# Patient Record
Sex: FEMALE | Race: WHITE | NOT HISPANIC OR LATINO | Employment: OTHER | URBAN - METROPOLITAN AREA
[De-identification: names, ages, dates, MRNs, and addresses within clinical notes are randomized per-mention and may not be internally consistent; named-entity substitution may affect disease eponyms.]

---

## 2017-10-31 ENCOUNTER — HISTORICAL (OUTPATIENT)
Dept: ADMINISTRATIVE | Facility: HOSPITAL | Age: 77
End: 2017-10-31

## 2017-11-27 ENCOUNTER — HISTORICAL (OUTPATIENT)
Dept: ADMINISTRATIVE | Facility: HOSPITAL | Age: 77
End: 2017-11-27

## 2018-01-24 ENCOUNTER — HISTORICAL (OUTPATIENT)
Dept: RADIOLOGY | Facility: HOSPITAL | Age: 78
End: 2018-01-24

## 2018-02-05 ENCOUNTER — HISTORICAL (OUTPATIENT)
Dept: ADMINISTRATIVE | Facility: HOSPITAL | Age: 78
End: 2018-02-05

## 2018-02-05 LAB
ERYTHROCYTE [DISTWIDTH] IN BLOOD BY AUTOMATED COUNT: 17.2 % (ref 11.5–17)
FERRITIN SERPL-MCNC: 5.8 NG/ML (ref 8–388)
FOLATE SERPL-MCNC: 41.5 NG/ML (ref 3.1–17.5)
HCT VFR BLD AUTO: 31.2 % (ref 37–47)
HGB BLD-MCNC: 9.4 GM/DL (ref 12–16)
IRON SATN MFR SERPL: 5.1 % (ref 20–50)
IRON SERPL-MCNC: 25 MCG/DL (ref 50–175)
MCH RBC QN AUTO: 24 PG (ref 27–31)
MCHC RBC AUTO-ENTMCNC: 30.1 GM/DL (ref 33–36)
MCV RBC AUTO: 79.6 FL (ref 80–94)
PLATELET # BLD AUTO: 85 X10(3)/MCL (ref 130–400)
PMV BLD AUTO: 10.9 FL (ref 9.4–12.4)
RBC # BLD AUTO: 3.92 X10(6)/MCL (ref 4.2–5.4)
TIBC SERPL-MCNC: 488 MCG/DL (ref 250–450)
TRANSFERRIN SERPL-MCNC: 363 MG/DL (ref 200–360)
VIT B12 SERPL-MCNC: 1555 PG/ML (ref 193–986)
WBC # SPEC AUTO: 3.1 X10(3)/MCL (ref 4.5–11.5)

## 2018-02-14 ENCOUNTER — HISTORICAL (OUTPATIENT)
Dept: ADMINISTRATIVE | Facility: HOSPITAL | Age: 78
End: 2018-02-14

## 2018-02-14 LAB
ALBUMIN SERPL-MCNC: 3.4 GM/DL (ref 3.4–5)
ALBUMIN/GLOB SERPL: 1.1 RATIO (ref 1.1–2)
ALP SERPL-CCNC: 82 UNIT/L (ref 38–126)
ALT SERPL-CCNC: 23 UNIT/L (ref 12–78)
AST SERPL-CCNC: 32 UNIT/L (ref 15–37)
BILIRUB SERPL-MCNC: 1.1 MG/DL (ref 0.2–1)
BILIRUBIN DIRECT+TOT PNL SERPL-MCNC: 0.3 MG/DL (ref 0–0.5)
BILIRUBIN DIRECT+TOT PNL SERPL-MCNC: 0.8 MG/DL (ref 0–0.8)
BUN SERPL-MCNC: 12 MG/DL (ref 7–18)
CALCIUM SERPL-MCNC: 8.8 MG/DL (ref 8.5–10.1)
CHLORIDE SERPL-SCNC: 108 MMOL/L (ref 98–107)
CO2 SERPL-SCNC: 28 MMOL/L (ref 21–32)
CREAT SERPL-MCNC: 0.88 MG/DL (ref 0.55–1.02)
ERYTHROCYTE [DISTWIDTH] IN BLOOD BY AUTOMATED COUNT: 20.1 % (ref 11.5–17)
GLOBULIN SER-MCNC: 3 GM/DL (ref 2.4–3.5)
GLUCOSE SERPL-MCNC: 122 MG/DL (ref 74–106)
HCT VFR BLD AUTO: 32.4 % (ref 37–47)
HGB BLD-MCNC: 9.3 GM/DL (ref 12–16)
MAGNESIUM SERPL-MCNC: 2.4 MG/DL (ref 1.8–2.4)
MCH RBC QN AUTO: 24.9 PG (ref 27–31)
MCHC RBC AUTO-ENTMCNC: 28.7 GM/DL (ref 33–36)
MCV RBC AUTO: 86.9 FL (ref 80–94)
PLATELET # BLD AUTO: 82 X10(3)/MCL (ref 130–400)
PMV BLD AUTO: 10.8 FL (ref 9.4–12.4)
POTASSIUM SERPL-SCNC: 4.5 MMOL/L (ref 3.5–5.1)
PROT SERPL-MCNC: 6.4 GM/DL (ref 6.4–8.2)
RBC # BLD AUTO: 3.73 X10(6)/MCL (ref 4.2–5.4)
SODIUM SERPL-SCNC: 140 MMOL/L (ref 136–145)
WBC # SPEC AUTO: 3.2 X10(3)/MCL (ref 4.5–11.5)

## 2018-06-06 ENCOUNTER — HISTORICAL (OUTPATIENT)
Dept: ADMINISTRATIVE | Facility: HOSPITAL | Age: 78
End: 2018-06-06

## 2018-06-06 ENCOUNTER — HISTORICAL (OUTPATIENT)
Dept: LAB | Facility: HOSPITAL | Age: 78
End: 2018-06-06

## 2018-06-06 LAB
ABS NEUT (OLG): 3.79 X10(3)/MCL (ref 2.1–9.2)
ALBUMIN SERPL-MCNC: 3.9 GM/DL (ref 3.4–5)
ALBUMIN/GLOB SERPL: 1.2 RATIO (ref 1.1–2)
ALP SERPL-CCNC: 95 UNIT/L (ref 38–126)
ALT SERPL-CCNC: 42 UNIT/L (ref 12–78)
AMYLASE SERPL-CCNC: 84 UNIT/L (ref 25–115)
APPEARANCE, UA: CLEAR
AST SERPL-CCNC: 47 UNIT/L (ref 15–37)
BACTERIA SPEC CULT: ABNORMAL /HPF
BASOPHILS # BLD AUTO: 0 X10(3)/MCL (ref 0–0.2)
BASOPHILS NFR BLD AUTO: 0 %
BILIRUB SERPL-MCNC: 2 MG/DL (ref 0.2–1)
BILIRUB UR QL STRIP: NEGATIVE
BILIRUBIN DIRECT+TOT PNL SERPL-MCNC: 0.4 MG/DL (ref 0–0.5)
BILIRUBIN DIRECT+TOT PNL SERPL-MCNC: 1.6 MG/DL (ref 0–0.8)
BUN SERPL-MCNC: 22 MG/DL (ref 7–18)
CALCIUM SERPL-MCNC: 9.5 MG/DL (ref 8.5–10.1)
CHLORIDE SERPL-SCNC: 102 MMOL/L (ref 98–107)
CO2 SERPL-SCNC: 32 MMOL/L (ref 21–32)
COLOR UR: ABNORMAL
CREAT SERPL-MCNC: 1.03 MG/DL (ref 0.55–1.02)
EOSINOPHIL # BLD AUTO: 0.1 X10(3)/MCL (ref 0–0.9)
EOSINOPHIL NFR BLD AUTO: 1 %
ERYTHROCYTE [DISTWIDTH] IN BLOOD BY AUTOMATED COUNT: 13.8 % (ref 11.5–17)
GLOBULIN SER-MCNC: 3.3 GM/DL (ref 2.4–3.5)
GLUCOSE (UA): NEGATIVE
GLUCOSE SERPL-MCNC: 89 MG/DL (ref 74–106)
H PYLORI AB SER IA-ACNC: NEGATIVE
HCT VFR BLD AUTO: 48.6 % (ref 37–47)
HGB BLD-MCNC: 15.5 GM/DL (ref 12–16)
HGB UR QL STRIP: NEGATIVE
KETONES UR QL STRIP: ABNORMAL
LEUKOCYTE ESTERASE UR QL STRIP: ABNORMAL
LIPASE SERPL-CCNC: 246 UNIT/L (ref 73–393)
LYMPHOCYTES # BLD AUTO: 1 X10(3)/MCL (ref 0.6–4.6)
LYMPHOCYTES NFR BLD AUTO: 18 %
MCH RBC QN AUTO: 32.1 PG (ref 27–31)
MCHC RBC AUTO-ENTMCNC: 31.9 GM/DL (ref 33–36)
MCV RBC AUTO: 100.6 FL (ref 80–94)
MONOCYTES # BLD AUTO: 0.8 X10(3)/MCL (ref 0.1–1.3)
MONOCYTES NFR BLD AUTO: 14 %
NEUTROPHILS # BLD AUTO: 3.79 X10(3)/MCL (ref 1.4–7.9)
NEUTROPHILS NFR BLD AUTO: 65 %
NITRITE UR QL STRIP: NEGATIVE
PH UR STRIP: 6.5 [PH] (ref 5–9)
PLATELET # BLD AUTO: 83 X10(3)/MCL (ref 130–400)
PMV BLD AUTO: 11.6 FL (ref 9.4–12.4)
POTASSIUM SERPL-SCNC: 4.6 MMOL/L (ref 3.5–5.1)
PROT SERPL-MCNC: 7.2 GM/DL (ref 6.4–8.2)
PROT UR QL STRIP: NEGATIVE
RBC # BLD AUTO: 4.83 X10(6)/MCL (ref 4.2–5.4)
RBC #/AREA URNS HPF: ABNORMAL /[HPF]
SODIUM SERPL-SCNC: 139 MMOL/L (ref 136–145)
SP GR UR STRIP: 1.02 (ref 1–1.03)
SQUAMOUS EPITHELIAL, UA: ABNORMAL
UA WBC MAN: ABNORMAL /HPF
UROBILINOGEN UR STRIP-ACNC: 1
WBC # SPEC AUTO: 5.8 X10(3)/MCL (ref 4.5–11.5)

## 2018-09-25 ENCOUNTER — HISTORICAL (OUTPATIENT)
Dept: ADMINISTRATIVE | Facility: HOSPITAL | Age: 78
End: 2018-09-25

## 2018-10-29 ENCOUNTER — HISTORICAL (OUTPATIENT)
Dept: ADMINISTRATIVE | Facility: HOSPITAL | Age: 78
End: 2018-10-29

## 2018-10-29 LAB
ALBUMIN SERPL-MCNC: 3.5 GM/DL (ref 3.4–5)
ALP SERPL-CCNC: 77 UNIT/L (ref 38–126)
ALT SERPL-CCNC: 32 UNIT/L (ref 12–78)
AST SERPL-CCNC: 39 UNIT/L (ref 15–37)
BILIRUB SERPL-MCNC: 2.3 MG/DL (ref 0.2–1)
BILIRUBIN DIRECT+TOT PNL SERPL-MCNC: 0.5 MG/DL (ref 0–0.2)
BILIRUBIN DIRECT+TOT PNL SERPL-MCNC: 1.8 MG/DL (ref 0–0.8)
BUN SERPL-MCNC: 22 MG/DL (ref 7–18)
CALCIUM SERPL-MCNC: 8.7 MG/DL (ref 8.5–10.1)
CHLORIDE SERPL-SCNC: 106 MMOL/L (ref 98–107)
CO2 SERPL-SCNC: 29 MMOL/L (ref 21–32)
CREAT SERPL-MCNC: 0.94 MG/DL (ref 0.55–1.02)
CREAT/UREA NIT SERPL: 23.4
ERYTHROCYTE [DISTWIDTH] IN BLOOD BY AUTOMATED COUNT: 12.6 % (ref 11.5–17)
GLUCOSE SERPL-MCNC: 108 MG/DL (ref 74–106)
HCT VFR BLD AUTO: 43.8 % (ref 37–47)
HGB BLD-MCNC: 13.8 GM/DL (ref 12–16)
LIVER PROFILE INTERP: ABNORMAL
MCH RBC QN AUTO: 31.6 PG (ref 27–31)
MCHC RBC AUTO-ENTMCNC: 31.5 GM/DL (ref 33–36)
MCV RBC AUTO: 100.2 FL (ref 80–94)
PLATELET # BLD AUTO: 54 X10(3)/MCL (ref 130–400)
PMV BLD AUTO: 11.1 FL (ref 9.4–12.4)
POTASSIUM SERPL-SCNC: 4.3 MMOL/L (ref 3.5–5.1)
PROT SERPL-MCNC: 6.4 GM/DL (ref 6.4–8.2)
RBC # BLD AUTO: 4.37 X10(6)/MCL (ref 4.2–5.4)
SODIUM SERPL-SCNC: 141 MMOL/L (ref 136–145)
WBC # SPEC AUTO: 3.2 X10(3)/MCL (ref 4.5–11.5)

## 2019-03-04 ENCOUNTER — HISTORICAL (OUTPATIENT)
Dept: ADMINISTRATIVE | Facility: HOSPITAL | Age: 79
End: 2019-03-04

## 2019-03-04 LAB
ALBUMIN SERPL-MCNC: 3.5 GM/DL (ref 3.4–5)
ALBUMIN/GLOB SERPL: 1 RATIO (ref 1.1–2)
ALP SERPL-CCNC: 81 UNIT/L (ref 38–126)
ALT SERPL-CCNC: 33 UNIT/L (ref 12–78)
APTT PPP: 27.4 SECOND(S) (ref 24.8–36.9)
AST SERPL-CCNC: 38 UNIT/L (ref 15–37)
BILIRUB SERPL-MCNC: 1.9 MG/DL (ref 0.2–1)
BILIRUBIN DIRECT+TOT PNL SERPL-MCNC: 0.5 MG/DL (ref 0–0.5)
BILIRUBIN DIRECT+TOT PNL SERPL-MCNC: 1.4 MG/DL (ref 0–0.8)
BUN SERPL-MCNC: 15 MG/DL (ref 7–18)
CALCIUM SERPL-MCNC: 8.9 MG/DL (ref 8.5–10.1)
CHLORIDE SERPL-SCNC: 106 MMOL/L (ref 98–107)
CHOLEST SERPL-MCNC: 186 MG/DL (ref 0–200)
CHOLEST/HDLC SERPL: 2.5 {RATIO} (ref 0–4)
CO2 SERPL-SCNC: 31 MMOL/L (ref 21–32)
CREAT SERPL-MCNC: 0.81 MG/DL (ref 0.55–1.02)
ERYTHROCYTE [DISTWIDTH] IN BLOOD BY AUTOMATED COUNT: 13.5 % (ref 11.5–17)
GLOBULIN SER-MCNC: 3.4 GM/DL (ref 2.4–3.5)
GLUCOSE SERPL-MCNC: 105 MG/DL (ref 74–106)
HCT VFR BLD AUTO: 46.1 % (ref 37–47)
HDLC SERPL-MCNC: 75 MG/DL (ref 35–60)
HGB BLD-MCNC: 14.5 GM/DL (ref 12–16)
INR PPP: 1.1 (ref 0–1.3)
LDLC SERPL CALC-MCNC: 95 MG/DL (ref 0–129)
MAGNESIUM SERPL-MCNC: 2 MG/DL (ref 1.8–2.4)
MCH RBC QN AUTO: 31.3 PG (ref 27–31)
MCHC RBC AUTO-ENTMCNC: 31.5 GM/DL (ref 33–36)
MCV RBC AUTO: 99.6 FL (ref 80–94)
PLATELET # BLD AUTO: 61 X10(3)/MCL (ref 130–400)
PMV BLD AUTO: 10.7 FL (ref 9.4–12.4)
POTASSIUM SERPL-SCNC: 3.9 MMOL/L (ref 3.5–5.1)
PROT SERPL-MCNC: 6.9 GM/DL (ref 6.4–8.2)
PROTHROMBIN TIME: 14.6 SECOND(S) (ref 12.2–14.7)
RBC # BLD AUTO: 4.63 X10(6)/MCL (ref 4.2–5.4)
SODIUM SERPL-SCNC: 140 MMOL/L (ref 136–145)
TRIGL SERPL-MCNC: 79 MG/DL (ref 30–150)
TSH SERPL-ACNC: 2.72 MIU/L (ref 0.36–3.74)
VLDLC SERPL CALC-MCNC: 16 MG/DL
WBC # SPEC AUTO: 3.7 X10(3)/MCL (ref 4.5–11.5)

## 2019-03-27 ENCOUNTER — HISTORICAL (OUTPATIENT)
Dept: ADMINISTRATIVE | Facility: HOSPITAL | Age: 79
End: 2019-03-27

## 2019-03-27 LAB
INFLUENZA A ANTIGEN, POC: POSITIVE
INFLUENZA B ANTIGEN, POC: NEGATIVE

## 2019-06-12 ENCOUNTER — HISTORICAL (OUTPATIENT)
Dept: LAB | Facility: HOSPITAL | Age: 79
End: 2019-06-12

## 2019-06-12 LAB
ABS NEUT (OLG): 2.9 X10(3)/MCL (ref 2.1–9.2)
ALBUMIN SERPL-MCNC: 3.7 GM/DL (ref 3.4–5)
ALBUMIN/GLOB SERPL: 1.2 RATIO (ref 1.1–2)
ALP SERPL-CCNC: 78 UNIT/L (ref 38–126)
ALT SERPL-CCNC: 28 UNIT/L (ref 12–78)
AST SERPL-CCNC: 37 UNIT/L (ref 15–37)
BASOPHILS # BLD AUTO: 0 X10(3)/MCL (ref 0–0.2)
BASOPHILS NFR BLD AUTO: 0 %
BILIRUB SERPL-MCNC: 1.9 MG/DL (ref 0.2–1)
BILIRUBIN DIRECT+TOT PNL SERPL-MCNC: 0.4 MG/DL (ref 0–0.5)
BILIRUBIN DIRECT+TOT PNL SERPL-MCNC: 1.5 MG/DL (ref 0–0.8)
BUN SERPL-MCNC: 23 MG/DL (ref 7–18)
CALCIUM SERPL-MCNC: 9.2 MG/DL (ref 8.5–10.1)
CHLORIDE SERPL-SCNC: 107 MMOL/L (ref 98–107)
CO2 SERPL-SCNC: 30 MMOL/L (ref 21–32)
CREAT SERPL-MCNC: 0.97 MG/DL (ref 0.55–1.02)
EOSINOPHIL # BLD AUTO: 0 X10(3)/MCL (ref 0–0.9)
EOSINOPHIL NFR BLD AUTO: 1 %
ERYTHROCYTE [DISTWIDTH] IN BLOOD BY AUTOMATED COUNT: 13.8 % (ref 11.5–17)
FERRITIN SERPL-MCNC: 46.5 NG/ML (ref 8–388)
GLOBULIN SER-MCNC: 3.2 GM/DL (ref 2.4–3.5)
GLUCOSE SERPL-MCNC: 72 MG/DL (ref 74–106)
HCT VFR BLD AUTO: 47.6 % (ref 37–47)
HGB BLD-MCNC: 14.7 GM/DL (ref 12–16)
IRON SATN MFR SERPL: 34 % (ref 20–50)
IRON SERPL-MCNC: 117 MCG/DL (ref 50–175)
LYMPHOCYTES # BLD AUTO: 0.7 X10(3)/MCL (ref 0.6–4.6)
LYMPHOCYTES NFR BLD AUTO: 17 %
MCH RBC QN AUTO: 31.3 PG (ref 27–31)
MCHC RBC AUTO-ENTMCNC: 30.9 GM/DL (ref 33–36)
MCV RBC AUTO: 101.5 FL (ref 80–94)
MONOCYTES # BLD AUTO: 0.5 X10(3)/MCL (ref 0.1–1.3)
MONOCYTES NFR BLD AUTO: 12 %
NEUTROPHILS # BLD AUTO: 2.9 X10(3)/MCL (ref 2.1–9.2)
NEUTROPHILS NFR BLD AUTO: 69 %
PLATELET # BLD AUTO: 67 X10(3)/MCL (ref 130–400)
PMV BLD AUTO: 11.9 FL (ref 9.4–12.4)
POTASSIUM SERPL-SCNC: 4.5 MMOL/L (ref 3.5–5.1)
PROT SERPL-MCNC: 6.9 GM/DL (ref 6.4–8.2)
RBC # BLD AUTO: 4.69 X10(6)/MCL (ref 4.2–5.4)
SODIUM SERPL-SCNC: 142 MMOL/L (ref 136–145)
TIBC SERPL-MCNC: 344 MCG/DL (ref 250–450)
TRANSFERRIN SERPL-MCNC: 265 MG/DL (ref 200–360)
VIT B12 SERPL-MCNC: 1408 PG/ML (ref 193–986)
WBC # SPEC AUTO: 4.2 X10(3)/MCL (ref 4.5–11.5)

## 2019-10-16 ENCOUNTER — HISTORICAL (OUTPATIENT)
Dept: ADMINISTRATIVE | Facility: HOSPITAL | Age: 79
End: 2019-10-16

## 2019-10-16 LAB
ABS NEUT (OLG): 1.61 X10(3)/MCL (ref 2.1–9.2)
ACANTHOCYTES (OLG): 0
ALBUMIN SERPL-MCNC: 3.4 GM/DL (ref 3.4–5)
ALP SERPL-CCNC: 96 UNIT/L (ref 38–126)
ALT SERPL-CCNC: 34 UNIT/L (ref 12–78)
AST SERPL-CCNC: 42 UNIT/L (ref 15–37)
BILIRUB SERPL-MCNC: 1.7 MG/DL (ref 0.2–1)
BILIRUBIN DIRECT+TOT PNL SERPL-MCNC: 0.5 MG/DL (ref 0–0.5)
BILIRUBIN DIRECT+TOT PNL SERPL-MCNC: 1.2 MG/DL (ref 0–0.8)
BUN SERPL-MCNC: 14 MG/DL (ref 7–18)
BURR CELLS BLD QL SMEAR: 0
CALCIUM SERPL-MCNC: 8.7 MG/DL (ref 8.5–10.1)
CHLORIDE SERPL-SCNC: 109 MMOL/L (ref 98–107)
CO2 SERPL-SCNC: 30 MMOL/L (ref 21–32)
CREAT SERPL-MCNC: 0.89 MG/DL (ref 0.55–1.02)
CREAT/UREA NIT SERPL: 15.7
DACRYOCYTES BLD QL SMEAR: 0
ERYTHROCYTE [DISTWIDTH] IN BLOOD BY AUTOMATED COUNT: 13.4 % (ref 11.5–17)
GLUCOSE SERPL-MCNC: 78 MG/DL (ref 74–106)
HCT VFR BLD AUTO: 44 % (ref 37–47)
HGB BLD-MCNC: 13.7 GM/DL (ref 12–16)
LIVER PROFILE INTERP: ABNORMAL
LYMPHOCYTES NFR BLD MANUAL: 19 % (ref 13–40)
MCH RBC QN AUTO: 31.1 PG (ref 27–31)
MCHC RBC AUTO-ENTMCNC: 31.1 GM/DL (ref 33–36)
MCV RBC AUTO: 99.8 FL (ref 80–94)
MONOCYTES NFR BLD MANUAL: 10 % (ref 2–11)
NEUTROPHILS NFR BLD MANUAL: 70 % (ref 47–80)
OVALOCYTES BLD QL SMEAR: 0
PLATELET # BLD AUTO: 54 X10(3)/MCL (ref 130–400)
PLATELET # BLD EST: NORMAL 10*3/UL
PMV BLD AUTO: 11.2 FL (ref 7.4–10.4)
POTASSIUM SERPL-SCNC: 4 MMOL/L (ref 3.5–5.1)
PROT SERPL-MCNC: 6.5 GM/DL (ref 6.4–8.2)
RBC # BLD AUTO: 4.41 X10(6)/MCL (ref 4.2–5.4)
SODIUM SERPL-SCNC: 142 MMOL/L (ref 136–145)
SPHEROCYTES BLD QL SMEAR: 0
WBC # SPEC AUTO: 2.9 X10(3)/MCL (ref 4.5–11.5)

## 2019-12-19 ENCOUNTER — HISTORICAL (OUTPATIENT)
Dept: ADMINISTRATIVE | Facility: HOSPITAL | Age: 79
End: 2019-12-19

## 2019-12-19 LAB
BUN SERPL-MCNC: 16 MG/DL (ref 7–18)
CREAT SERPL-MCNC: 0.84 MG/DL (ref 0.55–1.02)

## 2020-03-05 ENCOUNTER — HISTORICAL (OUTPATIENT)
Dept: ADMINISTRATIVE | Facility: HOSPITAL | Age: 80
End: 2020-03-05

## 2020-03-05 LAB
ABS NEUT (OLG): 2.03 X10(3)/MCL (ref 2.1–9.2)
ALBUMIN SERPL-MCNC: 3 GM/DL (ref 3.4–5)
ALBUMIN/GLOB SERPL: 0.9 {RATIO}
ALP SERPL-CCNC: 97 UNIT/L (ref 38–126)
ALT SERPL-CCNC: 39 UNIT/L (ref 12–78)
AST SERPL-CCNC: 49 UNIT/L (ref 15–37)
BASOPHILS NFR BLD MANUAL: 1 % (ref 0–2)
BILIRUB SERPL-MCNC: 2.1 MG/DL (ref 0.2–1)
BILIRUBIN DIRECT+TOT PNL SERPL-MCNC: 0.5 MG/DL (ref 0–0.2)
BILIRUBIN DIRECT+TOT PNL SERPL-MCNC: 1.6 MG/DL (ref 0–0.8)
BUN SERPL-MCNC: 19 MG/DL (ref 7–18)
CALCIUM SERPL-MCNC: 8.7 MG/DL (ref 8.5–10.1)
CHLORIDE SERPL-SCNC: 107 MMOL/L (ref 98–107)
CO2 SERPL-SCNC: 28 MMOL/L (ref 21–32)
CREAT SERPL-MCNC: 0.98 MG/DL (ref 0.55–1.02)
EOSINOPHIL NFR BLD MANUAL: 4 % (ref 0–8)
ERYTHROCYTE [DISTWIDTH] IN BLOOD BY AUTOMATED COUNT: 14.5 % (ref 11.5–17)
GLOBULIN SER-MCNC: 3.4 GM/DL (ref 2.4–3.5)
GLUCOSE SERPL-MCNC: 179 MG/DL (ref 74–106)
HCT VFR BLD AUTO: 46.3 % (ref 37–47)
HGB BLD-MCNC: 14 GM/DL (ref 12–16)
LYMPHOCYTES NFR BLD MANUAL: 17 % (ref 13–40)
MCH RBC QN AUTO: 30.3 PG (ref 27–31)
MCHC RBC AUTO-ENTMCNC: 30.2 GM/DL (ref 33–36)
MCV RBC AUTO: 100.2 FL (ref 80–94)
MONOCYTES NFR BLD MANUAL: 3 % (ref 2–11)
NEUTROPHILS NFR BLD MANUAL: 75 % (ref 47–80)
PLATELET # BLD AUTO: 60 X10(3)/MCL (ref 130–400)
PLATELET # BLD EST: NORMAL 10*3/UL
PMV BLD AUTO: 12.1 FL (ref 7.4–10.4)
POTASSIUM SERPL-SCNC: 3.8 MMOL/L (ref 3.5–5.1)
PROT SERPL-MCNC: 6.4 GM/DL (ref 6.4–8.2)
RBC # BLD AUTO: 4.62 X10(6)/MCL (ref 4.2–5.4)
SODIUM SERPL-SCNC: 140 MMOL/L (ref 136–145)
WBC # SPEC AUTO: 2.9 X10(3)/MCL (ref 4.5–11.5)

## 2020-03-06 ENCOUNTER — HISTORICAL (OUTPATIENT)
Dept: ADMINISTRATIVE | Facility: HOSPITAL | Age: 80
End: 2020-03-06

## 2020-03-06 LAB
CHOLEST SERPL-MCNC: 190 MG/DL (ref 0–200)
CHOLEST/HDLC SERPL: 2.2 {RATIO} (ref 0–4)
HDLC SERPL-MCNC: 87 MG/DL (ref 35–60)
LDLC SERPL CALC-MCNC: 89 MG/DL (ref 0–129)
TRIGL SERPL-MCNC: 69 MG/DL (ref 30–150)
VLDLC SERPL CALC-MCNC: 14 MG/DL

## 2020-06-27 ENCOUNTER — HISTORICAL (OUTPATIENT)
Dept: URGENT CARE | Facility: CLINIC | Age: 80
End: 2020-06-27

## 2020-07-22 ENCOUNTER — HISTORICAL (OUTPATIENT)
Dept: ADMINISTRATIVE | Facility: HOSPITAL | Age: 80
End: 2020-07-22

## 2020-07-22 LAB
ABS NEUT (OLG): 3.53 X10(3)/MCL (ref 2.1–9.2)
ALBUMIN SERPL-MCNC: 3.1 GM/DL (ref 3.4–5)
ALBUMIN/GLOB SERPL: 0.9 RATIO (ref 1.1–2)
ALP SERPL-CCNC: 112 UNIT/L (ref 40–150)
ALT SERPL-CCNC: 29 UNIT/L (ref 0–55)
AST SERPL-CCNC: 48 UNIT/L (ref 5–34)
BASOPHILS # BLD AUTO: 0 X10(3)/MCL (ref 0–0.2)
BASOPHILS NFR BLD AUTO: 0 %
BILIRUB SERPL-MCNC: 1.3 MG/DL
BILIRUBIN DIRECT+TOT PNL SERPL-MCNC: 0.5 MG/DL (ref 0–0.5)
BILIRUBIN DIRECT+TOT PNL SERPL-MCNC: 0.8 MG/DL (ref 0–0.8)
BUN SERPL-MCNC: 17.2 MG/DL (ref 9.8–20.1)
CALCIUM SERPL-MCNC: 9.2 MG/DL (ref 8.4–10.2)
CHLORIDE SERPL-SCNC: 99 MMOL/L (ref 98–107)
CHOLEST SERPL-MCNC: 172 MG/DL
CHOLEST/HDLC SERPL: 3 {RATIO} (ref 0–5)
CO2 SERPL-SCNC: 28 MMOL/L (ref 23–31)
CREAT SERPL-MCNC: 0.83 MG/DL (ref 0.55–1.02)
EOSINOPHIL # BLD AUTO: 0 X10(3)/MCL (ref 0–0.9)
EOSINOPHIL NFR BLD AUTO: 0 %
ERYTHROCYTE [DISTWIDTH] IN BLOOD BY AUTOMATED COUNT: 13.5 % (ref 11.5–17)
EST. AVERAGE GLUCOSE BLD GHB EST-MCNC: 114 MG/DL
GLOBULIN SER-MCNC: 3.4 GM/DL (ref 2.4–3.5)
GLUCOSE SERPL-MCNC: 174 MG/DL (ref 82–115)
HBA1C MFR BLD: 5.6 %
HCT VFR BLD AUTO: 42.6 % (ref 37–47)
HDLC SERPL-MCNC: 54 MG/DL (ref 35–60)
HGB BLD-MCNC: 13.5 GM/DL (ref 12–16)
IMM GRANULOCYTES # BLD AUTO: 0 10*3/UL
IMM GRANULOCYTES NFR BLD AUTO: 2 %
LDLC SERPL CALC-MCNC: 105 MG/DL (ref 50–140)
LYMPHOCYTES # BLD AUTO: 0.6 X10(3)/MCL (ref 0.6–4.6)
LYMPHOCYTES NFR BLD AUTO: 12 %
MCH RBC QN AUTO: 31 PG (ref 27–31)
MCHC RBC AUTO-ENTMCNC: 31.7 GM/DL (ref 33–36)
MCV RBC AUTO: 97.7 FL (ref 80–94)
MONOCYTES # BLD AUTO: 0.5 X10(3)/MCL (ref 0.1–1.3)
MONOCYTES NFR BLD AUTO: 11 %
NEUTROPHILS # BLD AUTO: 3.53 X10(3)/MCL (ref 2.1–9.2)
NEUTROPHILS NFR BLD AUTO: 74 %
PLATELET # BLD AUTO: 107 X10(3)/MCL (ref 130–400)
PMV BLD AUTO: 10.6 FL (ref 9.4–12.4)
POTASSIUM SERPL-SCNC: 4.6 MMOL/L (ref 3.5–5.1)
PROT SERPL-MCNC: 6.5 GM/DL (ref 5.8–7.6)
RBC # BLD AUTO: 4.36 X10(6)/MCL (ref 4.2–5.4)
SODIUM SERPL-SCNC: 137 MMOL/L (ref 136–145)
TRIGL SERPL-MCNC: 67 MG/DL (ref 37–140)
VLDLC SERPL CALC-MCNC: 13 MG/DL
WBC # SPEC AUTO: 4.8 X10(3)/MCL (ref 4.5–11.5)

## 2021-01-18 ENCOUNTER — HISTORICAL (OUTPATIENT)
Dept: ADMINISTRATIVE | Facility: HOSPITAL | Age: 81
End: 2021-01-18

## 2021-02-04 ENCOUNTER — HISTORICAL (OUTPATIENT)
Dept: ADMINISTRATIVE | Facility: HOSPITAL | Age: 81
End: 2021-02-04

## 2021-02-04 LAB
ABS NEUT (OLG): 2.56 X10(3)/MCL (ref 2.1–9.2)
AFP-TM SERPL-MCNC: 4.8 NG/ML
ALBUMIN SERPL-MCNC: 3.7 GM/DL (ref 3.4–4.8)
ALBUMIN/GLOB SERPL: 1.2 RATIO (ref 1.1–2)
ALP SERPL-CCNC: 87 UNIT/L (ref 40–150)
ALT SERPL-CCNC: 22 UNIT/L (ref 0–55)
AST SERPL-CCNC: 34 UNIT/L (ref 5–34)
BASOPHILS # BLD AUTO: 0 X10(3)/MCL (ref 0–0.2)
BASOPHILS NFR BLD AUTO: 0 %
BILIRUB SERPL-MCNC: 1.4 MG/DL
BILIRUBIN DIRECT+TOT PNL SERPL-MCNC: 0.6 MG/DL (ref 0–0.5)
BILIRUBIN DIRECT+TOT PNL SERPL-MCNC: 0.8 MG/DL (ref 0–0.8)
BUN SERPL-MCNC: 16.4 MG/DL (ref 9.8–20.1)
CALCIUM SERPL-MCNC: 9.6 MG/DL (ref 8.4–10.2)
CHLORIDE SERPL-SCNC: 104 MMOL/L (ref 98–107)
CO2 SERPL-SCNC: 32 MMOL/L (ref 23–31)
CREAT SERPL-MCNC: 0.89 MG/DL (ref 0.55–1.02)
EOSINOPHIL # BLD AUTO: 0 X10(3)/MCL (ref 0–0.9)
EOSINOPHIL NFR BLD AUTO: 1 %
ERYTHROCYTE [DISTWIDTH] IN BLOOD BY AUTOMATED COUNT: 13.9 % (ref 11.5–17)
GLOBULIN SER-MCNC: 3 GM/DL (ref 2.4–3.5)
GLUCOSE SERPL-MCNC: 122 MG/DL (ref 82–115)
HCT VFR BLD AUTO: 42.2 % (ref 37–47)
HGB BLD-MCNC: 13.2 GM/DL (ref 12–16)
LYMPHOCYTES # BLD AUTO: 0.7 X10(3)/MCL (ref 0.6–4.6)
LYMPHOCYTES NFR BLD AUTO: 18 %
MCH RBC QN AUTO: 31.3 PG (ref 27–31)
MCHC RBC AUTO-ENTMCNC: 31.3 GM/DL (ref 33–36)
MCV RBC AUTO: 100 FL (ref 80–94)
MONOCYTES # BLD AUTO: 0.5 X10(3)/MCL (ref 0.1–1.3)
MONOCYTES NFR BLD AUTO: 14 %
NEUTROPHILS # BLD AUTO: 2.56 X10(3)/MCL (ref 2.1–9.2)
NEUTROPHILS NFR BLD AUTO: 66 %
PLATELET # BLD AUTO: 71 X10(3)/MCL (ref 130–400)
PMV BLD AUTO: 11.5 FL (ref 9.4–12.4)
POTASSIUM SERPL-SCNC: 4.5 MMOL/L (ref 3.5–5.1)
PROT SERPL-MCNC: 6.7 GM/DL (ref 5.8–7.6)
RBC # BLD AUTO: 4.22 X10(6)/MCL (ref 4.2–5.4)
SODIUM SERPL-SCNC: 141 MMOL/L (ref 136–145)
WBC # SPEC AUTO: 3.9 X10(3)/MCL (ref 4.5–11.5)

## 2021-02-11 ENCOUNTER — HISTORICAL (OUTPATIENT)
Dept: RADIOLOGY | Facility: HOSPITAL | Age: 81
End: 2021-02-11

## 2021-02-22 ENCOUNTER — HISTORICAL (OUTPATIENT)
Dept: ADMINISTRATIVE | Facility: HOSPITAL | Age: 81
End: 2021-02-22

## 2021-06-17 ENCOUNTER — HISTORICAL (OUTPATIENT)
Dept: ADMINISTRATIVE | Facility: HOSPITAL | Age: 81
End: 2021-06-17

## 2021-08-24 ENCOUNTER — HISTORICAL (OUTPATIENT)
Dept: ADMINISTRATIVE | Facility: HOSPITAL | Age: 81
End: 2021-08-24

## 2021-09-20 ENCOUNTER — HISTORICAL (OUTPATIENT)
Dept: ADMINISTRATIVE | Facility: HOSPITAL | Age: 81
End: 2021-09-20

## 2021-09-20 LAB
ABS NEUT (OLG): 2.27 X10(3)/MCL (ref 2.1–9.2)
ALBUMIN SERPL-MCNC: 3.2 GM/DL (ref 3.4–4.8)
ALBUMIN/GLOB SERPL: 1.1 RATIO (ref 1.1–2)
ALP SERPL-CCNC: 87 UNIT/L (ref 40–150)
ALT SERPL-CCNC: 25 UNIT/L (ref 0–55)
AST SERPL-CCNC: 37 UNIT/L (ref 5–34)
BASOPHILS # BLD AUTO: 0 X10(3)/MCL (ref 0–0.2)
BASOPHILS NFR BLD AUTO: 1 %
BILIRUB SERPL-MCNC: 2.2 MG/DL
BILIRUBIN DIRECT+TOT PNL SERPL-MCNC: 0.7 MG/DL (ref 0–0.5)
BILIRUBIN DIRECT+TOT PNL SERPL-MCNC: 1.5 MG/DL (ref 0–0.8)
BUN SERPL-MCNC: 31.8 MG/DL (ref 9.8–20.1)
CALCIUM SERPL-MCNC: 9.4 MG/DL (ref 8.4–10.2)
CHLORIDE SERPL-SCNC: 110 MMOL/L (ref 98–107)
CHOLEST SERPL-MCNC: 181 MG/DL
CHOLEST/HDLC SERPL: 4 {RATIO} (ref 0–5)
CO2 SERPL-SCNC: 26 MMOL/L (ref 23–31)
CREAT SERPL-MCNC: 1.1 MG/DL (ref 0.55–1.02)
EOSINOPHIL # BLD AUTO: 0.1 X10(3)/MCL (ref 0–0.9)
EOSINOPHIL NFR BLD AUTO: 2 %
ERYTHROCYTE [DISTWIDTH] IN BLOOD BY AUTOMATED COUNT: 14.6 % (ref 11.5–17)
GLOBULIN SER-MCNC: 2.9 GM/DL (ref 2.4–3.5)
GLUCOSE SERPL-MCNC: 115 MG/DL (ref 82–115)
HCT VFR BLD AUTO: 42.6 % (ref 37–47)
HDLC SERPL-MCNC: 49 MG/DL (ref 35–60)
HGB BLD-MCNC: 13.2 GM/DL (ref 12–16)
LDLC SERPL CALC-MCNC: 114 MG/DL (ref 50–140)
LYMPHOCYTES # BLD AUTO: 0.7 X10(3)/MCL (ref 0.6–4.6)
LYMPHOCYTES NFR BLD AUTO: 19 %
MAGNESIUM SERPL-MCNC: 2.3 MG/DL (ref 1.6–2.6)
MCH RBC QN AUTO: 31.1 PG (ref 27–31)
MCHC RBC AUTO-ENTMCNC: 31 GM/DL (ref 33–36)
MCV RBC AUTO: 100.2 FL (ref 80–94)
MONOCYTES # BLD AUTO: 0.5 X10(3)/MCL (ref 0.1–1.3)
MONOCYTES NFR BLD AUTO: 15 %
NEUTROPHILS # BLD AUTO: 2.27 X10(3)/MCL (ref 2.1–9.2)
NEUTROPHILS NFR BLD AUTO: 62 %
PLATELET # BLD AUTO: 71 X10(3)/MCL (ref 130–400)
PMV BLD AUTO: 11.1 FL (ref 9.4–12.4)
POTASSIUM SERPL-SCNC: 4.4 MMOL/L (ref 3.5–5.1)
PROT SERPL-MCNC: 6.1 GM/DL (ref 5.8–7.6)
RBC # BLD AUTO: 4.25 X10(6)/MCL (ref 4.2–5.4)
SODIUM SERPL-SCNC: 143 MMOL/L (ref 136–145)
TRIGL SERPL-MCNC: 89 MG/DL (ref 37–140)
TSH SERPL-ACNC: 1.96 UIU/ML (ref 0.35–4.94)
VLDLC SERPL CALC-MCNC: 18 MG/DL
WBC # SPEC AUTO: 3.6 X10(3)/MCL (ref 4.5–11.5)

## 2022-01-11 ENCOUNTER — HISTORICAL (OUTPATIENT)
Dept: ADMINISTRATIVE | Facility: HOSPITAL | Age: 82
End: 2022-01-11

## 2022-01-11 LAB
ALLERGEN DOG DANDER IGE (OLG): <0.1 KU/L
INFLUENZA A ANTIGEN, POC: NEGATIVE
INFLUENZA B ANTIGEN, POC: NEGATIVE
SARS-COV-2 RNA RESP QL NAA+PROBE: NEGATIVE

## 2022-04-07 ENCOUNTER — HISTORICAL (OUTPATIENT)
Dept: ADMINISTRATIVE | Facility: HOSPITAL | Age: 82
End: 2022-04-07

## 2022-04-07 LAB
ABS NEUT (OLG): 2.1 (ref 2.1–9.2)
ALBUMIN SERPL-MCNC: 3.6 G/DL (ref 3.4–4.8)
ALBUMIN/GLOB SERPL: 1.1 {RATIO} (ref 1.1–2)
ALP SERPL-CCNC: 130 U/L (ref 40–150)
ALT SERPL-CCNC: 25 U/L (ref 0–55)
AST SERPL-CCNC: 49 U/L (ref 5–34)
BASOPHILS NFR BLD MANUAL: 1 % (ref 0–2)
BILIRUB SERPL-MCNC: 1.9 MG/DL
BILIRUBIN DIRECT+TOT PNL SERPL-MCNC: 0.7 (ref 0–0.5)
BILIRUBIN DIRECT+TOT PNL SERPL-MCNC: 1.2 (ref 0–0.8)
BUN SERPL-MCNC: 15.8 MG/DL (ref 9.8–20.1)
CALCIUM SERPL-MCNC: 9.5 MG/DL (ref 8.7–10.5)
CHLORIDE SERPL-SCNC: 105 MMOL/L (ref 98–107)
CHOLEST SERPL-MCNC: 205 MG/DL
CHOLEST/HDLC SERPL: 3 {RATIO} (ref 0–5)
CO2 SERPL-SCNC: 28 MMOL/L (ref 23–31)
CREAT SERPL-MCNC: 0.79 MG/DL (ref 0.55–1.02)
DEPRECATED CALCIDIOL+CALCIFEROL SERPL-MC: 35.4 NG/ML (ref 30–80)
EOSINOPHIL NFR BLD MANUAL: 1 % (ref 0–8)
ERYTHROCYTE [DISTWIDTH] IN BLOOD BY AUTOMATED COUNT: 14.1 % (ref 11.5–17)
GLOBULIN SER-MCNC: 3.2 G/DL (ref 2.4–3.5)
GLUCOSE SERPL-MCNC: 77 MG/DL (ref 82–115)
HCT VFR BLD AUTO: 44.5 % (ref 37–47)
HDLC SERPL-MCNC: 66 MG/DL (ref 35–60)
HEMOLYSIS INTERF INDEX SERPL-ACNC: 5
HGB BLD-MCNC: 13.8 G/DL (ref 12–16)
ICTERIC INTERF INDEX SERPL-ACNC: 2
LDLC SERPL CALC-MCNC: 126 MG/DL (ref 50–140)
LIPEMIC INTERF INDEX SERPL-ACNC: <0
LYMPHOCYTES NFR BLD MANUAL: 15 % (ref 13–40)
MANUAL DIFF? (OHS): YES
MCH RBC QN AUTO: 30.1 PG (ref 27–31)
MCHC RBC AUTO-ENTMCNC: 31 G/DL (ref 33–36)
MCV RBC AUTO: 96.9 FL (ref 80–94)
MONOCYTES NFR BLD MANUAL: 10 % (ref 2–11)
NEUTROPHILS NFR BLD MANUAL: 72 % (ref 47–80)
PLATELET # BLD AUTO: 69 10*3/UL (ref 130–400)
PLATELET # BLD EST: NORMAL 10*3/UL
PMV BLD AUTO: 11.4 FL (ref 9.4–12.4)
POS ERR2 (OHS): NORMAL
POTASSIUM SERPL-SCNC: 4.1 MMOL/L (ref 3.5–5.1)
PROT SERPL-MCNC: 6.8 G/DL (ref 5.8–7.6)
RBC # BLD AUTO: 4.59 10*6/UL (ref 4.2–5.4)
RBC MORPH BLD: NORMAL
SODIUM SERPL-SCNC: 141 MMOL/L (ref 136–145)
TRIGL SERPL-MCNC: 64 MG/DL (ref 37–140)
TSH SERPL-ACNC: 1.66 M[IU]/L (ref 0.35–4.94)
VERIFY DIFF SUSPECT FLAG (OHS): NORMAL
VIT B12 SERPL-MCNC: 1110 PG/ML (ref 213–816)
VLDLC SERPL CALC-MCNC: 13 MG/DL
WBC # SPEC AUTO: 3.6 10*3/UL (ref 4.5–11.5)

## 2022-04-30 NOTE — OP NOTE
Patient:   Joy Donahue            MRN: [id number]            FIN: [id number]               Age:   80 years     Sex:  Female     :  1940   Associated Diagnoses:   None   Author:   Maya Toney MD      NAME: Joy Donahue  SURGEON:  Maya Toney MD    YOB: 1940  DATE OF SURGERY: 2021    PREOPERATIVE DIAGNOSIS:  Cataract, left eye.    POSTOPERATIVE DIAGNOSIS:  Cataract, left eye.    PROCEDURE:  Cataract extraction with intraocular lens placement, left eye.    HISTORY:  The patient is an 80 year-old female, who presented to the clinic with a 2-3+ nuclear sclerotic cataract causing difficulty in patient's activities of daily living. After thorough discussion of risks, benefits, alternatives and complications, the patient expressed understanding and wished to proceed with cataract extraction.  Patient has decided to proceed with FLACS and multifocal IOL in the operative eye.    PROCEDURE IN DETAIL:  On the day of surgery patient was brought to the preoperative holding area, where patient was given five drops each of phenylephrine, tropicamide and Cyclopentolate into the operative eye.    Patient was then brought to the [facility] laser suite, where patient was given Marcaine drops into the operative eye, and toric markings were made.  Laser was used to create capsulotomy, fragment lens..  Patient was then brought to the operating room.  Patient was prepped and draped in normal sterile fashion.  A lid speculum was inserted.  Using operating microscope, 1.0-mm keratome was used to create a side port wound through which 1% epishugarcaine was injected, followed by Viscoat.  A 2.4 mm keratome was used to create triplanar phacoemulsification wound, through which continuous tear capsulorrhexis was performed using cystotome and Utrata forceps. Hydrodissection and delineation were performed until lens was rotating freely in capsular bag.  Phacoemulsification was carried out in divide  and conquer fashion to remove nuclear and epinuclear fragments.  I/A was used to remove cortex carefully.  Healon was injected into the capsular bag, which was found to be free of tears or defect.  A ZLBOO +20.5 diopter lens was inserted and carefully rotated into place.  Viscoelastic was removed from the eye.  Wounds were sealed using hydration.   Lens was in excellent position at end of case.  Gati/Brom/Pred drops were placed into the patient's eye, which was patched and shielded.  The patient tolerated the procedure well and will followup tomorrow in clinic.

## 2022-04-30 NOTE — OP NOTE
Patient:   Joy Donahue            MRN: [id number]            FIN: [id number]               Age:   80 years     Sex:  Female     :  1940   Associated Diagnoses:   None   Author:   Maya Toney MD      NAME: Joy Donahue  SURGEON:  Maya Toney MD    YOB: 1940  DATE OF SURGERY: 1940    PREOPERATIVE DIAGNOSIS:  Cataract, right eye.    POSTOPERATIVE DIAGNOSIS:  Cataract, right eye.    PROCEDURE:  Cataract extraction with intraocular lens placement, right eye.    HISTORY:  The patient is an 80 year-old female, who presented to the clinic with a 2-3+ nuclear sclerotic cataract causing difficulty in patient's activities of daily living. After thorough discussion of risks, benefits, alternatives and complications, the patient expressed understanding and wished to proceed with cataract extraction.  Patient has decided to proceed with FLACS, LRI  and multifocal IOL in the operative eye.    PROCEDURE IN DETAIL:  On the day of surgery patient was brought to the preoperative holding area, where patient was given five drops each of phenylephrine, tropicamide and Cyclopentolate into the operative eye.    Patient was then brought to the [facility] laser suite, where patient was given Marcaine drops into the operative eye, and toric markings were made.  Laser was used to create capsulotomy, LRI, and fragment lens..  Patient was then brought to the operating room.  Patient was prepped and draped in normal sterile fashion.  A lid speculum was inserted.  Using operating microscope, 1.0-mm keratome was used to create a side port wound through which 1% epishugarcaine was injected, followed by Viscoat.  A 2.4 mm keratome was used to create triplanar phacoemulsification wound, through which continuous tear capsulorrhexis was performed using cystotome and Utrata forceps. Hydrodissection and delineation were performed until lens was rotating freely in capsular bag.  Phacoemulsification was  carried out in divide and conquer fashion to remove nuclear and epinuclear fragments.  I/A was used to remove cortex carefully.  Healon was injected into the capsular bag, which was found to be free of tears or defect.  A ZLBOO +20.0 diopter lens was inserted and carefully rotated into place.  Viscoelastic was removed from the eye.  Wounds were sealed using hydration.   Lens was in excellent position at end of case.  Gati/Brom/Pred drops were placed into the patient's eye, which was patched and shielded.  The patient tolerated the procedure well and will followup tomorrow in clinic.

## 2022-08-24 ENCOUNTER — OFFICE VISIT (OUTPATIENT)
Dept: PRIMARY CARE CLINIC | Facility: CLINIC | Age: 82
End: 2022-08-24
Payer: MEDICARE

## 2022-08-24 VITALS
SYSTOLIC BLOOD PRESSURE: 119 MMHG | HEART RATE: 74 BPM | OXYGEN SATURATION: 100 % | DIASTOLIC BLOOD PRESSURE: 71 MMHG | WEIGHT: 168.13 LBS

## 2022-08-24 DIAGNOSIS — D69.6 THROMBOCYTOPENIA, UNSPECIFIED: Primary | ICD-10-CM

## 2022-08-24 DIAGNOSIS — K55.059 ACUTE (REVERSIBLE) ISCHEMIA OF INTESTINE, PART AND EXTENT UNSPECIFIED: ICD-10-CM

## 2022-08-24 DIAGNOSIS — K74.60 HEPATIC CIRRHOSIS, UNSPECIFIED HEPATIC CIRRHOSIS TYPE, UNSPECIFIED WHETHER ASCITES PRESENT: ICD-10-CM

## 2022-08-24 PROCEDURE — 99214 OFFICE O/P EST MOD 30 MIN: CPT | Mod: ,,, | Performed by: FAMILY MEDICINE

## 2022-08-24 PROCEDURE — 80053 COMPREHEN METABOLIC PANEL: CPT | Performed by: FAMILY MEDICINE

## 2022-08-24 PROCEDURE — 99214 PR OFFICE/OUTPT VISIT, EST, LEVL IV, 30-39 MIN: ICD-10-PCS | Mod: ,,, | Performed by: FAMILY MEDICINE

## 2022-08-24 PROCEDURE — 85025 COMPLETE CBC W/AUTO DIFF WBC: CPT | Performed by: FAMILY MEDICINE

## 2022-08-24 PROCEDURE — 84075 ASSAY ALKALINE PHOSPHATASE: CPT | Performed by: FAMILY MEDICINE

## 2022-08-24 RX ORDER — SUCRALFATE 1 G/1
TABLET ORAL
COMMUNITY
Start: 2022-06-08

## 2022-08-24 RX ORDER — RIVAROXABAN 10 MG/1
TABLET, FILM COATED ORAL
COMMUNITY
Start: 2022-06-08

## 2022-08-24 RX ORDER — MELOXICAM 15 MG/1
TABLET ORAL
COMMUNITY
Start: 2022-02-26

## 2022-08-24 RX ORDER — DICYCLOMINE HYDROCHLORIDE 10 MG/1
10 CAPSULE ORAL
COMMUNITY
Start: 2022-05-13 | End: 2023-05-13

## 2022-08-24 RX ORDER — NADOLOL 20 MG/1
20 TABLET ORAL
COMMUNITY

## 2022-08-24 RX ORDER — SPIRONOLACTONE 50 MG/1
50 TABLET, FILM COATED ORAL
COMMUNITY
End: 2023-05-24

## 2022-08-24 RX ORDER — ALBUTEROL SULFATE 90 UG/1
AEROSOL, METERED RESPIRATORY (INHALATION)
COMMUNITY
Start: 2022-01-13

## 2022-08-24 RX ORDER — LACTULOSE 10 G/15ML
20 SOLUTION ORAL; RECTAL DAILY PRN
COMMUNITY

## 2022-08-24 NOTE — ASSESSMENT & PLAN NOTE
Assess CBC today, and platelet count is less than 50 would recommend discontinuation Xarelto however due to history of recurrent thrombotic events recommend follow-up with Hematology for further evaluation and treatment options which would not cause significant thrombocytopenia and may consider platelet transfusion as well as other possible treatment option.  ER precautions given for signs concerning for acute bleeding or signs of acute thrombus.

## 2022-08-24 NOTE — PROGRESS NOTES
Chief Complaint  Chief Complaint   Patient presents with    Follow-up     Hospital follow up       History of Present Illness  Patient presents to clinic today for routine visit.  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  She was started on Xarelto at hospital and follow-up with the gastroenterologist and was also referred to the hematologist who recommended continuation of anticoagulant therapy and was referred to a hepatologist to determine the length of anticoagulant therapy with a known history of liver cirrhosis in the setting of hepatitis-C see with treatment with nondetectable HCV viral load.  Specialist reported that she seemed to be well compensated from a cirrhosis standpoint however labs revealed low sodium and low platelet count and an EGD was performed which showed no gastritis it was recommended that she stop her PPI and sucralfate. A thoracic hemangioma was also noted on imaging which was assessed by hematology. Though the prior work-ups show that she was complaining of abdominal pain she states today that she had not experienced abdominal pain at any point and throughout her hospital visits and now her pain was always located at the mid-back without radiation. She states when the pain is at it's peak it is associated with a cold sensation but denies any numbness or tingling or bowel or bladder incontinence. She was told that the T12 hemangioma could be the source of her pain symptoms but did not choose to pursue any surgical intervention due to concerns of unwanted outcomes from the surgery.     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 Valentino   Hepatologist   GI  Hematology    Review of Systems  Review of Systems   Constitutional: Positive for fatigue. Negative for chills and fever.   HENT: Negative for congestion and sore throat.    Eyes: Negative for redness and visual disturbance.   Respiratory: Negative for cough and shortness of breath.    Cardiovascular: Negative for chest pain and palpitations.   Gastrointestinal: Positive for abdominal pain. Negative for blood in stool, constipation, diarrhea, nausea and vomiting.   Musculoskeletal: Positive for arthralgias and back pain. Negative for myalgias.   Skin: Negative for pallor and rash.   Neurological: Negative for dizziness and headaches.   Psychiatric/Behavioral: Negative for agitation and dysphoric mood.       Physical Exam  Physical Exam  Constitutional:       Appearance: Normal appearance.   HENT:      Head: Normocephalic and atraumatic.   Eyes:      General: No scleral icterus.     Conjunctiva/sclera: Conjunctivae normal.   Cardiovascular:      Rate and Rhythm: Normal rate and regular rhythm.   Pulmonary:      Effort: Pulmonary effort is normal.      Breath sounds: Normal breath sounds.   Skin:     General: Skin is warm and dry.   Neurological:      General: No focal deficit present.      Mental Status: She is alert and oriented to person, place, and time.   Psychiatric:         Mood  and Affect: Mood normal.         Behavior: Behavior normal.         Problem List/Past Medical History  Past Medical History:   Diagnosis Date    Pacemaker     Paroxysmal atrial fibrillation     Pericardial cyst     Thrombus        Procedure/Surgical History  Past Surgical History:   Procedure Laterality Date    APPENDECTOMY      CARDIAC PACEMAKER PLACEMENT      cataract surgery      HYSTERECTOMY      LAPAROSCOPIC CHOLECYSTECTOMY      REPEAT ABDOMINAL PARACENTESIS      THROMBECTOMY         Medications  Current Outpatient Medications on File Prior to Visit   Medication Sig Dispense Refill    pantoprazole (PROTONIX) 40 MG tablet TAKE 1 TABLET BY MOUTH TWICE A DAY (Patient not taking: Reported on 8/24/2022) 180 tablet 1     No current facility-administered medications on file prior to visit.       Allegies  Review of patient's allergies indicates:   Allergen Reactions    Ciprofloxacin Swelling    Iodine Shortness Of Breath    Latex Itching    Fluoride preparations Dermatitis     Other reaction(s): Ulcer of mouth        Social History  Social History     Socioeconomic History    Marital status:    Tobacco Use    Smoking status: Never Smoker    Smokeless tobacco: Never Used   Substance and Sexual Activity    Alcohol use: Never    Drug use: Never    Sexual activity: Not Currently       Family History  History reviewed. No pertinent family history.      Immunizations    There is no immunization history on file for this patient.    Health Maintenance  Health Maintenance   Topic Date Due    TETANUS VACCINE  Never done    DEXA Scan  07/05/2019    Lipid Panel  04/07/2027        Assessment / Plan  Problem List Items Addressed This Visit        Hematology    Thrombocytopenia, unspecified - Primary    Current Assessment & Plan     Assess CBC today, and platelet count is less than 50 would recommend discontinuation Xarelto however due to history of recurrent thrombotic events recommend follow-up with  Hematology for further evaluation and treatment options which would not cause significant thrombocytopenia and may consider platelet transfusion as well as other possible treatment option.  ER precautions given for signs concerning for acute bleeding or signs of acute thrombus.              Other    Acute (reversible) ischemia of intestine, part and extent unspecified      Other Visit Diagnoses     Hepatic cirrhosis, unspecified hepatic cirrhosis type, unspecified whether ascites present              RTC 6 weeks for wellness    Lauri Medina

## 2022-09-22 ENCOUNTER — HISTORICAL (OUTPATIENT)
Dept: ADMINISTRATIVE | Facility: HOSPITAL | Age: 82
End: 2022-09-22
Payer: MEDICARE

## 2022-10-10 ENCOUNTER — OFFICE VISIT (OUTPATIENT)
Dept: PRIMARY CARE CLINIC | Facility: CLINIC | Age: 82
End: 2022-10-10
Payer: MEDICARE

## 2022-10-10 VITALS
SYSTOLIC BLOOD PRESSURE: 134 MMHG | HEART RATE: 77 BPM | DIASTOLIC BLOOD PRESSURE: 58 MMHG | OXYGEN SATURATION: 99 % | WEIGHT: 166.31 LBS

## 2022-10-10 DIAGNOSIS — D69.6 THROMBOCYTOPENIA: ICD-10-CM

## 2022-10-10 DIAGNOSIS — D69.6 THROMBOCYTOPENIA, UNSPECIFIED: ICD-10-CM

## 2022-10-10 DIAGNOSIS — R41.89 COGNITIVE DECLINE: Primary | ICD-10-CM

## 2022-10-10 DIAGNOSIS — Z00.00 WELLNESS EXAMINATION: ICD-10-CM

## 2022-10-10 PROCEDURE — G0439 PPPS, SUBSEQ VISIT: HCPCS | Mod: ,,, | Performed by: FAMILY MEDICINE

## 2022-10-10 PROCEDURE — G0439 PR MEDICARE ANNUAL WELLNESS SUBSEQUENT VISIT: ICD-10-PCS | Mod: ,,, | Performed by: FAMILY MEDICINE

## 2022-10-10 RX ORDER — DONEPEZIL HYDROCHLORIDE 5 MG/1
5 TABLET, FILM COATED ORAL NIGHTLY
Qty: 90 TABLET | Refills: 3 | Status: SHIPPED | OUTPATIENT
Start: 2022-10-10 | End: 2023-12-05 | Stop reason: SDUPTHER

## 2022-10-10 NOTE — PROGRESS NOTES
Chief Complaint  Chief Complaint   Patient presents with    Follow-up     With labs & discuss MRI       History of Present Illness  Patient presents to clinic today for routine wellness visit with lab review.  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  She was started on Xarelto at time at the hospital and follow-up with the gastroenterologist and was also referred to the hematologist who recommended continuation of anticoagulant therapy and was referred to a hepatologist to determine the length of anticoagulant therapy with a known history of liver cirrhosis in the setting of hepatitis-C with treatment with nondetectable HCV viral load.  Lab work performed at her last clinic visit revealed progressively decreasing platelet count with most recent level 6 weeks ago at 58 however considering her significant thrombosis history in the fact that her platelets were remaining greater than 50,000 the decision was made at that time to continue Xarelto until follow-up with her specialist and monitor for signs concerning for acute blood loss.  Labs at that time revealed chemistry panel with no overly concerning findings with AST minimally elevated at 46 with ALT within normal range and no concerning electrolyte disturbances.  WBC was decreased at 2.9 with ANC at 1.8 without notable anemia.  Today in clinic her blood pressure is within normal limits.  More recent labs performed through her hepatologist include CBC with platelet count now increasing at 71 with WBC still at 2.9 and still no anemia similar findings on CMP with AST at 50 and ALT within normal range and no notable electrolyte abnormalities.  Patient does have a complaint over the past 6 months to year noticing issues with cognitive decline.  She states that 1st this was related to slow cognition and  remembering people's names but finds that she at this time often finds it difficult to remember the names of people that she is very familiar with and sometimes will even have difficulty with taking wrong turns when driving in areas that she is very familiar with.    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 Valentino   Hepatologist   GI  Hematology    Review of Systems  Review of Systems   Constitutional:  Positive for fatigue. Negative for chills and fever.   HENT:  Negative for congestion and sore throat.    Eyes:  Negative for redness and visual disturbance.   Respiratory:  Negative for cough and shortness of breath.    Cardiovascular:  Negative for chest pain and palpitations.   Gastrointestinal:  Positive for abdominal pain. Negative for blood in stool, constipation, diarrhea, nausea and vomiting.   Musculoskeletal:  Positive for arthralgias and back pain. Negative for myalgias.   Skin:  Negative for pallor and rash.   Neurological:  Negative for dizziness and headaches.   Psychiatric/Behavioral:  Negative for agitation and dysphoric mood.      Physical Exam  Physical Exam  Constitutional:       Appearance: Normal appearance.   HENT:      Head: Normocephalic and atraumatic.   Eyes:       General: No scleral icterus.     Conjunctiva/sclera: Conjunctivae normal.   Cardiovascular:      Rate and Rhythm: Normal rate and regular rhythm.   Pulmonary:      Effort: Pulmonary effort is normal.      Breath sounds: Normal breath sounds.   Skin:     General: Skin is warm and dry.   Neurological:      General: No focal deficit present.      Mental Status: She is alert and oriented to person, place, and time.   Psychiatric:         Mood and Affect: Mood normal.         Behavior: Behavior normal.       Problem List/Past Medical History  Past Medical History:   Diagnosis Date    Pacemaker     Paroxysmal atrial fibrillation     Pericardial cyst     Thrombus        Procedure/Surgical History  Past Surgical History:   Procedure Laterality Date    APPENDECTOMY      CARDIAC PACEMAKER PLACEMENT      cataract surgery      HYSTERECTOMY      LAPAROSCOPIC CHOLECYSTECTOMY      REPEAT ABDOMINAL PARACENTESIS      THROMBECTOMY         Medications  Current Outpatient Medications on File Prior to Visit   Medication Sig Dispense Refill    albuterol (PROVENTIL/VENTOLIN HFA) 90 mcg/actuation inhaler Inhale into the lungs.      lactulose (CHRONULAC) 10 gram/15 mL solution Take 20 g by mouth daily as needed.      nadoloL (CORGARD) 20 MG tablet Take 20 mg by mouth.      XARELTO 10 mg Tab       [DISCONTINUED] rifAXIMin (XIFAXAN) 550 mg Tab Take 550 mg by mouth.      dicyclomine (BENTYL) 10 MG capsule Take 10 mg by mouth.      meloxicam (MOBIC) 15 MG tablet       pantoprazole (PROTONIX) 40 MG tablet TAKE 1 TABLET BY MOUTH TWICE A DAY (Patient not taking: No sig reported) 180 tablet 1    spironolactone (ALDACTONE) 50 MG tablet Take 50 mg by mouth.      sucralfate (CARAFATE) 1 gram tablet        No current facility-administered medications on file prior to visit.       Allegies  Review of patient's allergies indicates:   Allergen Reactions    Ciprofloxacin Swelling    Iodine Shortness Of Breath    Latex Itching    Fluoride preparations  Dermatitis     Other reaction(s): Ulcer of mouth        Social History  Social History     Socioeconomic History    Marital status:    Tobacco Use    Smoking status: Never    Smokeless tobacco: Never   Substance and Sexual Activity    Alcohol use: Never    Drug use: Never    Sexual activity: Not Currently       Family History  History reviewed. No pertinent family history.      Immunizations    There is no immunization history on file for this patient.    Health Maintenance  Health Maintenance   Topic Date Due    TETANUS VACCINE  Never done    DEXA Scan  07/05/2019    Lipid Panel  04/07/2027        Assessment / Plan  Problem List Items Addressed This Visit          Neuro    Cognitive decline - Primary    Current Assessment & Plan     Based upon patient report concern for possibility of normal cognitive decline of aging verses early onset dementia, will refer to Neurology at this time recommend over-the-counter Prevagen which the patient has recently initiated with new prescription of Aricept 5 mg nightly and monitor for response with maintenance and prevention of further cognitive decline            Hematology    Thrombocytopenia, unspecified    Current Assessment & Plan     Improving me a levels though still thrombocytopenic with continued Xarelto dosing, follow-up with cardiac specialist for consideration of continuation of anticoagulant with history of recurrent DVTs with consideration of discontinuing splint if platelet count drops below 50 or signs of kamala bleeding are noted            Other    Wellness examination    Current Assessment & Plan     Discussed routine annual health maintenance and screening in addition to acute issues noted below.          Other Visit Diagnoses       Thrombocytopenia              RTC 4 months for routine follow-up    Lauri Medina

## 2022-10-10 NOTE — ASSESSMENT & PLAN NOTE
Based upon patient report concern for possibility of normal cognitive decline of aging verses early onset dementia, will refer to Neurology at this time recommend over-the-counter Prevagen which the patient has recently initiated with new prescription of Aricept 5 mg nightly and monitor for response with maintenance and prevention of further cognitive decline

## 2022-10-10 NOTE — ASSESSMENT & PLAN NOTE
Improving me a levels though still thrombocytopenic with continued Xarelto dosing, follow-up with cardiac specialist for consideration of continuation of anticoagulant with history of recurrent DVTs with consideration of discontinuing splint if platelet count drops below 50 or signs of kamala bleeding are noted

## 2022-10-18 LAB
CHOLEST SERPL-MSCNC: 208 MG/DL (ref 0–200)
HDLC SERPL-MCNC: 71 MG/DL (ref 35–70)
LDLC SERPL CALC-MCNC: 122 MG/DL (ref 0–160)
TRIGL SERPL-MCNC: 85 MG/DL (ref 40–160)

## 2023-01-09 PROBLEM — Z00.00 WELLNESS EXAMINATION: Status: RESOLVED | Noted: 2022-10-10 | Resolved: 2023-01-09

## 2023-02-09 PROCEDURE — 87088 URINE BACTERIA CULTURE: CPT | Performed by: FAMILY MEDICINE

## 2023-02-09 PROCEDURE — 85025 COMPLETE CBC W/AUTO DIFF WBC: CPT | Performed by: FAMILY MEDICINE

## 2023-02-09 PROCEDURE — 80053 COMPREHEN METABOLIC PANEL: CPT | Performed by: FAMILY MEDICINE

## 2023-02-09 PROCEDURE — 81001 URINALYSIS AUTO W/SCOPE: CPT | Performed by: FAMILY MEDICINE

## 2023-02-14 ENCOUNTER — OFFICE VISIT (OUTPATIENT)
Dept: PRIMARY CARE CLINIC | Facility: CLINIC | Age: 83
End: 2023-02-14
Payer: MEDICARE

## 2023-02-14 VITALS
SYSTOLIC BLOOD PRESSURE: 102 MMHG | HEART RATE: 66 BPM | WEIGHT: 166.38 LBS | OXYGEN SATURATION: 95 % | DIASTOLIC BLOOD PRESSURE: 65 MMHG

## 2023-02-14 DIAGNOSIS — K55.059 ACUTE (REVERSIBLE) ISCHEMIA OF INTESTINE, PART AND EXTENT UNSPECIFIED: ICD-10-CM

## 2023-02-14 DIAGNOSIS — I85.10 SECONDARY ESOPHAGEAL VARICES WITHOUT BLEEDING: ICD-10-CM

## 2023-02-14 DIAGNOSIS — I20.9 ANGINA PECTORIS, UNSPECIFIED: ICD-10-CM

## 2023-02-14 DIAGNOSIS — N17.9 AKI (ACUTE KIDNEY INJURY): ICD-10-CM

## 2023-02-14 DIAGNOSIS — R41.89 COGNITIVE DECLINE: ICD-10-CM

## 2023-02-14 DIAGNOSIS — D69.6 THROMBOCYTOPENIA, UNSPECIFIED: Primary | ICD-10-CM

## 2023-02-14 DIAGNOSIS — I48.91 ATRIAL FIBRILLATION, UNSPECIFIED TYPE: ICD-10-CM

## 2023-02-14 DIAGNOSIS — I11.0 HYPERTENSIVE HEART DISEASE WITH HEART FAILURE: ICD-10-CM

## 2023-02-14 DIAGNOSIS — K74.60 CIRRHOSIS OF LIVER WITHOUT ASCITES, UNSPECIFIED HEPATIC CIRRHOSIS TYPE: ICD-10-CM

## 2023-02-14 PROCEDURE — 99214 OFFICE O/P EST MOD 30 MIN: CPT | Mod: ,,, | Performed by: FAMILY MEDICINE

## 2023-02-14 PROCEDURE — 99214 PR OFFICE/OUTPT VISIT, EST, LEVL IV, 30-39 MIN: ICD-10-PCS | Mod: ,,, | Performed by: FAMILY MEDICINE

## 2023-02-14 NOTE — PROGRESS NOTES
Chief Complaint  Chief Complaint   Patient presents with    Follow-up     With labs       History of Present Illness  Patient presents to clinic today for routine wellness visit with lab review.  At the last visit she reported concern for mild cognitive decline and was referred to Neurology and given a prescription for Aricept 5 mg nightly which she is been continuing to take and reports no further progression of cognitive symptoms.  Blood work performed prior to this visit includes CBC with WBC at 4.0 which is increased from previous check of 2.9 though still slightly leukopenic with platelet count at 73 which is increased from previous check of 58 though still thrombocytopenic.  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 Since her last visit the Xarelto was discontinued which may be the cause of the increasing platelet count.  The decision was made at the time due to her risk possible recurrent VTE to continue the medication unless platelet count drops below 50 or if signs of kamala bleeding were noted which reportedly none have occurred.  Include CMP with creatinine slightly elevated at 1.08 with EGFR at 51 with fasting glucose at 122 with no other overly concerning findings.  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 Valentino   Hepatologist   GI  Hematology    Review of Systems  Review of Systems   Constitutional:  Positive for fatigue. Negative for chills and fever.   HENT:  Negative for congestion and sore throat.    Eyes:  Negative for redness and visual disturbance.   Respiratory:  Negative for cough and shortness of breath.    Cardiovascular:  Negative for chest pain and palpitations.   Gastrointestinal:  Positive for abdominal pain. Negative for blood in stool, constipation, diarrhea, nausea and vomiting.   Musculoskeletal:  Positive for arthralgias and back pain. Negative for myalgias.   Skin:  Negative for pallor and rash.   Neurological:  Negative for dizziness and headaches.   Psychiatric/Behavioral:  Negative for agitation and dysphoric mood.      Physical Exam  Physical Exam  Constitutional:       Appearance: Normal appearance.   HENT:      Head: Normocephalic and atraumatic.   Eyes:      General: No scleral icterus.     Conjunctiva/sclera: Conjunctivae normal.   Cardiovascular:      Rate and Rhythm: Normal rate and regular rhythm.   Pulmonary:      Effort: Pulmonary effort is normal.      Breath sounds: Normal breath sounds.   Skin:     General: Skin is warm and dry.   Neurological:      General: No focal deficit present.      Mental Status: She is alert and oriented to person, place, and time.   Psychiatric:         Mood and Affect: Mood normal.         Behavior: Behavior normal.       Problem List/Past Medical History  Past Medical History:   Diagnosis Date    Pacemaker     Paroxysmal atrial fibrillation     Pericardial cyst     Thrombus        Procedure/Surgical History  Past Surgical History:   Procedure Laterality Date    APPENDECTOMY      CARDIAC PACEMAKER PLACEMENT       cataract surgery      HYSTERECTOMY      LAPAROSCOPIC CHOLECYSTECTOMY      REPEAT ABDOMINAL PARACENTESIS      spot removed Left     left ankle spot removed    THROMBECTOMY         Medications  Current Outpatient Medications on File Prior to Visit   Medication Sig Dispense Refill    donepeziL (ARICEPT) 5 MG tablet Take 1 tablet (5 mg total) by mouth every evening. 90 tablet 3    nadoloL (CORGARD) 20 MG tablet Take 20 mg by mouth.      pantoprazole (PROTONIX) 40 MG tablet TAKE 1 TABLET BY MOUTH TWICE A  tablet 1    rifAXIMin (XIFAXAN) 550 mg Tab Take 1 tablet (550 mg total) by mouth 2 (two) times daily. 180 tablet 3    spironolactone (ALDACTONE) 50 MG tablet Take 50 mg by mouth.      albuterol (PROVENTIL/VENTOLIN HFA) 90 mcg/actuation inhaler Inhale into the lungs.      dicyclomine (BENTYL) 10 MG capsule Take 10 mg by mouth.      lactulose (CHRONULAC) 10 gram/15 mL solution Take 20 g by mouth daily as needed.      meloxicam (MOBIC) 15 MG tablet       sucralfate (CARAFATE) 1 gram tablet       XARELTO 10 mg Tab        No current facility-administered medications on file prior to visit.       Allegies  Review of patient's allergies indicates:   Allergen Reactions    Ciprofloxacin Swelling    Iodine Shortness Of Breath    Latex Itching    Fluoride preparations Dermatitis     Other reaction(s): Ulcer of mouth        Social History  Social History     Socioeconomic History    Marital status:    Tobacco Use    Smoking status: Never    Smokeless tobacco: Never   Substance and Sexual Activity    Alcohol use: Never    Drug use: Never    Sexual activity: Not Currently       Family History  History reviewed. No pertinent family history.      Immunizations    There is no immunization history on file for this patient.    Health Maintenance  Health Maintenance   Topic Date Due    TETANUS VACCINE  Never done    DEXA Scan  07/05/2019    Lipid Panel  10/18/2027        Assessment / Plan  Problem List Items Addressed  This Visit          Neuro    Cognitive decline    Overview     Continue Aricept at this time with follow-up to Neurology as previously referred and monitor for signs or symptoms concerning for acute delirium or progression of chronic symptoms.            Cardiac/Vascular    Hypertensive heart disease with heart failure    Angina pectoris, unspecified    Atrial fibrillation, unspecified type       Renal/    PAT (acute kidney injury)    Overview     Mild PAT  Reassess renal function with chem panel and UA at f/u. Renal sparing tactics discussed:  -Follow low sodium diet (2 grams a day)   -Control high blood pressure, goal BP < 130/80,    -Maintain healthy weight.   -Stay well hydrated with 64-128oz of free water daily   -Do not take NSAIDs (Ibuprofen, Naproxen, Aleve, Advil, Toradol, Mobic), may take only Tylenol as needed for pain/headaches.            Hematology    Thrombocytopenia, unspecified - Primary    Overview     Continue to monitor CBC at routine intervals            GI    Secondary esophageal varices without bleeding    Cirrhosis of liver without ascites    Overview     Continue to follow up with hepatologist            Other    Acute (reversible) ischemia of intestine, part and extent unspecified   RTC 6 months for routine follow-up for wellness    Lauri Medina

## 2023-04-10 ENCOUNTER — OFFICE VISIT (OUTPATIENT)
Dept: PRIMARY CARE CLINIC | Facility: CLINIC | Age: 83
End: 2023-04-10
Payer: MEDICARE

## 2023-04-10 VITALS
BODY MASS INDEX: 26.36 KG/M2 | HEIGHT: 66 IN | TEMPERATURE: 98 F | DIASTOLIC BLOOD PRESSURE: 72 MMHG | SYSTOLIC BLOOD PRESSURE: 110 MMHG | OXYGEN SATURATION: 98 % | RESPIRATION RATE: 18 BRPM | WEIGHT: 164 LBS | HEART RATE: 87 BPM

## 2023-04-10 DIAGNOSIS — G89.29 CHRONIC PAIN OF LEFT ANKLE: Primary | ICD-10-CM

## 2023-04-10 DIAGNOSIS — C44.701: ICD-10-CM

## 2023-04-10 DIAGNOSIS — R39.11 URINARY HESITANCY: ICD-10-CM

## 2023-04-10 DIAGNOSIS — M25.572 CHRONIC PAIN OF LEFT ANKLE: Primary | ICD-10-CM

## 2023-04-10 PROCEDURE — 99214 OFFICE O/P EST MOD 30 MIN: CPT | Mod: ,,, | Performed by: FAMILY MEDICINE

## 2023-04-10 PROCEDURE — 99214 PR OFFICE/OUTPT VISIT, EST, LEVL IV, 30-39 MIN: ICD-10-PCS | Mod: ,,, | Performed by: FAMILY MEDICINE

## 2023-04-10 NOTE — PROGRESS NOTES
Chief Complaint  Chief Complaint   Patient presents with    Back Pain       History of Present Illness  Patient presents to clinic today earlier than previously scheduled follow-up with complaint of aching back pain with some urinary hesitancy.  She also reports that her left lateral ankle had a skin growth develop and followed up to dermatology who performed a biopsy and was told that it was malignant skin cancer she has another follow-up for another surgical procedure on the area of the skin in 7 days however has been complaining for several years of pain to the left ankle and had imaging performed over 2 years ago but wants to know if imaging would be recommended for re-evaluation at this time considering her new cancer diagnosis.  Her blood pressure is within normal limits.      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 Valentino   Hepatologist   GI  Hematology    Review of Systems  Review of Systems   Constitutional:  Positive for fatigue. Negative for chills and fever.   HENT:  Negative for congestion and sore throat.    Eyes:  Negative for redness and visual disturbance.   Respiratory:  Negative for cough and shortness of breath.    Cardiovascular:  Negative for chest pain and palpitations.    Gastrointestinal:  Negative for abdominal pain, blood in stool, constipation, diarrhea, nausea and vomiting.   Musculoskeletal:  Positive for arthralgias and back pain. Negative for myalgias.   Skin:  Positive for wound. Negative for pallor and rash.   Neurological:  Negative for dizziness and headaches.   Psychiatric/Behavioral:  Negative for agitation and dysphoric mood.      Physical Exam  Physical Exam  Constitutional:       Appearance: Normal appearance.   HENT:      Head: Normocephalic and atraumatic.   Eyes:      General: No scleral icterus.     Conjunctiva/sclera: Conjunctivae normal.   Cardiovascular:      Rate and Rhythm: Normal rate and regular rhythm.   Pulmonary:      Effort: Pulmonary effort is normal.      Breath sounds: Normal breath sounds.   Skin:     General: Skin is warm and dry.      Comments: 1 cm diameter eschar of left lateral ankle from prior biopsy site with no erythema and no telangiectasias noted   Neurological:      General: No focal deficit present.      Mental Status: She is alert and oriented to person, place, and time.   Psychiatric:         Mood and Affect: Mood normal.         Behavior: Behavior normal.       Problem List/Past Medical History  Past Medical History:   Diagnosis Date    Pacemaker     Paroxysmal atrial fibrillation     Pericardial cyst     Thrombus        Procedure/Surgical History  Past Surgical History:   Procedure Laterality Date    APPENDECTOMY      CARDIAC PACEMAKER PLACEMENT      cataract surgery      HYSTERECTOMY      LAPAROSCOPIC CHOLECYSTECTOMY      REPEAT ABDOMINAL PARACENTESIS      spot removed Left     left ankle spot removed    THROMBECTOMY         Medications  Current Outpatient Medications on File Prior to Visit   Medication Sig Dispense Refill    albuterol (PROVENTIL/VENTOLIN HFA) 90 mcg/actuation inhaler Inhale into the lungs.      dicyclomine (BENTYL) 10 MG capsule Take 10 mg by mouth.      donepeziL (ARICEPT) 5 MG tablet Take 1 tablet (5 mg total)  by mouth every evening. 90 tablet 3    lactulose (CHRONULAC) 10 gram/15 mL solution Take 20 g by mouth daily as needed.      meloxicam (MOBIC) 15 MG tablet       nadoloL (CORGARD) 20 MG tablet Take 20 mg by mouth.      pantoprazole (PROTONIX) 40 MG tablet TAKE 1 TABLET BY MOUTH TWICE A  tablet 1    rifAXIMin (XIFAXAN) 550 mg Tab Take 1 tablet (550 mg total) by mouth 2 (two) times daily. 180 tablet 3    spironolactone (ALDACTONE) 50 MG tablet Take 50 mg by mouth.      sucralfate (CARAFATE) 1 gram tablet       XARELTO 10 mg Tab        No current facility-administered medications on file prior to visit.       Allegies  Review of patient's allergies indicates:   Allergen Reactions    Ciprofloxacin Swelling    Iodine Shortness Of Breath    Latex Itching    Fluoride      Mouth sores    Fluoride preparations Dermatitis     Other reaction(s): Ulcer of mouth        Social History  Social History     Socioeconomic History    Marital status:    Tobacco Use    Smoking status: Never    Smokeless tobacco: Never   Substance and Sexual Activity    Alcohol use: Never    Drug use: Never    Sexual activity: Not Currently       Family History  History reviewed. No pertinent family history.      Immunizations  Immunization History   Administered Date(s) Administered    COVID-19, MRNA, LN-S, PF (Pfizer) (Purple Cap) 03/06/2021, 03/27/2021    Influenza - High Dose - PF (65 years and older) 12/01/2016, 10/28/2018    Influenza - Trivalent - PF (ADULT) 10/06/2021    Pneumococcal Polysaccharide - 23 Valent 10/06/2021    Tdap 09/09/2016, 12/03/2016       Health Maintenance  Health Maintenance   Topic Date Due    DEXA Scan  07/05/2019    TETANUS VACCINE  12/03/2026    Lipid Panel  10/18/2027        Assessment / Plan  Problem List Items Addressed This Visit          Renal/    Urinary hesitancy    Overview     Urine dip in clinic reveals no concerning findings, perform urinalysis and follow-up results              Oncology     Primary malignant neoplasm of skin of ankle    Overview     Follow-up with dermatology for routine monitoring and management              Orthopedic    Chronic pain of left ankle - Primary    Overview     Perform x-ray and follow-up results          RTC previously scheduled follow-up  Lauri Medina

## 2023-04-12 ENCOUNTER — HOSPITAL ENCOUNTER (OUTPATIENT)
Dept: RADIOLOGY | Facility: HOSPITAL | Age: 83
Discharge: HOME | End: 2023-04-12
Attending: FAMILY MEDICINE
Payer: MEDICARE

## 2023-04-12 DIAGNOSIS — C44.701: ICD-10-CM

## 2023-04-12 DIAGNOSIS — G89.29 CHRONIC PAIN OF LEFT ANKLE: ICD-10-CM

## 2023-04-12 DIAGNOSIS — M25.572 CHRONIC PAIN OF LEFT ANKLE: ICD-10-CM

## 2023-04-12 PROCEDURE — 73610 X-RAY EXAM OF ANKLE: CPT | Mod: TC,LT

## 2023-05-22 PROBLEM — N17.9 AKI (ACUTE KIDNEY INJURY): Status: RESOLVED | Noted: 2023-02-14 | Resolved: 2023-05-22

## 2023-05-24 ENCOUNTER — OFFICE VISIT (OUTPATIENT)
Dept: PRIMARY CARE CLINIC | Facility: CLINIC | Age: 83
End: 2023-05-24
Payer: MEDICARE

## 2023-05-24 VITALS
DIASTOLIC BLOOD PRESSURE: 64 MMHG | WEIGHT: 170.69 LBS | SYSTOLIC BLOOD PRESSURE: 99 MMHG | OXYGEN SATURATION: 97 % | BODY MASS INDEX: 27.55 KG/M2 | HEART RATE: 76 BPM

## 2023-05-24 DIAGNOSIS — C44.701: Primary | ICD-10-CM

## 2023-05-24 PROCEDURE — 99214 PR OFFICE/OUTPT VISIT, EST, LEVL IV, 30-39 MIN: ICD-10-PCS | Mod: ,,, | Performed by: FAMILY MEDICINE

## 2023-05-24 PROCEDURE — 99214 OFFICE O/P EST MOD 30 MIN: CPT | Mod: ,,, | Performed by: FAMILY MEDICINE

## 2023-05-24 RX ORDER — FUROSEMIDE 40 MG/1
40 TABLET ORAL
COMMUNITY
End: 2023-05-24

## 2023-05-24 RX ORDER — BENZONATATE 200 MG/1
200 CAPSULE ORAL 3 TIMES DAILY PRN
Qty: 30 CAPSULE | Refills: 0 | Status: SHIPPED | OUTPATIENT
Start: 2023-05-24 | End: 2023-06-03

## 2023-05-24 RX ORDER — CEFDINIR 300 MG/1
300 CAPSULE ORAL 2 TIMES DAILY
COMMUNITY
End: 2023-08-11 | Stop reason: ALTCHOICE

## 2023-05-24 NOTE — PROGRESS NOTES
Chief Complaint  Chief Complaint   Patient presents with    Sinus Problem     Sinus congestion, ears popping & stopped up    Foot Swelling     Left foot swelling, has notes from a cruise ship doctor       History of Present Illness  Patient presents to clinic today earlier than previously scheduled follow-up. She went on a cruise following a recent surgery on her foot from dermatology who performed a biopsy and was told that it was malignant skin cancer she has another follow-up for another surgical procedure on the area of the skin and bone. The crusie was 7 days, returned 2 days ago and the left foot developed significant swelling and infection during the cruise and required rocephin and is now on oral cefdinir, swelling is improving but remains persistent to some extent.     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 no longer following up with last lab work performed 05/2019 with nondetectable HCV viral load, rifaximin 550 mg twice daily for prevention of hepatic encephalopathy   History of AFib-status post pacemaker placement, history of platelet deficiency-currently off of Xarelto platelet count drops less than 50 with average   Cognitive decline-Aricept 5 mg nightly    Specialist:  Cardiology-Dr. Valentino   Hepatologist   GI  Hematology    Review of Systems  Review of Systems   Constitutional:  Positive for fatigue. Negative for chills and fever.   HENT:  Negative for congestion and sore throat.    Eyes:  Negative for redness and visual disturbance.   Respiratory:  Negative for cough and shortness of breath.    Cardiovascular:  Negative for chest pain and palpitations.    Gastrointestinal:  Negative for abdominal pain, blood in stool, constipation, diarrhea, nausea and vomiting.   Musculoskeletal:  Positive for arthralgias and back pain. Negative for myalgias.   Skin:  Positive for wound. Negative for pallor and rash.   Neurological:  Negative for dizziness and headaches.   Psychiatric/Behavioral:  Negative for agitation and dysphoric mood.      Physical Exam  Physical Exam  Constitutional:       Appearance: Normal appearance.   HENT:      Head: Normocephalic and atraumatic.   Eyes:      General: No scleral icterus.     Conjunctiva/sclera: Conjunctivae normal.   Cardiovascular:      Rate and Rhythm: Normal rate and regular rhythm.   Pulmonary:      Effort: Pulmonary effort is normal.      Breath sounds: Normal breath sounds.   Skin:     General: Skin is warm and dry.      Comments: 1 cm diameter eschar of left lateral ankle from prior biopsy site with no erythema and no telangiectasias noted, mild pedal edema is noted with 2+ bilateral dorsalis pedis and posterior tibialis pulses   Neurological:      General: No focal deficit present.      Mental Status: She is alert and oriented to person, place, and time.   Psychiatric:         Mood and Affect: Mood normal.         Behavior: Behavior normal.       Problem List/Past Medical History  Past Medical History:   Diagnosis Date    Pacemaker     Paroxysmal atrial fibrillation     Pericardial cyst     Thrombus        Procedure/Surgical History  Past Surgical History:   Procedure Laterality Date    APPENDECTOMY      CARDIAC PACEMAKER PLACEMENT      cataract surgery      FOOT SURGERY Left     HYSTERECTOMY      LAPAROSCOPIC CHOLECYSTECTOMY      REPEAT ABDOMINAL PARACENTESIS      spot removed Left     left ankle spot removed    THROMBECTOMY         Medications  Current Outpatient Medications on File Prior to Visit   Medication Sig Dispense Refill    albuterol (PROVENTIL/VENTOLIN HFA) 90 mcg/actuation inhaler Inhale into the lungs.       cefdinir (OMNICEF) 300 MG capsule Take 300 mg by mouth 2 (two) times daily.      donepeziL (ARICEPT) 5 MG tablet Take 1 tablet (5 mg total) by mouth every evening. 90 tablet 3    furosemide (LASIX) 40 MG tablet Take 40 mg by mouth as needed.      lactulose (CHRONULAC) 10 gram/15 mL solution Take 20 g by mouth daily as needed.      meloxicam (MOBIC) 15 MG tablet       rifAXIMin (XIFAXAN) 550 mg Tab Take 1 tablet (550 mg total) by mouth 2 (two) times daily. 180 tablet 3    spironolactone (ALDACTONE) 50 MG tablet Take 50 mg by mouth.      sucralfate (CARAFATE) 1 gram tablet       XARELTO 10 mg Tab       nadoloL (CORGARD) 20 MG tablet Take 20 mg by mouth.      [DISCONTINUED] pantoprazole (PROTONIX) 40 MG tablet TAKE 1 TABLET BY MOUTH TWICE A DAY (Patient not taking: Reported on 5/24/2023) 180 tablet 1     No current facility-administered medications on file prior to visit.       Allegies  Review of patient's allergies indicates:   Allergen Reactions    Ciprofloxacin Swelling    Iodine Shortness Of Breath    Latex Itching    Fluoride      Mouth sores    Fluoride preparations Dermatitis     Other reaction(s): Ulcer of mouth        Social History  Social History     Socioeconomic History    Marital status:    Tobacco Use    Smoking status: Never    Smokeless tobacco: Never   Substance and Sexual Activity    Alcohol use: Never    Drug use: Never    Sexual activity: Not Currently       Family History  History reviewed. No pertinent family history.      Immunizations  Immunization History   Administered Date(s) Administered    COVID-19, MRNA, LN-S, PF (Pfizer) (Purple Cap) 03/06/2021, 03/27/2021    Influenza - High Dose - PF (65 years and older) 12/01/2016, 10/28/2018    Influenza - Trivalent - PF (ADULT) 10/06/2021    Pneumococcal Polysaccharide - 23 Valent 10/06/2021    Tdap 09/09/2016, 12/03/2016       Health Maintenance  Health Maintenance   Topic Date Due    DEXA Scan  07/05/2019    TETANUS VACCINE  12/03/2026     Lipid Panel  10/18/2027        Assessment / Plan  Problem List Items Addressed This Visit          Oncology    Primary malignant neoplasm of skin of ankle - Primary    Overview     Due to recent infection and treatment would recommend Follow-up with dermatology for routine monitoring and management          RTC previously scheduled follow-up  Lauri Medina

## 2023-05-25 ENCOUNTER — TELEPHONE (OUTPATIENT)
Dept: PRIMARY CARE CLINIC | Facility: CLINIC | Age: 83
End: 2023-05-25
Payer: MEDICARE

## 2023-05-25 NOTE — TELEPHONE ENCOUNTER
Pt is requesting an XR and MRI of the left foot. She also stated that with the pain she is have some nausea and vomiting. Please advise.

## 2023-05-25 NOTE — TELEPHONE ENCOUNTER
----- Message from Meghana Martinez sent at 5/25/2023 12:02 PM CDT -----  .Type:  Needs Medical Advice    Who Called: pt  Symptoms (please be specific): nausea ,foot pain   How long has patient had these symptoms:    Pharmacy name and phone #:    Would the patient rather a call back or a response via [facility]?   Best Call Back Number: [phone]  Additional Information: pt asking for pcp to order xray of foot and mri foot also with the pain is having she nausea and throwing up

## 2023-05-26 DIAGNOSIS — R11.0 NAUSEA: Primary | ICD-10-CM

## 2023-05-26 DIAGNOSIS — M86.9: Primary | ICD-10-CM

## 2023-05-26 RX ORDER — ONDANSETRON 4 MG/1
4 TABLET, FILM COATED ORAL EVERY 8 HOURS PRN
Qty: 15 TABLET | Refills: 0 | Status: SHIPPED | OUTPATIENT
Start: 2023-05-26

## 2023-05-26 NOTE — TELEPHONE ENCOUNTER
Would not recommend MRI but will order CT scan and x-ray.  X-ray may need to be performed 1st for insurance reasons but will order both at this time.  I will call in some medicine for nausea

## 2023-06-01 ENCOUNTER — HOSPITAL ENCOUNTER (OUTPATIENT)
Dept: RADIOLOGY | Facility: HOSPITAL | Age: 83
Discharge: HOME | End: 2023-06-01
Attending: FAMILY MEDICINE
Payer: MEDICARE

## 2023-06-01 DIAGNOSIS — M86.9: ICD-10-CM

## 2023-06-01 PROCEDURE — 73700 CT LOWER EXTREMITY W/O DYE: CPT | Mod: TC,LT

## 2023-06-05 ENCOUNTER — TELEPHONE (OUTPATIENT)
Dept: PRIMARY CARE CLINIC | Facility: CLINIC | Age: 83
End: 2023-06-05
Payer: MEDICARE

## 2023-06-05 DIAGNOSIS — C44.701: Primary | ICD-10-CM

## 2023-06-05 RX ORDER — MUPIROCIN 20 MG/G
OINTMENT TOPICAL
COMMUNITY
Start: 2023-03-28 | End: 2023-06-05 | Stop reason: SDUPTHER

## 2023-06-05 RX ORDER — MUPIROCIN 20 MG/G
OINTMENT TOPICAL 2 TIMES DAILY
Qty: 30 G | Refills: 2 | Status: SHIPPED | OUTPATIENT
Start: 2023-06-05

## 2023-06-05 NOTE — TELEPHONE ENCOUNTER
----- Message from [facility] sent at 6/5/2023 10:13 AM CDT -----  .Type:  Test Results    Who Called:  PT    Name of Test (Lab/Mammo/Etc):  MRI    Date of Test: 6-1-23    Ordering Provider:  Aravind    Where the test was performed: [facility]    Would the patient rather a call back? Yes    Best Call Back Number:  [phone]  Additional Information:   calling for results

## 2023-06-30 ENCOUNTER — OFFICE VISIT (OUTPATIENT)
Dept: URGENT CARE | Facility: CLINIC | Age: 83
End: 2023-06-30
Payer: MEDICARE

## 2023-06-30 VITALS
RESPIRATION RATE: 18 BRPM | BODY MASS INDEX: 26.36 KG/M2 | OXYGEN SATURATION: 98 % | HEIGHT: 66 IN | HEART RATE: 85 BPM | WEIGHT: 164 LBS | TEMPERATURE: 99 F | DIASTOLIC BLOOD PRESSURE: 76 MMHG | SYSTOLIC BLOOD PRESSURE: 114 MMHG

## 2023-06-30 DIAGNOSIS — M54.32 LEFT SIDED SCIATICA: Primary | ICD-10-CM

## 2023-06-30 PROCEDURE — 99213 OFFICE O/P EST LOW 20 MIN: CPT | Mod: ,,, | Performed by: FAMILY MEDICINE

## 2023-06-30 PROCEDURE — 99213 PR OFFICE/OUTPT VISIT, EST, LEVL III, 20-29 MIN: ICD-10-PCS | Mod: ,,, | Performed by: FAMILY MEDICINE

## 2023-06-30 RX ORDER — CYCLOBENZAPRINE HCL 5 MG
5 TABLET ORAL NIGHTLY
Qty: 10 TABLET | Refills: 0 | Status: SHIPPED | OUTPATIENT
Start: 2023-06-30 | End: 2023-07-10

## 2023-06-30 NOTE — PATIENT INSTRUCTIONS
Muscle relaxer at bedtime.  Warm compress. Careful stretching.  If it does not improve can call us.  May need further workup with left hip X ray if continues.

## 2023-06-30 NOTE — PROGRESS NOTES
"Subjective:      Patient ID: Nery Donahue is a 82 y.o. female.    Vitals:  height is 5' 6" (1.676 m) and weight is 74.4 kg (164 lb). Her temperature is 98.5 °F (36.9 °C). Her blood pressure is 114/76 and her pulse is 85. Her respiration is 18 and oxygen saturation is 98%.     Chief Complaint: Pain (Possible sciatica -- left hip pain that radiates down left leg, numbness in leg at times x 1 week. )    7 days of L hip pain down the L leg with occasional numbness.  No new trauma. No dysuria, no fever. No bowel or bladder incontinence.       Constitution: Negative for sweating, fatigue and fever.   Cardiovascular:  Negative for chest pain.   Musculoskeletal:  Positive for pain. Negative for trauma and joint swelling.   Skin:  Negative for rash.   Neurological:  Negative for dizziness.    Objective:     Physical Exam   Constitutional: She is oriented to person, place, and time. She appears well-developed. She is cooperative. No distress.   HENT:   Head: Normocephalic and atraumatic.   Nose: Nose normal.   Mouth/Throat: Oropharynx is clear and moist and mucous membranes are normal.   Eyes: Conjunctivae and lids are normal.   Neck: Trachea normal and phonation normal. Neck supple.   Cardiovascular: Normal rate, regular rhythm, normal heart sounds and normal pulses.   Pulmonary/Chest: Effort normal and breath sounds normal.   Abdominal: Normal appearance and bowel sounds are normal. She exhibits no mass. Soft.   Musculoskeletal:         General: No deformity.      Comments: Pos SLR L leg, slight TTP of L lower buttock. Neg log roll.  No anterior hip TTP.    Neurological: no focal deficit. She is alert and oriented to person, place, and time. She has normal strength and normal reflexes. No sensory deficit.   Skin: Skin is warm, dry, intact and not diaphoretic.   Psychiatric: Her speech is normal and behavior is normal. Judgment and thought content normal.   Nursing note and vitals reviewed.    Assessment:     1. Left " sided sciatica        Plan:       Left sided sciatica  -     cyclobenzaprine (FLEXERIL) 5 MG tablet; Take 1 tablet (5 mg total) by mouth nightly. for 10 days  Dispense: 10 tablet; Refill: 0

## 2023-07-23 ENCOUNTER — OFFICE VISIT (OUTPATIENT)
Dept: URGENT CARE | Facility: CLINIC | Age: 83
End: 2023-07-23
Payer: MEDICARE

## 2023-07-23 VITALS
HEIGHT: 66 IN | OXYGEN SATURATION: 97 % | RESPIRATION RATE: 16 BRPM | SYSTOLIC BLOOD PRESSURE: 108 MMHG | HEART RATE: 87 BPM | TEMPERATURE: 99 F | DIASTOLIC BLOOD PRESSURE: 66 MMHG | WEIGHT: 164 LBS | BODY MASS INDEX: 26.36 KG/M2

## 2023-07-23 DIAGNOSIS — R52 BODY ACHES: ICD-10-CM

## 2023-07-23 DIAGNOSIS — N30.00 ACUTE CYSTITIS WITHOUT HEMATURIA: Primary | ICD-10-CM

## 2023-07-23 LAB
BILIRUB UR QL STRIP: POSITIVE
CTP QC/QA: YES
CTP QC/QA: YES
GLUCOSE UR QL STRIP: NEGATIVE
KETONES UR QL STRIP: NEGATIVE
LEUKOCYTE ESTERASE UR QL STRIP: POSITIVE
PH, POC UA: 5
POC BLOOD, URINE: NEGATIVE
POC MOLECULAR INFLUENZA A AGN: NEGATIVE
POC MOLECULAR INFLUENZA B AGN: NEGATIVE
POC NITRATES, URINE: POSITIVE
PROT UR QL STRIP: POSITIVE
SARS-COV-2 RDRP RESP QL NAA+PROBE: NEGATIVE
SP GR UR STRIP: 1.01 (ref 1–1.03)
UROBILINOGEN UR STRIP-ACNC: 2 (ref 0.1–1.1)

## 2023-07-23 PROCEDURE — 87635: ICD-10-PCS | Mod: QW,,,

## 2023-07-23 PROCEDURE — 87077 CULTURE AEROBIC IDENTIFY: CPT

## 2023-07-23 PROCEDURE — 87635 SARS-COV-2 COVID-19 AMP PRB: CPT | Mod: QW,,,

## 2023-07-23 PROCEDURE — 96372 PR INJECTION,THERAP/PROPH/DIAG2ST, IM OR SUBCUT: ICD-10-PCS | Mod: ,,,

## 2023-07-23 PROCEDURE — 87502 POCT INFLUENZA A/B MOLECULAR: ICD-10-PCS | Mod: QW,,,

## 2023-07-23 PROCEDURE — 99214 PR OFFICE/OUTPT VISIT, EST, LEVL IV, 30-39 MIN: ICD-10-PCS | Mod: 25,,,

## 2023-07-23 PROCEDURE — 87502 INFLUENZA DNA AMP PROBE: CPT | Mod: QW,,,

## 2023-07-23 PROCEDURE — 96372 THER/PROPH/DIAG INJ SC/IM: CPT | Mod: ,,,

## 2023-07-23 PROCEDURE — 87088 URINE BACTERIA CULTURE: CPT

## 2023-07-23 PROCEDURE — 99214 OFFICE O/P EST MOD 30 MIN: CPT | Mod: 25,,,

## 2023-07-23 PROCEDURE — 81003 URINALYSIS AUTO W/O SCOPE: CPT | Mod: QW,,,

## 2023-07-23 PROCEDURE — 81003 POCT URINALYSIS, DIPSTICK, MANUAL, W/O SCOPE: ICD-10-PCS | Mod: QW,,,

## 2023-07-23 RX ORDER — CEFTRIAXONE 1 G/1
1 INJECTION, POWDER, FOR SOLUTION INTRAMUSCULAR; INTRAVENOUS
Status: COMPLETED | OUTPATIENT
Start: 2023-07-23 | End: 2023-07-23

## 2023-07-23 RX ORDER — SULFAMETHOXAZOLE AND TRIMETHOPRIM 800; 160 MG/1; MG/1
1 TABLET ORAL 2 TIMES DAILY
Qty: 14 TABLET | Refills: 0 | Status: SHIPPED | OUTPATIENT
Start: 2023-07-23 | End: 2023-07-25

## 2023-07-23 RX ADMIN — CEFTRIAXONE 1 G: 1 INJECTION, POWDER, FOR SOLUTION INTRAMUSCULAR; INTRAVENOUS at 11:07

## 2023-07-23 NOTE — PATIENT INSTRUCTIONS
Negative flu and COVID  Positive for bladder infections      Will call with urine culture results in a few days.  Complete full course of antibiotics.      Drink plenty of water daily. Avoid soda.    Follow up with your primary care doctor as needed.    Present to the nearest Emergency Department with any significant change or worsening of symptoms including but not limited to blood in urine, nausea/vomiting, abdominal pain, fever, body aches, chills, or flank pain.

## 2023-07-23 NOTE — PROGRESS NOTES
"Subjective:      Patient ID: Nery Donahue is a 82 y.o. female.    Vitals:  height is 5' 6" (1.676 m) and weight is 74.4 kg (164 lb). Her temperature is 99.2 °F (37.3 °C). Her blood pressure is 108/66 and her pulse is 87. Her respiration is 16 and oxygen saturation is 97%.     Chief Complaint: Fever (Temp 99.9 today. Last night chills, sweats, body aches along with frequent urination. Took aspirin this morning.)    Patient is a 82-year-old female with past medical history of hypertensive heart disease, atrial fibrillation, pacemaker, who presents to urgent care with complaints of low-grade fever, T-max 99.9° at home last night with chills, sweats, body aches.  Patient also reports urinary frequency and urgency.  Patient denies abdominal pain, flank pain, neck stiffness, rash, GI symptoms.    Fever       Constitution: Positive for fever.   Genitourinary:  Positive for frequency and urgency.    Objective:     Physical Exam   Constitutional: She is oriented to person, place, and time. She appears well-developed.   HENT:   Head: Normocephalic and atraumatic.   Ears:   Right Ear: External ear normal.   Left Ear: External ear normal.   Nose: Nose normal. No nasal deformity. No epistaxis.   Mouth/Throat: Oropharynx is clear and moist and mucous membranes are normal.   Eyes: Lids are normal.   Neck: Trachea normal and phonation normal. Neck supple.   Cardiovascular: Regular rhythm, normal heart sounds and normal pulses.   Pulmonary/Chest: Effort normal and breath sounds normal. No respiratory distress. She has no wheezes.   Abdominal: Normal appearance and bowel sounds are normal. She exhibits no distension. Soft. There is no guarding, no left CVA tenderness and no right CVA tenderness.      Comments: Suprapubic discomfort, mild tenderness   Neurological: no focal deficit. She is alert, oriented to person, place, and time and at baseline. She displays no weakness. No sensory deficit. Coordination and gait normal.   Skin: " Skin is warm, dry and intact.   Psychiatric: Her speech is normal and behavior is normal.   Nursing note and vitals reviewed.    Assessment:     1. Acute cystitis without hematuria    2. Body aches        Plan:       Acute cystitis without hematuria  -     POCT Urinalysis, Dipstick, Manual, w/o Scope  -     Urine culture  -     sulfamethoxazole-trimethoprim 800-160mg (BACTRIM DS) 800-160 mg Tab; Take 1 tablet by mouth 2 (two) times daily. for 7 days  Dispense: 14 tablet; Refill: 0  -     cefTRIAXone injection 1 g    Body aches  -     POCT COVID-19 Rapid Screening  -     POCT Influenza A/B Molecular        UA positive for nitrites, 500 leukocytes  Based on chills, systemic symptoms will send in 7 days of Bactrim.  Based on complicated UTI and fever, patient would like injection of Rocephin in clinic.        Will call with urine culture results in a few days.  Complete full course of antibiotics.      Drink plenty of water daily. Avoid soda.    Follow up with your primary care doctor as needed.    Present to the nearest Emergency Department with any significant change or worsening of symptoms including but not limited to blood in urine, nausea/vomiting, abdominal pain, fever, body aches, chills, or flank pain.

## 2023-07-25 DIAGNOSIS — N30.00 ACUTE CYSTITIS WITHOUT HEMATURIA: Primary | ICD-10-CM

## 2023-07-25 LAB — BACTERIA UR CULT: ABNORMAL

## 2023-07-25 RX ORDER — NITROFURANTOIN 25; 75 MG/1; MG/1
100 CAPSULE ORAL 2 TIMES DAILY
Qty: 14 CAPSULE | Refills: 0 | Status: SHIPPED | OUTPATIENT
Start: 2023-07-25 | End: 2023-08-01

## 2023-08-09 NOTE — PROGRESS NOTES
Date: 08/14/2023  Patient ID: [id number]   Chief Complaint: Urinary Tract Infection and Fatigue    HPI:   Nery Donahue is a 83 y.o. female here today for Urinary Tract Infection and Fatigue  Patient recently went to the urgent care for UTI symptoms (frequency, urgency).  Urinalysis showed gamma Streptococcus.  Patient was started on Augmentin and Pyridium. Since then patient developed burning pain in back and upper chest, nausea, chills, fever of 101 2 days ago and is still having urinary frequency/urgency.  Never had dysuria  Patient also c/o fatigue and occasional lightheadedness when walking around for too long.   Patient Active Problem List   Diagnosis    Thrombocytopenia, unspecified    Acute (reversible) ischemia of intestine, part and extent unspecified    Cognitive decline    Wellness examination    Secondary esophageal varices without bleeding    Hypertensive heart disease with heart failure    Angina pectoris, unspecified    Atrial fibrillation, unspecified type    Cirrhosis of liver without ascites    Urinary hesitancy    Chronic pain of left ankle    Primary malignant neoplasm of skin of ankle    Acute cystitis without hematuria    Fatigue    Pyelonephritis     Outpatient Medications Marked as Taking for the 8/14/23 encounter (Office Visit) with Monae Nazario MD   Medication Sig Dispense Refill    albuterol (PROVENTIL/VENTOLIN HFA) 90 mcg/actuation inhaler Inhale into the lungs.      amoxicillin-clavulanate 875-125mg (AUGMENTIN) 875-125 mg per tablet Take 1 tablet by mouth every 12 (twelve) hours. for 7 days 14 tablet 0    donepeziL (ARICEPT) 5 MG tablet Take 1 tablet (5 mg total) by mouth every evening. 90 tablet 3    L.acid/B.animalis,bifidum/FOS (PROBIOTIC COMPLEX ORAL) Take by mouth. SYMBIOTIC      lactulose (CHRONULAC) 10 gram/15 mL solution Take 20 g by mouth daily as needed.      meloxicam (MOBIC) 15 MG tablet       mupirocin (BACTROBAN) 2 % ointment Apply topically 2 (two) times daily. 30 g  "2    nadoloL (CORGARD) 20 MG tablet Take 20 mg by mouth.      phenazopyridine (PYRIDIUM) 200 MG tablet Take 1 tablet (200 mg total) by mouth 3 (three) times daily as needed for Pain. 6 tablet 0    rifAXIMin (XIFAXAN) 550 mg Tab Take 1 tablet (550 mg total) by mouth 2 (two) times daily. 180 tablet 3    sucralfate (CARAFATE) 1 gram tablet       XARELTO 10 mg Tab        Past Medical History:   Diagnosis Date    Hepatitis C     treated/cured previously    Pacemaker     Paroxysmal atrial fibrillation     Pericardial cyst     Skin cancer     Thrombus      Past Surgical History:   Procedure Laterality Date    APPENDECTOMY      CARDIAC PACEMAKER PLACEMENT      cataract surgery      FOOT SURGERY Left     HYSTERECTOMY      LAPAROSCOPIC CHOLECYSTECTOMY      parathyroid resection      REPEAT ABDOMINAL PARACENTESIS      SKIN CANCER EXCISION      spot removed Left     left ankle spot removed    THROMBECTOMY       Review of patient's allergies indicates:   Allergen Reactions    Ciprofloxacin Swelling    Iodine Shortness Of Breath    Latex Itching    Fluoride      Mouth sores    Fluoride preparations Dermatitis     Other reaction(s): Ulcer of mouth     Family History   Problem Relation Age of Onset    No Known Problems Mother     No Known Problems Father     No Known Problems Sister     No Known Problems Brother       Social History     Socioeconomic History    Marital status:    Tobacco Use    Smoking status: Never    Smokeless tobacco: Never   Substance and Sexual Activity    Alcohol use: Not Currently    Drug use: Never    Sexual activity: Not Currently     Patient Care Team:  Monae Nazario MD as PCP - General (Family Medicine)  Lauri Medina MD (Inactive)   Subjective:   ROS  See HPI for details  All Other ROS: Negative except as stated in HPI.   Objective:   BP (!) 153/78   Pulse 70   Temp 97.6 °F (36.4 °C)   Ht 5' 6" (1.676 m)   Wt 75.8 kg (167 lb 3.2 oz)   SpO2 95%   BMI 26.99 kg/m²   Physical " Exam  Cardiovascular:      Rate and Rhythm: Normal rate and regular rhythm.      Heart sounds: Normal heart sounds.      Comments: Occasional PVCs. Has pacemaker  Pulmonary:      Effort: Pulmonary effort is normal.      Breath sounds: Normal breath sounds.   Abdominal:      General: Bowel sounds are normal. There is no distension.      Palpations: Abdomen is soft.      Tenderness: There is abdominal tenderness (upper quadrants). There is left CVA tenderness and guarding. There is no right CVA tenderness or rebound.   Musculoskeletal:      Comments: Anterior chest surgical scar       Assessment:       ICD-10-CM ICD-9-CM   1. Acute cystitis without hematuria  N30.00 595.0   2. Pyelonephritis  N12 590.80   3. Fatigue, unspecified type  R53.83 780.79      Plan:   1. Acute cystitis without hematuria  Overview:  Stop Augmentin since it is not working  Start Bactrim for now until culture results.  Patient has allergy to Cipro  Can use OTC AZO for relief of urinary spasms.  Report any continuing/worsening symptoms after antibiotic completion such as nausea/vomiting, low back pain, blood in urine, burning with urination, or fever/body aches/chills, etc.  Drink plenty of water daily. Avoid soda and sugary drinks  Increase hydration  Wipe from front to back  Cotton underwear  Avoid douching  Empty bladder before and after intercourse  Urinate frequently. Do not hold urine for extended periods of time  ED precautions            Orders:  -     sulfamethoxazole-trimethoprim 800-160mg (BACTRIM DS) 800-160 mg Tab; Take 1 tablet by mouth 2 (two) times daily. for 7 days  Dispense: 14 tablet; Refill: 0  -     Urinalysis  -     Cancel: Urine culture  -     Urine culture  -     CT Abdomen Without Contrast; Future; Expected date: 08/14/2023    2. Pyelonephritis  Overview:  Suspected due to exam  Obtain CT to r/o abscess/other etiology. Pt cannot tolerate dye 2/2 pacemaker and allergy  F/u with results  Start abx as described  above      Orders:  -     CT Abdomen Without Contrast; Future; Expected date: 08/14/2023    3. Fatigue, unspecified type  Overview:  Likely related to IVVD increase oral hydration   R/O other etiology: TSH/fT4, CBC, vit D level, cortisol level  R/O psych: anxiety, depression  Consider sleep study for possible MAX/OHS  Reduce stressors  Encourage sleep and nutrition  Avoid tobacco, caffeine, alcohol  Graded exercise therapy: 30min moderate activity 5d/wk  CBT      Orders:  -     CBC Auto Differential; Future; Expected date: 08/14/2023  -     Comprehensive Metabolic Panel; Future; Expected date: 08/14/2023  -     Cortisol; Future; Expected date: 08/14/2023  -     Vitamin D; Future; Expected date: 08/14/2023  -     TSH; Future; Expected date: 08/14/2023  -     T4, Free; Future; Expected date: 08/14/2023         Medication List with Changes/Refills   New Medications    SULFAMETHOXAZOLE-TRIMETHOPRIM 800-160MG (BACTRIM DS) 800-160 MG TAB    Take 1 tablet by mouth 2 (two) times daily. for 7 days       Start Date: 8/14/2023 End Date: 8/21/2023   Current Medications    ALBUTEROL (PROVENTIL/VENTOLIN HFA) 90 MCG/ACTUATION INHALER    Inhale into the lungs.       Start Date: 1/13/2022 End Date: --    AMOXICILLIN-CLAVULANATE 875-125MG (AUGMENTIN) 875-125 MG PER TABLET    Take 1 tablet by mouth every 12 (twelve) hours. for 7 days       Start Date: 8/11/2023 End Date: 8/18/2023    DONEPEZIL (ARICEPT) 5 MG TABLET    Take 1 tablet (5 mg total) by mouth every evening.       Start Date: 10/10/2022End Date: 10/10/2023    L.ACID/B.ANIMALIS,BIFIDUM/FOS (PROBIOTIC COMPLEX ORAL)    Take by mouth. SYMBIOTIC       Start Date: --        End Date: --    LACTULOSE (CHRONULAC) 10 GRAM/15 ML SOLUTION    Take 20 g by mouth daily as needed.       Start Date: --        End Date: --    MELOXICAM (MOBIC) 15 MG TABLET           Start Date: 2/26/2022 End Date: --    MUPIROCIN (BACTROBAN) 2 % OINTMENT    Apply topically 2 (two) times daily.       Start  Date: 6/5/2023  End Date: --    NADOLOL (CORGARD) 20 MG TABLET    Take 20 mg by mouth.       Start Date: --        End Date: --    ONDANSETRON (ZOFRAN) 4 MG TABLET    Take 1 tablet (4 mg total) by mouth every 8 (eight) hours as needed for Nausea.       Start Date: 5/26/2023 End Date: --    PHENAZOPYRIDINE (PYRIDIUM) 200 MG TABLET    Take 1 tablet (200 mg total) by mouth 3 (three) times daily as needed for Pain.       Start Date: 8/11/2023 End Date: 8/14/2023    RIFAXIMIN (XIFAXAN) 550 MG TAB    Take 1 tablet (550 mg total) by mouth 2 (two) times daily.       Start Date: 10/10/2022End Date: --    SUCRALFATE (CARAFATE) 1 GRAM TABLET           Start Date: 6/8/2022  End Date: --    XARELTO 10 MG TAB           Start Date: 6/8/2022  End Date: --          Follow up in about 1 week (around 8/21/2023) for UTI, fatigue. In addition to their scheduled follow up, the patient has also been instructed to follow up on as needed basis.

## 2023-08-11 ENCOUNTER — OFFICE VISIT (OUTPATIENT)
Dept: URGENT CARE | Facility: CLINIC | Age: 83
End: 2023-08-11
Payer: MEDICARE

## 2023-08-11 VITALS
HEART RATE: 86 BPM | SYSTOLIC BLOOD PRESSURE: 133 MMHG | OXYGEN SATURATION: 96 % | BODY MASS INDEX: 26.52 KG/M2 | DIASTOLIC BLOOD PRESSURE: 95 MMHG | TEMPERATURE: 99 F | RESPIRATION RATE: 18 BRPM | HEIGHT: 66 IN | WEIGHT: 165 LBS

## 2023-08-11 DIAGNOSIS — N30.00 ACUTE CYSTITIS WITHOUT HEMATURIA: Primary | ICD-10-CM

## 2023-08-11 LAB
BILIRUB UR QL STRIP: NEGATIVE
GLUCOSE UR QL STRIP: NEGATIVE
KETONES UR QL STRIP: NEGATIVE
LEUKOCYTE ESTERASE UR QL STRIP: POSITIVE
PH, POC UA: 5
POC BLOOD, URINE: NEGATIVE
POC NITRATES, URINE: NEGATIVE
PROT UR QL STRIP: NEGATIVE
SP GR UR STRIP: 1.01 (ref 1–1.03)
UROBILINOGEN UR STRIP-ACNC: POSITIVE (ref 0.1–1.1)

## 2023-08-11 PROCEDURE — 99213 OFFICE O/P EST LOW 20 MIN: CPT | Mod: ,,, | Performed by: FAMILY MEDICINE

## 2023-08-11 PROCEDURE — 87077 CULTURE AEROBIC IDENTIFY: CPT | Performed by: FAMILY MEDICINE

## 2023-08-11 PROCEDURE — 99213 PR OFFICE/OUTPT VISIT, EST, LEVL III, 20-29 MIN: ICD-10-PCS | Mod: ,,, | Performed by: FAMILY MEDICINE

## 2023-08-11 PROCEDURE — 81003 POCT URINALYSIS, DIPSTICK, MANUAL, W/O SCOPE: ICD-10-PCS | Mod: QW,,, | Performed by: FAMILY MEDICINE

## 2023-08-11 PROCEDURE — 87088 URINE BACTERIA CULTURE: CPT | Performed by: FAMILY MEDICINE

## 2023-08-11 PROCEDURE — 81003 URINALYSIS AUTO W/O SCOPE: CPT | Mod: QW,,, | Performed by: FAMILY MEDICINE

## 2023-08-11 RX ORDER — PHENAZOPYRIDINE HYDROCHLORIDE 200 MG/1
200 TABLET, FILM COATED ORAL 3 TIMES DAILY PRN
Qty: 6 TABLET | Refills: 0 | Status: SHIPPED | OUTPATIENT
Start: 2023-08-11 | End: 2023-08-14

## 2023-08-11 RX ORDER — AMOXICILLIN AND CLAVULANATE POTASSIUM 875; 125 MG/1; MG/1
1 TABLET, FILM COATED ORAL EVERY 12 HOURS
Qty: 14 TABLET | Refills: 0 | Status: SHIPPED | OUTPATIENT
Start: 2023-08-11 | End: 2023-08-18

## 2023-08-11 NOTE — PROGRESS NOTES
Patient ID: [id number]     Chief Complaint: Urinary Frequency (Frequent urination, chills x last night. Was here approx two weeks ago for a UTI -- symptoms did resolve, but returned. )    83-year-old female still very independent presents today with about 3 days of urinary frequency and suprapubic tenderness.  She was here 2 weeks ago diagnosed with a UTI, given Bactrim, urine culture came back resistant to Bactrim so she was given Macrobid for 5 days.  She took the Macrobid and she reports that almost all of the symptoms went away but she believes there may have been some lingering symptoms remaining.  She went on a cruise recently and by the time she came back she was having out right urgency, frequency and return of the suprapubic tenderness.  She also reports having some chills but no flank pain or fever.    History of Present Illness:     Nery Donahue is a 83 y.o. female with a history of prior UTIs who presents today for symptoms of urinary frequency, urgency and dysuria. She denies experiencing any fevers, chills, nausea, vomiting, back pain/ flank pain, or significant fatigue or malaise beyond baseline. Her oral intake has been normal.     Past Medical History:     ----------------------------  Pacemaker  Paroxysmal atrial fibrillation  Pericardial cyst  Skin cancer  Thrombus     Past Surgical History:   Procedure Laterality Date    APPENDECTOMY      CARDIAC PACEMAKER PLACEMENT      cataract surgery      FOOT SURGERY Left     HYSTERECTOMY      LAPAROSCOPIC CHOLECYSTECTOMY      REPEAT ABDOMINAL PARACENTESIS      SKIN CANCER EXCISION      spot removed Left     left ankle spot removed    THROMBECTOMY         Review of patient's allergies indicates:   Allergen Reactions    Ciprofloxacin Swelling    Iodine Shortness Of Breath    Latex Itching    Fluoride      Mouth sores    Fluoride preparations Dermatitis     Other reaction(s): Ulcer of mouth       Outpatient Medications Marked as Taking for the 8/11/23  "encounter (Office Visit) with Joey Farrell MD   Medication Sig Dispense Refill    albuterol (PROVENTIL/VENTOLIN HFA) 90 mcg/actuation inhaler Inhale into the lungs.      donepeziL (ARICEPT) 5 MG tablet Take 1 tablet (5 mg total) by mouth every evening. 90 tablet 3    L.acid/B.animalis,bifidum/FOS (PROBIOTIC COMPLEX ORAL) Take by mouth. SYMBIOTIC      lactulose (CHRONULAC) 10 gram/15 mL solution Take 20 g by mouth daily as needed.      meloxicam (MOBIC) 15 MG tablet       mupirocin (BACTROBAN) 2 % ointment Apply topically 2 (two) times daily. 30 g 2    nadoloL (CORGARD) 20 MG tablet Take 20 mg by mouth.      rifAXIMin (XIFAXAN) 550 mg Tab Take 1 tablet (550 mg total) by mouth 2 (two) times daily. 180 tablet 3    sucralfate (CARAFATE) 1 gram tablet       XARELTO 10 mg Tab          Social History     Socioeconomic History    Marital status:    Tobacco Use    Smoking status: Never    Smokeless tobacco: Never   Substance and Sexual Activity    Alcohol use: Not Currently    Drug use: Never    Sexual activity: Not Currently        Family History   Problem Relation Age of Onset    No Known Problems Mother     No Known Problems Father     No Known Problems Sister     No Known Problems Brother         Subjective:     Review of Systems   Constitutional:  Negative for chills, fever and malaise/fatigue.   Gastrointestinal:  Negative for abdominal pain, diarrhea, nausea and vomiting.   Genitourinary:  Positive for frequency and urgency. Negative for dysuria.   Musculoskeletal:  Negative for back pain.       Objective:     BP (!) 133/95   Pulse 86   Temp 98.6 °F (37 °C)   Resp 18   Ht 5' 6" (1.676 m)   Wt 74.8 kg (165 lb)   SpO2 96%   BMI 26.63 kg/m²     Physical Exam  Constitutional:       General: She is not in acute distress.     Appearance: Normal appearance. She is normal weight. She is not ill-appearing or toxic-appearing.   Cardiovascular:      Rate and Rhythm: Normal rate.   Pulmonary:      Effort: " Pulmonary effort is normal.   Abdominal:      Tenderness: There is no abdominal tenderness. There is no right CVA tenderness or left CVA tenderness.   Skin:     Coloration: Skin is not pale.      Findings: No rash.   Neurological:      Mental Status: She is alert.         Assessment & Plan:       ICD-10-CM ICD-9-CM   1. Acute cystitis without hematuria  N30.00 595.0        1. Acute cystitis without hematuria  -     POCT Urinalysis, Dipstick, Manual, w/o Scope  -     amoxicillin-clavulanate 875-125mg (AUGMENTIN) 875-125 mg per tablet; Take 1 tablet by mouth every 12 (twelve) hours. for 7 days  Dispense: 14 tablet; Refill: 0  -     Urine culture  -     phenazopyridine (PYRIDIUM) 200 MG tablet; Take 1 tablet (200 mg total) by mouth 3 (three) times daily as needed for Pain.  Dispense: 6 tablet; Refill: 0         We will assume same bug as 2 weeks ago, and using that is sensitivity bug is especially sensitive to Augmentin so will send in that for 7 days.  We will send for culture and call her with the results.

## 2023-08-11 NOTE — PATIENT INSTRUCTIONS
Please take Augmentin twice daily for 7 days    We will call you with the results of your urine culture in 2 days    Please go to the emergency room if you develop nausea, vomiting, fever, or severe abdominal pain

## 2023-08-14 ENCOUNTER — OFFICE VISIT (OUTPATIENT)
Dept: PRIMARY CARE CLINIC | Facility: CLINIC | Age: 83
End: 2023-08-14
Payer: MEDICARE

## 2023-08-14 VITALS
BODY MASS INDEX: 26.87 KG/M2 | WEIGHT: 167.19 LBS | HEART RATE: 70 BPM | OXYGEN SATURATION: 95 % | DIASTOLIC BLOOD PRESSURE: 78 MMHG | TEMPERATURE: 98 F | HEIGHT: 66 IN | SYSTOLIC BLOOD PRESSURE: 153 MMHG

## 2023-08-14 DIAGNOSIS — N30.00 ACUTE CYSTITIS WITHOUT HEMATURIA: Primary | ICD-10-CM

## 2023-08-14 DIAGNOSIS — N12 PYELONEPHRITIS: ICD-10-CM

## 2023-08-14 DIAGNOSIS — R53.83 FATIGUE, UNSPECIFIED TYPE: ICD-10-CM

## 2023-08-14 LAB
APPEARANCE UR: CLEAR
BACTERIA #/AREA URNS AUTO: NORMAL /HPF
BILIRUB UR QL STRIP.AUTO: ABNORMAL
COLOR UR: ABNORMAL
GLUCOSE UR QL STRIP.AUTO: NEGATIVE
KETONES UR QL STRIP.AUTO: NEGATIVE
LEUKOCYTE ESTERASE UR QL STRIP.AUTO: ABNORMAL
NITRITE UR QL STRIP.AUTO: POSITIVE
PH UR STRIP.AUTO: 6.5 [PH]
PROT UR QL STRIP.AUTO: NEGATIVE
RBC #/AREA URNS AUTO: <5 /HPF
RBC UR QL AUTO: NEGATIVE
SP GR UR STRIP.AUTO: 1.02 (ref 1–1.03)
SQUAMOUS #/AREA URNS AUTO: <5 /HPF
UROBILINOGEN UR STRIP-ACNC: 1
WBC #/AREA URNS AUTO: <5 /HPF

## 2023-08-14 PROCEDURE — 99214 PR OFFICE/OUTPT VISIT, EST, LEVL IV, 30-39 MIN: ICD-10-PCS | Mod: ,,, | Performed by: STUDENT IN AN ORGANIZED HEALTH CARE EDUCATION/TRAINING PROGRAM

## 2023-08-14 PROCEDURE — 81001 URINALYSIS AUTO W/SCOPE: CPT | Performed by: STUDENT IN AN ORGANIZED HEALTH CARE EDUCATION/TRAINING PROGRAM

## 2023-08-14 PROCEDURE — 99214 OFFICE O/P EST MOD 30 MIN: CPT | Mod: ,,, | Performed by: STUDENT IN AN ORGANIZED HEALTH CARE EDUCATION/TRAINING PROGRAM

## 2023-08-14 RX ORDER — SULFAMETHOXAZOLE AND TRIMETHOPRIM 800; 160 MG/1; MG/1
1 TABLET ORAL 2 TIMES DAILY
Qty: 14 TABLET | Refills: 0 | Status: SHIPPED | OUTPATIENT
Start: 2023-08-14 | End: 2023-08-21

## 2023-08-15 ENCOUNTER — HOSPITAL ENCOUNTER (EMERGENCY)
Facility: HOSPITAL | Age: 83
Discharge: HOME | End: 2023-08-15
Attending: EMERGENCY MEDICINE
Payer: MEDICARE

## 2023-08-15 ENCOUNTER — TELEPHONE (OUTPATIENT)
Dept: PRIMARY CARE CLINIC | Facility: CLINIC | Age: 83
End: 2023-08-15
Payer: MEDICARE

## 2023-08-15 VITALS
RESPIRATION RATE: 18 BRPM | SYSTOLIC BLOOD PRESSURE: 152 MMHG | HEIGHT: 66 IN | OXYGEN SATURATION: 99 % | HEART RATE: 60 BPM | DIASTOLIC BLOOD PRESSURE: 83 MMHG | TEMPERATURE: 98 F | BODY MASS INDEX: 26.52 KG/M2 | WEIGHT: 165 LBS

## 2023-08-15 DIAGNOSIS — N39.0 URINARY TRACT INFECTION WITHOUT HEMATURIA, SITE UNSPECIFIED: Primary | ICD-10-CM

## 2023-08-15 DIAGNOSIS — R10.13 EPIGASTRIC ABDOMINAL PAIN: ICD-10-CM

## 2023-08-15 LAB
ALBUMIN SERPL-MCNC: 3.2 G/DL (ref 3.4–4.8)
ALBUMIN/GLOB SERPL: 1.1 RATIO (ref 1.1–2)
ALP SERPL-CCNC: 117 UNIT/L (ref 40–150)
ALT SERPL-CCNC: 21 UNIT/L (ref 0–55)
APPEARANCE UR: CLEAR
AST SERPL-CCNC: 42 UNIT/L (ref 5–34)
BACTERIA #/AREA URNS AUTO: NORMAL /HPF
BACTERIA UR CULT: ABNORMAL
BACTERIA UR CULT: ABNORMAL
BASOPHILS # BLD AUTO: 0.02 X10(3)/MCL
BASOPHILS NFR BLD AUTO: 0.4 %
BILIRUB SERPL-MCNC: 2.3 MG/DL
BILIRUB UR QL STRIP.AUTO: NEGATIVE
BUN SERPL-MCNC: 10 MG/DL (ref 9.8–20.1)
CALCIUM SERPL-MCNC: 8.8 MG/DL (ref 8.4–10.2)
CHLORIDE SERPL-SCNC: 107 MMOL/L (ref 98–107)
CO2 SERPL-SCNC: 21 MMOL/L (ref 23–31)
COLOR UR: YELLOW
CREAT SERPL-MCNC: 0.88 MG/DL (ref 0.55–1.02)
EOSINOPHIL # BLD AUTO: 0.02 X10(3)/MCL (ref 0–0.9)
EOSINOPHIL NFR BLD AUTO: 0.4 %
ERYTHROCYTE [DISTWIDTH] IN BLOOD BY AUTOMATED COUNT: 15.9 % (ref 11.5–17)
GFR SERPLBLD CREATININE-BSD FMLA CKD-EPI: >60 MLS/MIN/1.73/M2
GLOBULIN SER-MCNC: 2.9 GM/DL (ref 2.4–3.5)
GLUCOSE SERPL-MCNC: 89 MG/DL (ref 82–115)
GLUCOSE UR QL STRIP.AUTO: NEGATIVE
HCT VFR BLD AUTO: 38.9 % (ref 37–47)
HGB BLD-MCNC: 12.6 G/DL (ref 12–16)
IMM GRANULOCYTES # BLD AUTO: 0.04 X10(3)/MCL (ref 0–0.04)
IMM GRANULOCYTES NFR BLD AUTO: 0.8 %
KETONES UR QL STRIP.AUTO: NEGATIVE
LEUKOCYTE ESTERASE UR QL STRIP.AUTO: ABNORMAL
LIPASE SERPL-CCNC: 26 U/L
LYMPHOCYTES # BLD AUTO: 0.46 X10(3)/MCL (ref 0.6–4.6)
LYMPHOCYTES NFR BLD AUTO: 9 %
MCH RBC QN AUTO: 29.2 PG (ref 27–31)
MCHC RBC AUTO-ENTMCNC: 32.4 G/DL (ref 33–36)
MCV RBC AUTO: 90.3 FL (ref 80–94)
MONOCYTES # BLD AUTO: 0.85 X10(3)/MCL (ref 0.1–1.3)
MONOCYTES NFR BLD AUTO: 16.6 %
NEUTROPHILS # BLD AUTO: 3.72 X10(3)/MCL (ref 2.1–9.2)
NEUTROPHILS NFR BLD AUTO: 72.8 %
NITRITE UR QL STRIP.AUTO: NEGATIVE
NRBC BLD AUTO-RTO: 0 %
PH UR STRIP.AUTO: 7 [PH]
PLATELET # BLD AUTO: 62 X10(3)/MCL (ref 130–400)
PMV BLD AUTO: 10.8 FL (ref 7.4–10.4)
POTASSIUM SERPL-SCNC: 3.8 MMOL/L (ref 3.5–5.1)
PROT SERPL-MCNC: 6.1 GM/DL (ref 5.8–7.6)
PROT UR QL STRIP.AUTO: NEGATIVE
RBC # BLD AUTO: 4.31 X10(6)/MCL (ref 4.2–5.4)
RBC #/AREA URNS AUTO: <5 /HPF
RBC UR QL AUTO: NEGATIVE
SODIUM SERPL-SCNC: 136 MMOL/L (ref 136–145)
SP GR UR STRIP.AUTO: 1.01 (ref 1–1.03)
SQUAMOUS #/AREA URNS AUTO: <5 /HPF
TROPONIN I SERPL-MCNC: <0.01 NG/ML (ref 0–0.04)
UROBILINOGEN UR STRIP-ACNC: 1
WBC # SPEC AUTO: 5.11 X10(3)/MCL (ref 4.5–11.5)
WBC #/AREA URNS AUTO: <5 /HPF

## 2023-08-15 PROCEDURE — 25000003 PHARM REV CODE 250: Performed by: PHYSICIAN ASSISTANT

## 2023-08-15 PROCEDURE — 81001 URINALYSIS AUTO W/SCOPE: CPT | Performed by: PHYSICIAN ASSISTANT

## 2023-08-15 PROCEDURE — 84484 ASSAY OF TROPONIN QUANT: CPT | Performed by: PHYSICIAN ASSISTANT

## 2023-08-15 PROCEDURE — 93010 EKG 12-LEAD: ICD-10-PCS | Mod: ,,, | Performed by: INTERNAL MEDICINE

## 2023-08-15 PROCEDURE — 83690 ASSAY OF LIPASE: CPT | Performed by: PHYSICIAN ASSISTANT

## 2023-08-15 PROCEDURE — 63600175 PHARM REV CODE 636 W HCPCS: Performed by: PHYSICIAN ASSISTANT

## 2023-08-15 PROCEDURE — 80053 COMPREHEN METABOLIC PANEL: CPT | Performed by: PHYSICIAN ASSISTANT

## 2023-08-15 PROCEDURE — 93005 ELECTROCARDIOGRAM TRACING: CPT

## 2023-08-15 PROCEDURE — 85025 COMPLETE CBC W/AUTO DIFF WBC: CPT | Performed by: PHYSICIAN ASSISTANT

## 2023-08-15 PROCEDURE — 93010 ELECTROCARDIOGRAM REPORT: CPT | Mod: ,,, | Performed by: INTERNAL MEDICINE

## 2023-08-15 PROCEDURE — 99285 EMERGENCY DEPT VISIT HI MDM: CPT | Mod: 25

## 2023-08-15 RX ORDER — HYOSCYAMINE SULFATE 0.12 MG/1
0.12 TABLET SUBLINGUAL 3 TIMES DAILY
Qty: 15 TABLET | Refills: 0 | Status: SHIPPED | OUTPATIENT
Start: 2023-08-15 | End: 2023-08-20

## 2023-08-15 RX ORDER — ONDANSETRON 4 MG/1
4 TABLET, ORALLY DISINTEGRATING ORAL
Status: COMPLETED | OUTPATIENT
Start: 2023-08-15 | End: 2023-08-15

## 2023-08-15 RX ORDER — MAG HYDROX/ALUMINUM HYD/SIMETH 200-200-20
30 SUSPENSION, ORAL (FINAL DOSE FORM) ORAL
Qty: 200 ML | Refills: 0 | Status: SHIPPED | OUTPATIENT
Start: 2023-08-15 | End: 2023-08-27

## 2023-08-15 RX ADMIN — ONDANSETRON 4 MG: 4 TABLET, ORALLY DISINTEGRATING ORAL at 11:08

## 2023-08-15 RX ADMIN — DEXTROSE MONOHYDRATE 1 G: 5 INJECTION INTRAVENOUS at 02:08

## 2023-08-15 NOTE — ED PROVIDER NOTES
Encounter Date: 8/15/2023       History     Chief Complaint   Patient presents with    Abdominal Pain     Presents to ED with c/o epigastric abdominal pain and nausea x2 days ago. Recently dx with UTI and Rx antibiotics, patient states the pain originally began in the lower abdomen, however, has traveled to the epigastric region. Denies urinary symptoms.      83 year old white female presents with complaints of epigastric abdominal pain and nausea for two days. Patient reports she was recently diagnosed with a UTI at the urgent care and started on prescriptions antibiotics. She reports that she has had these medications changed twice. From appearance of the chart this appears to be bactrim(7/23) to macrobid(7/25) to augmentin(8/11), back to bactrim (yesterday,8/14) by her pcp. She reports her pain has worsened substantially and has had intermittent fevers to tmax 102 approximately three days ago. She denies any dysuria or hematuria. She denies any active vomiting or diarrhea. She reports she had a colonoscopy approximately 5 years ago. History of appendectomy and cholecystectomy.She denies any vaginal or rectal bleeding, chest pain or shortness of breath. Patient does have a history of hepatitis C And cirrhosis. She was treated for the Hep C approximately 3 years ago.     The history is provided by the patient. No  was used.   Abdominal Pain  The current episode started two days ago. The onset of the illness was gradual. The problem has not changed since onset.The abdominal pain is located in the left flank and right flank. The severity of the abdominal pain is 7/10. The abdominal pain is relieved by nothing. The abdominal pain is exacerbated by fatty foods. The other symptoms of the illness include fever and nausea. The other symptoms of the illness do not include melena, vomiting or diarrhea.   The illness is associated with recent antibiotic use. The patient states that she believes she is  currently not pregnant. The patient has not had a change in bowel habit. Risk factors for an acute abdominal problem include being elderly.     Review of patient's allergies indicates:   Allergen Reactions    Ciprofloxacin Swelling    Iodine Shortness Of Breath    Latex Itching    Fluoride      Mouth sores    Fluoride preparations Dermatitis     Other reaction(s): Ulcer of mouth     Past Medical History:   Diagnosis Date    Hepatitis C     treated/cured previously    Pacemaker     Paroxysmal atrial fibrillation     Pericardial cyst     Skin cancer     Thrombus      Past Surgical History:   Procedure Laterality Date    APPENDECTOMY      CARDIAC PACEMAKER PLACEMENT      cataract surgery      FOOT SURGERY Left     HYSTERECTOMY      LAPAROSCOPIC CHOLECYSTECTOMY      parathyroid resection      REPEAT ABDOMINAL PARACENTESIS      SKIN CANCER EXCISION      spot removed Left     left ankle spot removed    THROMBECTOMY       Family History   Problem Relation Age of Onset    No Known Problems Mother     No Known Problems Father     No Known Problems Sister     No Known Problems Brother      Social History     Tobacco Use    Smoking status: Never    Smokeless tobacco: Never   Substance Use Topics    Alcohol use: Not Currently    Drug use: Never     Review of Systems   Constitutional:  Positive for fever.   Gastrointestinal:  Positive for abdominal pain and nausea. Negative for diarrhea, melena and vomiting.       Physical Exam     Initial Vitals [08/15/23 1049]   BP Pulse Resp Temp SpO2   (!) 146/70 71 19 98.3 °F (36.8 °C) 98 %      MAP       --         Physical Exam    ED Course   Procedures  Labs Reviewed   COMPREHENSIVE METABOLIC PANEL - Abnormal; Notable for the following components:       Result Value    Carbon Dioxide 21 (*)     Albumin Level 3.2 (*)     Bilirubin Total 2.3 (*)     Aspartate Aminotransferase 42 (*)     All other components within normal limits   URINALYSIS, REFLEX TO URINE CULTURE - Abnormal; Notable for  the following components:    Leukocyte Esterase, UA 1+ (*)     All other components within normal limits   CBC WITH DIFFERENTIAL - Abnormal; Notable for the following components:    MCHC 32.4 (*)     Platelet 62 (*)     MPV 10.8 (*)     Lymph # 0.46 (*)     All other components within normal limits   LIPASE - Normal   TROPONIN I - Normal   URINALYSIS, MICROSCOPIC - Normal   CBC W/ AUTO DIFFERENTIAL    Narrative:     The following orders were created for panel order CBC auto differential.  Procedure                               Abnormality         Status                     ---------                               -----------         ------                     CBC with Differential[[id number]]        Abnormal            Final result                 Please view results for these tests on the individual orders.     EKG Readings: (Independently Interpreted)   Initial Reading: No STEMI. Rhythm: Paced Rhythm. Heart Rate: 72. Ectopy: No Ectopy. Conduction: Normal.       Imaging Results              CT Abdomen Pelvis  Without Contrast (Final result)  Result time 08/15/23 13:55:54   Procedure changed from CT Abdomen Pelvis With Contrast     Final result by Marika Mendez MD (08/15/23 13:55:54)                          Final result by Marika Mendez MD (08/15/23 13:55:51)                   Impression:      1. Nonobstructing nephrolithiasis  2. Cirrhotic morphology of the liver  3. Changes of portal hypertension including splenomegaly, small volume free intraperitoneal fluid and mesenteric edema.  Component of portal enteropathy is not excluded.  4. Further evaluation limited without contrast.      Electronically signed by: Marika Mendez  Date:    08/15/2023  Time:    13:55               Narrative:    EXAMINATION:  CT ABDOMEN PELVIS WITHOUT CONTRAST    CLINICAL HISTORY:  UTI, recurrent/complicated (Female);    TECHNIQUE:  Helically acquired axial images, sagittal and coronal reformations were obtained from the  lung bases to the pubic symphysis without the IV administration of contrast.    Automated tube current modulation, weight-based exposure dosing, and/or iterative reconstruction technique utilized to reach lowest reasonably achievable exposure rate.    DLP: 316 mGy*cm    COMPARISON:  CT abdomen pelvis 12/01/2019    FINDINGS:  HEART: Distal aspect of a pacer lead is seen at the right ventricular apex.    LUNG BASES: Well aerated.    LIVER: Cirrhotic morphology of the liver.  Unchanged hypodensity at the left lobe of the liver.  Further characterization limited.  Note that this noncontrast CT does not serve as routine hepatocellular carcinoma screening.    BILIARY: Gallbladder is surgically absent.    PANCREAS: No inflammatory change.    SPLEEN: Spleen 13 cm.    ADRENALS: No mass.    KIDNEYS/URETERS: Incidental bilateral fluid attenuation renal cortical cysts. No follow-up imaging is recommended as incidental lesions are likely benign.  No hydronephrosis.  Nonobstructing caliceal calculi measuring 3-5 mm on the right and 1-2 mm on the left.  No perinephric stranding.    GI TRACT/MESENTERY: Evaluation of the bowel is limited without contrast.  Small sliding hiatal hernia. Colonic diverticulosis without acute inflammatory change.    PERITONEUM: There is mesenteric edema.  Trace free intraperitoneal fluid.No free air.    LYMPH NODES: No enlarged lymph nodes by size criteria.    VASCULATURE: Aortoiliac atherosclerosis.    BLADDER: Normal appearance given degree of distention.    REPRODUCTIVE ORGANS: Uterus is surgically absent.    ABDOMINAL WALL: Unremarkable.    BONES: Degenerative change at the lumbosacral junction.                                       Medications   cefTRIAXone (ROCEPHIN) 1 g in dextrose 5 % in water (D5W) 100 mL IVPB (MB+) (1 g Intravenous New Bag 8/15/23 1412)   ondansetron disintegrating tablet 4 mg (4 mg Oral Given 8/15/23 1122)     Medical Decision Making:   Initial Assessment:     83 year old  white female presents with complaints of epigastric abdominal pain and nausea for two days. Patient reports she was recently diagnosed with a UTI at the urgent care and started on prescriptions antibiotics. She reports that she has had these medications changed twice. From appearance of the chart this appears to be bactrim(7/23) to macrobid(7/25) to augmentin(8/11), back to bactrim (yesterday,8/14) by her pcp. She reports her pain has worsened substantially and has had intermittent fevers to tmax 102. She denies any dysuria or hematuria. She denies any active vomiting or diarrhea. She reports she had a colonoscopy approximately 5 years ago. History of appendectomy and cholecystectomy.She denies any vaginal or rectal bleeding. Patient does have a history of hepatitis C And cirrhosis. She was treated for the Hep C approximately 3 years ago.     Differential Diagnosis:   UTI, Pyelonephritis  Clinical Tests:   Lab Tests: Reviewed  The following lab test(s) were unremarkable: CBC, CMP and Urinalysis       <> Summary of Lab: Urinalysis improved even from yesterday. Renal indices are wnl.  Radiological Study: Reviewed  ED Management:  Patients labs and ct scan are within normal limits. Patient has been on 4 different abx in less than 4 week, she may have a level of gastritis with the pain she is describing. Will place her on maalox and levsin and have her finish bactrim to completion as her uti seems to be improving. Patient has not had fever in greater than 48 hours and she is likely on the posterior end of this infection. Assured patient that if anything changed or worsened to please return to the ED.                           Clinical Impression:   Final diagnoses:  [R10.13] Epigastric abdominal pain  [N39.0] Urinary tract infection without hematuria, site unspecified (Primary)        ED Disposition Condition    Discharge Stable          ED Prescriptions       Medication Sig Dispense Start Date End Date Auth. Provider     aluminum-magnesium hydroxide-simethicone (MAALOX) 200-200-20 mg/5 mL Susp Take 30 mLs by mouth 4 (four) times daily before meals and nightly. for 12 days 200 mL 8/15/2023 8/27/2023 Ifeoma Curry PA-C    hyoscyamine (LEVSIN/SL) 0.125 mg Subl Place 1 tablet (0.125 mg total) under the tongue 3 (three) times daily. for 5 days 15 tablet 8/15/2023 8/20/2023 Ifeoma Curry PA-C          Follow-up Information       Follow up With Specialties Details Why Contact Info    Monae Nazario MD Family Medicine Schedule an appointment as soon as possible for a visit in 3 days  [address] [facility] [place] [address] [address] [phone]               Ifeoma Curry PA-C  08/15/23 1525

## 2023-08-15 NOTE — TELEPHONE ENCOUNTER
----- Message from Jada Momin sent at 8/15/2023  8:41 AM CDT -----  Regarding: Referral  .Type:  Patient Returning Call    Who Called:Nery  Who Left Message for Patient:Nery  Does the patient know what this is regarding?:Pain  Would the patient rather a call back or a response via [facility]?   Best Call Back Number:[phone]  Additional Information: Patient states that she is in a lot of pain. Pain from her chest to her rectum and she has not been able to sleep. Patient wanted to know if a test was in the system for her. I did advise her that a mri was in the referral section. Patient want to know where to go and if blood work can be ordered as well.

## 2023-08-15 NOTE — DISCHARGE INSTRUCTIONS
Use the maalox as directed. Finish the antibiotics as discussed with food. If symptoms change or worsen at all please return.

## 2023-08-15 NOTE — FIRST PROVIDER EVALUATION
"Medical screening examination initiated.  I have conducted a focused provider triage encounter, findings are as follows:    Chief Complaint   Patient presents with    Abdominal Pain     Presents to ED with c/o epigastric abdominal pain and nausea x2 days ago. Recently dx with UTI and Rx antibiotics, patient states the pain originally began in the lower abdomen, however, has traveled to the epigastric region. Denies urinary symptoms.      Brief history of present illness: 83 y.o. female presents to the ED with epigastric abdominal pain onset 2 days ago. Recently diagnosed with UTI and started on abx. Denies chest pain, SOB, dysuria    Vitals:    08/15/23 1049   BP: (!) 146/70   Pulse: 71   Resp: 19   Temp: 98.3 °F (36.8 °C)   TempSrc: Oral   SpO2: 98%   Weight: 74.8 kg (165 lb)   Height: 5' 6" (1.676 m)       Pertinent physical exam:  Awake, alert, ambulatory, non-labored respirations    Brief workup plan:  labs, UA, EKG    Preliminary workup initiated; this workup will be continued and followed by the physician or advanced practice provider that is assigned to the patient when roomed.  "

## 2023-08-16 PROBLEM — R18.8 CIRRHOSIS OF LIVER WITH ASCITES: Status: ACTIVE | Noted: 2023-02-14

## 2023-08-17 ENCOUNTER — LAB VISIT (OUTPATIENT)
Dept: LAB | Facility: HOSPITAL | Age: 83
End: 2023-08-17
Attending: STUDENT IN AN ORGANIZED HEALTH CARE EDUCATION/TRAINING PROGRAM
Payer: MEDICARE

## 2023-08-17 DIAGNOSIS — R53.83 FATIGUE, UNSPECIFIED TYPE: ICD-10-CM

## 2023-08-17 LAB
ALBUMIN SERPL-MCNC: 3.2 G/DL (ref 3.4–4.8)
ALBUMIN/GLOB SERPL: 1.1 RATIO (ref 1.1–2)
ALP SERPL-CCNC: 111 UNIT/L (ref 40–150)
ALT SERPL-CCNC: 19 UNIT/L (ref 0–55)
AST SERPL-CCNC: 33 UNIT/L (ref 5–34)
BASOPHILS # BLD AUTO: 0.01 X10(3)/MCL
BASOPHILS NFR BLD AUTO: 0.3 %
BILIRUB SERPL-MCNC: 1.4 MG/DL
BUN SERPL-MCNC: 21.8 MG/DL (ref 9.8–20.1)
CALCIUM SERPL-MCNC: 8.9 MG/DL (ref 8.4–10.2)
CHLORIDE SERPL-SCNC: 105 MMOL/L (ref 98–107)
CO2 SERPL-SCNC: 25 MMOL/L (ref 23–31)
CORTIS SERPL-SCNC: 15.3 UG/DL
CREAT SERPL-MCNC: 1.04 MG/DL (ref 0.55–1.02)
DEPRECATED CALCIDIOL+CALCIFEROL SERPL-MC: 34.8 NG/ML (ref 30–80)
EOSINOPHIL # BLD AUTO: 0.04 X10(3)/MCL (ref 0–0.9)
EOSINOPHIL NFR BLD AUTO: 1 %
ERYTHROCYTE [DISTWIDTH] IN BLOOD BY AUTOMATED COUNT: 16.1 % (ref 11.5–17)
GFR SERPLBLD CREATININE-BSD FMLA CKD-EPI: 53 MLS/MIN/1.73/M2
GLOBULIN SER-MCNC: 2.8 GM/DL (ref 2.4–3.5)
GLUCOSE SERPL-MCNC: 105 MG/DL (ref 82–115)
HCT VFR BLD AUTO: 34.2 % (ref 37–47)
HGB BLD-MCNC: 10.8 G/DL (ref 12–16)
IMM GRANULOCYTES # BLD AUTO: 0.04 X10(3)/MCL (ref 0–0.04)
IMM GRANULOCYTES NFR BLD AUTO: 1 %
LYMPHOCYTES # BLD AUTO: 0.56 X10(3)/MCL (ref 0.6–4.6)
LYMPHOCYTES NFR BLD AUTO: 14.4 %
MCH RBC QN AUTO: 29.3 PG (ref 27–31)
MCHC RBC AUTO-ENTMCNC: 31.6 G/DL (ref 33–36)
MCV RBC AUTO: 92.7 FL (ref 80–94)
MONOCYTES # BLD AUTO: 0.61 X10(3)/MCL (ref 0.1–1.3)
MONOCYTES NFR BLD AUTO: 15.7 %
NEUTROPHILS # BLD AUTO: 2.62 X10(3)/MCL (ref 2.1–9.2)
NEUTROPHILS NFR BLD AUTO: 67.6 %
NRBC BLD AUTO-RTO: 0 %
PLATELET # BLD AUTO: 77 X10(3)/MCL (ref 130–400)
PMV BLD AUTO: 11.4 FL (ref 7.4–10.4)
POTASSIUM SERPL-SCNC: 5.1 MMOL/L (ref 3.5–5.1)
PROT SERPL-MCNC: 6 GM/DL (ref 5.8–7.6)
RBC # BLD AUTO: 3.69 X10(6)/MCL (ref 4.2–5.4)
SODIUM SERPL-SCNC: 137 MMOL/L (ref 136–145)
T4 FREE SERPL-MCNC: 0.91 NG/DL (ref 0.7–1.48)
TSH SERPL-ACNC: 2.58 UIU/ML (ref 0.35–4.94)
WBC # SPEC AUTO: 3.88 X10(3)/MCL (ref 4.5–11.5)

## 2023-08-17 PROCEDURE — 84439 ASSAY OF FREE THYROXINE: CPT

## 2023-08-17 PROCEDURE — 82533 TOTAL CORTISOL: CPT

## 2023-08-17 PROCEDURE — 85025 COMPLETE CBC W/AUTO DIFF WBC: CPT

## 2023-08-17 PROCEDURE — 82306 VITAMIN D 25 HYDROXY: CPT

## 2023-08-17 PROCEDURE — 80053 COMPREHEN METABOLIC PANEL: CPT

## 2023-08-17 PROCEDURE — 36415 COLL VENOUS BLD VENIPUNCTURE: CPT

## 2023-08-17 PROCEDURE — 84443 ASSAY THYROID STIM HORMONE: CPT

## 2023-08-19 LAB — BACTERIA UR CULT: NO GROWTH

## 2023-09-21 ENCOUNTER — TELEPHONE (OUTPATIENT)
Dept: PRIMARY CARE CLINIC | Facility: CLINIC | Age: 83
End: 2023-09-21
Payer: MEDICARE

## 2023-09-21 DIAGNOSIS — K58.9 IRRITABLE BOWEL SYNDROME, UNSPECIFIED TYPE: Primary | ICD-10-CM

## 2023-09-21 NOTE — TELEPHONE ENCOUNTER
----- Message from Do Andrews sent at 9/21/2023  1:22 PM CDT -----  .Type:  RX Refill Request    Who Called: pt  Refill or New Rx:refill  RX Name and Strength:rifAXIMin (XIFAXAN) 550 mg Tab  How is the patient currently taking it?. 2XDay  Is this a 30 day or 90 day RX:780  Preferred Pharmacy with phone number:[facility][id number] - [place] [facility] [address] [facility] [place]  Local or Mail Order:local   Ordering Provider:  Would the patient rather a call back or a response via [facility]?   Best Call Back Number:-[phone]  Additional Information: refill request

## 2023-11-13 PROBLEM — Z00.00 WELLNESS EXAMINATION: Status: RESOLVED | Noted: 2022-10-10 | Resolved: 2023-11-13

## 2023-11-13 PROBLEM — N12 PYELONEPHRITIS: Status: RESOLVED | Noted: 2023-08-14 | Resolved: 2023-11-13

## 2023-12-05 DIAGNOSIS — K58.9 IRRITABLE BOWEL SYNDROME, UNSPECIFIED TYPE: ICD-10-CM

## 2023-12-05 RX ORDER — DONEPEZIL HYDROCHLORIDE 5 MG/1
5 TABLET, FILM COATED ORAL NIGHTLY
Qty: 90 TABLET | Refills: 3 | Status: SHIPPED | OUTPATIENT
Start: 2023-12-05 | End: 2024-12-04

## 2024-01-17 ENCOUNTER — OFFICE VISIT (OUTPATIENT)
Dept: URGENT CARE | Facility: CLINIC | Age: 84
End: 2024-01-17
Payer: MEDICARE

## 2024-01-17 VITALS
BODY MASS INDEX: 26.52 KG/M2 | OXYGEN SATURATION: 95 % | DIASTOLIC BLOOD PRESSURE: 66 MMHG | WEIGHT: 165 LBS | HEIGHT: 66 IN | RESPIRATION RATE: 16 BRPM | HEART RATE: 71 BPM | SYSTOLIC BLOOD PRESSURE: 122 MMHG | TEMPERATURE: 99 F

## 2024-01-17 DIAGNOSIS — J06.9 URI WITH COUGH AND CONGESTION: ICD-10-CM

## 2024-01-17 DIAGNOSIS — Z11.59 ENCOUNTER FOR SCREENING FOR OTHER VIRAL DISEASES: Primary | ICD-10-CM

## 2024-01-17 LAB
CTP QC/QA: YES
SARS-COV-2 AG RESP QL IA.RAPID: NEGATIVE

## 2024-01-17 PROCEDURE — 87811 SARS-COV-2 COVID19 W/OPTIC: CPT | Mod: QW,S$GLB,, | Performed by: FAMILY MEDICINE

## 2024-01-17 PROCEDURE — 99213 OFFICE O/P EST LOW 20 MIN: CPT | Mod: S$GLB,,, | Performed by: FAMILY MEDICINE

## 2024-01-17 RX ORDER — LEVOCETIRIZINE DIHYDROCHLORIDE 5 MG/1
5 TABLET, FILM COATED ORAL NIGHTLY
Qty: 10 TABLET | Refills: 0 | Status: SHIPPED | OUTPATIENT
Start: 2024-01-17 | End: 2024-01-27

## 2024-01-17 RX ORDER — IPRATROPIUM BROMIDE 21 UG/1
2 SPRAY, METERED NASAL 2 TIMES DAILY
Qty: 30 ML | Refills: 0 | Status: SHIPPED | OUTPATIENT
Start: 2024-01-17 | End: 2024-01-24

## 2024-01-17 RX ORDER — PROMETHAZINE HYDROCHLORIDE AND DEXTROMETHORPHAN HYDROBROMIDE 6.25; 15 MG/5ML; MG/5ML
5 SYRUP ORAL NIGHTLY PRN
Qty: 118 ML | Refills: 0 | Status: SHIPPED | OUTPATIENT
Start: 2024-01-17

## 2024-01-17 NOTE — PATIENT INSTRUCTIONS
Below are suggestions for symptomatic relief of your upper respiratory symptoms:              -Salt water gargles to soothe throat pain.              -Chloroseptic spray and Cepacol lozenges also help to numb throat pain.              -Warm herbal teas with honey/lemon/mary can help soothe sore throat and hoarseness              -Nasal saline spray reduces inflammation and dryness.              -Warm face compresses to help with facial sinus pain/pressure.              -Humidifiers and steam can help with nasal dryness and congestion              -Vicks vapor rub at night for chest congestion.              -Flonase OTC or Nasacort OTC for nasal congestion and post-nasal drip. Ok to use twice daily for the first week, then reduce to once daily after symptoms have begun to improve.              -Afrin is a nasal spray that can give immediate relief of nasal congestion but you cannot use this medication for more than 3 days              -Simple foods like chicken noodle soup.              - Mucinex for congestion or Mucinex DM for cough during the day time. Delsym helps with coughing at night. Mucinex-D if you have sinus pressure/sinus pain or chest congestion. (caution if history of high blood pressure or palpitations). You must increase your water intake when using expectorants (Mucinex).             -Zyrtec/Claritin/Allegra/Xyzal should help with allergies.  -If you DO NOT have Hypertension or any history of palpitations, it is ok to take over the counter Sudafed or Mucinex D or Allegra-D or Claritin-D or Zyrtec-D.  -If you do take one of the above, it is ok to combine that with plain over the counter Mucinex or Allegra or Claritin or Zyrtec. If, for example, you are taking Zyrtec -D, you can combine that with Mucinex, but not Mucinex-D.  If you are taking Mucinex-D, you can combine that with plain Allegra or Claritin or Zyrtec.   -If you DO have Hypertension or palpitations, it is safe to take Coricidin HBP for  relief of sinus symptoms.

## 2024-01-17 NOTE — PROGRESS NOTES
"Subjective:      Patient ID: Nery Donahue is a 83 y.o. female.    Vitals:  height is 5' 6" (1.676 m) and weight is 74.8 kg (165 lb). Her tympanic temperature is 98.6 °F (37 °C). Her blood pressure is 122/66 and her pulse is 71. Her respiration is 16 and oxygen saturation is 95%.     Chief Complaint: Cough    Patient reports a productive, thick cough started  a couple of days ago.Patient reports she took Zyrtec and used OTC nose spray for her symptoms.    Cough  This is a new problem. The current episode started in the past 7 days. The problem has been unchanged. The problem occurs every few minutes. The cough is Productive of sputum. Associated symptoms include headaches, nasal congestion, postnasal drip and rhinorrhea. Associated symptoms comments: Scratchy throat. Nothing aggravates the symptoms. Treatments tried: OTC nose spray and zyrtec. The treatment provided mild relief. Her past medical history is significant for bronchitis. There is no history of asthma, COPD or emphysema.       HENT:  Positive for postnasal drip.    Respiratory:  Positive for cough.    Neurological:  Positive for headaches.      Objective:     Vitals:    01/17/24 1249   BP: 122/66   BP Location: Left arm   Patient Position: Sitting   BP Method: Medium (Automatic)   Pulse: 71   Resp: 16   Temp: 98.6 °F (37 °C)   TempSrc: Tympanic   SpO2: 95%   Weight: 74.8 kg (165 lb)   Height: 5' 6" (1.676 m)      Physical Exam   Constitutional: She is oriented to person, place, and time. She appears well-developed. She is cooperative.  Non-toxic appearance. She does not appear ill. No distress.   HENT:   Head: Normocephalic and atraumatic.   Ears:   Right Ear: Hearing, tympanic membrane, external ear and ear canal normal.   Left Ear: Hearing, tympanic membrane, external ear and ear canal normal.   Nose: Congestion present. No mucosal edema, rhinorrhea or nasal deformity. No epistaxis. Right sinus exhibits no maxillary sinus tenderness and no frontal " sinus tenderness. Left sinus exhibits no maxillary sinus tenderness and no frontal sinus tenderness.   Mouth/Throat: Uvula is midline, oropharynx is clear and moist and mucous membranes are normal. No trismus in the jaw. Normal dentition. No uvula swelling. No oropharyngeal exudate, posterior oropharyngeal edema or posterior oropharyngeal erythema.   Eyes: Conjunctivae and lids are normal. No scleral icterus.   Neck: Trachea normal and phonation normal. Neck supple. No edema present. No erythema present. No neck rigidity present.   Cardiovascular: Normal rate, regular rhythm, normal heart sounds and normal pulses.   Pulmonary/Chest: Effort normal and breath sounds normal. No respiratory distress. She has no decreased breath sounds. She has no rhonchi.   Abdominal: Normal appearance.   Musculoskeletal: Normal range of motion.         General: Normal range of motion.   Neurological: She is alert and oriented to person, place, and time. She exhibits normal muscle tone. Coordination normal.   Skin: Skin is warm, intact and not diaphoretic.   Psychiatric: Her speech is normal and behavior is normal. Judgment and thought content normal.   Nursing note and vitals reviewed.    Results for orders placed or performed in visit on 01/17/24   SARS Coronavirus 2 Antigen, POCT Manual Read   Result Value Ref Range    SARS Coronavirus 2 Antigen Negative Negative     Acceptable Yes         Assessment:     1. Encounter for screening for other viral diseases    2. URI with cough and congestion        Plan:       Encounter for screening for other viral diseases  -     SARS Coronavirus 2 Antigen, POCT Manual Read    2. URI with cough and congestion  -     promethazine-dextromethorphan (PROMETHAZINE-DM) 6.25-15 mg/5 mL Syrp; Take 5 mLs by mouth nightly as needed (cough).  Dispense: 118 mL; Refill: 0  -     levocetirizine (XYZAL) 5 MG tablet; Take 1 tablet (5 mg total) by mouth every evening. May substitute for Zyrtec 10 mg  daily if Xyzal is not affordable. for 10 days  Dispense: 10 tablet; Refill: 0  -     ipratropium (ATROVENT) 21 mcg (0.03 %) nasal spray; 2 sprays by Each Nostril route 2 (two) times daily. for 7 days  Dispense: 30 mL; Refill: 0      Patient Instructions   Below are suggestions for symptomatic relief of your upper respiratory symptoms:              -Salt water gargles to soothe throat pain.              -Chloroseptic spray and Cepacol lozenges also help to numb throat pain.              -Warm herbal teas with honey/lemon/mary can help soothe sore throat and hoarseness              -Nasal saline spray reduces inflammation and dryness.              -Warm face compresses to help with facial sinus pain/pressure.              -Humidifiers and steam can help with nasal dryness and congestion              -Vicks vapor rub at night for chest congestion.              -Flonase OTC or Nasacort OTC for nasal congestion and post-nasal drip. Ok to use twice daily for the first week, then reduce to once daily after symptoms have begun to improve.              -Afrin is a nasal spray that can give immediate relief of nasal congestion but you cannot use this medication for more than 3 days              -Simple foods like chicken noodle soup.              - Mucinex for congestion or Mucinex DM for cough during the day time. Delsym helps with coughing at night. Mucinex-D if you have sinus pressure/sinus pain or chest congestion. (caution if history of high blood pressure or palpitations). You must increase your water intake when using expectorants (Mucinex).             -Zyrtec/Claritin/Allegra/Xyzal should help with allergies.  -If you DO NOT have Hypertension or any history of palpitations, it is ok to take over the counter Sudafed or Mucinex D or Allegra-D or Claritin-D or Zyrtec-D.  -If you do take one of the above, it is ok to combine that with plain over the counter Mucinex or Allegra or Claritin or Zyrtec. If, for example, you  are taking Zyrtec -D, you can combine that with Mucinex, but not Mucinex-D.  If you are taking Mucinex-D, you can combine that with plain Allegra or Claritin or Zyrtec.   -If you DO have Hypertension or palpitations, it is safe to take Coricidin HBP for relief of sinus symptoms.

## 2024-04-02 ENCOUNTER — LAB VISIT (OUTPATIENT)
Dept: LAB | Facility: HOSPITAL | Age: 84
End: 2024-04-02
Attending: STUDENT IN AN ORGANIZED HEALTH CARE EDUCATION/TRAINING PROGRAM
Payer: MEDICARE

## 2024-04-02 DIAGNOSIS — I10 ESSENTIAL HYPERTENSION, MALIGNANT: Primary | ICD-10-CM

## 2024-04-02 DIAGNOSIS — I81 PORTAL VEIN THROMBOSIS: ICD-10-CM

## 2024-04-02 DIAGNOSIS — K52.9 INFLAMMATORY BOWEL DISEASE: ICD-10-CM

## 2024-04-02 DIAGNOSIS — D18.03 HEMANGIOMA OF INTRA-ABDOMINAL STRUCTURES: ICD-10-CM

## 2024-04-02 DIAGNOSIS — K74.60 HEPATIC CIRRHOSIS, UNSPECIFIED HEPATIC CIRRHOSIS TYPE, UNSPECIFIED WHETHER ASCITES PRESENT: ICD-10-CM

## 2024-04-02 DIAGNOSIS — I82.90 THROMBOSIS: ICD-10-CM

## 2024-04-02 DIAGNOSIS — R19.7 DIARRHEA, UNSPECIFIED TYPE: ICD-10-CM

## 2024-04-02 DIAGNOSIS — I86.4 GASTRIC VARICES: ICD-10-CM

## 2024-04-02 DIAGNOSIS — K76.6 PORTAL HYPERTENSION: ICD-10-CM

## 2024-04-02 DIAGNOSIS — K72.90 HEPATIC NECROSIS: ICD-10-CM

## 2024-04-02 DIAGNOSIS — I85.00 ESOPHAGEAL VARICES WITHOUT BLEEDING: ICD-10-CM

## 2024-04-02 LAB
AFP-TM SERPL-MCNC: 3.2 NG/ML
ALBUMIN SERPL-MCNC: 3.3 G/DL (ref 3.4–4.8)
ALBUMIN/GLOB SERPL: 1.1 RATIO (ref 1.1–2)
ALP SERPL-CCNC: 98 UNIT/L (ref 40–150)
ALT SERPL-CCNC: 19 UNIT/L (ref 0–55)
AST SERPL-CCNC: 35 UNIT/L (ref 5–34)
BASOPHILS # BLD AUTO: 0.01 X10(3)/MCL
BASOPHILS NFR BLD AUTO: 0.3 %
BILIRUB SERPL-MCNC: 2.2 MG/DL
BUN SERPL-MCNC: 16.5 MG/DL (ref 9.8–20.1)
CALCIUM SERPL-MCNC: 9.5 MG/DL (ref 8.4–10.2)
CHLORIDE SERPL-SCNC: 109 MMOL/L (ref 98–107)
CO2 SERPL-SCNC: 27 MMOL/L (ref 23–31)
CREAT SERPL-MCNC: 0.91 MG/DL (ref 0.55–1.02)
EOSINOPHIL # BLD AUTO: 0.05 X10(3)/MCL (ref 0–0.9)
EOSINOPHIL NFR BLD AUTO: 1.7 %
ERYTHROCYTE [DISTWIDTH] IN BLOOD BY AUTOMATED COUNT: 15.8 % (ref 11.5–17)
GFR SERPLBLD CREATININE-BSD FMLA CKD-EPI: >60 MLS/MIN/1.73/M2
GLOBULIN SER-MCNC: 2.9 GM/DL (ref 2.4–3.5)
GLUCOSE SERPL-MCNC: 107 MG/DL (ref 82–115)
HCT VFR BLD AUTO: 41.1 % (ref 37–47)
HGB BLD-MCNC: 12.7 G/DL (ref 12–16)
IMM GRANULOCYTES # BLD AUTO: 0.01 X10(3)/MCL (ref 0–0.04)
IMM GRANULOCYTES NFR BLD AUTO: 0.3 %
INR PPP: 1.3
LYMPHOCYTES # BLD AUTO: 0.59 X10(3)/MCL (ref 0.6–4.6)
LYMPHOCYTES NFR BLD AUTO: 20.1 %
MCH RBC QN AUTO: 29.3 PG (ref 27–31)
MCHC RBC AUTO-ENTMCNC: 30.9 G/DL (ref 33–36)
MCV RBC AUTO: 94.9 FL (ref 80–94)
MONOCYTES # BLD AUTO: 0.63 X10(3)/MCL (ref 0.1–1.3)
MONOCYTES NFR BLD AUTO: 21.4 %
NEUTROPHILS # BLD AUTO: 1.65 X10(3)/MCL (ref 2.1–9.2)
NEUTROPHILS NFR BLD AUTO: 56.2 %
NRBC BLD AUTO-RTO: 0 %
PLATELET # BLD AUTO: 60 X10(3)/MCL (ref 130–400)
PLATELETS.RETICULATED NFR BLD AUTO: 8.6 % (ref 0.9–11.2)
PMV BLD AUTO: 11.5 FL (ref 7.4–10.4)
POTASSIUM SERPL-SCNC: 4.2 MMOL/L (ref 3.5–5.1)
PROT SERPL-MCNC: 6.2 GM/DL (ref 5.8–7.6)
PROTHROMBIN TIME: 15.9 SECONDS (ref 12.5–14.5)
RBC # BLD AUTO: 4.33 X10(6)/MCL (ref 4.2–5.4)
SODIUM SERPL-SCNC: 141 MMOL/L (ref 136–145)
TSH SERPL-ACNC: 1.21 UIU/ML (ref 0.35–4.94)
WBC # SPEC AUTO: 2.94 X10(3)/MCL (ref 4.5–11.5)

## 2024-04-02 PROCEDURE — 84443 ASSAY THYROID STIM HORMONE: CPT

## 2024-04-02 PROCEDURE — 82105 ALPHA-FETOPROTEIN SERUM: CPT

## 2024-04-02 PROCEDURE — 80053 COMPREHEN METABOLIC PANEL: CPT

## 2024-04-02 PROCEDURE — 36415 COLL VENOUS BLD VENIPUNCTURE: CPT

## 2024-04-02 PROCEDURE — 85610 PROTHROMBIN TIME: CPT

## 2024-04-02 PROCEDURE — 85025 COMPLETE CBC W/AUTO DIFF WBC: CPT

## 2024-04-24 DIAGNOSIS — D72.819 LEUKOPENIA: ICD-10-CM

## 2024-04-24 DIAGNOSIS — D69.6 THROMBOCYTOPENIA: Primary | ICD-10-CM

## 2024-05-06 NOTE — PROGRESS NOTES
Subjective:       Patient ID: Joy Donahue is a 83 y.o. female.    Chief Complaint: New Patient    Diagnosis: Thrombocytopenia, Leukopenia    Current Treatment: None    Treatment History: N/A    HPI:   82 yo F presents in May '24 for evaluation of Thrombocytopenia and Leukopenia. On review of her labs she has had moderate thrombocytopenia with platelet count ranging from 60-100K since at least 2019. Additionally, she has had intermittent mild leukopenia since 2019 as well. She presents to hematology for further evaluation. 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 She states she underwent treatment in [place] for HCV several decades ago and reportedly cleared her infection. She follows with hepatology in [place] and sees them every couple of years? She does not get biannual abd US for HCC screening. She denies any abnormal bleeding, bruising, or infections. She denies any frequent hospitalizations, fevers, chills, NS, unintentional weight loss, increased fatigue, or changes in appetite. She feels her usual self with no complaints today.     I discussed her CBC findings and how they differ from expected values. We discussed the possible etiology for her findings including both benign and malignant causes. I explained the role for initial evaluation with blood work and possibly secondary evaluation with a bone marrow biopsy. All questions were answered at the time of the visit. She is agreeable to proceed with the plan.      Past Medical History:   Diagnosis Date    Cirrhosis of liver with ascites 2/14/2023    Continue to follow up with hepatologist    Hepatitis C     treated/cured previously    Pacemaker     Paroxysmal atrial fibrillation     Pericardial cyst     Skin cancer     Thrombus       Past Surgical History:   Procedure Laterality Date    APPENDECTOMY      CARDIAC PACEMAKER PLACEMENT      cataract surgery      FOOT SURGERY Left     HYSTERECTOMY       LAPAROSCOPIC CHOLECYSTECTOMY      parathyroid resection      REPEAT ABDOMINAL PARACENTESIS      SKIN CANCER EXCISION      spot removed Left     left ankle spot removed    THROMBECTOMY       Social History     Socioeconomic History    Marital status:    Tobacco Use    Smoking status: Never    Smokeless tobacco: Never   Substance and Sexual Activity    Alcohol use: Not Currently    Drug use: Never    Sexual activity: Not Currently     Social Determinants of Health      Received from [facility], [facility]    Food Insecurity    Received from [facility] [place], [facility]    Housing Stability      Family History   Problem Relation Name Age of Onset    No Known Problems Mother      No Known Problems Father      No Known Problems Sister      No Known Problems Brother        Review of patient's allergies indicates:   Allergen Reactions    Ciprofloxacin Swelling    Iodine Shortness Of Breath    Latex Itching    Fluoride      Mouth sores    Fluoride preparations Dermatitis     Other reaction(s): Ulcer of mouth      Review of Systems   Constitutional:  Negative for activity change, fever and unexpected weight change.   HENT:  Negative for sore throat.    Eyes:  Negative for visual disturbance.   Respiratory:  Negative for cough and shortness of breath.    Cardiovascular:  Negative for chest pain.   Gastrointestinal:  Negative for abdominal pain, diarrhea, nausea and vomiting.   Endocrine: Negative for polyuria.   Genitourinary:  Negative for dysuria and hot flashes.   Integumentary:  Negative for rash.   Neurological:  Negative for weakness and headaches.   Hematological:  Negative for adenopathy.   Psychiatric/Behavioral:  Negative for confusion.          Objective:      Vitals:    05/08/24 1303   BP: 115/68   Pulse: 75   Resp: 18   Temp: 97.6 °F (36.4 °C)       Physical Exam  Constitutional:       General: She is not in acute distress.     Appearance:  Normal appearance. She is not ill-appearing.   HENT:      Head: Normocephalic and atraumatic.      Nose: Nose normal.      Mouth/Throat:      Mouth: Mucous membranes are moist.      Pharynx: Oropharynx is clear.   Eyes:      Extraocular Movements: Extraocular movements intact.      Conjunctiva/sclera: Conjunctivae normal.      Pupils: Pupils are equal, round, and reactive to light.   Cardiovascular:      Rate and Rhythm: Normal rate and regular rhythm.      Pulses: Normal pulses.      Heart sounds: Normal heart sounds. No murmur heard.  Pulmonary:      Effort: Pulmonary effort is normal. No respiratory distress.      Breath sounds: Normal breath sounds.   Abdominal:      General: There is no distension.      Palpations: Abdomen is soft.      Tenderness: There is no abdominal tenderness.   Musculoskeletal:         General: Normal range of motion.      Cervical back: Normal range of motion and neck supple.      Right lower leg: No edema.      Left lower leg: No edema.   Lymphadenopathy:      Cervical: No cervical adenopathy.   Skin:     General: Skin is warm and dry.   Neurological:      General: No focal deficit present.      Mental Status: She is alert and oriented to person, place, and time.         LABS AND IMAGING REVIEWED IN EPIC        Component      Latest Ref Rng 8/15/2023 8/17/2023 4/2/2024   Eos #      0 - 0.9 x10(3)/mcL 0.02  0.04  0.05    Lymph #      0.6 - 4.6 x10(3)/mcL 0.46 (L)  0.56 (L)  0.59 (L)    Basophil %      % 0.4  0.3  0.3    Baso #      <=0.2 x10(3)/mcL 0.02  0.01  0.01    Mono #      0.1 - 1.3 x10(3)/mcL 0.85  0.61  0.63    Neut #      2.1 - 9.2 x10(3)/mcL 3.72  2.62  1.65 (L)    LYMPH %      % 9.0  14.4  20.1    Mono %      % 16.6  15.7  21.4    Neut %      % 72.8  67.6  56.2    Eos %      % 0.4  1.0  1.7    Hematocrit      37.0 - 47.0 % 38.9  34.2 (L)  41.1    MCHC      33.0 - 36.0 g/dL 32.4 (L)  31.6 (L)  30.9 (L)    MCV      80.0 - 94.0 fL 90.3  92.7  94.9 (H)    Hemoglobin      12.0 -  16.0 g/dL 12.6  10.8 (L)  12.7    MPV      7.4 - 10.4 fL 10.8 (H)  11.4 (H)  11.5 (H)    MCH      27.0 - 31.0 pg 29.2  29.3  29.3    RBC      4.20 - 5.40 x10(6)/mcL 4.31  3.69 (L)  4.33    Platelet Count      130 - 400 x10(3)/mcL 62 (L)  77 (L)  60 (L)    RDW      11.5 - 17.0 % 15.9  16.1  15.8    WBC      4.50 - 11.50 x10(3)/mcL 5.11  3.88 (L)  2.94 (L)    Immature Granulocytes      % 0.8  1.0  0.3    Immature Grans (Abs)      0 - 0.04 x10(3)/mcL 0.04  0.04  0.01    nRBC      % 0.0  0.0  0.0    Immature Platelet Fraction      0.9 - 11.2 %   8.6       Legend:  (L) Low  (H) High    Assessment:   Chronic thrombocytopenia - Moderate. Due to cirrhosis from HCV.     Leukopenia - Likely benign, but will investigate into possible etiology.     HCV Cirrhosis - Will need Q6m abdominal US and AFP checks for HCC surveillance        Plan:   - Abdominal Ultrasound ordered today  - Follow up with Dr Harris for records and follow up appointment  - Labs today   - RTC 4 weeks for MD visit, labs same day    I spent a total of 55 minutes on the day of the visit.This includes face to face time and non-face to face time preparing to see the patient (eg, review of tests), obtaining and/or reviewing separately obtained history, documenting clinical information in the electronic or other health record, independently interpreting results and communicating results to the patient/family/caregiver, or care coordinator.    Elizabeth Kennedy LeJeune, MD  Hematology/Oncology   [facility]        Professional Services   I, Soledad Guzman LPN, acted solely as a scribe for and in the presence of Dr. Elizabeth Kennedy LeJeune, who performed these services.

## 2024-05-08 ENCOUNTER — OFFICE VISIT (OUTPATIENT)
Dept: HEMATOLOGY/ONCOLOGY | Facility: CLINIC | Age: 84
End: 2024-05-08
Payer: MEDICARE

## 2024-05-08 ENCOUNTER — PATIENT OUTREACH (OUTPATIENT)
Dept: ADMINISTRATIVE | Facility: HOSPITAL | Age: 84
End: 2024-05-08
Payer: MEDICARE

## 2024-05-08 VITALS
HEART RATE: 75 BPM | HEIGHT: 66 IN | SYSTOLIC BLOOD PRESSURE: 115 MMHG | RESPIRATION RATE: 18 BRPM | DIASTOLIC BLOOD PRESSURE: 68 MMHG | WEIGHT: 163.13 LBS | BODY MASS INDEX: 26.22 KG/M2 | TEMPERATURE: 98 F | OXYGEN SATURATION: 98 %

## 2024-05-08 DIAGNOSIS — D72.819 LEUKOPENIA: ICD-10-CM

## 2024-05-08 DIAGNOSIS — K70.31 ALCOHOLIC CIRRHOSIS OF LIVER WITH ASCITES: Primary | ICD-10-CM

## 2024-05-08 DIAGNOSIS — D69.6 THROMBOCYTOPENIA, UNSPECIFIED: ICD-10-CM

## 2024-05-08 DIAGNOSIS — E44.1 MILD PROTEIN-CALORIE MALNUTRITION: ICD-10-CM

## 2024-05-08 DIAGNOSIS — D69.6 THROMBOCYTOPENIA: ICD-10-CM

## 2024-05-08 LAB
ALBUMIN SERPL-MCNC: 3.6 G/DL (ref 3.4–4.8)
ALBUMIN/GLOB SERPL: 1.1 RATIO (ref 1.1–2)
ALP SERPL-CCNC: 90 UNIT/L (ref 40–150)
ALT SERPL-CCNC: 17 UNIT/L (ref 0–55)
AST SERPL-CCNC: 33 UNIT/L (ref 5–34)
BASOPHILS # BLD AUTO: 0.02 X10(3)/MCL
BASOPHILS NFR BLD AUTO: 0.4 %
BILIRUB SERPL-MCNC: 1.7 MG/DL
BUN SERPL-MCNC: 25.1 MG/DL (ref 9.8–20.1)
CALCIUM SERPL-MCNC: 9.9 MG/DL (ref 8.4–10.2)
CHLORIDE SERPL-SCNC: 104 MMOL/L (ref 98–107)
CO2 SERPL-SCNC: 28 MMOL/L (ref 23–31)
CREAT SERPL-MCNC: 0.91 MG/DL (ref 0.55–1.02)
EOSINOPHIL # BLD AUTO: 0.07 X10(3)/MCL (ref 0–0.9)
EOSINOPHIL NFR BLD AUTO: 1.5 %
ERYTHROCYTE [DISTWIDTH] IN BLOOD BY AUTOMATED COUNT: 15.7 % (ref 11.5–17)
FERRITIN SERPL-MCNC: 22.38 NG/ML (ref 4.63–204)
FOLATE SERPL-MCNC: 12.9 NG/ML (ref 7–31.4)
GFR SERPLBLD CREATININE-BSD FMLA CKD-EPI: >60 ML/MIN/1.73/M2
GLOBULIN SER-MCNC: 3.3 GM/DL (ref 2.4–3.5)
GLUCOSE SERPL-MCNC: 57 MG/DL (ref 82–115)
HCT VFR BLD AUTO: 44.5 % (ref 37–47)
HGB BLD-MCNC: 13.9 G/DL (ref 12–16)
IGA SERPL-MCNC: 235 MG/DL (ref 69–517)
IGG SERPL-MCNC: 1386 MG/DL (ref 522–1631)
IGM SERPL-MCNC: 138 MG/DL (ref 33–293)
IMM GRANULOCYTES # BLD AUTO: 0.03 X10(3)/MCL (ref 0–0.04)
IMM GRANULOCYTES NFR BLD AUTO: 0.6 %
IRON SATN MFR SERPL: 18 % (ref 20–50)
IRON SERPL-MCNC: 65 UG/DL (ref 50–170)
LYMPHOCYTES # BLD AUTO: 0.79 X10(3)/MCL (ref 0.6–4.6)
LYMPHOCYTES NFR BLD AUTO: 16.9 %
MCH RBC QN AUTO: 29.8 PG (ref 27–31)
MCHC RBC AUTO-ENTMCNC: 31.2 G/DL (ref 33–36)
MCV RBC AUTO: 95.5 FL (ref 80–94)
MONOCYTES # BLD AUTO: 0.77 X10(3)/MCL (ref 0.1–1.3)
MONOCYTES NFR BLD AUTO: 16.5 %
NEUTROPHILS # BLD AUTO: 2.99 X10(3)/MCL (ref 2.1–9.2)
NEUTROPHILS NFR BLD AUTO: 64.1 %
PLATELET # BLD AUTO: 69 X10(3)/MCL (ref 130–400)
PMV BLD AUTO: 11.4 FL (ref 7.4–10.4)
POTASSIUM SERPL-SCNC: 4.4 MMOL/L (ref 3.5–5.1)
PROT SERPL-MCNC: 6.9 GM/DL (ref 5.8–7.6)
RBC # BLD AUTO: 4.66 X10(6)/MCL (ref 4.2–5.4)
RET# (OHS): 0.07 X10E6/UL (ref 0.02–0.08)
RETICULOCYTE COUNT AUTOMATED (OLG): 1.43 % (ref 1.1–2.1)
SODIUM SERPL-SCNC: 138 MMOL/L (ref 136–145)
TIBC SERPL-MCNC: 288 UG/DL (ref 70–310)
TIBC SERPL-MCNC: 353 UG/DL (ref 250–450)
TRANSFERRIN SERPL-MCNC: 335 MG/DL
TSH SERPL-ACNC: 1.82 UIU/ML (ref 0.35–4.94)
VIT B12 SERPL-MCNC: 1562 PG/ML (ref 213–816)
WBC # SPEC AUTO: 4.67 X10(3)/MCL (ref 4.5–11.5)

## 2024-05-08 PROCEDURE — 80053 COMPREHEN METABOLIC PANEL: CPT | Performed by: STUDENT IN AN ORGANIZED HEALTH CARE EDUCATION/TRAINING PROGRAM

## 2024-05-08 PROCEDURE — 84443 ASSAY THYROID STIM HORMONE: CPT | Performed by: STUDENT IN AN ORGANIZED HEALTH CARE EDUCATION/TRAINING PROGRAM

## 2024-05-08 PROCEDURE — 83521 IG LIGHT CHAINS FREE EACH: CPT | Mod: 59 | Performed by: STUDENT IN AN ORGANIZED HEALTH CARE EDUCATION/TRAINING PROGRAM

## 2024-05-08 PROCEDURE — 3078F DIAST BP <80 MM HG: CPT | Mod: CPTII,S$GLB,, | Performed by: STUDENT IN AN ORGANIZED HEALTH CARE EDUCATION/TRAINING PROGRAM

## 2024-05-08 PROCEDURE — 99204 OFFICE O/P NEW MOD 45 MIN: CPT | Mod: S$GLB,,, | Performed by: STUDENT IN AN ORGANIZED HEALTH CARE EDUCATION/TRAINING PROGRAM

## 2024-05-08 PROCEDURE — 84165 PROTEIN E-PHORESIS SERUM: CPT | Performed by: STUDENT IN AN ORGANIZED HEALTH CARE EDUCATION/TRAINING PROGRAM

## 2024-05-08 PROCEDURE — 99999 PR PBB SHADOW E&M-EST. PATIENT-LVL V: CPT | Mod: PBBFAC,,, | Performed by: STUDENT IN AN ORGANIZED HEALTH CARE EDUCATION/TRAINING PROGRAM

## 2024-05-08 PROCEDURE — 3074F SYST BP LT 130 MM HG: CPT | Mod: CPTII,S$GLB,, | Performed by: STUDENT IN AN ORGANIZED HEALTH CARE EDUCATION/TRAINING PROGRAM

## 2024-05-08 PROCEDURE — 82728 ASSAY OF FERRITIN: CPT | Performed by: STUDENT IN AN ORGANIZED HEALTH CARE EDUCATION/TRAINING PROGRAM

## 2024-05-08 PROCEDURE — 82525 ASSAY OF COPPER: CPT | Performed by: STUDENT IN AN ORGANIZED HEALTH CARE EDUCATION/TRAINING PROGRAM

## 2024-05-08 PROCEDURE — 36415 COLL VENOUS BLD VENIPUNCTURE: CPT | Performed by: STUDENT IN AN ORGANIZED HEALTH CARE EDUCATION/TRAINING PROGRAM

## 2024-05-08 PROCEDURE — 82784 ASSAY IGA/IGD/IGG/IGM EACH: CPT | Performed by: STUDENT IN AN ORGANIZED HEALTH CARE EDUCATION/TRAINING PROGRAM

## 2024-05-08 PROCEDURE — 1159F MED LIST DOCD IN RCRD: CPT | Mod: CPTII,S$GLB,, | Performed by: STUDENT IN AN ORGANIZED HEALTH CARE EDUCATION/TRAINING PROGRAM

## 2024-05-08 PROCEDURE — 1160F RVW MEDS BY RX/DR IN RCRD: CPT | Mod: CPTII,S$GLB,, | Performed by: STUDENT IN AN ORGANIZED HEALTH CARE EDUCATION/TRAINING PROGRAM

## 2024-05-08 PROCEDURE — 82607 VITAMIN B-12: CPT | Performed by: STUDENT IN AN ORGANIZED HEALTH CARE EDUCATION/TRAINING PROGRAM

## 2024-05-08 PROCEDURE — 82746 ASSAY OF FOLIC ACID SERUM: CPT | Performed by: STUDENT IN AN ORGANIZED HEALTH CARE EDUCATION/TRAINING PROGRAM

## 2024-05-08 PROCEDURE — 1126F AMNT PAIN NOTED NONE PRSNT: CPT | Mod: CPTII,S$GLB,, | Performed by: STUDENT IN AN ORGANIZED HEALTH CARE EDUCATION/TRAINING PROGRAM

## 2024-05-08 PROCEDURE — 85025 COMPLETE CBC W/AUTO DIFF WBC: CPT | Performed by: STUDENT IN AN ORGANIZED HEALTH CARE EDUCATION/TRAINING PROGRAM

## 2024-05-08 PROCEDURE — 85045 AUTOMATED RETICULOCYTE COUNT: CPT | Performed by: STUDENT IN AN ORGANIZED HEALTH CARE EDUCATION/TRAINING PROGRAM

## 2024-05-08 PROCEDURE — 83540 ASSAY OF IRON: CPT | Performed by: STUDENT IN AN ORGANIZED HEALTH CARE EDUCATION/TRAINING PROGRAM

## 2024-05-08 RX ORDER — NADOLOL 40 MG/1
40 TABLET ORAL
COMMUNITY

## 2024-05-08 NOTE — PROGRESS NOTES
Value base Outreach  Follow up 5/9/24 to discuss overdue Health maintenance Topics, Patient on appt. Today  Dexa scan, OPCM eligible

## 2024-05-09 ENCOUNTER — PATIENT OUTREACH (OUTPATIENT)
Dept: ADMINISTRATIVE | Facility: HOSPITAL | Age: 84
End: 2024-05-09
Payer: MEDICARE

## 2024-05-09 DIAGNOSIS — R41.89 COGNITIVE DECLINE: Primary | ICD-10-CM

## 2024-05-09 PROBLEM — D72.819 LEUKOPENIA: Status: ACTIVE | Noted: 2024-05-09

## 2024-05-09 LAB
ALBUMIN % SPEP (OHS): 48.51
ALBUMIN SERPL-MCNC: 3.4 G/DL (ref 3.4–4.8)
ALBUMIN/GLOB SERPL: 0.9 RATIO (ref 1.1–2)
ALPHA 1 GLOB (OHS): 0.24 GM/DL
ALPHA 1 GLOB% (OHS): 3.46
ALPHA 2 GLOB % (OHS): 11.73
ALPHA 2 GLOB (OHS): 0.82 GM/DL
BETA GLOB (OHS): 1.05 GM/DL
BETA GLOB% (OHS): 15
COPPER SERPL-MCNC: 130 MCG/DL (ref 77–206)
GAMMA GLOBULIN % (OHS): 21.3
GAMMA GLOBULIN (OHS): 1.49 GM/DL
GLOBULIN SER-MCNC: 3.6 GM/DL (ref 2.4–3.5)
KAPPA LC FREE SER-MCNC: 2.86 MG/DL (ref 0.33–1.94)
KAPPA LC FREE/LAMBDA FREE SER: 1.42 {RATIO} (ref 0.26–1.65)
LAMBDA LC FREE SERPL-MCNC: 2.02 MG/DL (ref 0.57–2.63)
M SPIKE % (OHS): ABNORMAL
M SPIKE (OHS): ABNORMAL
PATH REV: NORMAL
PROT SERPL-MCNC: 7 GM/DL (ref 5.8–7.6)

## 2024-05-09 NOTE — PROGRESS NOTES
Health Maintenance Topic(s) Outreach Outcomes & Actions Taken:  Spoke with patient regarding the following. Currently due for Annual visit, pending appt. I will continue to follow.     Osteoporosis Screening - Outreach Outcomes & Actions Taken  : Patient Declined Scheduling Dexa or Will Call Back to Schedule and Declines does not want xrays, HM updated.     Additional Notes:           Care Management, Digital Medicine, and/or Education Referrals      Eligible for OPCM

## 2024-05-10 ENCOUNTER — OUTPATIENT CASE MANAGEMENT (OUTPATIENT)
Dept: ADMINISTRATIVE | Facility: OTHER | Age: 84
End: 2024-05-10
Payer: MEDICARE

## 2024-05-10 NOTE — LETTER
Joy Donahue  [address]   [address]  [place] [address]      Dear Joy Donahue,     I work with [facility] Outpatient Care Management Department. We received a referral to call you to discuss your medical history. These services are free of charge and are offered to [facility] patients who have recently been discharged from any of our facilities or who have medical conditions that may require the skill of a nurse to assist with management.     I am a Registered Nurse who specializes in connecting patients with available medical and financial resources as well as addressing any educational needs that may be indicated.    I attempted to reach you by telephone, but I was unsuccessful. Please call our department so that we can go over some questions with you, regarding your health.    The Outpatient Care Management Department can be reached at [phone], from 8:00AM to 4:30 PM, on Monday thru Friday.     Additionally, [facility] also has a program where a nurse is available 24/7 to answer questions or provide medical advice, their number is [phone].      Thanks,        Tamara See RN  Outpatient Care Management  Phone #: [phone]

## 2024-05-10 NOTE — PROGRESS NOTES
Outpatient Care Management  Patient Does Not Consent    Patient: Joy Donahue  MRN:  [id number]  Date of Service:  5/10/2024  Completed by:  Tamara See RN    Chief Complaint   Patient presents with    OPCM Chart Review    Case Closure       Patient Summary           Consent Received:  Decline      Patient declined OPCM at this time. Letter with info sent via portal, and mail also per request. Patient would like my name and number if needed in the future - encouraged to call if needed. Voiced understanding.  DANIA See RN

## 2024-05-29 ENCOUNTER — OFFICE VISIT (OUTPATIENT)
Dept: URGENT CARE | Facility: CLINIC | Age: 84
End: 2024-05-29
Payer: MEDICARE

## 2024-05-29 VITALS
HEIGHT: 66 IN | TEMPERATURE: 100 F | OXYGEN SATURATION: 98 % | BODY MASS INDEX: 26.2 KG/M2 | WEIGHT: 163 LBS | SYSTOLIC BLOOD PRESSURE: 142 MMHG | HEART RATE: 73 BPM | RESPIRATION RATE: 17 BRPM | DIASTOLIC BLOOD PRESSURE: 82 MMHG

## 2024-05-29 DIAGNOSIS — R05.9 COUGH, UNSPECIFIED TYPE: ICD-10-CM

## 2024-05-29 DIAGNOSIS — J18.9 PNEUMONIA OF LEFT LOWER LOBE DUE TO INFECTIOUS ORGANISM: Primary | ICD-10-CM

## 2024-05-29 DIAGNOSIS — J01.00 ACUTE NON-RECURRENT MAXILLARY SINUSITIS: ICD-10-CM

## 2024-05-29 DIAGNOSIS — R09.89 CHEST RALES: ICD-10-CM

## 2024-05-29 LAB
CTP QC/QA: YES
CTP QC/QA: YES
POC MOLECULAR INFLUENZA A AGN: NEGATIVE
POC MOLECULAR INFLUENZA B AGN: NEGATIVE
SARS-COV-2 AG RESP QL IA.RAPID: NEGATIVE

## 2024-05-29 PROCEDURE — 87502 INFLUENZA DNA AMP PROBE: CPT | Mod: QW,S$GLB,, | Performed by: PHYSICIAN ASSISTANT

## 2024-05-29 PROCEDURE — 87811 SARS-COV-2 COVID19 W/OPTIC: CPT | Mod: QW,S$GLB,, | Performed by: PHYSICIAN ASSISTANT

## 2024-05-29 PROCEDURE — 99214 OFFICE O/P EST MOD 30 MIN: CPT | Mod: S$GLB,,, | Performed by: PHYSICIAN ASSISTANT

## 2024-05-29 RX ORDER — AMOXICILLIN AND CLAVULANATE POTASSIUM 875; 125 MG/1; MG/1
1 TABLET, FILM COATED ORAL 2 TIMES DAILY
Qty: 14 TABLET | Refills: 0 | Status: SHIPPED | OUTPATIENT
Start: 2024-05-29 | End: 2024-06-05

## 2024-05-29 RX ORDER — AZITHROMYCIN 250 MG/1
TABLET, FILM COATED ORAL
Qty: 6 TABLET | Refills: 0 | Status: SHIPPED | OUTPATIENT
Start: 2024-05-29

## 2024-05-29 RX ORDER — BENZONATATE 200 MG/1
200 CAPSULE ORAL 3 TIMES DAILY PRN
Qty: 30 CAPSULE | Refills: 0 | Status: SHIPPED | OUTPATIENT
Start: 2024-05-29 | End: 2024-06-08

## 2024-05-29 NOTE — PATIENT INSTRUCTIONS
- Rest.    - Drink plenty of fluids.    - Tylenol (acetaminophen) or Ibuprofen as directed as needed for fever/pain. Avoid tylenol if you have a history of liver disease. Do not take ibuprofen if you have a history of GI bleeding, kidney disease, gastric surgery, or if you take blood thinners.     - You can take over-the-counter claritin, zyrtec, allegra, or xyzal as directed. These are antihistamines that can help with runny nose, nasal congestion, sneezing, and helps to dry up post-nasal drip, which usually causes sore throat and cough.    - You can use Flonase (fluticasone) nasal spray as directed for sinus congestion and postnasal drip. This is a steroid nasal spray that works locally over time to decrease the inflammation in your nose/sinuses and help with allergic symptoms. This is not an quick- relief spray like afrin, but it works well if used daily.  Discontinue if you develop nose bleed  - use OTC nasal saline prior to Flonase.  - you can use OTC nasal saline such as Ocean Spray Nasal Saline 1-3 puffs each nostril every 2-3 hours then blow out onto tissue. This is to irrigate the nasal passage way to clear the sinus openings. Use until sinus problem resolved.    - you can take plain OTC Mucinex (guaifenesin) 1200 mg twice a day to help loosen mucous.     -warm salt water gargles can help with sore throat    - warm tea with honey can help with cough. Honey is a natural cough suppressant.    - Take the tessalon (benzonatate) as needed as prescribed for cough     -Please take Augmentin (amoxicillin-clavulanate) with food as this medication may cause upset stomach  - You have been given an antibiotics (augmentin + azithromycin) to treat your condition today (suspected pneumonia/ respiratory infection).    - Please complete the antibiotic as directed on the bottle.   - If you are female and on oral birth control pills, use additional methods to prevent pregnancy while on antibiotics and for one cycle after.   -  you can take otc probiotic to limit upset stomach      - Follow up with your PCP or specialty clinic as directed in the next 1-2 weeks if not improved or as needed.  You can call [phone] to schedule an appointment with the appropriate provider.      - Go to the ER if you develop new or worsening symptoms.     - You must understand that you have received an Urgent Care treatment only and that you may be released before all of your medical problems are known or treated.   - You, the patient, will arrange for follow up care as instructed.   - If your condition worsens or fails to improve we recommend that you receive another evaluation at the ER immediately or contact your PCP to discuss your concerns or return here.

## 2024-05-29 NOTE — PROGRESS NOTES
"Subjective:      Patient ID: Joy Donahue is a 83 y.o. female.    Vitals:  height is 5' 5.98" (1.676 m) and weight is 73.9 kg (163 lb). Her tympanic temperature is 100.3 °F (37.9 °C). Her blood pressure is 142/82 (abnormal) and her pulse is 73. Her respiration is 17 and oxygen saturation is 98%.     Chief Complaint: Cough    This is a 83 y.o female who presents today with chief complaint of productive cough. Symptoms began about a week ago and have been persistent and worsening. She states that cough is productive of green and yellow dark sputum, no hemoptysis. She also has nasal congestion and mucous production from sinuses.   Patient denies SOB, chest pain, body aches, chill, headaches and abdominal pain.   Home tx: otc cough medicine and reports no improvement.   Patient hx of bronchitis.       Cough  This is a new problem. The current episode started 1 to 4 weeks ago. The cough is Productive of sputum. Associated symptoms include a fever (subjective). Pertinent negatives include no chest pain, chills, ear congestion, ear pain, eye redness, headaches, heartburn, hemoptysis, myalgias, nasal congestion, postnasal drip, rash, rhinorrhea, sore throat, shortness of breath, sweats, weight loss or wheezing. She has tried OTC cough suppressant for the symptoms. Her past medical history is significant for bronchitis. There is no history of asthma, bronchiectasis, COPD, emphysema or environmental allergies.     Patient Active Problem List   Diagnosis    Thrombocytopenia, unspecified    Acute (reversible) ischemia of intestine, part and extent unspecified    Cognitive decline    Secondary esophageal varices without bleeding    Hypertensive heart disease with heart failure    Angina pectoris, unspecified    Atrial fibrillation, unspecified type    Cirrhosis of liver with ascites    Urinary hesitancy    Chronic pain of left ankle    Primary malignant neoplasm of skin of ankle    Acute cystitis without hematuria    " Fatigue    Leukopenia     Past Medical History:   Diagnosis Date    Cirrhosis of liver with ascites 2/14/2023    Continue to follow up with hepatologist    Hepatitis C     treated/cured previously    Pacemaker     Paroxysmal atrial fibrillation     Pericardial cyst     Skin cancer     Thrombus          Constitution: Positive for fever (subjective). Negative for chills and fatigue.   HENT:  Positive for congestion and sinus pressure. Negative for ear pain, postnasal drip and sore throat.    Neck: Negative for neck pain and neck stiffness.   Cardiovascular:  Negative for chest pain, leg swelling and palpitations.   Eyes:  Negative for eye itching, eye pain and eye redness.   Respiratory:  Positive for cough and sputum production. Negative for bloody sputum, shortness of breath and wheezing.    Gastrointestinal:  Negative for abdominal pain, nausea, vomiting, diarrhea and heartburn.   Genitourinary:  Negative for dysuria, frequency, urgency, flank pain and hematuria.   Musculoskeletal:  Negative for pain, joint swelling and muscle ache.   Skin:  Negative for color change and rash.   Allergic/Immunologic: Negative for environmental allergies.   Neurological:  Negative for dizziness, light-headedness, headaches, disorientation, altered mental status, numbness and tingling.   Psychiatric/Behavioral:  Negative for altered mental status and disorientation.       Objective:     Physical Exam   Constitutional: She is oriented to person, place, and time. She appears well-developed. She is cooperative.  Non-toxic appearance. She does not appear ill. No distress.   HENT:   Head: Normocephalic and atraumatic.   Ears:   Right Ear: Hearing, tympanic membrane, external ear and ear canal normal.   Left Ear: Hearing, tympanic membrane, external ear and ear canal normal.   Nose: Mucosal edema present. No rhinorrhea or nasal deformity. No epistaxis. Right sinus exhibits maxillary sinus tenderness. Right sinus exhibits no frontal sinus  tenderness. Left sinus exhibits maxillary sinus tenderness. Left sinus exhibits no frontal sinus tenderness.   Mouth/Throat: Uvula is midline, oropharynx is clear and moist and mucous membranes are normal. She does not have dentures. No trismus in the jaw. Normal dentition. No uvula swelling or dental caries. No oropharyngeal exudate, posterior oropharyngeal edema, posterior oropharyngeal erythema, tonsillar abscesses or cobblestoning. No tonsillar exudate.   Eyes: Conjunctivae and lids are normal. No scleral icterus.   Neck: Trachea normal and phonation normal. Neck supple. No edema present. No erythema present. No neck rigidity present.   Cardiovascular: Normal rate, regular rhythm, normal heart sounds and normal pulses.   Pulmonary/Chest: Effort normal. No accessory muscle usage or stridor. No tachypnea and no bradypnea. No respiratory distress. She has no decreased breath sounds. She has no wheezes. She has no rhonchi. She has rales in the left lower field.   Abdominal: Normal appearance.   Musculoskeletal: Normal range of motion.         General: No deformity. Normal range of motion.      Right lower leg: No edema.      Left lower leg: No edema.   Lymphadenopathy:     She has no cervical adenopathy.   Neurological: She is alert and oriented to person, place, and time. She exhibits normal muscle tone. Coordination normal.   Skin: Skin is warm, dry, intact, not diaphoretic and not pale.   Psychiatric: Her speech is normal and behavior is normal. Judgment and thought content normal.   Nursing note and vitals reviewed.      Results for orders placed or performed in visit on 05/29/24   SARS Coronavirus 2 Antigen, POCT Manual Read   Result Value Ref Range    SARS Coronavirus 2 Antigen Negative Negative     Acceptable Yes    POCT Influenza A/B MOLECULAR   Result Value Ref Range    POC Molecular Influenza A Ag Negative Negative    POC Molecular Influenza B Ag Negative Negative      Acceptable Yes        Assessment:     1. Pneumonia of left lower lobe due to infectious organism    2. Cough, unspecified type    3. Chest rales    4. Acute non-recurrent maxillary sinusitis        Plan:       Pneumonia of left lower lobe due to infectious organism    Cough, unspecified type  -     SARS Coronavirus 2 Antigen, POCT Manual Read  -     POCT Influenza A/B MOLECULAR  -     benzonatate (TESSALON) 200 MG capsule; Take 1 capsule (200 mg total) by mouth 3 (three) times daily as needed for Cough.  Dispense: 30 capsule; Refill: 0    Chest rales  -     amoxicillin-clavulanate 875-125mg (AUGMENTIN) 875-125 mg per tablet; Take 1 tablet by mouth 2 (two) times daily. for 7 days  Dispense: 14 tablet; Refill: 0  -     azithromycin (Z-VINCE) 250 MG tablet; Take 2 tablets by mouth on day 1; Take 1 tablet by mouth on days 2-5  Dispense: 6 tablet; Refill: 0    Acute non-recurrent maxillary sinusitis      - Discussed ddx, home care, tx options, and given follow up precautions.  I have reviewed the patient's chart to view previous visits, labs, and imaging to assess PMH and look for any trends or previous treatments.  Previous labs reviewed, recent CMP reviewed to assess hepatic function and kidney function prior to Rx.  Patient with symptoms of respiratory infection for the past week subjective fever at home and elevated temperature 100.3° F in clinic.  COVID and flu both negative.  On physical exam, faint inspiratory rales left lower lung field, recommended chest x-ray to evaluate potential pneumonia, we do not have x-ray available in clinic at this time and patient deferred, will initiate empiric antibiotic treatment for suspected left lower lobe pneumonia.  Close follow-up with PCP, given strict follow-up precautions, seek medical attention for new or worsening symptoms.  Patient expressed understanding, agrees with plan of care.

## 2024-06-05 ENCOUNTER — HOSPITAL ENCOUNTER (OUTPATIENT)
Dept: RADIOLOGY | Facility: HOSPITAL | Age: 84
Discharge: HOME | End: 2024-06-05
Attending: STUDENT IN AN ORGANIZED HEALTH CARE EDUCATION/TRAINING PROGRAM
Payer: MEDICARE

## 2024-06-05 DIAGNOSIS — D69.6 THROMBOCYTOPENIA: ICD-10-CM

## 2024-06-05 PROCEDURE — 76700 US EXAM ABDOM COMPLETE: CPT | Mod: TC

## 2024-06-06 NOTE — PROGRESS NOTES
Subjective:       Patient ID: Joy Donahue is a 83 y.o. female.    Chief Complaint: Follow up    Diagnosis: Thrombocytopenia, Leukopenia    Current Treatment: None    Treatment History: N/A    HPI:   84 yo F presented in May '24 for evaluation of Thrombocytopenia and Leukopenia. On review of her labs she has had moderate thrombocytopenia with platelet count ranging from 60-100K since at least 2019. Additionally, she has had intermittent mild leukopenia since 2019 as well. She presents to hematology for further evaluation. 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 She states she underwent treatment in [place] for HCV several decades ago and reportedly cleared her infection. She follows with hepatology in [place] and sees them every couple of years? She does not get biannual abd US for HCC screening. She denies any abnormal bleeding, bruising, or infections. She denies any frequent hospitalizations, fevers, chills, NS, unintentional weight loss, increased fatigue, or changes in appetite. She feels her usual self with no complaints today.     Hematology workup was done. Her thrombocytopenia is due to her cirrhosis and stable. Her leukopenia is suspected due to age and medication. No malignant findings on workup.     Interval History:  Patient presents to clinic today for MD follow up appointment and labs to discuss results and plan of treatment.  She states she is doing well today.  Recently diagnosed with pneumonia, continues to improve daily.  Discussed labs and scan in detail with patient.  Complains today of frequent urination with a past history of nephrolithiasis, will follow up with PCP.  Denies any abnormal bleeding, bruising, fevers, chills or NS.  Her appetite and energy level are both stable.  Otherwise, she has no further complaints or concerns.      Past Medical History:   Diagnosis Date    Cirrhosis of liver with ascites 2/14/2023    Continue to  follow up with hepatologist    Hepatitis C     treated/cured previously    Pacemaker     Paroxysmal atrial fibrillation     Pericardial cyst     Skin cancer     Thrombus       Past Surgical History:   Procedure Laterality Date    APPENDECTOMY      CARDIAC PACEMAKER PLACEMENT      cataract surgery      FOOT SURGERY Left     HYSTERECTOMY      LAPAROSCOPIC CHOLECYSTECTOMY      parathyroid resection      REPEAT ABDOMINAL PARACENTESIS      SKIN CANCER EXCISION      spot removed Left     left ankle spot removed    THROMBECTOMY       Social History     Socioeconomic History    Marital status:    Tobacco Use    Smoking status: Never    Smokeless tobacco: Never   Substance and Sexual Activity    Alcohol use: Not Currently    Drug use: Never    Sexual activity: Not Currently     Social Determinants of Health      [place]    Food Insecurity    Received from [place]    Housing Stability      Family History   Problem Relation Name Age of Onset    No Known Problems Mother      No Known Problems Father      No Known Problems Sister      No Known Problems Brother        Review of patient's allergies indicates:   Allergen Reactions    Ciprofloxacin Swelling    Iodine Shortness Of Breath    Latex Itching    Fluoride      Mouth sores    Fluoride preparations Dermatitis     Other reaction(s): Ulcer of mouth      Review of Systems   Constitutional:  Negative for activity change, fever and unexpected weight change.   HENT:  Negative for sore throat.    Eyes:  Negative for visual disturbance.   Respiratory:  Negative for cough and shortness of breath.    Cardiovascular:  Negative for chest pain.   Gastrointestinal:  Negative for abdominal pain, diarrhea, nausea and vomiting.   Endocrine: Negative for polyuria.   Genitourinary:  Negative for dysuria and hot flashes.   Integumentary:  Negative for rash.   Neurological:   Negative for weakness and headaches.   Hematological:  Negative for adenopathy.   Psychiatric/Behavioral:  Negative for confusion.          Objective:      Vitals:    06/10/24 1044   BP: 127/70   Pulse: 100   Resp: 18   Temp: 97.4 °F (36.3 °C)         Physical Exam  Constitutional:       General: She is not in acute distress.     Appearance: Normal appearance. She is not ill-appearing.   HENT:      Head: Normocephalic and atraumatic.      Nose: Nose normal.      Mouth/Throat:      Mouth: Mucous membranes are moist.      Pharynx: Oropharynx is clear.   Eyes:      Extraocular Movements: Extraocular movements intact.      Conjunctiva/sclera: Conjunctivae normal.      Pupils: Pupils are equal, round, and reactive to light.   Cardiovascular:      Rate and Rhythm: Normal rate and regular rhythm.      Pulses: Normal pulses.      Heart sounds: Normal heart sounds. No murmur heard.  Pulmonary:      Effort: Pulmonary effort is normal. No respiratory distress.      Breath sounds: Normal breath sounds.   Abdominal:      General: There is no distension.      Palpations: Abdomen is soft.      Tenderness: There is no abdominal tenderness.   Musculoskeletal:         General: Normal range of motion.      Cervical back: Normal range of motion and neck supple.      Right lower leg: No edema.      Left lower leg: No edema.   Lymphadenopathy:      Cervical: No cervical adenopathy.   Skin:     General: Skin is warm and dry.   Neurological:      General: No focal deficit present.      Mental Status: She is alert and oriented to person, place, and time.         LABS AND IMAGING REVIEWED IN EPIC        Component      Latest Ref Rng 8/15/2023 8/17/2023 4/2/2024   Eos #      0 - 0.9 x10(3)/mcL 0.02  0.04  0.05    Lymph #      0.6 - 4.6 x10(3)/mcL 0.46 (L)  0.56 (L)  0.59 (L)    Basophil %      % 0.4  0.3  0.3    Baso #      <=0.2 x10(3)/mcL 0.02  0.01  0.01    Mono #      0.1 - 1.3 x10(3)/mcL 0.85  0.61  0.63    Neut #      2.1 - 9.2  x10(3)/mcL 3.72  2.62  1.65 (L)    LYMPH %      % 9.0  14.4  20.1    Mono %      % 16.6  15.7  21.4    Neut %      % 72.8  67.6  56.2    Eos %      % 0.4  1.0  1.7    Hematocrit      37.0 - 47.0 % 38.9  34.2 (L)  41.1    MCHC      33.0 - 36.0 g/dL 32.4 (L)  31.6 (L)  30.9 (L)    MCV      80.0 - 94.0 fL 90.3  92.7  94.9 (H)    Hemoglobin      12.0 - 16.0 g/dL 12.6  10.8 (L)  12.7    MPV      7.4 - 10.4 fL 10.8 (H)  11.4 (H)  11.5 (H)    MCH      27.0 - 31.0 pg 29.2  29.3  29.3    RBC      4.20 - 5.40 x10(6)/mcL 4.31  3.69 (L)  4.33    Platelet Count      130 - 400 x10(3)/mcL 62 (L)  77 (L)  60 (L)    RDW      11.5 - 17.0 % 15.9  16.1  15.8    WBC      4.50 - 11.50 x10(3)/mcL 5.11  3.88 (L)  2.94 (L)    Immature Granulocytes      % 0.8  1.0  0.3    Immature Grans (Abs)      0 - 0.04 x10(3)/mcL 0.04  0.04  0.01    nRBC      % 0.0  0.0  0.0    Immature Platelet Fraction      0.9 - 11.2 %   8.6         IMAGIN24 Abdominal US:  Impression:  Coarse echotexture with the lobular contour might be features of cirrhosis.  Status post cholecystectomy.  Bilateral renal cyst.  Spleen at 13.1 cm with multiple hyperechoic lesions of questionable etiology other imaging modalities might prove helpful for further evaluation.  Small amount of ascites.  Varices identified near the valerie hepatis      Assessment:   Chronic thrombocytopenia - Moderate. Due to cirrhosis from HCV.     Leukopenia - Benign due to age and medication causing bone marrow suppression.     HCV Cirrhosis - Will need Q6m abdominal US and AFP checks for HCC surveillance        Plan:   - Plan for Abdominal US q6 months for HCC screening - due Dec '24  - CT Abdomen/Pelvis with IV contrast for follow up screening due to findings on ultrasound; ordered today  - RTC 4 months for NP visit, labs same day    Elizabeth Kennedy LeJeune, MD  Hematology/Oncology   [facility]        Professional Services   Soledad TSE LPN, acted solely as a scribe for  and in the presence of Dr. Elizabeth Kennedy LeJeune, who performed these services.

## 2024-06-10 ENCOUNTER — OFFICE VISIT (OUTPATIENT)
Dept: HEMATOLOGY/ONCOLOGY | Facility: CLINIC | Age: 84
End: 2024-06-10
Payer: MEDICARE

## 2024-06-10 ENCOUNTER — LAB VISIT (OUTPATIENT)
Dept: LAB | Facility: HOSPITAL | Age: 84
End: 2024-06-10
Attending: STUDENT IN AN ORGANIZED HEALTH CARE EDUCATION/TRAINING PROGRAM
Payer: MEDICARE

## 2024-06-10 ENCOUNTER — TELEPHONE (OUTPATIENT)
Dept: HEMATOLOGY/ONCOLOGY | Facility: CLINIC | Age: 84
End: 2024-06-10

## 2024-06-10 VITALS
HEART RATE: 100 BPM | TEMPERATURE: 97 F | DIASTOLIC BLOOD PRESSURE: 70 MMHG | OXYGEN SATURATION: 98 % | WEIGHT: 165.81 LBS | HEIGHT: 66 IN | BODY MASS INDEX: 26.65 KG/M2 | RESPIRATION RATE: 18 BRPM | SYSTOLIC BLOOD PRESSURE: 127 MMHG

## 2024-06-10 DIAGNOSIS — D69.6 THROMBOCYTOPENIA: ICD-10-CM

## 2024-06-10 DIAGNOSIS — Z91.041 CONTRAST MEDIA ALLERGY: Primary | ICD-10-CM

## 2024-06-10 DIAGNOSIS — D72.819 LEUKOPENIA: ICD-10-CM

## 2024-06-10 DIAGNOSIS — R93.89 ABNORMAL FINDING ON IMAGING: ICD-10-CM

## 2024-06-10 LAB
ALBUMIN SERPL-MCNC: 2.9 G/DL (ref 3.4–4.8)
ALBUMIN/GLOB SERPL: 0.9 RATIO (ref 1.1–2)
ALP SERPL-CCNC: 112 UNIT/L (ref 40–150)
ALT SERPL-CCNC: 20 UNIT/L (ref 0–55)
ANION GAP SERPL CALC-SCNC: 4 MEQ/L
AST SERPL-CCNC: 39 UNIT/L (ref 5–34)
BASOPHILS # BLD AUTO: 0.03 X10(3)/MCL
BASOPHILS NFR BLD AUTO: 0.9 %
BILIRUB SERPL-MCNC: 1 MG/DL
BUN SERPL-MCNC: 14.6 MG/DL (ref 9.8–20.1)
CALCIUM SERPL-MCNC: 9.3 MG/DL (ref 8.4–10.2)
CHLORIDE SERPL-SCNC: 108 MMOL/L (ref 98–107)
CO2 SERPL-SCNC: 29 MMOL/L (ref 23–31)
CREAT SERPL-MCNC: 0.84 MG/DL (ref 0.55–1.02)
CREAT/UREA NIT SERPL: 17
EOSINOPHIL # BLD AUTO: 0.05 X10(3)/MCL (ref 0–0.9)
EOSINOPHIL NFR BLD AUTO: 1.4 %
ERYTHROCYTE [DISTWIDTH] IN BLOOD BY AUTOMATED COUNT: 15.3 % (ref 11.5–17)
GFR SERPLBLD CREATININE-BSD FMLA CKD-EPI: >60 ML/MIN/1.73/M2
GLOBULIN SER-MCNC: 3.1 GM/DL (ref 2.4–3.5)
GLUCOSE SERPL-MCNC: 104 MG/DL (ref 82–115)
HCT VFR BLD AUTO: 37.5 % (ref 37–47)
HGB BLD-MCNC: 12.2 G/DL (ref 12–16)
IMM GRANULOCYTES # BLD AUTO: 0.04 X10(3)/MCL (ref 0–0.04)
IMM GRANULOCYTES NFR BLD AUTO: 1.1 %
LYMPHOCYTES # BLD AUTO: 0.7 X10(3)/MCL (ref 0.6–4.6)
LYMPHOCYTES NFR BLD AUTO: 20 %
MCH RBC QN AUTO: 31.2 PG (ref 27–31)
MCHC RBC AUTO-ENTMCNC: 32.5 G/DL (ref 33–36)
MCV RBC AUTO: 95.9 FL (ref 80–94)
MONOCYTES # BLD AUTO: 0.48 X10(3)/MCL (ref 0.1–1.3)
MONOCYTES NFR BLD AUTO: 13.7 %
NEUTROPHILS # BLD AUTO: 2.2 X10(3)/MCL (ref 2.1–9.2)
NEUTROPHILS NFR BLD AUTO: 62.9 %
PLATELET # BLD AUTO: 80 X10(3)/MCL (ref 130–400)
PMV BLD AUTO: 10.2 FL (ref 7.4–10.4)
POTASSIUM SERPL-SCNC: 4.2 MMOL/L (ref 3.5–5.1)
PROT SERPL-MCNC: 6 GM/DL (ref 5.8–7.6)
RBC # BLD AUTO: 3.91 X10(6)/MCL (ref 4.2–5.4)
SODIUM SERPL-SCNC: 141 MMOL/L (ref 136–145)
WBC # SPEC AUTO: 3.5 X10(3)/MCL (ref 4.5–11.5)

## 2024-06-10 PROCEDURE — 99213 OFFICE O/P EST LOW 20 MIN: CPT | Mod: S$GLB,,, | Performed by: STUDENT IN AN ORGANIZED HEALTH CARE EDUCATION/TRAINING PROGRAM

## 2024-06-10 PROCEDURE — 1126F AMNT PAIN NOTED NONE PRSNT: CPT | Mod: CPTII,S$GLB,, | Performed by: STUDENT IN AN ORGANIZED HEALTH CARE EDUCATION/TRAINING PROGRAM

## 2024-06-10 PROCEDURE — 1160F RVW MEDS BY RX/DR IN RCRD: CPT | Mod: CPTII,S$GLB,, | Performed by: STUDENT IN AN ORGANIZED HEALTH CARE EDUCATION/TRAINING PROGRAM

## 2024-06-10 PROCEDURE — 1159F MED LIST DOCD IN RCRD: CPT | Mod: CPTII,S$GLB,, | Performed by: STUDENT IN AN ORGANIZED HEALTH CARE EDUCATION/TRAINING PROGRAM

## 2024-06-10 PROCEDURE — 3074F SYST BP LT 130 MM HG: CPT | Mod: CPTII,S$GLB,, | Performed by: STUDENT IN AN ORGANIZED HEALTH CARE EDUCATION/TRAINING PROGRAM

## 2024-06-10 PROCEDURE — 85025 COMPLETE CBC W/AUTO DIFF WBC: CPT

## 2024-06-10 PROCEDURE — 80053 COMPREHEN METABOLIC PANEL: CPT

## 2024-06-10 PROCEDURE — 36415 COLL VENOUS BLD VENIPUNCTURE: CPT

## 2024-06-10 PROCEDURE — 3078F DIAST BP <80 MM HG: CPT | Mod: CPTII,S$GLB,, | Performed by: STUDENT IN AN ORGANIZED HEALTH CARE EDUCATION/TRAINING PROGRAM

## 2024-06-10 PROCEDURE — 99999 PR PBB SHADOW E&M-EST. PATIENT-LVL V: CPT | Mod: PBBFAC,,, | Performed by: STUDENT IN AN ORGANIZED HEALTH CARE EDUCATION/TRAINING PROGRAM

## 2024-06-10 RX ORDER — PREDNISONE 50 MG/1
TABLET ORAL
Qty: 3 TABLET | Refills: 0 | Status: SHIPPED | OUTPATIENT
Start: 2024-06-10

## 2024-06-10 RX ORDER — DIPHENHYDRAMINE HCL 50 MG
CAPSULE ORAL
Qty: 1 CAPSULE | Refills: 0 | Status: SHIPPED | OUTPATIENT
Start: 2024-06-10

## 2024-06-20 ENCOUNTER — HOSPITAL ENCOUNTER (OUTPATIENT)
Dept: RADIOLOGY | Facility: HOSPITAL | Age: 84
Discharge: HOME | End: 2024-06-20
Attending: STUDENT IN AN ORGANIZED HEALTH CARE EDUCATION/TRAINING PROGRAM
Payer: MEDICARE

## 2024-06-20 DIAGNOSIS — R93.89 ABNORMAL FINDING ON IMAGING: ICD-10-CM

## 2024-06-20 PROCEDURE — 25500020 PHARM REV CODE 255: Performed by: STUDENT IN AN ORGANIZED HEALTH CARE EDUCATION/TRAINING PROGRAM

## 2024-06-20 PROCEDURE — 74177 CT ABD & PELVIS W/CONTRAST: CPT | Mod: TC

## 2024-06-20 RX ADMIN — IOHEXOL 95 ML: 350 INJECTION, SOLUTION INTRAVENOUS at 08:06

## 2024-07-05 ENCOUNTER — TELEPHONE (OUTPATIENT)
Dept: HEMATOLOGY/ONCOLOGY | Facility: CLINIC | Age: 84
End: 2024-07-05
Payer: MEDICARE

## 2024-07-05 ENCOUNTER — HOSPITAL ENCOUNTER (EMERGENCY)
Facility: HOSPITAL | Age: 84
Discharge: HOME | End: 2024-07-05
Attending: EMERGENCY MEDICINE
Payer: MEDICARE

## 2024-07-05 VITALS
HEIGHT: 65 IN | TEMPERATURE: 98 F | RESPIRATION RATE: 17 BRPM | BODY MASS INDEX: 26.99 KG/M2 | SYSTOLIC BLOOD PRESSURE: 122 MMHG | OXYGEN SATURATION: 98 % | WEIGHT: 162 LBS | HEART RATE: 80 BPM | DIASTOLIC BLOOD PRESSURE: 72 MMHG

## 2024-07-05 DIAGNOSIS — K52.9 COLITIS: Primary | ICD-10-CM

## 2024-07-05 LAB
ALBUMIN SERPL-MCNC: 3.3 G/DL (ref 3.4–4.8)
ALBUMIN/GLOB SERPL: 1.1 RATIO (ref 1.1–2)
ALP SERPL-CCNC: 100 UNIT/L (ref 40–150)
ALT SERPL-CCNC: 23 UNIT/L (ref 0–55)
ANION GAP SERPL CALC-SCNC: 7 MEQ/L
APTT PPP: 26 SECONDS (ref 23.2–33.7)
AST SERPL-CCNC: 40 UNIT/L (ref 5–34)
BACTERIA #/AREA URNS AUTO: ABNORMAL /HPF
BASOPHILS # BLD AUTO: 0.02 X10(3)/MCL
BASOPHILS NFR BLD AUTO: 0.6 %
BILIRUB SERPL-MCNC: 1.7 MG/DL
BILIRUB UR QL STRIP.AUTO: NEGATIVE
BUN SERPL-MCNC: 19.1 MG/DL (ref 9.8–20.1)
CALCIUM SERPL-MCNC: 9.8 MG/DL (ref 8.4–10.2)
CHLORIDE SERPL-SCNC: 108 MMOL/L (ref 98–107)
CLARITY UR: CLEAR
CO2 SERPL-SCNC: 25 MMOL/L (ref 23–31)
COLOR UR AUTO: ABNORMAL
CREAT SERPL-MCNC: 0.79 MG/DL (ref 0.55–1.02)
CREAT/UREA NIT SERPL: 24
EOSINOPHIL # BLD AUTO: 0.04 X10(3)/MCL (ref 0–0.9)
EOSINOPHIL NFR BLD AUTO: 1.1 %
ERYTHROCYTE [DISTWIDTH] IN BLOOD BY AUTOMATED COUNT: 15 % (ref 11.5–17)
GFR SERPLBLD CREATININE-BSD FMLA CKD-EPI: >60 ML/MIN/1.73/M2
GLOBULIN SER-MCNC: 3 GM/DL (ref 2.4–3.5)
GLUCOSE SERPL-MCNC: 133 MG/DL (ref 82–115)
GLUCOSE UR QL STRIP: NORMAL
HCT VFR BLD AUTO: 38.4 % (ref 37–47)
HGB BLD-MCNC: 12 G/DL (ref 12–16)
HGB UR QL STRIP: NEGATIVE
IMM GRANULOCYTES # BLD AUTO: 0.02 X10(3)/MCL (ref 0–0.04)
IMM GRANULOCYTES NFR BLD AUTO: 0.6 %
INR PPP: 1.1
KETONES UR QL STRIP: NEGATIVE
LEUKOCYTE ESTERASE UR QL STRIP: 250
LIPASE SERPL-CCNC: 46 U/L
LYMPHOCYTES # BLD AUTO: 0.71 X10(3)/MCL (ref 0.6–4.6)
LYMPHOCYTES NFR BLD AUTO: 19.7 %
MAGNESIUM SERPL-MCNC: 2 MG/DL (ref 1.6–2.6)
MCH RBC QN AUTO: 29.2 PG (ref 27–31)
MCHC RBC AUTO-ENTMCNC: 31.3 G/DL (ref 33–36)
MCV RBC AUTO: 93.4 FL (ref 80–94)
MONOCYTES # BLD AUTO: 0.55 X10(3)/MCL (ref 0.1–1.3)
MONOCYTES NFR BLD AUTO: 15.2 %
NEUTROPHILS # BLD AUTO: 2.27 X10(3)/MCL (ref 2.1–9.2)
NEUTROPHILS NFR BLD AUTO: 62.8 %
NITRITE UR QL STRIP: NEGATIVE
NRBC BLD AUTO-RTO: 0 %
PH UR STRIP: 6 [PH]
PLATELET # BLD AUTO: 51 X10(3)/MCL (ref 130–400)
PLATELETS.RETICULATED NFR BLD AUTO: 6 % (ref 0.9–11.2)
PMV BLD AUTO: 11.7 FL (ref 7.4–10.4)
POTASSIUM SERPL-SCNC: 4.1 MMOL/L (ref 3.5–5.1)
PROT SERPL-MCNC: 6.3 GM/DL (ref 5.8–7.6)
PROT UR QL STRIP: NEGATIVE
PROTHROMBIN TIME: 14.3 SECONDS (ref 12.5–14.5)
RBC # BLD AUTO: 4.11 X10(6)/MCL (ref 4.2–5.4)
RBC #/AREA URNS AUTO: ABNORMAL /HPF
SODIUM SERPL-SCNC: 140 MMOL/L (ref 136–145)
SP GR UR STRIP.AUTO: 1.02 (ref 1–1.03)
SQUAMOUS #/AREA URNS LPF: ABNORMAL /HPF
UROBILINOGEN UR STRIP-ACNC: NORMAL
WBC # BLD AUTO: 3.61 X10(3)/MCL (ref 4.5–11.5)
WBC #/AREA URNS AUTO: ABNORMAL /HPF

## 2024-07-05 PROCEDURE — 96372 THER/PROPH/DIAG INJ SC/IM: CPT | Performed by: PHYSICIAN ASSISTANT

## 2024-07-05 PROCEDURE — 81015 MICROSCOPIC EXAM OF URINE: CPT | Performed by: NURSE PRACTITIONER

## 2024-07-05 PROCEDURE — 85025 COMPLETE CBC W/AUTO DIFF WBC: CPT | Performed by: NURSE PRACTITIONER

## 2024-07-05 PROCEDURE — 63600175 PHARM REV CODE 636 W HCPCS: Performed by: PHYSICIAN ASSISTANT

## 2024-07-05 PROCEDURE — 83690 ASSAY OF LIPASE: CPT | Performed by: NURSE PRACTITIONER

## 2024-07-05 PROCEDURE — 85610 PROTHROMBIN TIME: CPT | Performed by: NURSE PRACTITIONER

## 2024-07-05 PROCEDURE — 83735 ASSAY OF MAGNESIUM: CPT | Performed by: NURSE PRACTITIONER

## 2024-07-05 PROCEDURE — 81001 URINALYSIS AUTO W/SCOPE: CPT | Performed by: NURSE PRACTITIONER

## 2024-07-05 PROCEDURE — 80053 COMPREHEN METABOLIC PANEL: CPT | Performed by: NURSE PRACTITIONER

## 2024-07-05 PROCEDURE — 85730 THROMBOPLASTIN TIME PARTIAL: CPT | Performed by: NURSE PRACTITIONER

## 2024-07-05 PROCEDURE — 99285 EMERGENCY DEPT VISIT HI MDM: CPT | Mod: 25

## 2024-07-05 PROCEDURE — 25000003 PHARM REV CODE 250: Performed by: PHYSICIAN ASSISTANT

## 2024-07-05 RX ORDER — AMOXICILLIN AND CLAVULANATE POTASSIUM 875; 125 MG/1; MG/1
1 TABLET, FILM COATED ORAL 2 TIMES DAILY
Qty: 14 TABLET | Refills: 0 | Status: SHIPPED | OUTPATIENT
Start: 2024-07-05 | End: 2024-07-12

## 2024-07-05 RX ORDER — DICYCLOMINE HYDROCHLORIDE 20 MG/1
20 TABLET ORAL 4 TIMES DAILY
Qty: 28 TABLET | Refills: 0 | Status: SHIPPED | OUTPATIENT
Start: 2024-07-05 | End: 2024-07-12

## 2024-07-05 RX ORDER — KETOROLAC TROMETHAMINE 30 MG/ML
30 INJECTION, SOLUTION INTRAMUSCULAR; INTRAVENOUS
Status: COMPLETED | OUTPATIENT
Start: 2024-07-05 | End: 2024-07-05

## 2024-07-05 RX ORDER — DICYCLOMINE HYDROCHLORIDE 10 MG/1
20 CAPSULE ORAL
Status: COMPLETED | OUTPATIENT
Start: 2024-07-05 | End: 2024-07-05

## 2024-07-05 RX ADMIN — KETOROLAC TROMETHAMINE 30 MG: 30 INJECTION, SOLUTION INTRAMUSCULAR at 02:07

## 2024-07-05 RX ADMIN — DICYCLOMINE HYDROCHLORIDE 20 MG: 10 CAPSULE ORAL at 03:07

## 2024-07-05 NOTE — ED PROVIDER NOTES
Encounter Date: 7/5/2024       History     Chief Complaint   Patient presents with    Flank Pain     Pt to ed via pov with reports R flank, lateral abd pain that worsened this morning. Denies nausea, vomiting, or fever. Hx: liver cirrhosis     83-year-old female presents to ED for evaluation of right flank pain worsening this morning.  Reports pain worse with movement and touch.  Denies any nausea or vomiting.  Denies any changes in urination.  Denies any dysuria.  Denies any diarrhea or fever.    The history is provided by the patient. No  was used.     Review of patient's allergies indicates:   Allergen Reactions    Ciprofloxacin Swelling    Iodine Shortness Of Breath    Latex Itching    Fluoride      Mouth sores    Fluoride preparations Dermatitis     Other reaction(s): Ulcer of mouth     Past Medical History:   Diagnosis Date    Cirrhosis of liver with ascites 2/14/2023    Continue to follow up with hepatologist    Hepatitis C     treated/cured previously    Pacemaker     Paroxysmal atrial fibrillation     Pericardial cyst     Skin cancer     Thrombus      Past Surgical History:   Procedure Laterality Date    APPENDECTOMY      CARDIAC PACEMAKER PLACEMENT      cataract surgery      FOOT SURGERY Left     HYSTERECTOMY      LAPAROSCOPIC CHOLECYSTECTOMY      parathyroid resection      REPEAT ABDOMINAL PARACENTESIS      SKIN CANCER EXCISION      spot removed Left     left ankle spot removed    THROMBECTOMY       Family History   Problem Relation Name Age of Onset    No Known Problems Mother      No Known Problems Father      No Known Problems Sister      No Known Problems Brother       Social History     Tobacco Use    Smoking status: Never    Smokeless tobacco: Never   Substance Use Topics    Alcohol use: Not Currently    Drug use: Never     Review of Systems   Constitutional:  Negative for chills, fatigue and fever.   Respiratory:  Negative for shortness of breath.    Cardiovascular:  Negative  for chest pain.   Gastrointestinal:  Positive for abdominal pain. Negative for diarrhea, nausea and vomiting.   Genitourinary:  Positive for flank pain. Negative for dysuria, frequency, pelvic pain and urgency.   Musculoskeletal:  Negative for myalgias.   All other systems reviewed and are negative.      Physical Exam     Initial Vitals [07/05/24 1102]   BP Pulse Resp Temp SpO2   (!) 122/59 76 18 97.8 °F (36.6 °C) 96 %      MAP       --         Physical Exam    Nursing note and vitals reviewed.  Constitutional: She appears well-developed and well-nourished.   HENT:   Head: Normocephalic and atraumatic.   Right Ear: Tympanic membrane and external ear normal.   Left Ear: Tympanic membrane and external ear normal.   Mouth/Throat: Uvula is midline, oropharynx is clear and moist and mucous membranes are normal. No trismus in the jaw. No uvula swelling. No oropharyngeal exudate, posterior oropharyngeal edema or posterior oropharyngeal erythema.   Eyes: Conjunctivae are normal. Pupils are equal, round, and reactive to light.   Neck: Neck supple.   Normal range of motion.  Cardiovascular:  Normal rate, regular rhythm and normal heart sounds.           Pulmonary/Chest: Breath sounds normal. She has no wheezes. She has no rhonchi. She has no rales.   Abdominal: Abdomen is soft. Bowel sounds are normal. There is abdominal tenderness (RUQ). There is no rebound and no guarding.   Musculoskeletal:         General: Normal range of motion.      Cervical back: Normal range of motion and neck supple.     Neurological: She is alert and oriented to person, place, and time.   Skin: Skin is warm and dry.   Psychiatric: She has a normal mood and affect.         ED Course   Procedures  Labs Reviewed   CBC WITH DIFFERENTIAL - Abnormal; Notable for the following components:       Result Value    WBC 3.61 (*)     RBC 4.11 (*)     MCHC 31.3 (*)     Platelet 51 (*)     MPV 11.7 (*)     All other components within normal limits   COMPREHENSIVE  METABOLIC PANEL - Abnormal; Notable for the following components:    Chloride 108 (*)     Glucose 133 (*)     Albumin 3.3 (*)     Bilirubin Total 1.7 (*)     AST 40 (*)     All other components within normal limits   URINALYSIS, REFLEX TO URINE CULTURE - Abnormal; Notable for the following components:    Leukocyte Esterase,  (*)     All other components within normal limits   MAGNESIUM - Normal   LIPASE - Normal   PROTIME-INR - Normal   APTT - Normal          Imaging Results              CT Abdomen Pelvis  Without Contrast (Final result)  Result time 07/05/24 14:44:15      Final result by Marika Mendez MD (07/05/24 14:44:15)                   Impression:      1. Nonobstructing nephrolithiasis  2. Small liver with cirrhotic morphology  3. Mild mesenteric edema and pericolonic edema is nonspecific.  This could be related to changes of portal hypertension or nonspecific enterocolitis.      Electronically signed by: Marika Mendez  Date:    07/05/2024  Time:    14:44               Narrative:    EXAMINATION:  CT ABDOMEN PELVIS WITHOUT CONTRAST    CLINICAL HISTORY:  Abdominal pain, acute, nonlocalized;Flank pain, kidney stone suspected;    TECHNIQUE:  Helically acquired axial images, sagittal and coronal reformations were obtained from the lung bases to the pubic symphysis without the IV administration of contrast.    Automated tube current modulation, weight-based exposure dosing, and/or iterative reconstruction technique utilized to reach lowest reasonably achievable exposure rate.    DLP: 297 mGy*cm    COMPARISON:  CT abdomen pelvis 06/20/2024    FINDINGS:  HEART: Incidental pericardial cyst at the right cardiophrenic angle.    LUNG BASES: Unchanged    LIVER: Small liver with nodular surface contour.    BILIARY: Gallbladder is surgically absent.    PANCREAS: No inflammatory change.    SPLEEN: Mild splenomegaly.    ADRENALS: No mass.    KIDNEYS/URETERS: There are bilateral incidental renal cortical  cysts.  Punctate bilateral renal calculi measuring up to 4-5 mm on the right and 2-3 mm on the left.  No hydronephrosis.    GI TRACT/MESENTERY: Evaluation of the bowel is limited without contrast. Mild mesenteric edema.  Mild edema adjacent to the right hemicolon.  Colonic diverticulosis without acute inflammatory change.    PERITONEUM: Trace free intraperitoneal fluid.No free air.    LYMPH NODES: No enlarged lymph nodes by size criteria.    VASCULATURE: Aortoiliac atherosclerosis.    BLADDER: Normal appearance given degree of distention.    REPRODUCTIVE ORGANS: Normal as visualized.    ABDOMINAL WALL: Unremarkable.    BONES: Degenerative change at the lower lumbar spine.  There is grade 1 anterolisthesis L4 on L5.  Severe disc space narrowing at L5-S1.                                       Medications   ketorolac injection 30 mg (30 mg Intramuscular Given 7/5/24 6080)     Medical Decision Making  83-year-old female presents to ED for evaluation of right flank pain worsening this morning.  Reports pain worse with movement and touch.  Denies any nausea or vomiting.  Denies any changes in urination.  Denies any dysuria.  Denies any diarrhea or fever.    Differential Diagnosis includes, but is not limited to:  AAA, aortic dissection, mesenteric ischemia, perforated viscous, MI/ACS, SBO/volvulus, incarcerated/strangulated hernia, intussusception, ileus, appendicitis, cholecystitis, cholangitis, diverticulitis, esophagitis, hepatitis, nephrolithiasis, pancreatitis, gastroenteritis, colitis, IBD/IBS, biliary colic, GERD, PUD, constipation, UTI/pyelonephritis,  disorder.        Amount and/or Complexity of Data Reviewed  Labs: ordered. Decision-making details documented in ED Course.  Radiology: ordered.  Discussion of management or test interpretation with external provider(s): Patient is afebrile in no acute distress.  Presents to ED for evaluation of right-sided abdominal pain radiating to her flank.  Was heme worse as  movement touch.  Was obtained showing no leukocytosis.  H&H stable.  Mag normal.  Lipase negative.  UA positive for leukocyte esterase.  CT abdomen and pelvis obtained also showing a possible colitis this while placed on Augmentin to cover for both her urine and colitis.  We will give short course of pain medication.  Discussed return to ED precautions.  Patient verbalizes understanding and agrees with plan care.    Risk  OTC drugs.  Prescription drug management.               ED Course as of 07/05/24 1540   Fri Jul 05, 2024   1537 Lipase: 46 [SL]   1537 Magnesium : 2.00 [SL]   1537 WBC(!): 3.61 [SL]   1537 Hemoglobin: 12.0 [SL]   1537 Hematocrit: 38.4 [SL]   1538 Leukocyte Esterase, UA(!): 250 [SL]      ED Course User Index  [SL] Lachelle Rayo PA                           Clinical Impression:  Final diagnoses:  [K52.9] Colitis (Primary)          ED Disposition Condition    Discharge Stable          ED Prescriptions       Medication Sig Dispense Start Date End Date Auth. Provider    amoxicillin-clavulanate 875-125mg (AUGMENTIN) 875-125 mg per tablet Take 1 tablet by mouth 2 (two) times daily. for 7 days 14 tablet 7/5/2024 7/12/2024 Lachelle Rayo PA    dicyclomine (BENTYL) 20 mg tablet Take 1 tablet (20 mg total) by mouth 4 (four) times daily. for 7 days 28 tablet 7/5/2024 7/12/2024 Lachelle Rayo PA          Follow-up Information       Follow up With Specialties Details Why Contact Info    PCP  In 1 week As needed, If you do not have a PCP you may call [phone] to help get set up If you do not have a PCP you may call [phone] to help get set up.             Lachelle Rayo PA  07/05/24 1540

## 2024-07-05 NOTE — DISCHARGE INSTRUCTIONS
Eat a bland diet over the next 2-3 days.  Drink plenty of fluids.  He is Bentyl for abdominal pain.  Take full course of antibiotics.

## 2024-07-05 NOTE — FIRST PROVIDER EVALUATION
Medical screening examination initiated.  I have conducted a focused provider triage encounter, findings are as follows:    Brief history of present illness:  84 y/o female presents with right side flank pain for couple days but worsened this AM. No n/v. No fever. States that she has cirrhosis so unsure if this is culprit. No urinary complaints.     There were no vitals filed for this visit.    Pertinent physical exam:  alert, nonlabored, ambulatory     Brief workup plan:  labs, urine    Preliminary workup initiated; this workup will be continued and followed by the physician or advanced practice provider that is assigned to the patient when roomed.   [FreeTextEntry1] : Hung is a 13 year old healthy girl who presents for an initial consultation for three soft tissue masses. Her mother states that Hung's soft tissue masses were noticed at 2.5-3 years of age and she has three of them, one in her axilla, one on her left posterior flank, and another at her left groin. She states that the soft tissue mass on her left groin causes her discomfort when she stretches, exercises during track practice, runs quickly, and perform push-ups. She is asymptomatic with respect to the other two masses Her mother believes the masses have not increased in size. However, she notes that Hung has grown and that the masses may have grown with her. Hung denies taking any current medications and denies pertinent surgical history.

## 2024-07-11 ENCOUNTER — TELEPHONE (OUTPATIENT)
Dept: HEMATOLOGY/ONCOLOGY | Facility: CLINIC | Age: 84
End: 2024-07-11
Payer: MEDICARE

## 2024-07-16 ENCOUNTER — TELEPHONE (OUTPATIENT)
Dept: INTERNAL MEDICINE | Facility: CLINIC | Age: 84
End: 2024-07-16

## 2024-07-16 NOTE — TELEPHONE ENCOUNTER
----- Message from Shukri Ocasio sent at 7/16/2024  9:53 AM CDT -----  Type:  Same Day Appointment Request    Caller is requesting a same day appointment.  Caller declined first available appointment listed below.    Name of Caller:pt  When is the first available appointment?none  Symptoms:right side severe pain  Best Call Back Number: [phone]  Additional Information: pt states her daughter sees provider and she would like to see provider as well if possible today or tomorrow. Thank you

## 2024-07-16 NOTE — TELEPHONE ENCOUNTER
"Spoke with patient. States she was seen at  recently and taking antibiotic medications and current pain on right side is 8/10. States she would like a local Primary Care Provider since she is "spending so much time here" in [facility] and would like to see Dr. Najera, if possible since her daughter, Shima Donahue (MRN [id number]) is established with Dr. Najera. Informed patient I would ask about an opening in schedule. Patient states she will continue treatment ordered by team at last Emergency Department visit.     Advised patient that should her pain increase/become worse/more painful between now and whenever an appointment can be made, or should she develop any new/alarming symptoms, to immediately go to the closest Emergency Department to her current location for treatment. Patient expressed understanding and gratitude for phone call.  Call ended.      Encounter routed to Dr. Najera.     "    ----- Message from Shukri AnaBios sent at 7/16/2024  9:53 AM CDT -----  Type:  Same Day Appointment Request     Caller is requesting a same day appointment.  Caller declined first available appointment listed below.    Name of Caller:pt  When is the first available appointment?none  Symptoms:right side severe pain  Best Call Back Number:[phone]  Additional Information: pt states her daughter sees provider and she would like to see provider as well if possible today or tomorrow. Thank you      "  "

## 2024-07-18 NOTE — PROGRESS NOTES
[facility] Note    Chief Complaint      Chief Complaint   Patient presents with    [facility]       History of Present Illness      Joy Donahue is a 83 y.o. female who presents today for   Chief Complaint   Patient presents with    [facility]         Patient is new to me.  She presents to clinic today to [facility] primary care and for her routine medical exam.  Patient reports history of hepatitis-C through blood transfusion.  She completed treatment for hepatitis-C 2 years ago.  Platelets remain low. ( Look in media section).  She is seeing Dr. Foster for hepatology follow up.  Bilirubin is elevated the patient says she has decreased over.  She also reports getting infusion of platelets last month.  She is active daily.  She travels to her different work areas.  She ends concession stands in different airports.  There are no other complaints at this time.  She reports feeling well today.               Family History:  family history includes Clotting disorder in her sister; Drug abuse in her daughter; Heart disease in her daughter, father, and mother; Heart failure in her brother; Hypertension in her father; No Known Problems in her maternal grandfather, maternal grandmother, paternal grandfather, paternal grandmother, and son; Pacemaker/defibrilator in her brother and father.   Family history was reviewed with patient.     Medications:  Outpatient Encounter Medications as of 8/2/2024   Medication Sig Note Dispense Refill    lactulose (CHRONULAC) 10 gram/15 mL solution Take 30 mLs (20 g total) by mouth daily as needed.  1892 mL 11    rifAXIMin (XIFAXAN) 550 mg Tab Take 1 tablet (550 mg total) by mouth 2 (two) times daily.  180 tablet 3    spironolactone (ALDACTONE) 25 MG tablet Take 25 mg by mouth once daily.       sucralfate (CARAFATE) 1 gram tablet        [DISCONTINUED] XARELTO 10 mg Tab        [DISCONTINUED] albuterol (PROVENTIL/VENTOLIN HFA) 90 mcg/actuation inhaler Inhale into  the lungs. (Patient not taking: Reported on 5/8/2024) 6/30/2023: PRN      [DISCONTINUED] aluminum-magnesium hydroxide-simethicone (MAALOX) 200-200-20 mg/5 mL Susp Take 30 mLs by mouth 4 (four) times daily before meals and nightly. for 12 days (Patient not taking: Reported on 8/2/2024)  200 mL 0    [DISCONTINUED] azithromycin (Z-VINCE) 250 MG tablet Take 2 tablets by mouth on day 1; Take 1 tablet by mouth on days 2-5 (Patient not taking: Reported on 6/10/2024)  6 tablet 0    [DISCONTINUED] diphenhydrAMINE (BENADRYL) 50 MG capsule Take 50mg by mouth 1 hour before contrast administration. (Patient not taking: Reported on 8/1/2024)  1 capsule 0    [DISCONTINUED] donepeziL (ARICEPT) 5 MG tablet Take 1 tablet (5 mg total) by mouth every evening. (Patient not taking: Reported on 5/8/2024)  90 tablet 3    [DISCONTINUED] hyoscyamine (LEVSIN/SL) 0.125 mg Subl Place 1 tablet (0.125 mg total) under the tongue 3 (three) times daily. for 5 days (Patient not taking: Reported on 8/2/2024)  15 tablet 0    [DISCONTINUED] L.acid/B.animalis,bifidum/FOS (PROBIOTIC COMPLEX ORAL) Take by mouth. SYMBIOTIC (Patient not taking: Reported on 6/10/2024)       [DISCONTINUED] lactulose (CHRONULAC) 10 gram/15 mL solution Take 20 g by mouth daily as needed. (Patient not taking: Reported on 6/10/2024)       [DISCONTINUED] levocetirizine (XYZAL) 5 MG tablet Take 1 tablet (5 mg total) by mouth every evening. May substitute for Zyrtec 10 mg daily if Xyzal is not affordable. for 10 days (Patient not taking: Reported on 8/2/2024)  10 tablet 0    [DISCONTINUED] meloxicam (MOBIC) 15 MG tablet  (Patient not taking: Reported on 5/8/2024)       [DISCONTINUED] mupirocin (BACTROBAN) 2 % ointment Apply topically 2 (two) times daily. (Patient not taking: Reported on 5/8/2024)  30 g 2    [DISCONTINUED] nadoloL (CORGARD) 20 MG tablet Take 20 mg by mouth. (Patient not taking: Reported on 5/8/2024)       [DISCONTINUED] nadoloL (CORGARD) 40 MG tablet 40 mg. (Patient not  "taking: Reported on 8/2/2024)       [DISCONTINUED] ondansetron (ZOFRAN) 4 MG tablet Take 1 tablet (4 mg total) by mouth every 8 (eight) hours as needed for Nausea. (Patient not taking: Reported on 5/8/2024)  15 tablet 0    [DISCONTINUED] predniSONE (DELTASONE) 50 MG Tab Take 50mg by mouth at 13 hours, 7 hours, and 1 hour before contrast administration. (Patient not taking: Reported on 8/1/2024)  3 tablet 0    [DISCONTINUED] promethazine-dextromethorphan (PROMETHAZINE-DM) 6.25-15 mg/5 mL Syrp Take 5 mLs by mouth nightly as needed (cough). (Patient not taking: Reported on 5/8/2024)  118 mL 0     No facility-administered encounter medications on file as of 8/2/2024.       Allergies:  Review of patient's allergies indicates:   Allergen Reactions    Ciprofloxacin Swelling    Iodine Shortness Of Breath    Latex Itching    Fluoride      Mouth sores    Fluoride preparations Dermatitis     Other reaction(s): Ulcer of mouth       Health Maintenance:  Health Maintenance   Topic Date Due    Shingles Vaccine (1 of 2) Never done    DEXA Scan  05/11/2026 (Originally 7/5/2019)    TETANUS VACCINE  12/03/2026    Lipid Panel  07/30/2029     Health Maintenance Topics with due status: Not Due       Topic Last Completion Date    TETANUS VACCINE 12/03/2016    Influenza Vaccine 10/06/2021    Lipid Panel 07/30/2024       Physical Exam      Vital Signs  Pulse: 91  SpO2: 99 %  BP: 124/70  BP Location: Right arm  Patient Position: Sitting  Pain Score: 0-No pain  Height and Weight  Height: 5' 5" (165.1 cm)  Weight: 75.3 kg (166 lb 0.1 oz)  BSA (Calculated - sq m): 1.86 sq meters  BMI (Calculated): 27.6  Weight in (lb) to have BMI = 25: 149.9]    Physical Exam       Assessment/Plan     Joy Donahue is a 83 y.o.female with:    Routine medical exam  -     T4, Free; Future; Expected date: 08/02/2024  -     TSH; Future; Expected date: 08/02/2024    Thrombocytopenia, unspecified    Fatigue, unspecified type  -     T4, Free; Future; Expected " date: 08/02/2024  -     TSH; Future; Expected date: 08/02/2024    Pre-diabetes    Pacemaker    Urinary tract infection without hematuria, site unspecified  -     Urinalysis, Reflex to Urine Culture Urine, Clean Catch    Acute hepatitis C virus infection without hepatic coma        As above, continue current medications and maintain follow up with specialists.  Return to clinic as needed.    Greater than 50% of visit was spent face to face with patient.  All questions were answered to patient's satisfaction.          Miya Silverio, NP-C  [facility]

## 2024-07-23 NOTE — PROGRESS NOTES
Date: 08/01/2024  Patient ID: [id number]   Chief Complaint: Medicare AWV    HPI:   Joy Donahue is a 83 y.o. female here today for a Medicare Wellness.     Opioid Screening: Patient medication list reviewed, patient is not taking prescription opioids. Patient is not using additional opioids than prescribed. Patient is at low risk of substance abuse based on this opioid use history.     Otherwise, patient reports to be doing well.  Since last visit patient went to the ED for abdominal pain where she was treated for UTI with Levsin and Maalox.  However, UA only showed 1+ leukocytes.  Cultures were not obtained.  Prior urine culture showed Enterococcus faecalis.  CT abdomen was also obtained and showed R nonobstructing nephrolithiasis, cirrhotic liver, portal hypertension with splenomegaly and small volume free intraperitoneal fluid and mesenteric edema, aortoiliac atherosclerotic cysts, small sliding hiatal hernia, colonic diverticulosis without acute inflammatory changes, DJD lumbosacral junction.  She was discharged with Augmentin and Bentyl.  She does f/u with hepatologist for cirrhosis.     Labs showed decreased albumin, Hb/Hct, NL cortisol/vitD/TFT. No growth on urine cx  DEXA scan    Diet: eats low cholesterol diet  Activity level: stays active; drives alot  Cognition: answering questions appropriately and following conversation without issues. No sign of cognitive decline during this exam  MMG: no longer   Pap: no longer  DEXA: postponed  CRC: no longer      Patient Active Problem List   Diagnosis    Thrombocytopenia, unspecified    Cognitive decline    Wellness examination    Secondary esophageal varices without bleeding    Hypertensive heart disease with heart failure    Angina pectoris, unspecified    Atrial fibrillation, unspecified type    Cirrhosis of liver with ascites    Urinary hesitancy    Chronic pain of left ankle    Primary malignant neoplasm of skin of ankle    Fatigue    Leukopenia      Outpatient Medications Marked as Taking for the 8/1/24 encounter (Office Visit) with Monae Nazario MD   Medication Sig Dispense Refill    nadoloL (CORGARD) 40 MG tablet 40 mg.      rifAXIMin (XIFAXAN) 550 mg Tab Take 1 tablet (550 mg total) by mouth 2 (two) times daily. 180 tablet 3    spironolactone (ALDACTONE) 25 MG tablet Take 25 mg by mouth once daily.       Past Medical History:   Diagnosis Date    Acute (reversible) ischemia of intestine, part and extent unspecified 08/24/2022    Acute cystitis without hematuria 08/14/2023    Cirrhosis of liver with ascites 02/14/2023    Continue to follow up with hepatologist    COVID     x 2-residual dysgeusia    Hepatitis C     treated/cured previously    Pacemaker     Paroxysmal atrial fibrillation     Pericardial cyst     Skin cancer     Thrombus      Past Surgical History:   Procedure Laterality Date    APPENDECTOMY      CARDIAC PACEMAKER PLACEMENT      cataract surgery      FOOT SURGERY Left     HYSTERECTOMY      LAPAROSCOPIC CHOLECYSTECTOMY      parathyroid resection      REPEAT ABDOMINAL PARACENTESIS      SKIN CANCER EXCISION      spot removed Left     left ankle spot removed    THROMBECTOMY       Review of patient's allergies indicates:   Allergen Reactions    Ciprofloxacin Swelling    Iodine Shortness Of Breath    Latex Itching    Fluoride      Mouth sores    Fluoride preparations Dermatitis     Other reaction(s): Ulcer of mouth     Family History   Problem Relation Name Age of Onset    No Known Problems Mother      No Known Problems Father      No Known Problems Sister      No Known Problems Brother        Social History     Socioeconomic History    Marital status:    Tobacco Use    Smoking status: Never    Smokeless tobacco: Never   Substance and Sexual Activity    Alcohol use: Not Currently    Drug use: Never    Sexual activity: Not Currently     Social Determinants of Health     Financial Resource Strain: Low Risk  (8/1/2024)    Overall Financial  Resource Strain (CARDIA)     Difficulty of Paying Living Expenses: Not hard at all   Food Insecurity: No Food Insecurity (8/1/2024)    Hunger Vital Sign     Worried About Running Out of Food in the Last Year: Never true     Ran Out of Food in the Last Year: Never true   Transportation Needs: No Transportation Needs (8/1/2024)    TRANSPORTATION NEEDS     Transportation : No   Physical Activity: Sufficiently Active (8/1/2024)    Exercise Vital Sign     Days of Exercise per Week: 7 days     Minutes of Exercise per Session: 60 min   Stress: No Stress Concern Present (8/1/2024)    [place] [employer] [employer] - Occupational Stress Questionnaire     Feeling of Stress : Not at all   Housing Stability: Low Risk  (8/1/2024)    Housing Stability Vital Sign     Unable to Pay for Housing in the Last Year: No     Homeless in the Last Year: No     Patient Care Team:  Monae Nazario MD as PCP - General (Family Medicine)  Leida Beckman LPN as Care Coordinator  Valentino, Vernon A., MD as Consulting Physician (Cardiology)   Subjective:   Review of Systems   Constitutional:  Negative for fever and weight loss.   HENT:  Negative for congestion, hearing loss and sore throat.    Eyes:  Negative for blurred vision and double vision.   Respiratory:  Negative for cough and shortness of breath.    Cardiovascular:  Negative for chest pain and palpitations.   Gastrointestinal:  Negative for abdominal pain and diarrhea.   Genitourinary:  Negative for dysuria, frequency, hematuria and urgency.   Musculoskeletal:  Negative for falls.   Skin:  Negative for rash.   Psychiatric/Behavioral:  Negative for depression and memory loss. The patient is not nervous/anxious and does not have insomnia.      See HPI for details  All Other ROS: Negative except as stated in HPI  Patient Reported Health Risk Assessment  What is your age?: 80 or older  Are you male or female?: Female  During the past four weeks, how much have you been bothered  by emotional problems such as feeling anxious, depressed, irritable, sad, or downhearted and blue?: Not at all  During the past five weeks, has your physical and/or emotional health limited your social activities with family, friends, neighbors, or groups?: Not at all  During the past four weeks, how much bodily pain have you generally had?: Mild pain  During the past four weeks, was someone available to help if you needed and wanted help?: Yes, as much as I wanted  During the past four weeks, what was the hardest physical activity you could do for at least two minutes?: Moderate  Can you get to places out of walking distance without help?  (For example, can you travel alone on buses or taxis, or drive your own car?): Yes  Can you go shopping for groceries or clothes without someone's help?: Yes  Can you prepare your own meals?: Yes  Can you do your own housework without help?: Yes  Because of any health problems, do you need the help of another person with your personal care needs such as eating, bathing, dressing, or getting around the house?: No  Can you handle your own money without help?: Yes  During the past four weeks, how would you rate your health in general?: Good  How have things been going for you during the past four weeks?: Pretty well  Are you having difficulties driving your car?: No  Do you always fasten your seat belt when you are in a car?: Yes, usually  How often in the past four weeks have you been bothered by falling or dizzy when standing up?: Never  How often in the past four weeks have you been bothered by sexual problems?: Never  How often in the past four weeks have you been bothered by trouble eating well?: Never  How often in the past four weeks have you been bothered by teeth or denture problems?: Never  How often in the past four weeks have you been bothered with problems using the telephone?: Never  How often in the past four weeks have you been bothered by tiredness or fatigue?:  "Never  Have you fallen two or more times in the past year?: No  Are you afraid of falling?: No  Are you a smoker?: No  During the past four weeks, how many drinks of wine, beer, or other alcoholic beverages did you have?: No alcohol at all  Do you exercise for about 20 minutes three or more days a week?: Yes, most of the time  Have you been given any information to help you with hazards in your house that might hurt you?: Yes  Have you been given any information to help you with keeping track of your medications?: Yes  How often do you have trouble taking medicines the way you've been told to take them?: I always take them as prescribed  How confident are you that you can control and manage most of your health problems?: Very confident  What is your race? (Check all that apply.):   Objective:   /62   Pulse 100   Ht 5' 5" (1.651 m)   Wt 75.3 kg (165 lb 14.4 oz)   SpO2 97%   BMI 27.61 kg/m²   Physical Exam  Vitals reviewed.   Constitutional:       Appearance: Normal appearance.   HENT:      Head: Normocephalic and atraumatic.      Right Ear: Tympanic membrane, ear canal and external ear normal.      Left Ear: Tympanic membrane, ear canal and external ear normal.      Nose: Nose normal. No congestion or rhinorrhea.      Mouth/Throat:      Mouth: Mucous membranes are moist.      Pharynx: Oropharynx is clear.   Eyes:      General: No scleral icterus.     Extraocular Movements: Extraocular movements intact.      Conjunctiva/sclera: Conjunctivae normal.   Cardiovascular:      Rate and Rhythm: Normal rate. Rhythm irregular.      Pulses: Normal pulses.      Heart sounds: Normal heart sounds.   Pulmonary:      Effort: Pulmonary effort is normal.      Breath sounds: Normal breath sounds.   Abdominal:      General: Abdomen is flat. Bowel sounds are normal.      Palpations: Abdomen is soft.      Tenderness: There is no abdominal tenderness.   Musculoskeletal:         General: No swelling or deformity. Normal " range of motion.      Cervical back: Normal range of motion and neck supple.   Skin:     General: Skin is warm and dry.   Neurological:      Mental Status: She is alert and oriented to person, place, and time.   Psychiatric:         Mood and Affect: Mood normal.         Behavior: Behavior normal.         Thought Content: Thought content normal.         Judgment: Judgment normal.            No data to display                  8/1/2024    10:15 AM 8/14/2023     1:30 PM 5/24/2023     9:30 AM 4/10/2023    10:00 AM 2/14/2023     1:00 PM 10/10/2022     2:30 PM 8/24/2022     2:30 PM   Fall Risk Assessment - Outpatient   Mobility Status Ambulatory Ambulatory Ambulatory Ambulatory Ambulatory Ambulatory Ambulatory   Number of falls 0 0 0 0 0 0 0   Identified as fall risk False False False False False False False           Depression Screening  Over the past two weeks, has the patient felt down, depressed, or hopeless?: No  Over the past two weeks, has the patient felt little interest or pleasure in doing things?: No  Functional Ability/Safety Screening  Was the patient's timed Up & Go test unsteady or longer than 30 seconds?: No  Does the patient need help with phone, transportation, shopping, preparing meals, housework, laundry, meds, or managing money?: No  Does the patient's home have rugs in the hallway, lack grab bars in the bathroom, lack handrails on the stairs or have poor lighting?: No  Have you noticed any hearing difficulties?: No  Cognitive Function (Assessed through direct observation with due consideration of information obtained by way of patient reports and/or concerns raised by family, friends, caretakers, or others)    Does the patient repeat questions/statements in the same day?: No  Does the patient have trouble remembering the date, year, and time?: No  Does the patient have difficulty managing finances?: No  Does the patient have a decreased sense of direction?: No  Assessment:       ICD-10-CM ICD-9-CM    1. Wellness examination  Z00.00 V70.0   2. Cirrhosis of liver with ascites, unspecified hepatic cirrhosis type  K74.60 571.5    R18.8    3. Atrial fibrillation, unspecified type  I48.91 427.31   4. Hypertensive heart disease with heart failure  I11.0 402.91     428.9   5. Leukopenia, unspecified type  D72.819 288.50   6. Thrombocytopenia, unspecified  D69.6 287.5      Plan:   1. Wellness examination  Assessment & Plan:  Provided Joy Donahue with a 5-10 year written screening schedule and personal prevention plan. Recommendations were developed using the USPSTF age appropriate recommendations. Education, counseling, and referrals were provided as needed. After Visit Summary printed and given to patient which includes a list of additional screenings\tests needed.        2. Cirrhosis of liver with ascites, unspecified hepatic cirrhosis type  Overview:  H/o of hepC from transfusion  Continue to follow up with hepatologist    Orders:  -     lactulose (CHRONULAC) 10 gram/15 mL solution; Take 30 mLs (20 g total) by mouth daily as needed.  Dispense: 1892 mL; Refill: 11  -     Comprehensive Metabolic Panel; Future; Expected date: 08/01/2025  -     Lipid Panel; Future; Expected date: 08/01/2025    3. Atrial fibrillation, unspecified type  Assessment & Plan:  Not on blood thinner  Pt to f/u with cardiology     Orders:  -     CBC Auto Differential; Future; Expected date: 08/01/2025  -     Lipid Panel; Future; Expected date: 08/01/2025    4. Hypertensive heart disease with heart failure  Assessment & Plan:  Stable  Continue management per cardiology       Orders:  -     CBC Auto Differential; Future; Expected date: 08/01/2025  -     Comprehensive Metabolic Panel; Future; Expected date: 08/01/2025  -     Lipid Panel; Future; Expected date: 08/01/2025    5. Leukopenia, unspecified type  Assessment & Plan:  Stable  Continue to monitor      6. Thrombocytopenia, unspecified  Overview:  Continue to monitor CBC at routine  intervals           Medicare Annual Wellness and Personalized Prevention Plan:   Fall Risk + Home Safety + Hearing Impairment + Depression Screen + Opioid and Substance Abuse Screening + Cognitive Impairment Screen + Health Risk Assessment all reviewed.     Health Maintenance Topics with due status: Not Due       Topic Last Completion Date    TETANUS VACCINE 12/03/2016    Influenza Vaccine 10/06/2021    Lipid Panel 07/30/2024      The patient's Health Maintenance was reviewed and the following appears to be due at this time:   Health Maintenance Due   Topic Date Due    Shingles Vaccine (1 of 2) Never done    RSV Vaccine (Age 60+ and Pregnant patients) (1 - 1-dose 60+ series) Never done    COVID-19 Vaccine (3 - Pfizer risk series) 04/24/2021    Pneumococcal Vaccines (Age 65+) (2 of 2 - PCV) 10/06/2022       Advance Care Planning     Date: 07/23/2024    Living Will  During this visit, I engaged the patient  in the voluntary advance care planning process.  The patient and I reviewed the role for advance directives and their purpose in directing future healthcare if the patient's unable to speak for him/herself.  At this point in time, the patient does have full decision-making capacity.  We discussed different extreme health states that she could experience, and reviewed what kind of medical care she would want in those situations. Has one at home           Follow up in about 1 year (around 8/1/2025) for Medicare Wellness. In addition to their scheduled follow up, the patient has also been instructed to follow up on as needed basis.

## 2024-07-23 NOTE — ASSESSMENT & PLAN NOTE
Provided Joy Donahue with a 5-10 year written screening schedule and personal prevention plan. Recommendations were developed using the USPSTF age appropriate recommendations. Education, counseling, and referrals were provided as needed. After Visit Summary printed and given to patient which includes a list of additional screenings\tests needed.

## 2024-07-25 DIAGNOSIS — Z00.00 WELLNESS EXAMINATION: Primary | ICD-10-CM

## 2024-07-25 DIAGNOSIS — I11.0 HYPERTENSIVE HEART DISEASE WITH HEART FAILURE: ICD-10-CM

## 2024-07-30 ENCOUNTER — LAB VISIT (OUTPATIENT)
Dept: LAB | Facility: HOSPITAL | Age: 84
End: 2024-07-30
Attending: STUDENT IN AN ORGANIZED HEALTH CARE EDUCATION/TRAINING PROGRAM
Payer: MEDICARE

## 2024-07-30 DIAGNOSIS — Z13.220 SCREENING FOR LIPOID DISORDERS: ICD-10-CM

## 2024-07-30 DIAGNOSIS — K74.60 HEPATIC CIRRHOSIS, UNSPECIFIED HEPATIC CIRRHOSIS TYPE, UNSPECIFIED WHETHER ASCITES PRESENT: Primary | ICD-10-CM

## 2024-07-30 DIAGNOSIS — D69.6 THROMBOCYTOPENIA, UNSPECIFIED: ICD-10-CM

## 2024-07-30 LAB
ALBUMIN SERPL-MCNC: 3.5 G/DL (ref 3.4–4.8)
ALBUMIN/GLOB SERPL: 1.1 RATIO (ref 1.1–2)
ALP SERPL-CCNC: 105 UNIT/L (ref 40–150)
ALT SERPL-CCNC: 22 UNIT/L (ref 0–55)
ANION GAP SERPL CALC-SCNC: 3 MEQ/L
AST SERPL-CCNC: 37 UNIT/L (ref 5–34)
BASOPHILS # BLD AUTO: 0.02 X10(3)/MCL
BASOPHILS NFR BLD AUTO: 0.5 %
BILIRUB SERPL-MCNC: 2.1 MG/DL
BUN SERPL-MCNC: 14.5 MG/DL (ref 9.8–20.1)
CALCIUM SERPL-MCNC: 9.7 MG/DL (ref 8.4–10.2)
CHLORIDE SERPL-SCNC: 108 MMOL/L (ref 98–107)
CHOLEST SERPL-MCNC: 227 MG/DL
CHOLEST/HDLC SERPL: 3 {RATIO} (ref 0–5)
CO2 SERPL-SCNC: 29 MMOL/L (ref 23–31)
CREAT SERPL-MCNC: 0.92 MG/DL (ref 0.55–1.02)
CREAT/UREA NIT SERPL: 16
EOSINOPHIL # BLD AUTO: 0.05 X10(3)/MCL (ref 0–0.9)
EOSINOPHIL NFR BLD AUTO: 1.2 %
ERYTHROCYTE [DISTWIDTH] IN BLOOD BY AUTOMATED COUNT: 14.6 % (ref 11.5–17)
GFR SERPLBLD CREATININE-BSD FMLA CKD-EPI: >60 ML/MIN/1.73/M2
GLOBULIN SER-MCNC: 3.1 GM/DL (ref 2.4–3.5)
GLUCOSE SERPL-MCNC: 95 MG/DL (ref 82–115)
HCT VFR BLD AUTO: 41 % (ref 37–47)
HDLC SERPL-MCNC: 76 MG/DL (ref 35–60)
HGB BLD-MCNC: 12.5 G/DL (ref 12–16)
IMM GRANULOCYTES # BLD AUTO: 0.02 X10(3)/MCL (ref 0–0.04)
IMM GRANULOCYTES NFR BLD AUTO: 0.5 %
INR PPP: 1.2
LDLC SERPL CALC-MCNC: 139 MG/DL (ref 50–140)
LYMPHOCYTES # BLD AUTO: 0.91 X10(3)/MCL (ref 0.6–4.6)
LYMPHOCYTES NFR BLD AUTO: 21.2 %
MCH RBC QN AUTO: 28.9 PG (ref 27–31)
MCHC RBC AUTO-ENTMCNC: 30.5 G/DL (ref 33–36)
MCV RBC AUTO: 94.7 FL (ref 80–94)
MONOCYTES # BLD AUTO: 0.62 X10(3)/MCL (ref 0.1–1.3)
MONOCYTES NFR BLD AUTO: 14.5 %
NEUTROPHILS # BLD AUTO: 2.67 X10(3)/MCL (ref 2.1–9.2)
NEUTROPHILS NFR BLD AUTO: 62.1 %
NRBC BLD AUTO-RTO: 0 %
PLATELET # BLD AUTO: 60 X10(3)/MCL (ref 130–400)
PLATELETS.RETICULATED NFR BLD AUTO: 6.4 % (ref 0.9–11.2)
PMV BLD AUTO: 10.6 FL (ref 7.4–10.4)
POTASSIUM SERPL-SCNC: 4.2 MMOL/L (ref 3.5–5.1)
PROT SERPL-MCNC: 6.6 GM/DL (ref 5.8–7.6)
PROTHROMBIN TIME: 14.9 SECONDS (ref 12.5–14.5)
RBC # BLD AUTO: 4.33 X10(6)/MCL (ref 4.2–5.4)
SODIUM SERPL-SCNC: 140 MMOL/L (ref 136–145)
TRIGL SERPL-MCNC: 61 MG/DL (ref 37–140)
VLDLC SERPL CALC-MCNC: 12 MG/DL
WBC # BLD AUTO: 4.29 X10(3)/MCL (ref 4.5–11.5)

## 2024-07-30 PROCEDURE — 80053 COMPREHEN METABOLIC PANEL: CPT

## 2024-07-30 PROCEDURE — 36415 COLL VENOUS BLD VENIPUNCTURE: CPT

## 2024-07-30 PROCEDURE — 85025 COMPLETE CBC W/AUTO DIFF WBC: CPT

## 2024-07-30 PROCEDURE — 85610 PROTHROMBIN TIME: CPT

## 2024-07-30 PROCEDURE — 80061 LIPID PANEL: CPT | Mod: GA

## 2024-07-31 DIAGNOSIS — I48.91 ATRIAL FIBRILLATION, UNSPECIFIED TYPE: ICD-10-CM

## 2024-07-31 DIAGNOSIS — Z95.0 CARDIAC PACEMAKER IN SITU: Primary | ICD-10-CM

## 2024-08-01 ENCOUNTER — OFFICE VISIT (OUTPATIENT)
Dept: PRIMARY CARE CLINIC | Facility: CLINIC | Age: 84
End: 2024-08-01
Payer: MEDICARE

## 2024-08-01 VITALS
DIASTOLIC BLOOD PRESSURE: 62 MMHG | HEART RATE: 100 BPM | BODY MASS INDEX: 27.64 KG/M2 | OXYGEN SATURATION: 97 % | SYSTOLIC BLOOD PRESSURE: 124 MMHG | HEIGHT: 65 IN | WEIGHT: 165.88 LBS

## 2024-08-01 DIAGNOSIS — R18.8 CIRRHOSIS OF LIVER WITH ASCITES, UNSPECIFIED HEPATIC CIRRHOSIS TYPE: ICD-10-CM

## 2024-08-01 DIAGNOSIS — D72.819 LEUKOPENIA, UNSPECIFIED TYPE: ICD-10-CM

## 2024-08-01 DIAGNOSIS — Z00.00 WELLNESS EXAMINATION: Primary | ICD-10-CM

## 2024-08-01 DIAGNOSIS — I48.91 ATRIAL FIBRILLATION, UNSPECIFIED TYPE: ICD-10-CM

## 2024-08-01 DIAGNOSIS — K74.60 CIRRHOSIS OF LIVER WITH ASCITES, UNSPECIFIED HEPATIC CIRRHOSIS TYPE: ICD-10-CM

## 2024-08-01 DIAGNOSIS — I11.0 HYPERTENSIVE HEART DISEASE WITH HEART FAILURE: ICD-10-CM

## 2024-08-01 DIAGNOSIS — D69.6 THROMBOCYTOPENIA, UNSPECIFIED: ICD-10-CM

## 2024-08-01 PROBLEM — K55.059 ACUTE (REVERSIBLE) ISCHEMIA OF INTESTINE, PART AND EXTENT UNSPECIFIED: Status: RESOLVED | Noted: 2022-08-24 | Resolved: 2024-08-01

## 2024-08-01 PROBLEM — N30.00 ACUTE CYSTITIS WITHOUT HEMATURIA: Status: RESOLVED | Noted: 2023-08-14 | Resolved: 2024-08-01

## 2024-08-01 RX ORDER — LACTULOSE 10 G/15ML
20 SOLUTION ORAL; RECTAL DAILY PRN
Qty: 1892 ML | Refills: 11 | Status: SHIPPED | OUTPATIENT
Start: 2024-08-01

## 2024-08-01 RX ORDER — SPIRONOLACTONE 25 MG/1
25 TABLET ORAL DAILY
COMMUNITY

## 2024-08-02 ENCOUNTER — OFFICE VISIT (OUTPATIENT)
Dept: PRIMARY CARE CLINIC | Facility: CLINIC | Age: 84
End: 2024-08-02
Payer: MEDICARE

## 2024-08-02 ENCOUNTER — LAB VISIT (OUTPATIENT)
Dept: LAB | Facility: HOSPITAL | Age: 84
End: 2024-08-02
Attending: NURSE PRACTITIONER
Payer: MEDICARE

## 2024-08-02 VITALS
DIASTOLIC BLOOD PRESSURE: 70 MMHG | HEART RATE: 91 BPM | SYSTOLIC BLOOD PRESSURE: 124 MMHG | WEIGHT: 166 LBS | OXYGEN SATURATION: 99 % | HEIGHT: 65 IN | BODY MASS INDEX: 27.66 KG/M2

## 2024-08-02 DIAGNOSIS — Z95.0 PACEMAKER: ICD-10-CM

## 2024-08-02 DIAGNOSIS — D69.6 THROMBOCYTOPENIA, UNSPECIFIED: ICD-10-CM

## 2024-08-02 DIAGNOSIS — R73.03 PRE-DIABETES: ICD-10-CM

## 2024-08-02 DIAGNOSIS — R53.83 FATIGUE, UNSPECIFIED TYPE: ICD-10-CM

## 2024-08-02 DIAGNOSIS — B17.10 ACUTE HEPATITIS C VIRUS INFECTION WITHOUT HEPATIC COMA: ICD-10-CM

## 2024-08-02 DIAGNOSIS — Z00.00 ROUTINE MEDICAL EXAM: Primary | ICD-10-CM

## 2024-08-02 DIAGNOSIS — N39.0 URINARY TRACT INFECTION WITHOUT HEMATURIA, SITE UNSPECIFIED: ICD-10-CM

## 2024-08-02 DIAGNOSIS — Z00.00 ROUTINE MEDICAL EXAM: ICD-10-CM

## 2024-08-02 LAB
BACTERIA #/AREA URNS AUTO: NORMAL /HPF
BILIRUB UR QL STRIP: NEGATIVE
CLARITY UR REFRACT.AUTO: CLEAR
COLOR UR AUTO: YELLOW
GLUCOSE UR QL STRIP: NEGATIVE
HGB UR QL STRIP: NEGATIVE
KETONES UR QL STRIP: NEGATIVE
LEUKOCYTE ESTERASE UR QL STRIP: ABNORMAL
MICROSCOPIC COMMENT: NORMAL
NITRITE UR QL STRIP: NEGATIVE
PH UR STRIP: 7 [PH] (ref 5–8)
PROT UR QL STRIP: NEGATIVE
RBC #/AREA URNS AUTO: 1 /HPF (ref 0–4)
SP GR UR STRIP: 1.01 (ref 1–1.03)
SQUAMOUS #/AREA URNS AUTO: 5 /HPF
T4 FREE SERPL-MCNC: 0.86 NG/DL (ref 0.71–1.51)
TSH SERPL DL<=0.005 MIU/L-ACNC: 1.65 UIU/ML (ref 0.4–4)
URN SPEC COLLECT METH UR: ABNORMAL
WBC #/AREA URNS AUTO: 4 /HPF (ref 0–5)

## 2024-08-02 PROCEDURE — 84443 ASSAY THYROID STIM HORMONE: CPT | Performed by: NURSE PRACTITIONER

## 2024-08-02 PROCEDURE — 81001 URINALYSIS AUTO W/SCOPE: CPT | Performed by: NURSE PRACTITIONER

## 2024-08-02 PROCEDURE — 36415 COLL VENOUS BLD VENIPUNCTURE: CPT | Mod: PN | Performed by: NURSE PRACTITIONER

## 2024-08-02 PROCEDURE — 99999 PR PBB SHADOW E&M-EST. PATIENT-LVL IV: CPT | Mod: PBBFAC,,, | Performed by: NURSE PRACTITIONER

## 2024-08-02 PROCEDURE — 84439 ASSAY OF FREE THYROXINE: CPT | Performed by: NURSE PRACTITIONER

## 2024-08-05 ENCOUNTER — PATIENT MESSAGE (OUTPATIENT)
Dept: PRIMARY CARE CLINIC | Facility: CLINIC | Age: 84
End: 2024-08-05
Payer: MEDICARE

## 2024-08-06 ENCOUNTER — CLINICAL SUPPORT (OUTPATIENT)
Dept: CARDIOLOGY | Facility: HOSPITAL | Age: 84
End: 2024-08-06
Attending: INTERNAL MEDICINE
Payer: MEDICARE

## 2024-08-06 ENCOUNTER — HOSPITAL ENCOUNTER (OUTPATIENT)
Dept: CARDIOLOGY | Facility: CLINIC | Age: 84
Discharge: HOME | End: 2024-08-06
Payer: MEDICARE

## 2024-08-06 ENCOUNTER — OFFICE VISIT (OUTPATIENT)
Dept: ELECTROPHYSIOLOGY | Facility: CLINIC | Age: 84
End: 2024-08-06
Payer: MEDICARE

## 2024-08-06 VITALS — BODY MASS INDEX: 27.73 KG/M2 | HEART RATE: 83 BPM | WEIGHT: 166.44 LBS | HEIGHT: 65 IN

## 2024-08-06 DIAGNOSIS — Z95.0 CARDIAC PACEMAKER IN SITU: ICD-10-CM

## 2024-08-06 DIAGNOSIS — I48.91 ATRIAL FIBRILLATION, UNSPECIFIED TYPE: ICD-10-CM

## 2024-08-06 DIAGNOSIS — I48.91 ATRIAL FIBRILLATION, UNSPECIFIED TYPE: Primary | ICD-10-CM

## 2024-08-06 DIAGNOSIS — Z95.0 PACEMAKER: ICD-10-CM

## 2024-08-06 LAB
OHS QRS DURATION: 68 MS
OHS QTC CALCULATION: 444 MS

## 2024-08-06 PROCEDURE — 3288F FALL RISK ASSESSMENT DOCD: CPT | Mod: CPTII,S$GLB,, | Performed by: INTERNAL MEDICINE

## 2024-08-06 PROCEDURE — 93010 ELECTROCARDIOGRAM REPORT: CPT | Mod: S$GLB,,, | Performed by: INTERNAL MEDICINE

## 2024-08-06 PROCEDURE — 1125F AMNT PAIN NOTED PAIN PRSNT: CPT | Mod: CPTII,S$GLB,, | Performed by: INTERNAL MEDICINE

## 2024-08-06 PROCEDURE — 1101F PT FALLS ASSESS-DOCD LE1/YR: CPT | Mod: CPTII,S$GLB,, | Performed by: INTERNAL MEDICINE

## 2024-08-06 PROCEDURE — 99999 PR PBB SHADOW E&M-EST. PATIENT-LVL III: CPT | Mod: PBBFAC,,, | Performed by: INTERNAL MEDICINE

## 2024-08-06 PROCEDURE — 93280 PM DEVICE PROGR EVAL DUAL: CPT

## 2024-08-06 PROCEDURE — 99204 OFFICE O/P NEW MOD 45 MIN: CPT | Mod: S$GLB,,, | Performed by: INTERNAL MEDICINE

## 2024-08-06 PROCEDURE — 1159F MED LIST DOCD IN RCRD: CPT | Mod: CPTII,S$GLB,, | Performed by: INTERNAL MEDICINE

## 2024-08-06 PROCEDURE — 1160F RVW MEDS BY RX/DR IN RCRD: CPT | Mod: CPTII,S$GLB,, | Performed by: INTERNAL MEDICINE

## 2024-08-06 PROCEDURE — 93005 ELECTROCARDIOGRAM TRACING: CPT | Mod: S$GLB,,, | Performed by: INTERNAL MEDICINE

## 2024-09-03 ENCOUNTER — CLINICAL SUPPORT (OUTPATIENT)
Dept: CARDIOLOGY | Facility: HOSPITAL | Age: 84
End: 2024-09-03
Attending: INTERNAL MEDICINE
Payer: MEDICARE

## 2024-09-03 DIAGNOSIS — Z95.0 CARDIAC PACEMAKER IN SITU: ICD-10-CM

## 2024-09-03 DIAGNOSIS — I48.91 ATRIAL FIBRILLATION, UNSPECIFIED TYPE: ICD-10-CM

## 2024-09-03 PROCEDURE — 93280 PM DEVICE PROGR EVAL DUAL: CPT

## 2024-09-23 ENCOUNTER — CLINICAL SUPPORT (OUTPATIENT)
Dept: CARDIOLOGY | Facility: HOSPITAL | Age: 84
End: 2024-09-23
Attending: INTERNAL MEDICINE
Payer: MEDICARE

## 2024-09-23 DIAGNOSIS — I48.91 ATRIAL FIBRILLATION, UNSPECIFIED TYPE: ICD-10-CM

## 2024-09-23 DIAGNOSIS — Z95.0 CARDIAC PACEMAKER IN SITU: ICD-10-CM

## 2024-09-23 PROCEDURE — 93280 PM DEVICE PROGR EVAL DUAL: CPT | Mod: 26,,, | Performed by: INTERNAL MEDICINE

## 2024-09-23 PROCEDURE — 93280 PM DEVICE PROGR EVAL DUAL: CPT

## 2024-09-27 LAB
OHS CV AF BURDEN PERCENT: < 1
OHS CV DC REMOTE DEVICE TYPE: NORMAL
OHS CV RV PACING PERCENT: 1.3 %

## 2024-10-10 ENCOUNTER — TELEPHONE (OUTPATIENT)
Dept: CARDIOLOGY | Facility: HOSPITAL | Age: 84
End: 2024-10-10
Payer: MEDICARE

## 2024-10-10 ENCOUNTER — LAB VISIT (OUTPATIENT)
Dept: LAB | Facility: HOSPITAL | Age: 84
End: 2024-10-10
Attending: STUDENT IN AN ORGANIZED HEALTH CARE EDUCATION/TRAINING PROGRAM
Payer: MEDICARE

## 2024-10-10 ENCOUNTER — OFFICE VISIT (OUTPATIENT)
Dept: HEMATOLOGY/ONCOLOGY | Facility: CLINIC | Age: 84
End: 2024-10-10
Payer: MEDICARE

## 2024-10-10 VITALS
HEART RATE: 92 BPM | BODY MASS INDEX: 30.01 KG/M2 | RESPIRATION RATE: 18 BRPM | HEIGHT: 65 IN | TEMPERATURE: 98 F | DIASTOLIC BLOOD PRESSURE: 76 MMHG | SYSTOLIC BLOOD PRESSURE: 137 MMHG | OXYGEN SATURATION: 99 % | WEIGHT: 180.13 LBS

## 2024-10-10 DIAGNOSIS — D69.6 THROMBOCYTOPENIA: Primary | ICD-10-CM

## 2024-10-10 DIAGNOSIS — D69.6 THROMBOCYTOPENIA: ICD-10-CM

## 2024-10-10 DIAGNOSIS — B18.2 CHRONIC HEPATITIS C WITHOUT HEPATIC COMA: ICD-10-CM

## 2024-10-10 DIAGNOSIS — K70.31 ALCOHOLIC CIRRHOSIS OF LIVER WITH ASCITES: ICD-10-CM

## 2024-10-10 DIAGNOSIS — D70.8 OTHER NEUTROPENIA: ICD-10-CM

## 2024-10-10 LAB
ALBUMIN SERPL-MCNC: 3.3 G/DL (ref 3.4–4.8)
ALBUMIN/GLOB SERPL: 1.1 RATIO (ref 1.1–2)
ALP SERPL-CCNC: 86 UNIT/L (ref 40–150)
ALT SERPL-CCNC: 21 UNIT/L (ref 0–55)
ANION GAP SERPL CALC-SCNC: 6 MEQ/L
AST SERPL-CCNC: 39 UNIT/L (ref 5–34)
BASOPHILS # BLD AUTO: 0.01 X10(3)/MCL
BASOPHILS NFR BLD AUTO: 0.4 %
BILIRUB SERPL-MCNC: 1.8 MG/DL
BUN SERPL-MCNC: 10.2 MG/DL (ref 9.8–20.1)
CALCIUM SERPL-MCNC: 9.3 MG/DL (ref 8.4–10.2)
CHLORIDE SERPL-SCNC: 109 MMOL/L (ref 98–107)
CO2 SERPL-SCNC: 27 MMOL/L (ref 23–31)
CREAT SERPL-MCNC: 0.83 MG/DL (ref 0.55–1.02)
CREAT/UREA NIT SERPL: 12
EOSINOPHIL # BLD AUTO: 0.03 X10(3)/MCL (ref 0–0.9)
EOSINOPHIL NFR BLD AUTO: 1.3 %
ERYTHROCYTE [DISTWIDTH] IN BLOOD BY AUTOMATED COUNT: 15.9 % (ref 11.5–17)
GFR SERPLBLD CREATININE-BSD FMLA CKD-EPI: >60 ML/MIN/1.73/M2
GLOBULIN SER-MCNC: 3.1 GM/DL (ref 2.4–3.5)
GLUCOSE SERPL-MCNC: 106 MG/DL (ref 82–115)
HCT VFR BLD AUTO: 40.3 % (ref 37–47)
HGB BLD-MCNC: 12.4 G/DL (ref 12–16)
IMM GRANULOCYTES # BLD AUTO: 0.02 X10(3)/MCL (ref 0–0.04)
IMM GRANULOCYTES NFR BLD AUTO: 0.8 %
LYMPHOCYTES # BLD AUTO: 0.59 X10(3)/MCL (ref 0.6–4.6)
LYMPHOCYTES NFR BLD AUTO: 24.8 %
MCH RBC QN AUTO: 29 PG (ref 27–31)
MCHC RBC AUTO-ENTMCNC: 30.8 G/DL (ref 33–36)
MCV RBC AUTO: 94.2 FL (ref 80–94)
MONOCYTES # BLD AUTO: 0.4 X10(3)/MCL (ref 0.1–1.3)
MONOCYTES NFR BLD AUTO: 16.8 %
NEUTROPHILS # BLD AUTO: 1.33 X10(3)/MCL (ref 2.1–9.2)
NEUTROPHILS NFR BLD AUTO: 55.9 %
PLATELET # BLD AUTO: 54 X10(3)/MCL (ref 130–400)
PMV BLD AUTO: 10.2 FL (ref 7.4–10.4)
POTASSIUM SERPL-SCNC: 3.5 MMOL/L (ref 3.5–5.1)
PROT SERPL-MCNC: 6.4 GM/DL (ref 5.8–7.6)
RBC # BLD AUTO: 4.28 X10(6)/MCL (ref 4.2–5.4)
SODIUM SERPL-SCNC: 142 MMOL/L (ref 136–145)
WBC # BLD AUTO: 2.38 X10(3)/MCL (ref 4.5–11.5)

## 2024-10-10 PROCEDURE — 36415 COLL VENOUS BLD VENIPUNCTURE: CPT

## 2024-10-10 PROCEDURE — 85025 COMPLETE CBC W/AUTO DIFF WBC: CPT

## 2024-10-10 PROCEDURE — 80053 COMPREHEN METABOLIC PANEL: CPT

## 2024-10-10 PROCEDURE — 99999 PR PBB SHADOW E&M-EST. PATIENT-LVL IV: CPT | Mod: PBBFAC,,,

## 2024-10-10 PROCEDURE — 3078F DIAST BP <80 MM HG: CPT | Mod: CPTII,S$GLB,,

## 2024-10-10 PROCEDURE — 3075F SYST BP GE 130 - 139MM HG: CPT | Mod: CPTII,S$GLB,,

## 2024-10-10 PROCEDURE — 1159F MED LIST DOCD IN RCRD: CPT | Mod: CPTII,S$GLB,,

## 2024-10-10 PROCEDURE — 1160F RVW MEDS BY RX/DR IN RCRD: CPT | Mod: CPTII,S$GLB,,

## 2024-10-10 PROCEDURE — 99214 OFFICE O/P EST MOD 30 MIN: CPT | Mod: S$GLB,,,

## 2024-10-10 PROCEDURE — 1126F AMNT PAIN NOTED NONE PRSNT: CPT | Mod: CPTII,S$GLB,,

## 2024-10-10 NOTE — PROGRESS NOTES
Subjective:       Patient ID: Joy Donahue is a 84 y.o. female.    Chief Complaint: Follow up    Diagnosis: Thrombocytopenia, Leukopenia    Current Treatment: None    Treatment History: N/A    HPI:   83 yo F presented in May '24 for evaluation of Thrombocytopenia and Leukopenia. On review of her labs she has had moderate thrombocytopenia with platelet count ranging from 60-100K since at least 2019. Additionally, she has had intermittent mild leukopenia since 2019 as well. She presents to hematology for further evaluation. 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 She states she underwent treatment in [place] for HCV several decades ago and reportedly cleared her infection. She follows with hepatology in [place] and sees them every couple of years? She does not get biannual abd US for HCC screening. She denies any abnormal bleeding, bruising, or infections. She denies any frequent hospitalizations, fevers, chills, NS, unintentional weight loss, increased fatigue, or changes in appetite. She feels her usual self with no complaints today.     Hematology workup was done. Her thrombocytopenia is due to her cirrhosis and stable. Her leukopenia is suspected due to age and medication. No malignant findings on workup.     Interval History:  Patient presents to clinic today for NP follow up appointment and labs to discuss results and plan of treatment.  She states she is doing well today.  Reports recurrent Bronchitis; notes always at this time of year.  She complains of a cough but is currently managed by PCP.   Discussed labs and scan in detail with patient.  Denies any abnormal bleeding, bruising, fevers, chills or NS.  Her appetite and energy level are both stable.  Otherwise, she has no further complaints or concerns.      Past Medical History:   Diagnosis Date    Acute (reversible) ischemia of intestine, part and extent unspecified 08/24/2022    Acute cystitis  without hematuria 08/14/2023    Cirrhosis of liver with ascites 02/14/2023    Continue to follow up with hepatologist    COVID     x 2-residual dysgeusia    Hepatitis C     treated/cured previously    Pacemaker     Paroxysmal atrial fibrillation     Pericardial cyst     Skin cancer     Thrombus       Past Surgical History:   Procedure Laterality Date    APPENDECTOMY      CARDIAC PACEMAKER PLACEMENT      cataract surgery      FOOT SURGERY Left     HYSTERECTOMY      LAPAROSCOPIC CHOLECYSTECTOMY      parathyroid resection      REPEAT ABDOMINAL PARACENTESIS      SKIN CANCER EXCISION      spot removed Left     left ankle spot removed    THROMBECTOMY       Social History     Socioeconomic History    Marital status:    Tobacco Use    Smoking status: Never    Smokeless tobacco: Never   Substance and Sexual Activity    Alcohol use: Not Currently    Drug use: Never    Sexual activity: Not Currently     Social Drivers of Health     Financial Resource Strain: Low Risk  (8/1/2024)    Overall Financial Resource Strain (CARDIA)     Difficulty of Paying Living Expenses: Not hard at all   Food Insecurity: No Food Insecurity (8/1/2024)    Hunger Vital Sign     Worried About Running Out of Food in the Last Year: Never true     Ran Out of Food in the Last Year: Never true   Transportation Needs: No Transportation Needs (8/1/2024)    TRANSPORTATION NEEDS     Transportation : No   Physical Activity: Sufficiently Active (8/1/2024)    Exercise Vital Sign     Days of Exercise per Week: 7 days     Minutes of Exercise per Session: 60 min   Stress: No Stress Concern Present (8/1/2024)    [employer] [place] [employer] - Occupational Stress Questionnaire     Feeling of Stress : Not at all   Housing Stability: Low Risk  (8/1/2024)    Housing Stability Vital Sign     Unable to Pay for Housing in the Last Year: No     Homeless in the Last Year: No      Family History   Problem Relation Name Age of Onset    Heart disease Mother       Heart disease Father      Hypertension Father      Pacemaker/defibrilator Father      Clotting disorder Sister Elizabeth     Heart failure Brother Anolivia     Pacemaker/defibrilator Brother Anolivia     No Known Problems Maternal Grandmother      No Known Problems Maternal Grandfather      No Known Problems Paternal Grandmother      No Known Problems Paternal Grandfather      Drug abuse Daughter Rimma     Heart disease Daughter Johnnie         possibly related to covid vaccine    No Known Problems Son Piotr       Review of patient's allergies indicates:   Allergen Reactions    Ciprofloxacin Swelling    Iodine Shortness Of Breath    Latex Itching    Fluoride      Mouth sores    Fluoride preparations Dermatitis     Other reaction(s): Ulcer of mouth      Review of Systems   Constitutional:  Negative for activity change, fever and unexpected weight change.   HENT:  Negative for sore throat.    Eyes:  Negative for visual disturbance.   Respiratory:  Negative for cough and shortness of breath.    Cardiovascular:  Negative for chest pain.   Gastrointestinal:  Negative for abdominal pain, diarrhea, nausea and vomiting.   Endocrine: Negative for polyuria.   Genitourinary:  Negative for dysuria and hot flashes.   Integumentary:  Negative for rash.   Neurological:  Negative for weakness and headaches.   Hematological:  Negative for adenopathy.   Psychiatric/Behavioral:  Negative for confusion.          Objective:      Vitals:    10/10/24 1018   BP: 137/76   Pulse: 92   Resp: 18   Temp: 97.9 °F (36.6 °C)         Physical Exam  Constitutional:       General: She is not in acute distress.     Appearance: Normal appearance. She is not ill-appearing.   HENT:      Head: Normocephalic and atraumatic.      Nose: Nose normal.      Mouth/Throat:      Mouth: Mucous membranes are moist.      Pharynx: Oropharynx is clear.   Eyes:      Extraocular Movements: Extraocular movements intact.      Conjunctiva/sclera: Conjunctivae normal.      Pupils:  Pupils are equal, round, and reactive to light.   Cardiovascular:      Rate and Rhythm: Normal rate and regular rhythm.      Pulses: Normal pulses.      Heart sounds: Normal heart sounds. No murmur heard.  Pulmonary:      Effort: Pulmonary effort is normal. No respiratory distress.      Breath sounds: Normal breath sounds.   Abdominal:      General: There is no distension.      Palpations: Abdomen is soft.      Tenderness: There is no abdominal tenderness.   Musculoskeletal:         General: Normal range of motion.      Cervical back: Normal range of motion and neck supple.      Right lower leg: No edema.      Left lower leg: No edema.   Lymphadenopathy:      Cervical: No cervical adenopathy.   Skin:     General: Skin is warm and dry.   Neurological:      General: No focal deficit present.      Mental Status: She is alert and oriented to person, place, and time.       LABS AND IMAGING REVIEWED IN EPIC      IMAGING:  Component      Latest Ref Rng 7/30/2024 10/10/2024   Hemoglobin      12.0 - 16.0 g/dL 12.5  12.4    Hematocrit      37.0 - 47.0 % 41.0  40.3    MCV      80.0 - 94.0 fL 94.7 (H)  94.2 (H)    MCH      27.0 - 31.0 pg 28.9  29.0    MCHC      33.0 - 36.0 g/dL 30.5 (L)  30.8 (L)    MPV      7.4 - 10.4 fL 10.6 (H)  10.2    WBC      4.50 - 11.50 x10(3)/mcL 4.29 (L)  2.38 (L)    RBC      4.20 - 5.40 x10(6)/mcL 4.33  4.28    RDW      11.5 - 17.0 % 14.6  15.9    Platelet Count      130 - 400 x10(3)/mcL 60 (L)  54 (L)    Mono %      % 14.5  16.8    Baso #      <=0.2 x10(3)/mcL 0.02  0.01    Mono #      0.1 - 1.3 x10(3)/mcL 0.62  0.40    Neut #      2.1 - 9.2 x10(3)/mcL 2.67  1.33 (L)    Basophil %      % 0.5  0.4    Eos #      0 - 0.9 x10(3)/mcL 0.05  0.03    Lymph #      0.6 - 4.6 x10(3)/mcL 0.91  0.59 (L)    Neut %      % 62.1  55.9    LYMPH %      % 21.2  24.8    Eos %      % 1.2  1.3    Immature Granulocytes      % 0.5  0.8    Immature Grans (Abs)      0 - 0.04 x10(3)/mcL 0.02  0.02       Legend:  (H) High  (L)  Low      6/5/24 Abdominal US:  Impression:  Coarse echotexture with the lobular contour might be features of cirrhosis.  Status post cholecystectomy.  Bilateral renal cyst.  Spleen at 13.1 cm with multiple hyperechoic lesions of questionable etiology other imaging modalities might prove helpful for further evaluation.  Small amount of ascites.  Varices identified near the valerie hepatis    6/23/2024 CT CAP  Impression:   1. Findings consistent with cirrhosis without solid intrahepatic mass.  2. Findings consistent with portal hypertension including trace volume ascites, splenomegaly, gastroesophageal varices.  The multifocal splenic hyperdensities seen on ultrasound are not appreciated on this single phase exam.  3. Parenchymal scarring in the bilateral lung bases.  4. Small hiatal hernia.  5. Diverticulosis without diverticulitis.       Assessment:   Chronic thrombocytopenia - Moderate. Due to cirrhosis from HCV.     Leukopenia - Benign due to age and medication causing bone marrow suppression. Recurrent Bronchitis    HCV Cirrhosis - Will need Q6m abdominal US and AFP checks for HCC surveillance        Plan:   - Plan for Abdominal US q6 months for HCC screening - due Dec '24; ordered today    - RTC 2 months for MD visit, labs same day        BAHMAN Quiles  Hematology/Oncology   [facility]

## 2024-10-10 NOTE — TELEPHONE ENCOUNTER
Spoke with patient and moved her follow up in clinic device check to 10/16/2024 @ 3:20.   ----- Message from Morenita sent at 10/10/2024 12:27 PM CDT -----  Regarding: Rs Juan  Pt [phone] would like a call to Rs her in clinic device check. She has to come in town for other appts next week and she would like an appt on 10/16 10/17 or 10/18 please call the pt before scheduling the appt.    Thanks

## 2024-10-16 ENCOUNTER — CLINICAL SUPPORT (OUTPATIENT)
Dept: CARDIOLOGY | Facility: HOSPITAL | Age: 84
End: 2024-10-16
Attending: INTERNAL MEDICINE
Payer: MEDICARE

## 2024-10-16 DIAGNOSIS — I48.91 ATRIAL FIBRILLATION, UNSPECIFIED TYPE: ICD-10-CM

## 2024-10-16 DIAGNOSIS — Z95.0 CARDIAC PACEMAKER IN SITU: ICD-10-CM

## 2024-10-16 PROCEDURE — 93280 PM DEVICE PROGR EVAL DUAL: CPT

## 2024-10-28 PROBLEM — Z00.00 WELLNESS EXAMINATION: Status: RESOLVED | Noted: 2022-10-10 | Resolved: 2024-10-28

## 2024-10-29 ENCOUNTER — HOSPITAL ENCOUNTER (OUTPATIENT)
Dept: RADIOLOGY | Facility: HOSPITAL | Age: 84
Discharge: HOME | End: 2024-10-29
Payer: MEDICARE

## 2024-10-29 DIAGNOSIS — B18.2 CHRONIC HEPATITIS C WITHOUT HEPATIC COMA: ICD-10-CM

## 2024-10-29 PROCEDURE — 76700 US EXAM ABDOM COMPLETE: CPT | Mod: TC

## 2024-11-15 ENCOUNTER — TELEPHONE (OUTPATIENT)
Dept: ELECTROPHYSIOLOGY | Facility: CLINIC | Age: 84
End: 2024-11-15
Payer: MEDICARE

## 2024-11-15 ENCOUNTER — CLINICAL SUPPORT (OUTPATIENT)
Dept: CARDIOLOGY | Facility: HOSPITAL | Age: 84
End: 2024-11-15
Attending: INTERNAL MEDICINE
Payer: MEDICARE

## 2024-11-15 DIAGNOSIS — I48.91 ATRIAL FIBRILLATION, UNSPECIFIED TYPE: ICD-10-CM

## 2024-11-15 DIAGNOSIS — Z95.0 CARDIAC PACEMAKER IN SITU: ICD-10-CM

## 2024-11-15 PROCEDURE — 93280 PM DEVICE PROGR EVAL DUAL: CPT

## 2024-11-15 NOTE — TELEPHONE ENCOUNTER
The patients' CIED has reached ELECTIVE REPLACEMENT.     PT has history of low platelet count, pt states her platelets were 48 on 11/13.2024, not seen in epic    Current Device  and Model: LETHA Patel DR MRI    Date of RRT, if known: 11/11/2024, mode will change to VVI 65 when less than 3 months to SILVIANO    Is this replacement indicator early or unexpected due to an advisory:  No    Battery Voltage (if available): 2.82 V    Pacemaker Dependent: SB, 41 bpm    Anticoagulation Status: None    Last EF: 60 - 65% 5/2/2023    Model of Leads: RA- 5076 MRI CapSureFix Novus 2/12/2015 and RV -  5076 MRI CapSureFix Novus on 2/12/2025    Any known lead recalls:  No    Leads MRI compatible:  Yes     Any history of treated ventricular arrhythmias (ATP or shocks)?  No    Any history of device treated atrial arrhythmias (atrial ATP)?  No      *RN coordinator notified for scheduling; device coordinator notified to contact patient

## 2024-11-18 ENCOUNTER — PATIENT MESSAGE (OUTPATIENT)
Dept: ELECTROPHYSIOLOGY | Facility: CLINIC | Age: 84
End: 2024-11-18
Payer: MEDICARE

## 2024-11-18 DIAGNOSIS — I48.91 ATRIAL FIBRILLATION, UNSPECIFIED TYPE: ICD-10-CM

## 2024-11-18 DIAGNOSIS — Z45.010 PACEMAKER BATTERY DEPLETION: Primary | ICD-10-CM

## 2024-11-19 ENCOUNTER — TELEPHONE (OUTPATIENT)
Dept: CARDIOLOGY | Facility: HOSPITAL | Age: 84
End: 2024-11-19
Payer: MEDICARE

## 2024-11-19 NOTE — TELEPHONE ENCOUNTER
Pt called the Device Clinic on this am and asked to s/w Dr. Singh's nurse.  Informed pt a message would have to be sent as she is in Clinic this am.  Understanding was verbalized.  Message routed to Dr. Singh's nurse (Saba).

## 2024-11-19 NOTE — TELEPHONE ENCOUNTER
Informed pt that we do not have anything sooner at this time and will leave her gen change as scheduled on 12/6 and call her if anything becomes available

## 2024-11-25 ENCOUNTER — HOSPITAL ENCOUNTER (INPATIENT)
Facility: HOSPITAL | Age: 84
LOS: 7 days | Discharge: HOME HEALTH | DRG: 329 | End: 2024-12-03
Attending: STUDENT IN AN ORGANIZED HEALTH CARE EDUCATION/TRAINING PROGRAM | Admitting: INTERNAL MEDICINE
Payer: MEDICARE

## 2024-11-25 DIAGNOSIS — D72.829 LEUKOCYTOSIS, UNSPECIFIED TYPE: ICD-10-CM

## 2024-11-25 DIAGNOSIS — Z95.0 PACEMAKER: ICD-10-CM

## 2024-11-25 DIAGNOSIS — K52.9 JEJUNITIS: Primary | ICD-10-CM

## 2024-11-25 DIAGNOSIS — R07.9 CHEST PAIN: ICD-10-CM

## 2024-11-25 DIAGNOSIS — R10.13 EPIGASTRIC ABDOMINAL PAIN: ICD-10-CM

## 2024-11-25 DIAGNOSIS — R00.1 BRADYCARDIA: ICD-10-CM

## 2024-11-25 DIAGNOSIS — R65.10 SIRS (SYSTEMIC INFLAMMATORY RESPONSE SYNDROME): ICD-10-CM

## 2024-11-25 DIAGNOSIS — K56.2 VOLVULUS: ICD-10-CM

## 2024-11-25 LAB
ALBUMIN SERPL-MCNC: 3.4 G/DL (ref 3.4–4.8)
ALBUMIN/GLOB SERPL: 1 RATIO (ref 1.1–2)
ALP SERPL-CCNC: 87 UNIT/L (ref 40–150)
ALT SERPL-CCNC: 28 UNIT/L (ref 0–55)
ANION GAP SERPL CALC-SCNC: 10 MEQ/L
AST SERPL-CCNC: 39 UNIT/L (ref 5–34)
BASOPHILS # BLD AUTO: 0.05 X10(3)/MCL
BASOPHILS NFR BLD AUTO: 0.4 %
BILIRUB SERPL-MCNC: 3 MG/DL
BUN SERPL-MCNC: 17.4 MG/DL (ref 9.8–20.1)
CALCIUM SERPL-MCNC: 9.6 MG/DL (ref 8.4–10.2)
CHLORIDE SERPL-SCNC: 104 MMOL/L (ref 98–107)
CO2 SERPL-SCNC: 23 MMOL/L (ref 23–31)
CREAT SERPL-MCNC: 0.86 MG/DL (ref 0.55–1.02)
CREAT/UREA NIT SERPL: 20
EOSINOPHIL # BLD AUTO: 0.03 X10(3)/MCL (ref 0–0.9)
EOSINOPHIL NFR BLD AUTO: 0.2 %
ERYTHROCYTE [DISTWIDTH] IN BLOOD BY AUTOMATED COUNT: 15.8 % (ref 11.5–17)
GFR SERPLBLD CREATININE-BSD FMLA CKD-EPI: >60 ML/MIN/1.73/M2
GLOBULIN SER-MCNC: 3.3 GM/DL (ref 2.4–3.5)
GLUCOSE SERPL-MCNC: 139 MG/DL (ref 82–115)
HCT VFR BLD AUTO: 46.4 % (ref 37–47)
HGB BLD-MCNC: 15.1 G/DL (ref 12–16)
IMM GRANULOCYTES # BLD AUTO: 0.19 X10(3)/MCL (ref 0–0.04)
IMM GRANULOCYTES NFR BLD AUTO: 1.5 %
INR PPP: 1.2
LACTATE SERPL-SCNC: 2.3 MMOL/L (ref 0.5–2.2)
LIPASE SERPL-CCNC: 26 U/L
LYMPHOCYTES # BLD AUTO: 1.31 X10(3)/MCL (ref 0.6–4.6)
LYMPHOCYTES NFR BLD AUTO: 10.3 %
MAGNESIUM SERPL-MCNC: 2 MG/DL (ref 1.6–2.6)
MCH RBC QN AUTO: 30.1 PG (ref 27–31)
MCHC RBC AUTO-ENTMCNC: 32.5 G/DL (ref 33–36)
MCV RBC AUTO: 92.4 FL (ref 80–94)
MONOCYTES # BLD AUTO: 1.48 X10(3)/MCL (ref 0.1–1.3)
MONOCYTES NFR BLD AUTO: 11.7 %
NEUTROPHILS # BLD AUTO: 9.63 X10(3)/MCL (ref 2.1–9.2)
NEUTROPHILS NFR BLD AUTO: 75.9 %
NRBC BLD AUTO-RTO: 0 %
PLATELET # BLD AUTO: 63 X10(3)/MCL (ref 130–400)
PLATELETS.RETICULATED NFR BLD AUTO: 7.6 % (ref 0.9–11.2)
PMV BLD AUTO: 11.9 FL (ref 7.4–10.4)
POTASSIUM SERPL-SCNC: 4.1 MMOL/L (ref 3.5–5.1)
PROT SERPL-MCNC: 6.7 GM/DL (ref 5.8–7.6)
PROTHROMBIN TIME: 15.7 SECONDS (ref 12.5–14.5)
RBC # BLD AUTO: 5.02 X10(6)/MCL (ref 4.2–5.4)
SODIUM SERPL-SCNC: 137 MMOL/L (ref 136–145)
TROPONIN I SERPL-MCNC: 0.02 NG/ML (ref 0–0.04)
WBC # BLD AUTO: 12.69 X10(3)/MCL (ref 4.5–11.5)

## 2024-11-25 PROCEDURE — 93005 ELECTROCARDIOGRAM TRACING: CPT

## 2024-11-25 PROCEDURE — 83605 ASSAY OF LACTIC ACID: CPT | Performed by: STUDENT IN AN ORGANIZED HEALTH CARE EDUCATION/TRAINING PROGRAM

## 2024-11-25 PROCEDURE — 99285 EMERGENCY DEPT VISIT HI MDM: CPT | Mod: 25

## 2024-11-25 PROCEDURE — 96375 TX/PRO/DX INJ NEW DRUG ADDON: CPT

## 2024-11-25 PROCEDURE — 96361 HYDRATE IV INFUSION ADD-ON: CPT

## 2024-11-25 PROCEDURE — 80053 COMPREHEN METABOLIC PANEL: CPT | Performed by: STUDENT IN AN ORGANIZED HEALTH CARE EDUCATION/TRAINING PROGRAM

## 2024-11-25 PROCEDURE — 85025 COMPLETE CBC W/AUTO DIFF WBC: CPT | Performed by: STUDENT IN AN ORGANIZED HEALTH CARE EDUCATION/TRAINING PROGRAM

## 2024-11-25 PROCEDURE — 63600175 PHARM REV CODE 636 W HCPCS: Performed by: STUDENT IN AN ORGANIZED HEALTH CARE EDUCATION/TRAINING PROGRAM

## 2024-11-25 PROCEDURE — 93010 ELECTROCARDIOGRAM REPORT: CPT | Mod: ,,, | Performed by: STUDENT IN AN ORGANIZED HEALTH CARE EDUCATION/TRAINING PROGRAM

## 2024-11-25 PROCEDURE — 83690 ASSAY OF LIPASE: CPT | Performed by: STUDENT IN AN ORGANIZED HEALTH CARE EDUCATION/TRAINING PROGRAM

## 2024-11-25 PROCEDURE — 25000003 PHARM REV CODE 250: Performed by: STUDENT IN AN ORGANIZED HEALTH CARE EDUCATION/TRAINING PROGRAM

## 2024-11-25 PROCEDURE — 25500020 PHARM REV CODE 255: Performed by: STUDENT IN AN ORGANIZED HEALTH CARE EDUCATION/TRAINING PROGRAM

## 2024-11-25 PROCEDURE — 84484 ASSAY OF TROPONIN QUANT: CPT | Performed by: STUDENT IN AN ORGANIZED HEALTH CARE EDUCATION/TRAINING PROGRAM

## 2024-11-25 PROCEDURE — 85610 PROTHROMBIN TIME: CPT | Performed by: STUDENT IN AN ORGANIZED HEALTH CARE EDUCATION/TRAINING PROGRAM

## 2024-11-25 PROCEDURE — 83735 ASSAY OF MAGNESIUM: CPT | Performed by: STUDENT IN AN ORGANIZED HEALTH CARE EDUCATION/TRAINING PROGRAM

## 2024-11-25 RX ORDER — FAMOTIDINE 10 MG/ML
20 INJECTION INTRAVENOUS
Status: COMPLETED | OUTPATIENT
Start: 2024-11-25 | End: 2024-11-25

## 2024-11-25 RX ORDER — METHYLPREDNISOLONE SOD SUCC 125 MG
125 VIAL (EA) INJECTION
Status: COMPLETED | OUTPATIENT
Start: 2024-11-25 | End: 2024-11-25

## 2024-11-25 RX ORDER — ONDANSETRON HYDROCHLORIDE 2 MG/ML
4 INJECTION, SOLUTION INTRAVENOUS
Status: COMPLETED | OUTPATIENT
Start: 2024-11-25 | End: 2024-11-25

## 2024-11-25 RX ORDER — DIPHENHYDRAMINE HYDROCHLORIDE 50 MG/ML
25 INJECTION INTRAMUSCULAR; INTRAVENOUS
Status: COMPLETED | OUTPATIENT
Start: 2024-11-25 | End: 2024-11-25

## 2024-11-25 RX ORDER — MORPHINE SULFATE 4 MG/ML
4 INJECTION, SOLUTION INTRAMUSCULAR; INTRAVENOUS
Status: COMPLETED | OUTPATIENT
Start: 2024-11-25 | End: 2024-11-25

## 2024-11-25 RX ADMIN — METHYLPREDNISOLONE SODIUM SUCCINATE 125 MG: 125 INJECTION, POWDER, FOR SOLUTION INTRAMUSCULAR; INTRAVENOUS at 10:11

## 2024-11-25 RX ADMIN — FAMOTIDINE 20 MG: 10 INJECTION, SOLUTION INTRAVENOUS at 10:11

## 2024-11-25 RX ADMIN — DIPHENHYDRAMINE HYDROCHLORIDE 25 MG: 50 INJECTION INTRAMUSCULAR; INTRAVENOUS at 10:11

## 2024-11-25 RX ADMIN — MORPHINE SULFATE 4 MG: 4 INJECTION INTRAVENOUS at 10:11

## 2024-11-25 RX ADMIN — ONDANSETRON 4 MG: 2 INJECTION INTRAMUSCULAR; INTRAVENOUS at 10:11

## 2024-11-25 RX ADMIN — IOHEXOL 100 ML: 350 INJECTION, SOLUTION INTRAVENOUS at 11:11

## 2024-11-25 RX ADMIN — SODIUM CHLORIDE, POTASSIUM CHLORIDE, SODIUM LACTATE AND CALCIUM CHLORIDE 1000 ML: 600; 310; 30; 20 INJECTION, SOLUTION INTRAVENOUS at 10:11

## 2024-11-26 ENCOUNTER — ANESTHESIA (OUTPATIENT)
Dept: SURGERY | Facility: HOSPITAL | Age: 84
End: 2024-11-26
Payer: MEDICARE

## 2024-11-26 ENCOUNTER — ANESTHESIA EVENT (OUTPATIENT)
Dept: SURGERY | Facility: HOSPITAL | Age: 84
End: 2024-11-26
Payer: MEDICARE

## 2024-11-26 LAB
ABO + RH BLD: NORMAL
ABO + RH BLD: NORMAL
ALBUMIN SERPL-MCNC: 2.9 G/DL (ref 3.4–4.8)
ALBUMIN SERPL-MCNC: 3.5 G/DL (ref 3.4–4.8)
ALBUMIN/GLOB SERPL: 1 RATIO (ref 1.1–2)
ALBUMIN/GLOB SERPL: 1.3 RATIO (ref 1.1–2)
ALP SERPL-CCNC: 59 UNIT/L (ref 40–150)
ALP SERPL-CCNC: 80 UNIT/L (ref 40–150)
ALT SERPL-CCNC: 25 UNIT/L (ref 0–55)
ALT SERPL-CCNC: 29 UNIT/L (ref 0–55)
ANION GAP SERPL CALC-SCNC: 8 MEQ/L
ANION GAP SERPL CALC-SCNC: 9 MEQ/L
AST SERPL-CCNC: 37 UNIT/L (ref 5–34)
AST SERPL-CCNC: 39 UNIT/L (ref 5–34)
BASOPHILS # BLD AUTO: 0.02 X10(3)/MCL
BASOPHILS # BLD AUTO: 0.03 X10(3)/MCL
BASOPHILS NFR BLD AUTO: 0.2 %
BASOPHILS NFR BLD AUTO: 0.3 %
BILIRUB SERPL-MCNC: 2.9 MG/DL
BILIRUB SERPL-MCNC: 4 MG/DL
BLD PROD TYP BPU: NORMAL
BLD PROD TYP BPU: NORMAL
BLOOD UNIT EXPIRATION DATE: NORMAL
BLOOD UNIT EXPIRATION DATE: NORMAL
BLOOD UNIT TYPE CODE: 6200
BLOOD UNIT TYPE CODE: 6200
BUN SERPL-MCNC: 17.2 MG/DL (ref 9.8–20.1)
BUN SERPL-MCNC: 27.2 MG/DL (ref 9.8–20.1)
CALCIUM SERPL-MCNC: 8.6 MG/DL (ref 8.4–10.2)
CALCIUM SERPL-MCNC: 8.7 MG/DL (ref 8.4–10.2)
CHLORIDE SERPL-SCNC: 105 MMOL/L (ref 98–107)
CHLORIDE SERPL-SCNC: 107 MMOL/L (ref 98–107)
CO2 SERPL-SCNC: 21 MMOL/L (ref 23–31)
CO2 SERPL-SCNC: 22 MMOL/L (ref 23–31)
CREAT SERPL-MCNC: 0.76 MG/DL (ref 0.55–1.02)
CREAT SERPL-MCNC: 1.21 MG/DL (ref 0.55–1.02)
CREAT/UREA NIT SERPL: 22
CREAT/UREA NIT SERPL: 23
CROSSMATCH INTERPRETATION: NORMAL
CROSSMATCH INTERPRETATION: NORMAL
DISPENSE STATUS: NORMAL
DISPENSE STATUS: NORMAL
EOSINOPHIL # BLD AUTO: 0 X10(3)/MCL (ref 0–0.9)
EOSINOPHIL # BLD AUTO: 0 X10(3)/MCL (ref 0–0.9)
EOSINOPHIL NFR BLD AUTO: 0 %
EOSINOPHIL NFR BLD AUTO: 0 %
ERYTHROCYTE [DISTWIDTH] IN BLOOD BY AUTOMATED COUNT: 15.9 % (ref 11.5–17)
ERYTHROCYTE [DISTWIDTH] IN BLOOD BY AUTOMATED COUNT: 16.2 % (ref 11.5–17)
GFR SERPLBLD CREATININE-BSD FMLA CKD-EPI: 44 ML/MIN/1.73/M2
GFR SERPLBLD CREATININE-BSD FMLA CKD-EPI: >60 ML/MIN/1.73/M2
GLOBULIN SER-MCNC: 2.6 GM/DL (ref 2.4–3.5)
GLOBULIN SER-MCNC: 2.8 GM/DL (ref 2.4–3.5)
GLUCOSE SERPL-MCNC: 166 MG/DL (ref 82–115)
GLUCOSE SERPL-MCNC: 168 MG/DL (ref 82–115)
GROUP & RH: NORMAL
HCT VFR BLD AUTO: 36.9 % (ref 37–47)
HCT VFR BLD AUTO: 46.8 % (ref 37–47)
HGB BLD-MCNC: 11.8 G/DL (ref 12–16)
HGB BLD-MCNC: 14.9 G/DL (ref 12–16)
IMM GRANULOCYTES # BLD AUTO: 0.04 X10(3)/MCL (ref 0–0.04)
IMM GRANULOCYTES # BLD AUTO: 0.13 X10(3)/MCL (ref 0–0.04)
IMM GRANULOCYTES NFR BLD AUTO: 0.4 %
IMM GRANULOCYTES NFR BLD AUTO: 1.2 %
INDIRECT COOMBS: NORMAL
LACTATE SERPL-SCNC: 1.7 MMOL/L (ref 0.5–2.2)
LACTATE SERPL-SCNC: 2.6 MMOL/L (ref 0.5–2.2)
LYMPHOCYTES # BLD AUTO: 0.52 X10(3)/MCL (ref 0.6–4.6)
LYMPHOCYTES # BLD AUTO: 0.53 X10(3)/MCL (ref 0.6–4.6)
LYMPHOCYTES NFR BLD AUTO: 4.9 %
LYMPHOCYTES NFR BLD AUTO: 5.2 %
MAGNESIUM SERPL-MCNC: 1.7 MG/DL (ref 1.6–2.6)
MAGNESIUM SERPL-MCNC: 2 MG/DL (ref 1.6–2.6)
MCH RBC QN AUTO: 29.6 PG (ref 27–31)
MCH RBC QN AUTO: 30.4 PG (ref 27–31)
MCHC RBC AUTO-ENTMCNC: 31.8 G/DL (ref 33–36)
MCHC RBC AUTO-ENTMCNC: 32 G/DL (ref 33–36)
MCV RBC AUTO: 93 FL (ref 80–94)
MCV RBC AUTO: 95.1 FL (ref 80–94)
MONOCYTES # BLD AUTO: 0.54 X10(3)/MCL (ref 0.1–1.3)
MONOCYTES # BLD AUTO: 1.24 X10(3)/MCL (ref 0.1–1.3)
MONOCYTES NFR BLD AUTO: 12.4 %
MONOCYTES NFR BLD AUTO: 5 %
NEUTROPHILS # BLD AUTO: 8.21 X10(3)/MCL (ref 2.1–9.2)
NEUTROPHILS # BLD AUTO: 9.53 X10(3)/MCL (ref 2.1–9.2)
NEUTROPHILS NFR BLD AUTO: 81.8 %
NEUTROPHILS NFR BLD AUTO: 88.6 %
NRBC BLD AUTO-RTO: 0 %
NRBC BLD AUTO-RTO: 0 %
OHS QRS DURATION: 68 MS
OHS QTC CALCULATION: 419 MS
PHOSPHATE SERPL-MCNC: 3 MG/DL (ref 2.3–4.7)
PHOSPHATE SERPL-MCNC: 3.8 MG/DL (ref 2.3–4.7)
PLATELET # BLD AUTO: 49 X10(3)/MCL (ref 130–400)
PLATELET # BLD AUTO: 93 X10(3)/MCL (ref 130–400)
PLATELET # BLD EST: ABNORMAL 10*3/UL
PLATELETS.RETICULATED NFR BLD AUTO: 4 % (ref 0.9–11.2)
PMV BLD AUTO: 10.3 FL (ref 7.4–10.4)
PMV BLD AUTO: 11.1 FL (ref 7.4–10.4)
POTASSIUM SERPL-SCNC: 4 MMOL/L (ref 3.5–5.1)
POTASSIUM SERPL-SCNC: 4.4 MMOL/L (ref 3.5–5.1)
PROT SERPL-MCNC: 5.7 GM/DL (ref 5.8–7.6)
PROT SERPL-MCNC: 6.1 GM/DL (ref 5.8–7.6)
RBC # BLD AUTO: 3.88 X10(6)/MCL (ref 4.2–5.4)
RBC # BLD AUTO: 5.03 X10(6)/MCL (ref 4.2–5.4)
RBC MORPH BLD: NORMAL
SODIUM SERPL-SCNC: 134 MMOL/L (ref 136–145)
SODIUM SERPL-SCNC: 138 MMOL/L (ref 136–145)
SPECIMEN OUTDATE: NORMAL
UNIT NUMBER: NORMAL
UNIT NUMBER: NORMAL
WBC # BLD AUTO: 10.03 X10(3)/MCL (ref 4.5–11.5)
WBC # BLD AUTO: 10.76 X10(3)/MCL (ref 4.5–11.5)

## 2024-11-26 PROCEDURE — 25000003 PHARM REV CODE 250: Performed by: STUDENT IN AN ORGANIZED HEALTH CARE EDUCATION/TRAINING PROGRAM

## 2024-11-26 PROCEDURE — 63600175 PHARM REV CODE 636 W HCPCS

## 2024-11-26 PROCEDURE — 27000221 HC OXYGEN, UP TO 24 HOURS

## 2024-11-26 PROCEDURE — 27201423 OPTIME MED/SURG SUP & DEVICES STERILE SUPPLY: Performed by: STUDENT IN AN ORGANIZED HEALTH CARE EDUCATION/TRAINING PROGRAM

## 2024-11-26 PROCEDURE — 0DBA0ZZ EXCISION OF JEJUNUM, OPEN APPROACH: ICD-10-PCS | Performed by: STUDENT IN AN ORGANIZED HEALTH CARE EDUCATION/TRAINING PROGRAM

## 2024-11-26 PROCEDURE — 37000009 HC ANESTHESIA EA ADD 15 MINS: Performed by: STUDENT IN AN ORGANIZED HEALTH CARE EDUCATION/TRAINING PROGRAM

## 2024-11-26 PROCEDURE — 80053 COMPREHEN METABOLIC PANEL: CPT | Performed by: STUDENT IN AN ORGANIZED HEALTH CARE EDUCATION/TRAINING PROGRAM

## 2024-11-26 PROCEDURE — 99900035 HC TECH TIME PER 15 MIN (STAT)

## 2024-11-26 PROCEDURE — 85025 COMPLETE CBC W/AUTO DIFF WBC: CPT | Performed by: STUDENT IN AN ORGANIZED HEALTH CARE EDUCATION/TRAINING PROGRAM

## 2024-11-26 PROCEDURE — 63600175 PHARM REV CODE 636 W HCPCS: Mod: JZ,JG | Performed by: NURSE ANESTHETIST, CERTIFIED REGISTERED

## 2024-11-26 PROCEDURE — 36000708 HC OR TIME LEV III 1ST 15 MIN: Performed by: STUDENT IN AN ORGANIZED HEALTH CARE EDUCATION/TRAINING PROGRAM

## 2024-11-26 PROCEDURE — 86901 BLOOD TYPING SEROLOGIC RH(D): CPT | Performed by: STUDENT IN AN ORGANIZED HEALTH CARE EDUCATION/TRAINING PROGRAM

## 2024-11-26 PROCEDURE — 71000033 HC RECOVERY, INTIAL HOUR: Performed by: STUDENT IN AN ORGANIZED HEALTH CARE EDUCATION/TRAINING PROGRAM

## 2024-11-26 PROCEDURE — 96376 TX/PRO/DX INJ SAME DRUG ADON: CPT

## 2024-11-26 PROCEDURE — 11000001 HC ACUTE MED/SURG PRIVATE ROOM

## 2024-11-26 PROCEDURE — 88307 TISSUE EXAM BY PATHOLOGIST: CPT | Performed by: STUDENT IN AN ORGANIZED HEALTH CARE EDUCATION/TRAINING PROGRAM

## 2024-11-26 PROCEDURE — 0DN80ZZ RELEASE SMALL INTESTINE, OPEN APPROACH: ICD-10-PCS | Performed by: STUDENT IN AN ORGANIZED HEALTH CARE EDUCATION/TRAINING PROGRAM

## 2024-11-26 PROCEDURE — 63600175 PHARM REV CODE 636 W HCPCS: Performed by: STUDENT IN AN ORGANIZED HEALTH CARE EDUCATION/TRAINING PROGRAM

## 2024-11-26 PROCEDURE — 87040 BLOOD CULTURE FOR BACTERIA: CPT | Performed by: STUDENT IN AN ORGANIZED HEALTH CARE EDUCATION/TRAINING PROGRAM

## 2024-11-26 PROCEDURE — 83605 ASSAY OF LACTIC ACID: CPT | Performed by: STUDENT IN AN ORGANIZED HEALTH CARE EDUCATION/TRAINING PROGRAM

## 2024-11-26 PROCEDURE — 71000039 HC RECOVERY, EACH ADD'L HOUR: Performed by: STUDENT IN AN ORGANIZED HEALTH CARE EDUCATION/TRAINING PROGRAM

## 2024-11-26 PROCEDURE — 36000709 HC OR TIME LEV III EA ADD 15 MIN: Performed by: STUDENT IN AN ORGANIZED HEALTH CARE EDUCATION/TRAINING PROGRAM

## 2024-11-26 PROCEDURE — 21400001 HC TELEMETRY ROOM

## 2024-11-26 PROCEDURE — 44120 REMOVAL OF SMALL INTESTINE: CPT | Mod: ,,, | Performed by: STUDENT IN AN ORGANIZED HEALTH CARE EDUCATION/TRAINING PROGRAM

## 2024-11-26 PROCEDURE — 36415 COLL VENOUS BLD VENIPUNCTURE: CPT | Performed by: STUDENT IN AN ORGANIZED HEALTH CARE EDUCATION/TRAINING PROGRAM

## 2024-11-26 PROCEDURE — 37000008 HC ANESTHESIA 1ST 15 MINUTES: Performed by: STUDENT IN AN ORGANIZED HEALTH CARE EDUCATION/TRAINING PROGRAM

## 2024-11-26 PROCEDURE — 96361 HYDRATE IV INFUSION ADD-ON: CPT

## 2024-11-26 PROCEDURE — 25000003 PHARM REV CODE 250: Performed by: NURSE ANESTHETIST, CERTIFIED REGISTERED

## 2024-11-26 PROCEDURE — 99900031 HC PATIENT EDUCATION (STAT)

## 2024-11-26 PROCEDURE — P9035 PLATELET PHERES LEUKOREDUCED: HCPCS | Performed by: STUDENT IN AN ORGANIZED HEALTH CARE EDUCATION/TRAINING PROGRAM

## 2024-11-26 PROCEDURE — 63600175 PHARM REV CODE 636 W HCPCS: Performed by: ANESTHESIOLOGY

## 2024-11-26 PROCEDURE — 0D9670Z DRAINAGE OF STOMACH WITH DRAINAGE DEVICE, VIA NATURAL OR ARTIFICIAL OPENING: ICD-10-PCS | Performed by: INTERNAL MEDICINE

## 2024-11-26 PROCEDURE — 99223 1ST HOSP IP/OBS HIGH 75: CPT | Mod: 57,GC,, | Performed by: STUDENT IN AN ORGANIZED HEALTH CARE EDUCATION/TRAINING PROGRAM

## 2024-11-26 PROCEDURE — 84100 ASSAY OF PHOSPHORUS: CPT | Performed by: STUDENT IN AN ORGANIZED HEALTH CARE EDUCATION/TRAINING PROGRAM

## 2024-11-26 PROCEDURE — 83735 ASSAY OF MAGNESIUM: CPT | Performed by: STUDENT IN AN ORGANIZED HEALTH CARE EDUCATION/TRAINING PROGRAM

## 2024-11-26 PROCEDURE — 25000003 PHARM REV CODE 250

## 2024-11-26 PROCEDURE — 0DS80ZZ REPOSITION SMALL INTESTINE, OPEN APPROACH: ICD-10-PCS | Performed by: STUDENT IN AN ORGANIZED HEALTH CARE EDUCATION/TRAINING PROGRAM

## 2024-11-26 PROCEDURE — P9047 ALBUMIN (HUMAN), 25%, 50ML: HCPCS | Mod: JZ,JG | Performed by: NURSE ANESTHETIST, CERTIFIED REGISTERED

## 2024-11-26 PROCEDURE — 96365 THER/PROPH/DIAG IV INF INIT: CPT

## 2024-11-26 RX ORDER — CEFAZOLIN SODIUM 1 G/3ML
INJECTION, POWDER, FOR SOLUTION INTRAMUSCULAR; INTRAVENOUS
Status: DISCONTINUED | OUTPATIENT
Start: 2024-11-26 | End: 2024-11-26

## 2024-11-26 RX ORDER — TALC
6 POWDER (GRAM) TOPICAL NIGHTLY PRN
Status: DISCONTINUED | OUTPATIENT
Start: 2024-11-26 | End: 2024-12-03 | Stop reason: HOSPADM

## 2024-11-26 RX ORDER — LIDOCAINE HYDROCHLORIDE 20 MG/ML
INJECTION, SOLUTION EPIDURAL; INFILTRATION; INTRACAUDAL; PERINEURAL
Status: DISCONTINUED | OUTPATIENT
Start: 2024-11-26 | End: 2024-11-26

## 2024-11-26 RX ORDER — HYDROMORPHONE HYDROCHLORIDE 2 MG/ML
0.4 INJECTION, SOLUTION INTRAMUSCULAR; INTRAVENOUS; SUBCUTANEOUS EVERY 10 MIN PRN
Status: DISCONTINUED | OUTPATIENT
Start: 2024-11-26 | End: 2024-11-26 | Stop reason: HOSPADM

## 2024-11-26 RX ORDER — SODIUM CHLORIDE 0.9 % (FLUSH) 0.9 %
10 SYRINGE (ML) INJECTION
Status: DISCONTINUED | OUTPATIENT
Start: 2024-11-26 | End: 2024-12-03 | Stop reason: HOSPADM

## 2024-11-26 RX ORDER — MORPHINE SULFATE 4 MG/ML
2 INJECTION, SOLUTION INTRAMUSCULAR; INTRAVENOUS
Status: DISCONTINUED | OUTPATIENT
Start: 2024-11-26 | End: 2024-11-28

## 2024-11-26 RX ORDER — ACETAMINOPHEN 500 MG
1000 TABLET ORAL EVERY 6 HOURS PRN
Status: DISCONTINUED | OUTPATIENT
Start: 2024-11-26 | End: 2024-11-26

## 2024-11-26 RX ORDER — ETOMIDATE 2 MG/ML
INJECTION INTRAVENOUS
Status: DISCONTINUED | OUTPATIENT
Start: 2024-11-26 | End: 2024-11-26

## 2024-11-26 RX ORDER — SODIUM CHLORIDE, SODIUM LACTATE, POTASSIUM CHLORIDE, CALCIUM CHLORIDE 600; 310; 30; 20 MG/100ML; MG/100ML; MG/100ML; MG/100ML
INJECTION, SOLUTION INTRAVENOUS CONTINUOUS
Status: ACTIVE | OUTPATIENT
Start: 2024-11-26 | End: 2024-11-27

## 2024-11-26 RX ORDER — DIPHENHYDRAMINE HYDROCHLORIDE 50 MG/ML
12.5 INJECTION INTRAMUSCULAR; INTRAVENOUS ONCE AS NEEDED
Status: DISCONTINUED | OUTPATIENT
Start: 2024-11-26 | End: 2024-11-26 | Stop reason: HOSPADM

## 2024-11-26 RX ORDER — MORPHINE SULFATE 4 MG/ML
4 INJECTION, SOLUTION INTRAMUSCULAR; INTRAVENOUS EVERY 4 HOURS PRN
Status: DISCONTINUED | OUTPATIENT
Start: 2024-11-26 | End: 2024-11-28

## 2024-11-26 RX ORDER — PHENYLEPHRINE HCL IN 0.9% NACL 1 MG/10 ML
SYRINGE (ML) INTRAVENOUS
Status: DISCONTINUED | OUTPATIENT
Start: 2024-11-26 | End: 2024-11-26

## 2024-11-26 RX ORDER — ONDANSETRON HYDROCHLORIDE 2 MG/ML
INJECTION, SOLUTION INTRAMUSCULAR; INTRAVENOUS
Status: DISCONTINUED | OUTPATIENT
Start: 2024-11-26 | End: 2024-11-26

## 2024-11-26 RX ORDER — ONDANSETRON HYDROCHLORIDE 2 MG/ML
4 INJECTION, SOLUTION INTRAVENOUS ONCE AS NEEDED
Status: DISCONTINUED | OUTPATIENT
Start: 2024-11-26 | End: 2024-11-26 | Stop reason: HOSPADM

## 2024-11-26 RX ORDER — ONDANSETRON HYDROCHLORIDE 2 MG/ML
4 INJECTION, SOLUTION INTRAVENOUS
Status: COMPLETED | OUTPATIENT
Start: 2024-11-26 | End: 2024-11-26

## 2024-11-26 RX ORDER — IPRATROPIUM BROMIDE AND ALBUTEROL SULFATE 2.5; .5 MG/3ML; MG/3ML
3 SOLUTION RESPIRATORY (INHALATION) ONCE AS NEEDED
Status: DISCONTINUED | OUTPATIENT
Start: 2024-11-26 | End: 2024-11-26 | Stop reason: HOSPADM

## 2024-11-26 RX ORDER — PROPOFOL 10 MG/ML
VIAL (ML) INTRAVENOUS
Status: DISCONTINUED | OUTPATIENT
Start: 2024-11-26 | End: 2024-11-26

## 2024-11-26 RX ORDER — PANTOPRAZOLE SODIUM 40 MG/10ML
40 INJECTION, POWDER, LYOPHILIZED, FOR SOLUTION INTRAVENOUS DAILY
Status: DISCONTINUED | OUTPATIENT
Start: 2024-11-26 | End: 2024-12-03 | Stop reason: HOSPADM

## 2024-11-26 RX ORDER — GLUCAGON 1 MG
1 KIT INJECTION
Status: DISCONTINUED | OUTPATIENT
Start: 2024-11-26 | End: 2024-11-26 | Stop reason: HOSPADM

## 2024-11-26 RX ORDER — NALOXONE HCL 0.4 MG/ML
0.02 VIAL (ML) INJECTION
Status: DISCONTINUED | OUTPATIENT
Start: 2024-11-26 | End: 2024-12-03 | Stop reason: HOSPADM

## 2024-11-26 RX ORDER — IBUPROFEN 200 MG
24 TABLET ORAL
Status: DISCONTINUED | OUTPATIENT
Start: 2024-11-26 | End: 2024-11-26

## 2024-11-26 RX ORDER — SODIUM CHLORIDE, SODIUM LACTATE, POTASSIUM CHLORIDE, CALCIUM CHLORIDE 600; 310; 30; 20 MG/100ML; MG/100ML; MG/100ML; MG/100ML
125 INJECTION, SOLUTION INTRAVENOUS CONTINUOUS
Status: DISCONTINUED | OUTPATIENT
Start: 2024-11-26 | End: 2024-11-26

## 2024-11-26 RX ORDER — MEPERIDINE HYDROCHLORIDE 25 MG/ML
12.5 INJECTION INTRAMUSCULAR; INTRAVENOUS; SUBCUTANEOUS EVERY 10 MIN PRN
Status: CANCELLED | OUTPATIENT
Start: 2024-11-26 | End: 2024-11-27

## 2024-11-26 RX ORDER — HYDROCODONE BITARTRATE AND ACETAMINOPHEN 500; 5 MG/1; MG/1
TABLET ORAL ONCE
Status: DISCONTINUED | OUTPATIENT
Start: 2024-11-26 | End: 2024-12-03 | Stop reason: HOSPADM

## 2024-11-26 RX ORDER — OXYCODONE AND ACETAMINOPHEN 5; 325 MG/1; MG/1
1 TABLET ORAL
Status: CANCELLED | OUTPATIENT
Start: 2024-11-26

## 2024-11-26 RX ORDER — MORPHINE SULFATE 4 MG/ML
4 INJECTION, SOLUTION INTRAMUSCULAR; INTRAVENOUS
Status: DISCONTINUED | OUTPATIENT
Start: 2024-11-26 | End: 2024-11-26

## 2024-11-26 RX ORDER — IBUPROFEN 200 MG
16 TABLET ORAL
Status: DISCONTINUED | OUTPATIENT
Start: 2024-11-26 | End: 2024-11-26

## 2024-11-26 RX ORDER — METHOCARBAMOL 100 MG/ML
500 INJECTION, SOLUTION INTRAMUSCULAR; INTRAVENOUS EVERY 8 HOURS
Status: DISPENSED | OUTPATIENT
Start: 2024-11-26 | End: 2024-11-28

## 2024-11-26 RX ORDER — ONDANSETRON HYDROCHLORIDE 2 MG/ML
4 INJECTION, SOLUTION INTRAVENOUS EVERY 4 HOURS PRN
Status: DISCONTINUED | OUTPATIENT
Start: 2024-11-26 | End: 2024-12-03 | Stop reason: HOSPADM

## 2024-11-26 RX ORDER — ALUMINUM HYDROXIDE, MAGNESIUM HYDROXIDE, AND SIMETHICONE 1200; 120; 1200 MG/30ML; MG/30ML; MG/30ML
30 SUSPENSION ORAL 4 TIMES DAILY PRN
Status: DISCONTINUED | OUTPATIENT
Start: 2024-11-26 | End: 2024-11-26

## 2024-11-26 RX ORDER — ALBUMIN HUMAN 250 G/1000ML
SOLUTION INTRAVENOUS
Status: DISCONTINUED | OUTPATIENT
Start: 2024-11-26 | End: 2024-11-26

## 2024-11-26 RX ORDER — SUCCINYLCHOLINE CHLORIDE 20 MG/ML
INJECTION INTRAMUSCULAR; INTRAVENOUS
Status: DISCONTINUED | OUTPATIENT
Start: 2024-11-26 | End: 2024-11-26

## 2024-11-26 RX ORDER — ACETAMINOPHEN 10 MG/ML
1000 INJECTION, SOLUTION INTRAVENOUS EVERY 8 HOURS
Status: DISPENSED | OUTPATIENT
Start: 2024-11-26 | End: 2024-11-27

## 2024-11-26 RX ORDER — ENOXAPARIN SODIUM 100 MG/ML
40 INJECTION SUBCUTANEOUS EVERY 24 HOURS
Status: DISCONTINUED | OUTPATIENT
Start: 2024-11-26 | End: 2024-12-03 | Stop reason: HOSPADM

## 2024-11-26 RX ORDER — GLUCAGON 1 MG
1 KIT INJECTION
Status: DISCONTINUED | OUTPATIENT
Start: 2024-11-26 | End: 2024-12-03 | Stop reason: HOSPADM

## 2024-11-26 RX ORDER — DEXAMETHASONE SODIUM PHOSPHATE 4 MG/ML
INJECTION, SOLUTION INTRA-ARTICULAR; INTRALESIONAL; INTRAMUSCULAR; INTRAVENOUS; SOFT TISSUE
Status: DISCONTINUED | OUTPATIENT
Start: 2024-11-26 | End: 2024-11-26

## 2024-11-26 RX ORDER — FENTANYL CITRATE 50 UG/ML
INJECTION, SOLUTION INTRAMUSCULAR; INTRAVENOUS
Status: DISCONTINUED | OUTPATIENT
Start: 2024-11-26 | End: 2024-11-26

## 2024-11-26 RX ORDER — ROCURONIUM BROMIDE 10 MG/ML
INJECTION, SOLUTION INTRAVENOUS
Status: DISCONTINUED | OUTPATIENT
Start: 2024-11-26 | End: 2024-11-26

## 2024-11-26 RX ADMIN — HYDROMORPHONE HYDROCHLORIDE 0.4 MG: 2 INJECTION, SOLUTION INTRAMUSCULAR; INTRAVENOUS; SUBCUTANEOUS at 03:11

## 2024-11-26 RX ADMIN — PROPOFOL 80 MG: 10 INJECTION, EMULSION INTRAVENOUS at 12:11

## 2024-11-26 RX ADMIN — Medication 100 MCG: at 12:11

## 2024-11-26 RX ADMIN — DEXAMETHASONE SODIUM PHOSPHATE 8 MG: 4 INJECTION, SOLUTION INTRA-ARTICULAR; INTRALESIONAL; INTRAMUSCULAR; INTRAVENOUS; SOFT TISSUE at 12:11

## 2024-11-26 RX ADMIN — ONDANSETRON 4 MG: 2 INJECTION INTRAMUSCULAR; INTRAVENOUS at 01:11

## 2024-11-26 RX ADMIN — ROCURONIUM BROMIDE 40 MG: 10 SOLUTION INTRAVENOUS at 12:11

## 2024-11-26 RX ADMIN — SUCCINYLCHOLINE CHLORIDE 160 MG: 20 INJECTION, SOLUTION INTRAMUSCULAR; INTRAVENOUS at 12:11

## 2024-11-26 RX ADMIN — SODIUM CHLORIDE: 9 INJECTION, SOLUTION INTRAVENOUS at 12:11

## 2024-11-26 RX ADMIN — SODIUM CHLORIDE, POTASSIUM CHLORIDE, SODIUM LACTATE AND CALCIUM CHLORIDE: 600; 310; 30; 20 INJECTION, SOLUTION INTRAVENOUS at 03:11

## 2024-11-26 RX ADMIN — SODIUM CHLORIDE, POTASSIUM CHLORIDE, SODIUM LACTATE AND CALCIUM CHLORIDE: 600; 310; 30; 20 INJECTION, SOLUTION INTRAVENOUS at 11:11

## 2024-11-26 RX ADMIN — LIDOCAINE HYDROCHLORIDE 80 MG: 20 INJECTION, SOLUTION INTRAVENOUS at 12:11

## 2024-11-26 RX ADMIN — PIPERACILLIN SODIUM AND TAZOBACTAM SODIUM 4.5 G: 4; .5 INJECTION, POWDER, LYOPHILIZED, FOR SOLUTION INTRAVENOUS at 12:11

## 2024-11-26 RX ADMIN — ROCURONIUM BROMIDE 20 MG: 10 SOLUTION INTRAVENOUS at 01:11

## 2024-11-26 RX ADMIN — ALBUMIN (HUMAN) 100 ML: 12.5 SOLUTION INTRAVENOUS at 02:11

## 2024-11-26 RX ADMIN — PANTOPRAZOLE SODIUM 40 MG: 40 INJECTION, POWDER, FOR SOLUTION INTRAVENOUS at 09:11

## 2024-11-26 RX ADMIN — Medication 200 MCG: at 12:11

## 2024-11-26 RX ADMIN — MORPHINE SULFATE 4 MG: 4 INJECTION INTRAVENOUS at 01:11

## 2024-11-26 RX ADMIN — SODIUM CHLORIDE, POTASSIUM CHLORIDE, SODIUM LACTATE AND CALCIUM CHLORIDE 1000 ML: 600; 310; 30; 20 INJECTION, SOLUTION INTRAVENOUS at 02:11

## 2024-11-26 RX ADMIN — PROPOFOL 40 MG: 10 INJECTION, EMULSION INTRAVENOUS at 01:11

## 2024-11-26 RX ADMIN — ETOMIDATE 8 MG: 2 INJECTION INTRAVENOUS at 12:11

## 2024-11-26 RX ADMIN — FENTANYL CITRATE 100 MCG: 50 INJECTION, SOLUTION INTRAMUSCULAR; INTRAVENOUS at 12:11

## 2024-11-26 RX ADMIN — SUGAMMADEX 200 MG: 100 INJECTION, SOLUTION INTRAVENOUS at 02:11

## 2024-11-26 RX ADMIN — ENOXAPARIN SODIUM 40 MG: 40 INJECTION SUBCUTANEOUS at 05:11

## 2024-11-26 RX ADMIN — CEFAZOLIN 2 G: 330 INJECTION, POWDER, FOR SOLUTION INTRAMUSCULAR; INTRAVENOUS at 12:11

## 2024-11-26 NOTE — ANESTHESIA PROCEDURE NOTES
Intubation    Date/Time: 11/26/2024 12:24 PM    Performed by: Tracy Og CRNA  Authorized by: Maciej Scott MD    Intubation:     Induction:  Rapid sequence induction    Intubated:  Postinduction    Mask Ventilation:  Not attempted    Attempts:  1    Attempted By:  CRNA    Method of Intubation:  Video laryngoscopy    Blade:  Molina 3    Laryngeal View Grade: Grade I - full view of cords      Difficult Airway Encountered?: No      Complications:  None    Airway Device:  Oral endotracheal tube    Airway Device Size:  7.0    Style/Cuff Inflation:  Cuffed (inflated to minimal occlusive pressure)    Inflation Amount (mL):  6    Tube secured:  21    Secured at:  The teeth    Placement Verified By:  Capnometry    Complicating Factors:  None    Findings Post-Intubation:  BS equal bilateral and atraumatic/condition of teeth unchanged

## 2024-11-26 NOTE — OR NURSING
Bernal catheter insertion attempted prior to procedure start. Due to difficulty with bernal insertion, Dr. Velazquez stated that no bernal was needed and to proceed with prepping the patient. Bleeding noted in perineal area after bernal insertion attempted and Dr. Velazquez notified by Karuna Garibay RN.

## 2024-11-26 NOTE — ED NOTES
Pt NGT flushed with 20mL of tap water to loosen up gastric contents. Pt placed on low intermittent suction.

## 2024-11-26 NOTE — ANESTHESIA POSTPROCEDURE EVALUATION
Anesthesia Post Evaluation    Patient: Joy Donahue    Procedure(s) Performed: Procedure(s) (LRB):  LAPAROTOMY, EXPLORATORY (N/A)  EXCISION, SMALL INTESTINE    Final Anesthesia Type: general      Patient location during evaluation: PACU  Patient participation: Yes- Able to Participate  Level of consciousness: awake and alert and oriented  Post-procedure vital signs: reviewed and stable  Pain management: adequate  Airway patency: patent    PONV status at discharge: No PONV  Anesthetic complications: no      Cardiovascular status: blood pressure returned to baseline  Respiratory status: unassisted, spontaneous ventilation and room air  Hydration status: euvolemic  Follow-up not needed.              Vitals Value Taken Time   /51 11/26/24 1541   Temp  11/26/24 1558   Pulse 76 11/26/24 1550   Resp 14 11/26/24 1550   SpO2 100 % 11/26/24 1549   Vitals shown include unfiled device data.      No case tracking events are documented in the log.      Pain/Nadine Score: Pain Rating Prior to Med Admin: 8 (11/26/2024  3:15 PM)  Nadine Score: 8 (11/26/2024  3:15 PM)

## 2024-11-26 NOTE — ED TRIAGE NOTES
Hx of liver cirrhosis and hep C. Pt c/o epigastric pain, nausea, vomiting, SOB since last PM. Seen @ [facility] today for same complaints but symptoms have worsened. Reports hx CHF and a-fib. Denies use of thinners.

## 2024-11-26 NOTE — PT/OT/SLP PROGRESS
Physical Therapy Treatment    Patient Name:  Joy Donahue   MRN:  [id number]    Spoke with family. Patient recently returned from surgery. Asked for PT to return tomorrow.

## 2024-11-26 NOTE — ED PROVIDER NOTES
Encounter Date: 11/25/2024    SCRIBE #1 NOTE: I, Esperanza Sanchez, am scribing for, and in the presence of,  Gulshan Tavares MD. I have scribed the following portions of the note - Other sections scribed: HPI, ROS, PE.       History     Chief Complaint   Patient presents with    Abdominal Pain     Hx of liver cirrhosis and hep C. Pt c/o epigastric pain, nausea, vomiting, SOB since last PM. Seen @ [facility] today for same complaints but symptoms have worsened. Reports hx CHF and a-fib. Denies use of thinners.     Patient is an 84-year-old female with a history of CHF, hepatitis C, cirrhosis of the liver with ascites, and paroxysmal A-fib presenting to the ED with c/o abdominal pain. The pt reports mid abdominal pain onset last night that worsened this morning. She states that she presented to the [facility] prior to presenting here and notes that all tests performed on her heart were negative. She was given a GI cocktail which she notes relieved her symptoms.     The history is provided by the patient. No  was used.     Review of patient's allergies indicates:   Allergen Reactions    Ciprofloxacin Swelling    Iodine Shortness Of Breath    Latex Itching    Fluoride      Mouth sores    Fluoride preparations Dermatitis     Other reaction(s): Ulcer of mouth     Past Medical History:   Diagnosis Date    Acute (reversible) ischemia of intestine, part and extent unspecified 08/24/2022    Acute cystitis without hematuria 08/14/2023    Cirrhosis of liver with ascites 02/14/2023    Continue to follow up with hepatologist    COVID     x 2-residual dysgeusia    Hepatitis C     treated/cured previously    Pacemaker     Paroxysmal atrial fibrillation     Pericardial cyst     Skin cancer     Thrombus      Past Surgical History:   Procedure Laterality Date    APPENDECTOMY      CARDIAC PACEMAKER PLACEMENT      cataract surgery      FOOT SURGERY Left     HYSTERECTOMY      LAPAROSCOPIC CHOLECYSTECTOMY       parathyroid resection      REPEAT ABDOMINAL PARACENTESIS      SKIN CANCER EXCISION      spot removed Left     left ankle spot removed    THROMBECTOMY       Family History   Problem Relation Name Age of Onset    Heart disease Mother      Heart disease Father      Hypertension Father      Pacemaker/defibrilator Father      Clotting disorder Sister Elizabeth     Heart failure Brother Anolivia     Pacemaker/defibrilator Brother Chuck     No Known Problems Maternal Grandmother      No Known Problems Maternal Grandfather      No Known Problems Paternal Grandmother      No Known Problems Paternal Grandfather      Drug abuse Daughter Rimam     Heart disease Daughter Johnnie         possibly related to covid vaccine    No Known Problems Son Piotr      Social History     Tobacco Use    Smoking status: Never    Smokeless tobacco: Never   Substance Use Topics    Alcohol use: Not Currently    Drug use: Never     Review of Systems   Constitutional:  Negative for chills and fever.   HENT:  Negative for congestion, drooling and sore throat.    Eyes:  Negative for pain and visual disturbance.   Respiratory:  Negative for chest tightness, shortness of breath and wheezing.    Cardiovascular:  Negative for chest pain, palpitations and leg swelling.   Gastrointestinal:  Positive for abdominal pain (mid). Negative for nausea and vomiting.   Genitourinary:  Negative for dysuria and hematuria.   Musculoskeletal:  Negative for back pain, neck pain and neck stiffness.   Skin:  Negative for pallor and rash.   Neurological:  Negative for weakness and numbness.   Hematological:  Does not bruise/bleed easily.       Physical Exam     Initial Vitals [11/25/24 2203]   BP Pulse Resp Temp SpO2   (!) 163/87 76 18 97.9 °F (36.6 °C) 95 %      MAP       --         Physical Exam    Nursing note and vitals reviewed.  Constitutional: She appears well-developed and well-nourished. She is not diaphoretic. No distress.   The pt is elderly and frail-appearing.    HENT:   Head: Normocephalic and atraumatic.   Nose: Nose normal. Mouth/Throat: Oropharynx is clear and moist. Mucous membranes are dry.   Eyes: EOM are normal. Pupils are equal, round, and reactive to light.   Neck: Neck supple.   Normal range of motion.  Cardiovascular:  Normal rate and regular rhythm.           No murmur heard.  Pulmonary/Chest: Breath sounds normal. No respiratory distress. She has no wheezes. She has no rales.   Abdominal: Abdomen is soft. She exhibits no distension.   Epigastric tenderness to palpation with mild voluntary guarding.   Musculoskeletal:      Cervical back: Normal range of motion and neck supple.     Neurological: She is alert and oriented to person, place, and time. She has normal strength. No cranial nerve deficit or sensory deficit.   Skin: Skin is warm. Capillary refill takes less than 2 seconds. No rash noted.         ED Course   Critical Care    Date/Time: 11/26/2024 3:13 AM    Performed by: Gulshan Tavares MD  Authorized by: Gulshan Tavares MD  Direct patient critical care time: 35 minutes  Total critical care time (exclusive of procedural time) : 35 minutes  Critical care time was exclusive of separately billable procedures and treating other patients.  Critical care was time spent personally by me on the following activities: discussions with consultants, development of treatment plan with patient or surrogate, evaluation of patient's response to treatment, obtaining history from patient or surrogate, examination of patient, ordering and performing treatments and interventions, ordering and review of laboratory studies, ordering and review of radiographic studies, pulse oximetry, re-evaluation of patient's condition and review of old charts.        Labs Reviewed   COMPREHENSIVE METABOLIC PANEL - Abnormal       Result Value    Sodium 137      Potassium 4.1      Chloride 104      CO2 23      Glucose 139 (*)     Blood Urea Nitrogen 17.4      Creatinine 0.86       Calcium 9.6      Protein Total 6.7      Albumin 3.4      Globulin 3.3      Albumin/Globulin Ratio 1.0 (*)     Bilirubin Total 3.0 (*)     ALP 87      ALT 28      AST 39 (*)     eGFR >60      Anion Gap 10.0      BUN/Creatinine Ratio 20     LACTIC ACID, PLASMA - Abnormal    Lactic Acid Level 2.3 (*)    PROTIME-INR - Abnormal    PT 15.7 (*)     INR 1.2     CBC WITH DIFFERENTIAL - Abnormal    WBC 12.69 (*)     RBC 5.02      Hgb 15.1      Hct 46.4      MCV 92.4      MCH 30.1      MCHC 32.5 (*)     RDW 15.8      Platelet 63 (*)     MPV 11.9 (*)     Neut % 75.9      Lymph % 10.3      Mono % 11.7      Eos % 0.2      Basophil % 0.4      Lymph # 1.31      Neut # 9.63 (*)     Mono # 1.48 (*)     Eos # 0.03      Baso # 0.05      IG# 0.19 (*)     IG% 1.5      NRBC% 0.0      IPF 7.6     LACTIC ACID, PLASMA - Abnormal    Lactic Acid Level 2.6 (*)    LIPASE - Normal    Lipase Level 26     MAGNESIUM - Normal    Magnesium Level 2.00     TROPONIN I - Normal    Troponin-I 0.020     BLOOD CULTURE [facility]   BLOOD CULTURE [facility]   CBC W/ AUTO DIFFERENTIAL    Narrative:     The following orders were created for panel order CBC auto differential.  Procedure                               Abnormality         Status                     ---------                               -----------         ------                     CBC with Differential[[id number]]       Abnormal            Final result                 Please view results for these tests on the individual orders.   LACTIC ACID, PLASMA   PHOSPHORUS   MAGNESIUM   COMPREHENSIVE METABOLIC PANEL   CBC W/ AUTO DIFFERENTIAL    Narrative:     The following orders were created for panel order CBC Auto Differential.  Procedure                               Abnormality         Status                     ---------                               -----------         ------                     CBC with Differential[[id number]]                                                        Please view results for these  tests on the individual orders.   CBC WITH DIFFERENTIAL          Imaging Results              XR Gastric tube check, non-radiologist performed (In process)                      CT Abdomen Pelvis With IV Contrast NO Oral Contrast (Preliminary result)  Result time 11/25/24 23:47:08      Preliminary result by Jani Constantino MD (11/25/24 23:47:08)                   Narrative:    START OF REPORT:  Technique: CT of the abdomen and pelvis was performed with axial images as well as sagittal and coronal reconstruction images with intravenous contrast.    Comparison: None available.    Clinical History: Epigastric pain.    Dosage Information: Automated Exposure Control was utilized 758.35 mGy.cm.    Findings:  Lines and Tubes: None.  Thorax:  Lungs: Minimal hazy and linear opacity is present at the visualized lung bases, consistent with nonspecific dependent changes scarring and atelectasis. No focal infiltrate or consolidation is seen.  Pleura: No effusions or thickening are seen.  Heart: The heart size is within normal limits.  Abdomen:  Abdominal Wall: No abdominal wall pathology is seen.  Liver: The liver is atrophic with nodular contours consistent with cirrhosis.  Biliary System: No intrahepatic or extrahepatic biliary duct dilatation is seen.  Gallbladder: Surgical clips are seen in the gallbladder fossa consistent with prior cholecystectomy.  Pancreas: A few cysts are identified in the pancreatic head and tail measuring up to 0.8 cm (Series 2 Image 33).  Spleen: There are a few cysts seen in the spleen measuring up to 1.5 cm (Series 2 Image 37).  Adrenals: The adrenal glands appear unremarkable.  Kidneys: Multiple cysts are identified in the left kidney the largest of which measures 3.6 cm is on Image 48, Series 2 in the mid pole of the left kidney. Multiple cysts are identified in the right kidney the largest of which measures 5.1 cm is on Image 27, Series 2 in the upper pole of the right kidney. The bilateral  kidneys otherwise appear unremarkable with no stones or hydronephrosis.  Aorta: The visualized abdominal aorta appears unremarkable.  IVC: Unremarkable.  Bowel:  Esophagus: There is a small hiatal hernia.  Stomach: The stomach appears unremarkable.  Duodenum: Unremarkable appearing duodenum.  Small Bowel: There are proximal loops of jejunum seen starting on image 32 series 2 through image 100 series 2 which demonstrate profound wall thickening and edema as well as interloop fluid and pronounced mucosal enhancement. There is a small mesenteric swirl just to the left of midline anterior to the abnormal loops seen on images 66 through 80 of axial series 2. The transition from the normal duodenum and most proximal jejunum to the abnormal segments is relatively gradual as is the transition from the abnormal jejunal segments 2 normal jejunum. The rest of the small bowel appears unremarkable.  Colon: Nondistended. Multiple diverticula are seen throughout the colon. No associated inflammatory stranding or pericolonic fluid is seen to suggest diverticulitis.  Appendix: The appendix is not identified but no inflammatory changes are seen in the right lower quadrant to suggest appendicitis.  Peritoneum: Minimal intraperitoneal free fluid is seen.    Pelvis:  Bladder: The bladder appears unremarkable.  Female:  Uterus: The uterus is not identified consistent with history of hysterectomy.  Ovaries: No adnexal masses are seen.    Bony structures:  Dorsal Spine: There is mild to moderate spondylosis of the visualized dorsal spine.  Bony Pelvis: The visualized bony structures of the pelvis appear unremarkable.    Notifications: The results were discussed with the emergency room physician (Dr Tavares) prior to dictation at 2024-11-25 23:39:40 CST.      Impression:  1. There are proximal loops of jejunum seen starting on image 32 series 2 through image 100 series 2 which demonstrate profound wall thickening and edema as well as  interloop fluid and pronounced mucosal enhancement. There is a small mesenteric swirl just to the left of midline anterior to the abnormal loops seen on images 66 through 80 of axial series 2. The transition from the normal duodenum and most proximal jejunum to the abnormal segments is relatively gradual as is the transition from the abnormal jejunal segments 2 normal jejunum. This is consistent with profound proximal jejunitis of uncertain etiology with the possibility of segmental volvulus versus inflammatory or infectious jejunitis considered. Correlate with clinical and laboratory findings.  2. Details and other findings as discussed above.                                         X-Ray Chest AP Portable (In process)                      Medications   lactated ringers infusion (has no administration in time range)   sodium chloride 0.9% flush 10 mL (has no administration in time range)   melatonin tablet 6 mg (has no administration in time range)   ondansetron injection 4 mg (has no administration in time range)   aluminum-magnesium hydroxide-simethicone 200-200-20 mg/5 mL suspension 30 mL (has no administration in time range)   acetaminophen tablet 1,000 mg (has no administration in time range)   naloxone 0.4 mg/mL injection 0.02 mg (has no administration in time range)   glucose chewable tablet 16 g (has no administration in time range)   glucose chewable tablet 24 g (has no administration in time range)   glucagon (human recombinant) injection 1 mg (has no administration in time range)   enoxaparin injection 40 mg (has no administration in time range)   dextrose 10% bolus 125 mL 125 mL (has no administration in time range)   dextrose 10% bolus 250 mL 250 mL (has no administration in time range)   diphenhydrAMINE injection 25 mg (25 mg Intravenous Given 11/25/24 2246)   methylPREDNISolone sodium succinate injection 125 mg (125 mg Intravenous Given 11/25/24 2245)   famotidine (PF) injection 20 mg (20 mg  Intravenous Given 11/25/24 2258)   lactated ringers bolus 1,000 mL (0 mLs Intravenous Stopped 11/25/24 2358)   morphine injection 4 mg (4 mg Intravenous Given 11/25/24 2246)   ondansetron injection 4 mg (4 mg Intravenous Given 11/25/24 2246)   iohexoL (OMNIPAQUE 350) injection 100 mL (100 mLs Intravenous Given 11/25/24 2306)   piperacillin-tazobactam (ZOSYN) 4.5 g in D5W 100 mL IVPB (MB+) (0 g Intravenous Stopped 11/26/24 0040)   ondansetron injection 4 mg (4 mg Intravenous Given 11/26/24 0123)   lactated ringers bolus 1,000 mL (1,000 mLs Intravenous New Bag 11/26/24 0224)     Medical Decision Making  Differential diagnosis (includes but is not limited to):   Pancreatitis, biliary pathology, intra-abdominal infection, SBO, ileus, ischemia, infarction, dehydration, kidney injury, electrolyte abnormalities    MDM Narrative  84-year-old female presents for evaluation of acute onset of epigastric pain.  She was seen at an outside hospital where she underwent EKG and GI cocktail that improved her symptoms so she was discharged home.  However, she states when she arrived home her symptoms began to worsen so she came in for re-evaluation.  She has significant tenderness to palpation to the epigastric region.  EKG and chest x-ray reviewed.  Labs reviewed, leukocytosis noted.  CT of the abdomen pelvis performed (patient reports a contrast allergy, able to tolerate with Benadryl and steroids which have been ordered), I was contacted by the radiologist to review the films, findings are concerning for possible significant jejunal inflammation or infection versus possible segmental volvulus.  Broad-spectrum antibiotics initiated with Zosyn, aggressive IV fluid resuscitation ongoing.  Blood cultures ordered.  General surgery consulted and has evaluated the patient at bedside, they will follow in consult, NG tube placed with placement confirmed by x-ray, recommends admission to the medicine service.  Hospital Medicine consulted  "and will admit.    Dispo: Admit    My independent radiology interpretation: as above  Point of care US (independently performed and interpreted):   Decision rules/clinical scoring:     Sepsis Perfusion Assessment: "I attest a sepsis perfusion exam was performed within 6 hours of sepsis, severe sepsis, or septic shock presentation, following fluid resuscitation."     Amount and/or Complexity of Data Reviewed  Independent historian: none   Summary of history:   External data reviewed: notes from previous ED visits and notes from clinic visits  Summary of data reviewed: Prior records reviewed  Risk and benefits of testing: discussed   Labs: ordered and reviewed  Radiology: ordered and independent interpretation performed (see above or ED course)  ECG/medicine tests: ordered and independent interpretation performed (see above or ED course)  Discussion of management or test interpretation with external provider(s): discussed with hospitalist physician and discussed with surgery consultant   Summary of discussion: as above    Risk  Parenteral controlled substances   Drug therapy requiring intense monitoring for toxicity   Decision regarding hospitalization  Shared decision making     Critical Care  30-74 minutes     Data Reviewed/Counseling: I have personally reviewed the patient's vital signs, nursing notes, and other relevant tests, information, and imaging. I had a detailed discussion regarding the historical points, exam findings, and any diagnostic results supporting the discharge diagnosis. I personally performed the history, PE, MDM and procedures as documented above and agree with the scribe's documentation.    Portions of this note were dictated using voice recognition software. Although it was reviewed for accuracy, some inherent voice recognition errors may have occurred and may be present in this document.         Scribe Attestation:   Scribe #1: I performed the above scribed service and the documentation " accurately describes the services I performed. I attest to the accuracy of the note.    Attending Attestation:           Physician Attestation for Scribe:  Physician Attestation Statement for Scribe #1: I, Gulshan Tavares MD, reviewed documentation, as scribed by Esperanza Sanchez in my presence, and it is both accurate and complete.             ED Course as of 11/26/24 0330   Mon Nov 25, 2024   2254 X-Ray Chest AP Portable  Independently visualized/reviewed by me during the ED visit.  - PPM in place, no PTX, no lobar consolidation, no free air [MC]   2357 Paged general surgery. [RB]   Tue Nov 26, 2024   0009 General surgery will evaluate the patient at bedside. [MC]   0302 Paged hospitalist. [RB]   0330 XR Gastric tube check, non-radiologist performed  Independently visualized/reviewed by me during the ED visit.  - NGT positioning appears appropriate [MC]      ED Course User Index  [MC] Gulshan Tavares MD  [RB] Esperanza Sanchez                           Clinical Impression:  Final diagnoses:  [R10.13] Epigastric abdominal pain  [K52.9] Jejunitis (Primary)  [K56.2] Volvulus  [D72.829] Leukocytosis, unspecified type  [R65.10] SIRS (systemic inflammatory response syndrome)          ED Disposition Condition    Admit Stable                Gulshan Tavares MD  11/26/24 0316       Gulshan Tavares MD  11/26/24 0330

## 2024-11-26 NOTE — BRIEF OP NOTE
[facility] - Periop Services  Brief Operative Note    SUMMARY     Surgery Date: 11/26/2024     Surgeons and Role:     * Diogenes Velazquez MD - Primary    Assisting Surgeon: None    Pre-op Diagnosis:  Volvulus [K56.2]    Post-op Diagnosis:  Post-Op Diagnosis Codes:     * Volvulus [K56.2]    Procedure(s) (LRB):  LAPAROTOMY, EXPLORATORY (N/A)  EXCISION, SMALL INTESTINE    Anesthesia: General    Implants:  * No implants in log *    Operative Findings: Surgical site was prepped and draped in the sterile fashion. Midline exploratory laparotomy incision made approximately 10cm inferior to xyphoid process and 7cm inferior to umbilicus. Adhesions were taken down to separate bowel/omentum from anterior abdominal wall. The bowel was run from ligament of Treitz to terminal ileum. Several adhesions were lysed and volvulus of omentum/bowel was unfurled as the bowel was run. A 53cm segment of proximal jejunum was found to be discolored/darkened, enlarged, and did not have peristalsis. Nargis area of jejunum was resected en block using a 75mm blue load MARY linear stapler. The small bowel end staple lines were reapproximated and reanastomosed with an antiperistaltic stapled side to side functional end to end technique. The staple line was over sewn with lambert sutures and a mesenteric defect was closed to prevent internal herniation post operatively. The abdomen was irrigated several times and the bowel was rerun to ensure it was contiguous and without defect. The anterior and posterior abdominal fascia were sewn in 1cm running bites without tension. The suture line was interrogated, found to be closed and was again irrigated. The skin incision was closed with staples and a sterile island dressing was applied over the staple line.     Estimated Blood Loss: 50 mL    Estimated Blood Loss has not been documented. EBL = 10mL.         Specimens:   Specimen (24h ago, onward)       Start     Ordered    11/26/24 1340  Specimen  to Pathology  RELEASE UPON ORDERING        References:    Click here for ordering Quick Tip   Question:  Release to patient  Answer:  Immediate    11/26/24 1345                    [id number]    July Eaton MD   [employer] General Surgery PGY-1

## 2024-11-26 NOTE — ANESTHESIA PREPROCEDURE EVALUATION
11/26/2024  Joy Donahue is a 84 y.o., female for colon resction.      Pre-op Assessment    I have reviewed the Patient Summary Reports.     I have reviewed the Nursing Notes. I have reviewed the NPO Status.   I have reviewed the Medications.     Review of Systems  Anesthesia Hx:  No problems with previous Anesthesia             Denies Family Hx of Anesthesia complications.    Denies Personal Hx of Anesthesia complications.                    Social:  Non-Smoker, No Alcohol Use       Hematology/Oncology:  Hematology Normal   Oncology Normal                                   EENT/Dental:  EENT/Dental Normal           Cardiovascular:         Dysrhythmias atrial fibrillation                                     Pulmonary:  Pulmonary Normal                       Renal/:  Renal/ Normal                 Hepatic/GI:       Hepatitis, C              Neurological:  Neurology Normal                                      Endocrine:  Endocrine Normal            Dermatological:  Skin Normal    Psych:  Psychiatric Normal                    Physical Exam  General: Well nourished, Cooperative, Alert and Oriented    Airway:  Mallampati: II / II  Mouth Opening: Normal  TM Distance: Normal  Tongue: Normal  Neck ROM: Normal ROM    Dental:  Intact        Anesthesia Plan  Type of Anesthesia, risks & benefits discussed:    Anesthesia Type: Gen ETT, Gen Supraglottic Airway, Gen Natural Airway  Intra-op Monitoring Plan: Standard ASA Monitors  Post Op Pain Control Plan: IV/PO Opioids PRN  Induction:  IV  Airway Plan: Direct, Post-Induction  Informed Consent: Informed consent signed with the Patient and all parties understand the risks and agree with anesthesia plan.  All questions answered.   ASA Score: 3  Day of Surgery Review of History & Physical: I have interviewed and examined the patient. I have reviewed the patient's H&P  dated:     Ready For Surgery From Anesthesia Perspective.     .

## 2024-11-26 NOTE — CONSULTS
"Acute Care and General Surgery   Consult Note  Admit Date: 11/25/2024  HD#0  POD#* No surgery found *    HPI  Joy Donahue 84 year old female with extensive medical history who presents with sudden onset epigastric pain since today. Patient reports that she has a history of these intermittent pain episodes. She has had "3 episodes of similar pain" in the last 5 years or so. This was further confirmed by her son. Patient reports that she has had nausea and vomiting associated with this. Unable to confirm if patient has had bowel movement or passed flatus recently.     Past Medical History:   Diagnosis Date    Acute (reversible) ischemia of intestine, part and extent unspecified 08/24/2022    Acute cystitis without hematuria 08/14/2023    Cirrhosis of liver with ascites 02/14/2023    Continue to follow up with hepatologist    COVID     x 2-residual dysgeusia    Hepatitis C     treated/cured previously    Pacemaker     Paroxysmal atrial fibrillation     Pericardial cyst     Skin cancer     Thrombus      Past Surgical History:   Procedure Laterality Date    APPENDECTOMY      CARDIAC PACEMAKER PLACEMENT      cataract surgery      FOOT SURGERY Left     HYSTERECTOMY      LAPAROSCOPIC CHOLECYSTECTOMY      parathyroid resection      REPEAT ABDOMINAL PARACENTESIS      SKIN CANCER EXCISION      spot removed Left     left ankle spot removed    THROMBECTOMY       Home Meds:  Current Outpatient Medications   Medication Instructions    lactulose (CHRONULAC) 20 g, Oral, Daily PRN    rifAXIMin (XIFAXAN) 550 mg, Oral, 2 times daily    spironolactone (ALDACTONE) 25 mg, Daily    sucralfate (CARAFATE) 1 gram tablet No dose, route, or frequency recorded.      Scheduled Meds:   lactated ringers  1,000 mL Intravenous ED 1 Time     Continuous Infusions:   lactated ringers   Intravenous Continuous         PRN Meds:     Objective:     VITAL SIGNS: 24 HR MIN & MAX LAST   Temp  Min: 97.9 °F (36.6 °C)  Max: 97.9 °F (36.6 °C)  97.9 °F (36.6 " "°C)   BP  Min: 133/81  Max: 188/81  133/81    Pulse  Min: 58  Max: 78  75    Resp  Min: 13  Max: 21  13    SpO2  Min: 95 %  Max: 100 %  100 %      HT: 5' 6" (167.6 cm)  WT: 81.6 kg (180 lb)  BMI: 29.1     Intake/output:  Intake/Output - Last 3 Shifts       None          No intake or output data in the 24 hours ending 11/26/24 0221      Lines/drains/airway:       Peripheral IV - Single Lumen 11/25/24 2230 20 G Anterior;Proximal;Right Forearm (Active)   Number of days: 0            NG/OG Tube 11/26/24 0135 16 Fr. Right nostril (Active)   Number of days: 0       Physical examination:  Gen: Drowsy, answering questions appropriately when aroused   HEENT: NG tube placed with dark, bilious, blood tinged output   CV: RR  Resp: NWOB  Abd: Soft/Tender to palpation in LUQ and epigastric area, + guarding, no rebound tenderness, no sign of peritonitis   Ext: moving all extremities spontaneously and purposefully  Neuro: CN II-XII grossly intact    Labs:  WBC/Hgb/Hct/Plts:  12.69/15.1/46.4/63 (11/25 2255)  Na/K/Cl/CO2:  137/4.1/104/23 (11/25 2255)  BUN/Cr/glu/ALT/AST/amyl/lip:  17.4/0.86/--/28/39/--/26 (11/25 2255)     Imaging:  CT A/P:   Impression:  1. There are proximal loops of jejunum seen starting on image 32 series 2 through image 100 series 2 which demonstrate profound wall thickening and edema as well as interloop fluid and pronounced mucosal enhancement. There is a small mesenteric swirl just to the left of midline anterior to the abnormal loops seen on images 66 through 80 of axial series 2. The transition from the normal duodenum and most proximal jejunum to the abnormal segments is relatively gradual as is the transition from the abnormal jejunal segments 2 normal jejunum. This is consistent with profound proximal jejunitis of uncertain etiology with the possibility of segmental volvulus versus inflammatory or infectious jejunitis considered. Correlate with clinical and laboratory findings.  2. Details and other " findings as discussed above.    Assessment & Plan:   Joy Donahue is a 84 y.o. 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 Velazquez and patient's son extensively. Patient with CT scan that is concernign for closed loop obstruction. Patient has a persistent lactate following IV fluid resuscitation. However, following NG tube placement patient has improved abdominal pain. She continues to have stable vital signs and she her mentation is improving. Discussed extensively with the patient's son that she has a high likelihood of failing non-operative management and requiring surgery due to high suspicion for developing bowel ischemia from closed loop obstruction likely from chronic scar tissue. The risk of bowel ischemia and worsening sequelae was talked with the family.  I discussed with him that the CT scans that we have to compare to do not appear to have this closed loop obstruction but it is possible that the prior pain episodes could have been a milder form of adhesive disease. I discussed offering surgery emergently now due to the concerning CT findings and remaining lactate. However, reasonably, considering her advanced age and multiple co-morbidities the patient and her family would like to continue non-operative management and allow for more time to try conservative non-operative management.     - continue IVF; 2L bolus given In ED  - continue zosyn  - continue serial abdominal exam   - trend lactate  - trend labs     Carlie Vargas  [employer] General Surgery, PGY4

## 2024-11-27 ENCOUNTER — TELEPHONE (OUTPATIENT)
Dept: ELECTROPHYSIOLOGY | Facility: CLINIC | Age: 84
End: 2024-11-27
Payer: MEDICARE

## 2024-11-27 LAB
ALBUMIN SERPL-MCNC: 3 G/DL (ref 3.4–4.8)
ALBUMIN/GLOB SERPL: 1.4 RATIO (ref 1.1–2)
ALP SERPL-CCNC: 48 UNIT/L (ref 40–150)
ALT SERPL-CCNC: 25 UNIT/L (ref 0–55)
ANION GAP SERPL CALC-SCNC: 11 MEQ/L
AST SERPL-CCNC: 46 UNIT/L (ref 5–34)
BASOPHILS # BLD AUTO: 0.01 X10(3)/MCL
BASOPHILS NFR BLD AUTO: 0.2 %
BILIRUB SERPL-MCNC: 2.4 MG/DL
BUN SERPL-MCNC: 34.8 MG/DL (ref 9.8–20.1)
CALCIUM SERPL-MCNC: 8.4 MG/DL (ref 8.4–10.2)
CHLORIDE SERPL-SCNC: 105 MMOL/L (ref 98–107)
CO2 SERPL-SCNC: 22 MMOL/L (ref 23–31)
CREAT SERPL-MCNC: 1.01 MG/DL (ref 0.55–1.02)
CREAT/UREA NIT SERPL: 34
EOSINOPHIL # BLD AUTO: 0 X10(3)/MCL (ref 0–0.9)
EOSINOPHIL NFR BLD AUTO: 0 %
ERYTHROCYTE [DISTWIDTH] IN BLOOD BY AUTOMATED COUNT: 16 % (ref 11.5–17)
GFR SERPLBLD CREATININE-BSD FMLA CKD-EPI: 55 ML/MIN/1.73/M2
GLOBULIN SER-MCNC: 2.1 GM/DL (ref 2.4–3.5)
GLUCOSE SERPL-MCNC: 150 MG/DL (ref 82–115)
HCT VFR BLD AUTO: 33.4 % (ref 37–47)
HGB BLD-MCNC: 10.8 G/DL (ref 12–16)
IMM GRANULOCYTES # BLD AUTO: 0.02 X10(3)/MCL (ref 0–0.04)
IMM GRANULOCYTES NFR BLD AUTO: 0.3 %
LYMPHOCYTES # BLD AUTO: 0.31 X10(3)/MCL (ref 0.6–4.6)
LYMPHOCYTES NFR BLD AUTO: 4.8 %
MAGNESIUM SERPL-MCNC: 2 MG/DL (ref 1.6–2.6)
MCH RBC QN AUTO: 30.3 PG (ref 27–31)
MCHC RBC AUTO-ENTMCNC: 32.3 G/DL (ref 33–36)
MCV RBC AUTO: 93.8 FL (ref 80–94)
MONOCYTES # BLD AUTO: 0.69 X10(3)/MCL (ref 0.1–1.3)
MONOCYTES NFR BLD AUTO: 10.6 %
NEUTROPHILS # BLD AUTO: 5.46 X10(3)/MCL (ref 2.1–9.2)
NEUTROPHILS NFR BLD AUTO: 84.1 %
NRBC BLD AUTO-RTO: 0 %
OHS QRS DURATION: 66 MS
OHS QTC CALCULATION: 454 MS
PHOSPHATE SERPL-MCNC: 3.3 MG/DL (ref 2.3–4.7)
PLATELET # BLD AUTO: 61 X10(3)/MCL (ref 130–400)
PMV BLD AUTO: 10.3 FL (ref 7.4–10.4)
POTASSIUM SERPL-SCNC: 4.1 MMOL/L (ref 3.5–5.1)
PREALB SERPL-MCNC: 11 MG/DL (ref 14–37)
PROT SERPL-MCNC: 5.1 GM/DL (ref 5.8–7.6)
RBC # BLD AUTO: 3.56 X10(6)/MCL (ref 4.2–5.4)
SODIUM SERPL-SCNC: 138 MMOL/L (ref 136–145)
WBC # BLD AUTO: 6.49 X10(3)/MCL (ref 4.5–11.5)

## 2024-11-27 PROCEDURE — 99900035 HC TECH TIME PER 15 MIN (STAT)

## 2024-11-27 PROCEDURE — 97116 GAIT TRAINING THERAPY: CPT

## 2024-11-27 PROCEDURE — 80053 COMPREHEN METABOLIC PANEL: CPT | Performed by: STUDENT IN AN ORGANIZED HEALTH CARE EDUCATION/TRAINING PROGRAM

## 2024-11-27 PROCEDURE — 97166 OT EVAL MOD COMPLEX 45 MIN: CPT

## 2024-11-27 PROCEDURE — 36415 COLL VENOUS BLD VENIPUNCTURE: CPT

## 2024-11-27 PROCEDURE — 97162 PT EVAL MOD COMPLEX 30 MIN: CPT

## 2024-11-27 PROCEDURE — 83735 ASSAY OF MAGNESIUM: CPT | Performed by: STUDENT IN AN ORGANIZED HEALTH CARE EDUCATION/TRAINING PROGRAM

## 2024-11-27 PROCEDURE — 36415 COLL VENOUS BLD VENIPUNCTURE: CPT | Performed by: STUDENT IN AN ORGANIZED HEALTH CARE EDUCATION/TRAINING PROGRAM

## 2024-11-27 PROCEDURE — 84134 ASSAY OF PREALBUMIN: CPT

## 2024-11-27 PROCEDURE — 93005 ELECTROCARDIOGRAM TRACING: CPT

## 2024-11-27 PROCEDURE — 11000001 HC ACUTE MED/SURG PRIVATE ROOM

## 2024-11-27 PROCEDURE — 63600175 PHARM REV CODE 636 W HCPCS

## 2024-11-27 PROCEDURE — 63600175 PHARM REV CODE 636 W HCPCS: Performed by: STUDENT IN AN ORGANIZED HEALTH CARE EDUCATION/TRAINING PROGRAM

## 2024-11-27 PROCEDURE — 93010 ELECTROCARDIOGRAM REPORT: CPT | Mod: ,,, | Performed by: STUDENT IN AN ORGANIZED HEALTH CARE EDUCATION/TRAINING PROGRAM

## 2024-11-27 PROCEDURE — 25000003 PHARM REV CODE 250: Performed by: INTERNAL MEDICINE

## 2024-11-27 PROCEDURE — 85025 COMPLETE CBC W/AUTO DIFF WBC: CPT | Performed by: STUDENT IN AN ORGANIZED HEALTH CARE EDUCATION/TRAINING PROGRAM

## 2024-11-27 PROCEDURE — 21400001 HC TELEMETRY ROOM

## 2024-11-27 PROCEDURE — 84100 ASSAY OF PHOSPHORUS: CPT | Performed by: STUDENT IN AN ORGANIZED HEALTH CARE EDUCATION/TRAINING PROGRAM

## 2024-11-27 PROCEDURE — 27000221 HC OXYGEN, UP TO 24 HOURS

## 2024-11-27 RX ORDER — SODIUM CHLORIDE, SODIUM LACTATE, POTASSIUM CHLORIDE, CALCIUM CHLORIDE 600; 310; 30; 20 MG/100ML; MG/100ML; MG/100ML; MG/100ML
INJECTION, SOLUTION INTRAVENOUS CONTINUOUS
Status: DISCONTINUED | OUTPATIENT
Start: 2024-11-27 | End: 2024-11-27

## 2024-11-27 RX ADMIN — SODIUM CHLORIDE, POTASSIUM CHLORIDE, SODIUM LACTATE AND CALCIUM CHLORIDE: 600; 310; 30; 20 INJECTION, SOLUTION INTRAVENOUS at 08:11

## 2024-11-27 RX ADMIN — MORPHINE SULFATE 2 MG: 4 INJECTION INTRAVENOUS at 11:11

## 2024-11-27 RX ADMIN — ENOXAPARIN SODIUM 40 MG: 40 INJECTION SUBCUTANEOUS at 04:11

## 2024-11-27 RX ADMIN — MORPHINE SULFATE 2 MG: 4 INJECTION INTRAVENOUS at 04:11

## 2024-11-27 RX ADMIN — PANTOPRAZOLE SODIUM 40 MG: 40 INJECTION, POWDER, FOR SOLUTION INTRAVENOUS at 08:11

## 2024-11-27 RX ADMIN — MORPHINE SULFATE 2 MG: 4 INJECTION INTRAVENOUS at 12:11

## 2024-11-27 RX ADMIN — LEUCINE, PHENYLALANINE, LYSINE, METHIONINE, ISOLEUCINE, VALINE, HISTIDINE, THREONINE, TRYPTOPHAN, ALANINE, GLYCINE, ARGININE, PROLINE, SERINE, TYROSINE, SODIUM ACETATE, DIBASIC POTASSIUM PHOSPHATE, MAGNESIUM CHLORIDE, SODIUM CHLORIDE, CALCIUM CHLORIDE, DEXTROSE
880; 489; 33; 5; 438; 204; 255; 311; 247; 51; 170; 238; 261; 289; 213; 297; 77; 179; 77; 17; 247 INJECTION INTRAVENOUS at 12:11

## 2024-11-27 NOTE — H&P
[facility] - 8th Floor [facility] MEDICINE - H&P ADMISSION NOTE      Patient Name: Joy Donahue  MRN: [id number]  Patient Class: IP- Inpatient   Admission Date:  November 25   Admitting Physician:  Service   Attending Physician: Michael Fallon MD  Primary Care Provider: Miya Silverio NP  Face-to-Face encounter date:  November 26        CHIEF COMPLAINT     Chief Complaint   Patient presents with    Abdominal Pain     Hx of liver cirrhosis and hep C. Pt c/o epigastric pain, nausea, vomiting, SOB since last PM. Seen @ [facility] today for same complaints but symptoms have worsened. Reports hx CHF and a-fib. Denies use of thinners.       HISTORY OF PRESENTING ILLNESS   84-year-old female with history of hepatitis-C through blood transfusion and been treated, thrombocytopenia, prediabetes, pacemaker.  She had came in with complaints of abdominal pain along with nausea and vomiting.  She has been having intermittent abdominal pain occasionally.  He was feeling better after the NG tube has been placed.  CT scan showed proximal small-bowel jejunitis differential would include segmental volvulus.  Surgery was consulted and had taken her for emergent surgery today after trying conservative therapy initially.        PAST MEDICAL HISTORY     Past Medical History:   Diagnosis Date    Acute (reversible) ischemia of intestine, part and extent unspecified 08/24/2022    Acute cystitis without hematuria 08/14/2023    Cirrhosis of liver with ascites 02/14/2023    Continue to follow up with hepatologist    COVID     x 2-residual dysgeusia    Hepatitis C     treated/cured previously    Pacemaker     Paroxysmal atrial fibrillation     Pericardial cyst     Skin cancer     Thrombus        PAST SURGICAL HISTORY     Past Surgical History:   Procedure Laterality Date    APPENDECTOMY      CARDIAC PACEMAKER PLACEMENT      cataract surgery      FOOT SURGERY Left     HYSTERECTOMY      LAPAROSCOPIC CHOLECYSTECTOMY       parathyroid resection      REPEAT ABDOMINAL PARACENTESIS      SKIN CANCER EXCISION      spot removed Left     left ankle spot removed    THROMBECTOMY         FAMILY HISTORY   Reviewed and noncontributory to this case    SOCIAL HISTORY     Social History     Socioeconomic History    Marital status:    Tobacco Use    Smoking status: Never    Smokeless tobacco: Never   Substance and Sexual Activity    Alcohol use: Not Currently    Drug use: Never    Sexual activity: Not Currently     Social Drivers of Health     Financial Resource Strain: Patient Declined (11/26/2024)    Overall Financial Resource Strain (CARDIA)     Difficulty of Paying Living Expenses: Patient declined   Food Insecurity: Patient Declined (11/26/2024)    Hunger Vital Sign     Worried About Running Out of Food in the Last Year: Patient declined     Ran Out of Food in the Last Year: Patient declined   Transportation Needs: Patient Declined (11/26/2024)    TRANSPORTATION NEEDS     Transportation : Patient declined   Physical Activity: Sufficiently Active (8/1/2024)    Exercise Vital Sign     Days of Exercise per Week: 7 days     Minutes of Exercise per Session: 60 min   Stress: Patient Declined (11/26/2024)    [employer] - Occupational Stress Questionnaire     Feeling of Stress : Patient declined   Housing Stability: Patient Declined (11/26/2024)    Housing Stability Vital Sign     Unable to Pay for Housing in the Last Year: Patient declined     Homeless in the Last Year: Patient declined     Screening for Social Drivers for health:  Patient screened for food insecurity, housing instability, transportation needs, utility difficulties, and interpersonal safety (select all that apply as identified as concern)  []Housing or Food  []Transportation Needs  []Utility Difficulties  []Interpersonal safety  [x]None      HOME MEDICATIONS     Prior to Admission medications    Medication Sig Start Date End Date Taking?  Authorizing Provider   rifAXIMin (XIFAXAN) 550 mg Tab Take 1 tablet (550 mg total) by mouth 2 (two) times daily. 12/5/23  Yes Monae Nazario MD   lactulose (CHRONULAC) 10 gram/15 mL solution Take 30 mLs (20 g total) by mouth daily as needed. 8/1/24   Monae Nazario MD   spironolactone (ALDACTONE) 25 MG tablet Take 25 mg by mouth once daily.    Provider, Historical   sucralfate (CARAFATE) 1 gram tablet  6/8/22   Provider, Historical       ALLERGIES   Ciprofloxacin, Iodine, Latex, Fluoride, and Fluoride preparations          REVIEW OF SYSTEMS   Except as documented above, all other systems reviewed and negative    PHYSICAL EXAM     Vitals:    11/27/24 0420   BP:    Pulse:    Resp: 17   Temp:       General:  In no acute distress, resting comfortably  Head and neck:  Atraumatic, normocephalic, moist mucous membranes, supple neck  Chest:  Clear to auscultation bilaterally  Heart:  S1, S2, no appreciable murmur  Abdomen:  Soft, nontender, BS +  MSK:  Warm, no lower extremity edema, no clubbing or cyanosis  Neuro:  Alert and oriented x4, moving all extremities with good strength  Integumentary:  No obvious skin rash  Psychiatry:  Appropriate mood and affect          ASSESSMENT AND PLAN   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  Conservative management ongoing currently with NG tube.  Dr. Velazquez called me and he will be taking her to the OR.   They already have plans for platelet transfusion given that she is going for surgery  Continue IV fluids  Date of service November 26    DVT prophylaxis:    __________________________________________________________________  LABS/MICRO/MEDS/DIAGNOSTICS       LABS  Recent Labs     11/27/24  0354      K 4.1   CO2 22*   BUN 34.8*   CREATININE 1.01   GLUCOSE 150*   CALCIUM 8.4   ALKPHOS 48   AST 46*   ALT 25   ALBUMIN 3.0*     Recent Labs     11/27/24  0354   WBC 6.49    RBC 3.56*   HCT 33.4*   MCV 93.8   PLT 61*       MICROBIOLOGY  Microbiology Results (last 7 days)       Procedure Component Value Units Date/Time    Blood Culture #1 **CANNOT BE ORDERED STAT** [[id number]] Collected: 11/26/24 1655    Order Status: Resulted Specimen: Blood Updated: 11/26/24 1713    Blood Culture #1 **CANNOT BE ORDERED STAT** [[id number]] Collected: 11/26/24 0409    Order Status: Resulted Specimen: Blood Updated: 11/26/24 0609            MEDICATIONS   0.9%  NaCl infusion (for blood administration)   Intravenous Once    acetaminophen  1,000 mg Intravenous Q8H    enoxparin  40 mg Subcutaneous Daily    methocarbamoL  500 mg Intravenous Q8H    pantoprazole  40 mg Intravenous Daily      INFUSIONS      DIAGNOSTIC TESTS  XR Gastric tube check, non-radiologist performed   Final Result      Frontal image of the upper abdomen.  Enteric tube extends into the stomach with the proximal side hole just past the GE junction.         Electronically signed by: Tal Kirk   Date:    11/26/2024   Time:    06:50      CT Abdomen Pelvis With IV Contrast NO Oral Contrast   Final Result      X-Ray Chest AP Portable   Final Result      No acute findings in the chest         Electronically signed by: Bayron Flynn MD   Date:    11/26/2024   Time:    06:47             Patient information was obtained from patient, patient's family, past medical records and ER records.   All diagnosis and differential diagnosis have been reviewed; assessment and plan has been documented. I have personally reviewed the labs and test results that are presently available; I have reviewed the patients medication list. I have reviewed the consulting providers response and recommendations. I have reviewed or attempted to review medical records based upon their availability.  All of the patient's questions have been addressed and answered. Patient's is agreeable to the above stated plan. I will continue to monitor closely and make adjustments to  medical management as needed.  This note was created using [employer] voice recognition software that occasionally misinterpreted phrases or words.  Please contact me if any questions may rise regarding documentation to clarify verbiage.        Michael Fallon MD   Internal Medicine  Department of Hospital Medicine [facility] - 8th Floor Med Surg

## 2024-11-27 NOTE — TELEPHONE ENCOUNTER
Spoke with son, reports pt had a procedure yesterday, expected for discharge on Sunday, scheduled for gen change with Dr Singh on 12/6, will not cancel at this time, will f/u on Monday and check on her status

## 2024-11-27 NOTE — PROGRESS NOTES
[facility] - 8th Floor [facility] MEDICINE ~ PROGRESS NOTE        CHIEF COMPLAINT   Hospital follow up    HOSPITAL COURSE   84-year-old female with history of hepatitis-C through blood transfusion and been treated, thrombocytopenia, prediabetes, pacemaker.  She had came in with complaints of abdominal pain along with nausea and vomiting.  She has been having intermittent abdominal pain occasionally.  He was feeling better after the NG tube has been placed.  CT scan showed proximal small-bowel jejunitis differential would include segmental volvulus.  Surgery was consulted and had taken her for emergent surgery today after trying conservative therapy initially.  November 26 patient was taken to the operating room with findings of adhesions and volvulus of the omentum/bowel, 53 cm segment of proximal jejunum found to be discolored and darkened without peristalsis, resected.  She was also noted to be bradycardic into the 40s and Cardiology was consulted given pacemaker battery was end of life already.    Today  Seen and examined this morning.  Heart rate is somewhere in the 70s this morning.  Heart rate went down to the 40s overnight.  Cardiology was consulted.  Pacemaker to be interrogated.  Complains of irritation to the throat from the NG tube.        OBJECTIVE/PHYSICAL EXAM     VITAL SIGNS (MOST RECENT):  Temp: 97.6 °F (36.4 °C) (11/27/24 0716)  Pulse: 70 (11/27/24 0716)  Resp: 20 (11/27/24 0716)  BP: 119/70 (11/27/24 0716)  SpO2: 99 % (11/27/24 0716) VITAL SIGNS (24 HOUR RANGE):  Temp:  [97.6 °F (36.4 °C)-98.2 °F (36.8 °C)] 97.6 °F (36.4 °C)  Pulse:  [45-83] 70  Resp:  [11-21] 20  SpO2:  [96 %-100 %] 99 %  BP: (108-145)/(51-93) 119/70   GENERAL: In no acute distress, afebrile  HEENT:  CHEST: Clear to auscultation bilaterally  HEART: S1, S2, no appreciable murmur  ABDOMEN: Soft, expected postoperative tenderness, NG tube  MSK: Warm, no lower extremity edema, no clubbing or  cyanosis  NEUROLOGIC: Alert and oriented x4, moving all extremities with good strength   INTEGUMENTARY:  PSYCHIATRY:        ASSESSMENT/PLAN   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  Postoperative care per General surgery.  NG tube currently in place.  Await return of bowel function  Monitor platelets, has history of cirrhosis from hepatitis-C  Change IV fluids to Clinimix  Cardiology consulted and following for possible pacemaker battery exchange  Start PT OT    DVT prophylaxis:  Lovenox 40    Anticipated discharge and disposition:   __________________________________________________________________________    NUTRITIONAL STATUS     Patient meets ASPEN criteria for   malnutrition of   per RD assessment as evidenced by:                       A minimum of two characteristics is recommended for diagnosis of either severe or non-severe malnutrition.     LABS/MICRO/MEDS/DIAGNOSTICS       LABS  Recent Labs     11/27/24  0354      K 4.1   CO2 22*   BUN 34.8*   CREATININE 1.01   GLUCOSE 150*   CALCIUM 8.4   ALKPHOS 48   AST 46*   ALT 25   ALBUMIN 3.0*     Recent Labs     11/27/24  0354   WBC 6.49   RBC 3.56*   HCT 33.4*   MCV 93.8   PLT 61*       MICROBIOLOGY  Microbiology Results (last 7 days)       Procedure Component Value Units Date/Time    Blood Culture #1 **CANNOT BE ORDERED STAT** [[id number]]  (Normal) Collected: 11/26/24 0409    Order Status: Completed Specimen: Blood Updated: 11/27/24 0700     Blood Culture No Growth At 24 Hours    Blood Culture #1 **CANNOT BE ORDERED STAT** [[id number]] Collected: 11/26/24 1655    Order Status: Resulted Specimen: Blood Updated: 11/26/24 1711               MEDICATIONS   0.9%  NaCl infusion (for blood administration)   Intravenous Once    acetaminophen  1,000 mg Intravenous Q8H    enoxparin  40  mg Subcutaneous Daily    methocarbamoL  500 mg Intravenous Q8H    pantoprazole  40 mg Intravenous Daily         INFUSIONS   lactated ringers   Intravenous Continuous 100 mL/hr at 11/27/24 0848 New Bag at 11/27/24 0848          DIAGNOSTIC TESTS  XR Gastric tube check, non-radiologist performed   Final Result      Frontal image of the upper abdomen.  Enteric tube extends into the stomach with the proximal side hole just past the GE junction.         Electronically signed by: Tal Kirk   Date:    11/26/2024   Time:    06:50      CT Abdomen Pelvis With IV Contrast NO Oral Contrast   Final Result      X-Ray Chest AP Portable   Final Result      No acute findings in the chest         Electronically signed by: Bayron Flynn MD   Date:    11/26/2024   Time:    06:47           No echocardiogram results found for the past 14 days.         Case related differential diagnoses have been reviewed; assessment and plan has been documented. I have personally reviewed the labs and test results that are currently available; I have reviewed the patients medication list. I have reviewed the consulting providers recommendations. I have reviewed or attempted to review medical records based upon their availability.  All of the patient's and/or family's questions have been addressed and answered to the best of my ability.  I will continue to monitor closely and make adjustments to medical management as needed.  This document was created using M*Modal Fluency Direct.  Transcription errors may have been made.  Please contact me if any questions may rise regarding documentation to clarify transcription.        Michael Fallon MD   Internal Medicine  Department of Hospital Medicine  [facility] - 8th Floor Med Surg

## 2024-11-27 NOTE — CARE UPDATE
Notified by nursing patient having episodes of nonsustained bradycardia, lowest HR documented 45. B/P stable. C/O feeling SOB when HR drops. S/P exploratory laparotomy with excision small intestine secondary to volvulus yesterday. Family reports her pacemaker battery is near end of life and scheduled for battery exchange on 12/6/24. Sees Dr. Valentino. Will obtain EKG. Monitor telemetry. Will interrogate device once  identified. Consult [facility] in AM for further recommendations. Sooner if bradycardia is sustained or she becomes HD unstable.

## 2024-11-27 NOTE — CONSULTS
Inpatient consult to Cardiology  Consult performed by: Jose Bennett FNP  Consult ordered by: Myranda Snyder FNP  Reason for consult: Bradycardia        [facility] *    Cardiology  Consult Note    Patient Name: Joy Donahue  MRN: [id number]  Admission Date: 11/25/2024  Hospital Length of Stay: 1 days  Code Status: Full Code   Attending Provider: Kye Melendez MD   Consulting Provider: NIXON Mays  Primary Care Physician: Miya Silverio NP  Principal Problem:<principal problem not specified>    Patient information was obtained from patient, past medical records, ER records, and primary team.     Subjective:   Chief Complaint/Reason for Consult: Bradycardia/Pacemaker EOL     HPI:   Ms. Donahue is a 84 year old female, distantly known to [facility] dating back to 2014- known to Dr. Freya LUCIANO & Dr. Valentino, who presented to the hospital with abdominal pain, nausea,  & vomiting. She felt better once NG Tube was placed. CT Scan revealed proximal small bowel jejunitis with concern for possible segmental volvulus. Surgical team was consulted and patient was taken for EX LAP an intestinal repair related to small intestine volvulus on 11.2624.  During her stay, patient noted to have paced rhythm. There was concern that her pacemaker may be EOL. [facility] consulted for rhythm evaluation and management.    PMH: PAF, SSS/Pacemaker, Hepatic Cirrhosis, Hepatitis C, History of COVID-19 Infection, Pericardial Cyst, Skin Cancer  PSH: Appendectomy, Pacemaker, Foot Surgery, Hysterectomy, Lap Cholecystectomy, Parathyroid Resection, Skin Cancer Excision, Thrombectomy (Details unclear)  Family History:  Father- Hypertension/Heart Disease/Device Placement, Mother- Heart Disease, Sister- Clotting Disorder, Brother- Device Placement  Social History: Tobacco- Negative, Alcohol- Negative, Substance Abuse- Negative    Previous Cardiac Diagnostics:   Echocardiogram (5.2.23):  Left ventricular hypertrophy. Normal  LV systolic function with estimated   EF of 60-65%. Grade 1 diastolic dysfunction.   Fibrocalcific changes of the aortic and mitral valves.   Mild mitral and mild to moderate tricuspid regurgitation.   Estimated PA systolic pressure is 35-45 mm Hg.   Pacer lead noted in right sided chambers.     MPI (5.2.23):  Normal anteroseptal, lateral and inferior wall perfusion, wall motion and systolic wall thickening. No scan evidence of ischemia.   Normal left ventricular ejection fraction. LVEF-62%.     RHC/LHC (2.19.18):  Hemodynamics:  Right atrial pressure mean 14.  Right ventricular pressure 47/15.   Pulmonary artery pressure 47/23. Pulmonary capillary wedge pressure:    A-wave 25, V-wave 29, mean 23.  Hepatic wedge pressure mean 17.  Left ventricular pressure pre-angiography 142/26 end-diastolic.  Aortic pressure upon pullback 142/66.   Left ventricular angiography discloses normal left ventricular wall motion and systolic function.  Estimated ejection fraction 65% to 70%.  There is mild mitral regurgitation evident, some of which appears to be catheter induced.   Coronary aortography reveals:.   Left main normal.   Left anterior descending normal.   Left circumflex normal.   Right coronary:  Minimal luminal irregularity.  The right coronary is dominant.     Review of patient's allergies indicates:   Allergen Reactions    Ciprofloxacin Swelling    Iodine Shortness Of Breath    Latex Itching    Fluoride      Mouth sores    Fluoride preparations Dermatitis     Other reaction(s): Ulcer of mouth     No current facility-administered medications on file prior to encounter.     Current Outpatient Medications on File Prior to Encounter   Medication Sig    rifAXIMin (XIFAXAN) 550 mg Tab Take 1 tablet (550 mg total) by mouth 2 (two) times daily.    lactulose (CHRONULAC) 10 gram/15 mL solution Take 30 mLs (20 g total) by mouth daily as needed.    spironolactone (ALDACTONE) 25 MG tablet Take 25 mg by mouth once daily.     sucralfate (CARAFATE) 1 gram tablet  (Patient not taking: Reported on 8/6/2024)     Review of Systems   Constitutional:  Negative for fever.   Respiratory:  Negative for chest tightness and shortness of breath.    Cardiovascular:  Negative for chest pain.   All other systems reviewed and are negative.    Objective:     Vital Signs (Most Recent):  Temp: 97.6 °F (36.4 °C) (11/27/24 0716)  Pulse: 70 (11/27/24 0716)  Resp: 20 (11/27/24 0716)  BP: 119/70 (11/27/24 0716)  SpO2: 99 % (11/27/24 0716) Vital Signs (24h Range):  Temp:  [97.6 °F (36.4 °C)-98.2 °F (36.8 °C)] 97.6 °F (36.4 °C)  Pulse:  [45-83] 70  Resp:  [11-21] 20  SpO2:  [96 %-100 %] 99 %  BP: (108-145)/(51-93) 119/70   Weight: 81.6 kg (179 lb 14.3 oz)  Body mass index is 29.04 kg/m².  SpO2: 99 %       Intake/Output Summary (Last 24 hours) at 11/27/2024 0728  Last data filed at 11/27/2024 0620  Gross per 24 hour   Intake 1300 ml   Output 775 ml   Net 525 ml     Lines/Drains/Airways       Drain  Duration                  NG/OG Tube 11/26/24 0135 16 Fr. Right nostril 1 day              Peripheral Intravenous Line  Duration                  Peripheral IV - Single Lumen 11/25/24 2230 20 G Anterior;Proximal;Right Forearm 1 day                  Significant Labs:   Chemistries:   Recent Labs   Lab 11/25/24  2255 11/26/24  0407 11/26/24  1655 11/27/24  0354    134* 138 138   K 4.1 4.4 4.0 4.1    105 107 105   CO2 23 21* 22* 22*   BUN 17.4 17.2 27.2* 34.8*   CREATININE 0.86 0.76 1.21* 1.01   CALCIUM 9.6 8.6 8.7 8.4   BILITOT 3.0* 4.0* 2.9* 2.4*   ALKPHOS 87 80 59 48   ALT 28 29 25 25   AST 39* 39* 37* 46*   GLUCOSE 139* 166* 168* 150*   MG 2.00 1.70 2.00 2.00   PHOS  --  3.0 3.8 3.3   TROPONINI 0.020  --   --   --         CBC/Anemia Labs: Coags:    Recent Labs   Lab 11/26/24  0407 11/26/24  1859 11/27/24  0354   WBC 10.76 10.03 6.49   HGB 14.9 11.8* 10.8*   HCT 46.8 36.9* 33.4*   PLT 49* 93* 61*   MCV 93.0 95.1* 93.8   RDW 15.9 16.2 16.0    Recent Labs   Lab  11/25/24  2255   INR 1.2        Significant Imaging:  Imaging Results              XR Gastric tube check, non-radiologist performed (Final result)  Result time 11/26/24 06:50:09      Final result by Tal Kirk MD (11/26/24 06:50:09)                   Impression:      Frontal image of the upper abdomen.  Enteric tube extends into the stomach with the proximal side hole just past the GE junction.      Electronically signed by: Tal Kirk  Date:    11/26/2024  Time:    06:50               Narrative:    EXAMINATION:  XR GASTRIC TUBE CHECK, NON-RADIOLOGIST PERFORMED    CLINICAL HISTORY:  Ng tube placement;    COMPARISON:  12 September 2015                                       CT Abdomen Pelvis With IV Contrast NO Oral Contrast (Final result)  Result time 11/26/24 12:05:03      Final result by August Murray MD (11/26/24 12:05:03)                   Narrative:    EXAMINATION  CT ABDOMEN PELVIS WITH IV CONTRAST    CLINICAL HISTORY  Nausea/vomiting;Epigastric pain;    TECHNIQUE  Post-contrast helical-acquisition CT images were obtained and multiplanar reformats accomplished by a CT technologist at a separate workstation, pushed to PACS for physician review.    CONTRAST  *IV: Omnipaque 350, 100 mL  *Enteric: none    COMPARISON  5 July 2024    FINDINGS  Images were reviewed in soft tissue, lung, and bone windows.    Exam quality: adequate for evaluation    Lines/tubes: Right heart leads are again visualized.    Chest: Stable heart chamber size. No pericardial effusion. No interval change of the visualized vasculature. No airspace consolidation or suspicious lung lesion. No worsening pleural effusion or evidence for loculation.    Hepatobiliary/Pancreas: No new or enlarging hepatic lesion.  Multiple cystic appearing foci are again appreciated.  There is similar nodular irregularity of the liver surface suggesting changes of cirrhosis.  Redemonstrated cholecystectomy.  No development of biliary dilatation.  No  convincing acute changes involving the pancreas or development of expansile mass-like lesion.  Subcentimeter simple appearing cystic structure incidentally noted at the pancreatic tail.    Spleen: There are scattered cystic structures throughout the spleen, with no definite new or enlarging mass-like lesion.    Adrenal/: No suspicious adrenal nodule.  Bilateral kidney cysts are similar to the comparison, with symmetric appearance of contrast enhancement.  No interval development of findings to suggest obstructive uropathy. Urinary bladder and adjacent reproductive structures are unchanged.    Esophagus/GI tract: The included lower esophagus is unremarkable. No evidence of gastric outlet or small bowel obstruction. There are proximal loops of jejunum (series 2, images ), which demonstrate profound wall thickening and edema, as well as interloop fluid and pronounced mucosal enhancement. There is a small mesenteric swirl just to the left of midline anterior to the abnormal loops (images 66-80).  The transition from the normal duodenum and most proximal jejunum to the abnormal segments is relatively gradual, as is the transition from the abnormal jejunal segments to the normal jejunum. The rest of the small bowel appears unremarkable. The appendix is not clearly identified but there are no secondary findings of the right lower quadrant to suggest acute appendicitis.  No acute abnormality of the large bowel is evident.  There is similar scattered sigmoid diverticulosis with no findings of active diverticulitis.    Musculoskeletal: Similar advanced degenerative changes through the spinal column and bony pelvis.  No acute or destructive osseous process is identified.  Regional musculature is unremarkable.    Other findings: Minimal volume free fluid is present through the abdomen.  No pneumoperitoneum is identified.  No drainable collections. Aortoiliac vascular structures are similar in comparison. No development  of pathologic anna enlargement or necrotic adenopathy.    IMPRESSION  1. Proximal small bowel findings suggestive of profound jejunitis of otherwise indeterminate etiology, differential includes segmental volvulus versus inflammatory or infectious process considered.  2. No other convincing acute abdominopelvic abnormality.  3. Chronic secondary findings discussed above, similar in the interval.  ==========    No significant discrepancy identified in relation to the teleradiology preliminary report.    RADIATION DOSE  Automated tube current modulation, weight-based exposure dosing, and/or iterative reconstruction technique utilized to reach lowest reasonably achievable exposure rate.    DLP: 758 mGy*cm      Electronically signed by: August Murray  Date:    11/26/2024  Time:    12:05                      Preliminary result by Jani Constantino MD (11/25/24 23:47:08)                   Impression:    1. There are proximal loops of jejunum seen starting on image 32 series 2 through image 100 series 2 which demonstrate profound wall thickening and edema as well as interloop fluid and pronounced mucosal enhancement. There is a small mesenteric swirl just to the left of midline anterior to the abnormal loops seen on images 66 through 80 of axial series 2. The transition from the normal duodenum and most proximal jejunum to the abnormal segments is relatively gradual as is the transition from the abnormal jejunal segments 2 normal jejunum. This is consistent with profound proximal jejunitis of uncertain etiology with the possibility of segmental volvulus versus inflammatory or infectious jejunitis considered. Correlate with clinical and laboratory findings.  2. Details and other findings as discussed above.               Narrative:    START OF REPORT:  Technique: CT of the abdomen and pelvis was performed with axial images as well as sagittal and coronal reconstruction images with intravenous contrast.    Comparison: None  available.    Clinical History: Epigastric pain.    Dosage Information: Automated Exposure Control was utilized 758.35 mGy.cm.    Findings:  Lines and Tubes: None.  Thorax:  Lungs: Minimal hazy and linear opacity is present at the visualized lung bases, consistent with nonspecific dependent changes scarring and atelectasis. No focal infiltrate or consolidation is seen.  Pleura: No effusions or thickening are seen.  Heart: The heart size is within normal limits.  Abdomen:  Abdominal Wall: No abdominal wall pathology is seen.  Liver: The liver is atrophic with nodular contours consistent with cirrhosis.  Biliary System: No intrahepatic or extrahepatic biliary duct dilatation is seen.  Gallbladder: Surgical clips are seen in the gallbladder fossa consistent with prior cholecystectomy.  Pancreas: A few cysts are identified in the pancreatic head and tail measuring up to 0.8 cm (Series 2 Image 33).  Spleen: There are a few cysts seen in the spleen measuring up to 1.5 cm (Series 2 Image 37).  Adrenals: The adrenal glands appear unremarkable.  Kidneys: Multiple cysts are identified in the left kidney the largest of which measures 3.6 cm is on Image 48, Series 2 in the mid pole of the left kidney. Multiple cysts are identified in the right kidney the largest of which measures 5.1 cm is on Image 27, Series 2 in the upper pole of the right kidney. The bilateral kidneys otherwise appear unremarkable with no stones or hydronephrosis.  Aorta: The visualized abdominal aorta appears unremarkable.  IVC: Unremarkable.  Bowel:  Esophagus: There is a small hiatal hernia.  Stomach: The stomach appears unremarkable.  Duodenum: Unremarkable appearing duodenum.  Small Bowel: There are proximal loops of jejunum seen starting on image 32 series 2 through image 100 series 2 which demonstrate profound wall thickening and edema as well as interloop fluid and pronounced mucosal enhancement. There is a small mesenteric swirl just to the left of  midline anterior to the abnormal loops seen on images 66 through 80 of axial series 2. The transition from the normal duodenum and most proximal jejunum to the abnormal segments is relatively gradual as is the transition from the abnormal jejunal segments 2 normal jejunum. The rest of the small bowel appears unremarkable.  Colon: Nondistended. Multiple diverticula are seen throughout the colon. No associated inflammatory stranding or pericolonic fluid is seen to suggest diverticulitis.  Appendix: The appendix is not identified but no inflammatory changes are seen in the right lower quadrant to suggest appendicitis.  Peritoneum: Minimal intraperitoneal free fluid is seen.    Pelvis:  Bladder: The bladder appears unremarkable.  Female:  Uterus: The uterus is not identified consistent with history of hysterectomy.  Ovaries: No adnexal masses are seen.    Bony structures:  Dorsal Spine: There is mild to moderate spondylosis of the visualized dorsal spine.  Bony Pelvis: The visualized bony structures of the pelvis appear unremarkable.    Notifications: The results were discussed with the emergency room physician (Dr Tavares) prior to dictation at 2024-11-25 23:39:40 CST.                                         X-Ray Chest AP Portable (Final result)  Result time 11/26/24 06:47:37      Final result by Bayron Flynn MD (11/26/24 06:47:37)                   Impression:      No acute findings in the chest      Electronically signed by: Bayron Flynn MD  Date:    11/26/2024  Time:    06:47               Narrative:    EXAMINATION:  XR CHEST AP PORTABLE    CLINICAL HISTORY:  Epigastric pain    COMPARISON:  02/04/2021    FINDINGS:  Single view of the chest shows no focal consolidation, pneumothorax or pleural effusion.  Cardiomediastinal silhouette and cardiac device are stable.  Aorta is partially calcified.                                    EKG:         Physical Exam  Vitals and nursing note reviewed.   Constitutional:        Appearance: Normal appearance.   HENT:      Head: Normocephalic.      Mouth/Throat:      Mouth: Mucous membranes are moist.      Pharynx: Oropharynx is clear.   Cardiovascular:      Rate and Rhythm: Normal rate and regular rhythm.      Heart sounds: Normal heart sounds.   Pulmonary:      Effort: Pulmonary effort is normal. No respiratory distress.      Breath sounds: Normal breath sounds. No wheezing or rales.   Abdominal:      Palpations: Abdomen is soft.   Musculoskeletal:         General: Normal range of motion.      Cervical back: Neck supple.   Skin:     General: Skin is warm and dry.   Neurological:      General: No focal deficit present.      Mental Status: She is alert and oriented to person, place, and time. Mental status is at baseline.   Psychiatric:         Mood and Affect: Mood normal.         Behavior: Behavior normal.       Home Medications:   No current facility-administered medications on file prior to encounter.     Current Outpatient Medications on File Prior to Encounter   Medication Sig Dispense Refill    rifAXIMin (XIFAXAN) 550 mg Tab Take 1 tablet (550 mg total) by mouth 2 (two) times daily. 180 tablet 3    lactulose (CHRONULAC) 10 gram/15 mL solution Take 30 mLs (20 g total) by mouth daily as needed. 1892 mL 11    spironolactone (ALDACTONE) 25 MG tablet Take 25 mg by mouth once daily.      sucralfate (CARAFATE) 1 gram tablet  (Patient not taking: Reported on 8/6/2024)       Current Schedule Inpatient Medications:   0.9%  NaCl infusion (for blood administration)   Intravenous Once    acetaminophen  1,000 mg Intravenous Q8H    enoxparin  40 mg Subcutaneous Daily    methocarbamoL  500 mg Intravenous Q8H    pantoprazole  40 mg Intravenous Daily       Assessment:   PAF    - [id number]: at least 3    - Not on Anticoagulation   Sick Sinus Syndrome    - Status Post Pacemaker (Medtronic Dual Chamber)- Nearing SILVIANO as of 8/2024 (Generator Change Anticipated at end of 2024- Per Clinic Note 8.6.24) (  Francisco LUCIANO)  Chronic Diastolic HF (Compensated)    - EF 60-65% with Grade I Diastolic Dysfunction (5/2023)    - No Significant CAD (2018)  Valvular Heart Disease    - MR: Mild, TR: Mild to Moderate  Jejunal Volvulus    - Status Post Surgical Repair (11.26.24)  Hepatic Cirrhosis  History of Hepatitis C (Through Blood Transfusion) (Treated)  Chronic Thrombocytopenia (PLT 50-80's)  Anemia  Leukocytosis (Resolved)    - Afebrile     Plan:   Interrogate Device.    Thank you for your consult.     NIXON Mays  Cardiology  [facility]     I agree with the findings of the complexity of problems addressed and take responsibility for the plan's risks and complications. I approved the plan documented by Jose Bennett NP.    Addendum 1:20 PM:  Device Interrogation reviewed by Dr. Moreira. Normal Device Function. Battery at RRT (stamped 11/11) have 3 Months from 11/11 for continued normal device settings/function. Approximately Feb 2025 device will go to SILVIANO and a setting of VVI 65 for another guaranteed 3 Months before depletion.    No indication for urgent generator change. Keep follow up with Dr. Singh for generator change as scheduled, in the coming days. Will sign off. Call if needed.

## 2024-11-27 NOTE — PT/OT/SLP EVAL
Occupational Therapy  Evaluation    Name: Joy Donahue  MRN: [id number]  Admitting Diagnosis:  s/p ex lap with SBR 2/2 closed loop obstruction from volvulus w/ necrotic segment of small bowel   Recent Surgery: Procedure(s) (LRB):  LAPAROTOMY, EXPLORATORY (N/A)  EXCISION, SMALL INTESTINE 1 Day Post-Op    Recommendations:     Discharge therapy intensity: Low Intensity Therapy   Discharge Equipment Recommendations:   (TBD)  Barriers to discharge:  Other (Comment) (Ongoing medical needs)    Assessment:     Joy Donahue is a 84 y.o. female with a medical diagnosis of  s/p ex lap with 56cm SBR 2/2 closed loop obstruction from volvulus w/ necrotic segment of small bowel .  Prior to admit, pt was Ind c ADLs and mobility without AD. She presents with the following performance deficits affecting function: weakness, impaired endurance, impaired self care skills, impaired functional mobility, impaired balance, pain. During evaluation, pt required Max A for LB dressing, CGA for balance during grooming tasks in standing at sink, and CGA for toilet t/f. Reported she has friends and family that will be able to assist at d/c. Recommend low intensity therapy.     Rehab Prognosis: Good; patient would benefit from acute skilled OT services to address these deficits and reach maximum level of function.       Plan:     Patient to be seen 4 x/week to address the above listed problems via self-care/home management, therapeutic activities, therapeutic exercises  Plan of Care Expires: 12/25/24  Plan of Care Reviewed with: patient    Subjective     Chief Complaint: Abdominal pain   Patient/Family Comments/goals: To return to PLOF     Occupational Profile:  Living Environment: Pt lives in a [facility] c 3 stairs and B rails to enter and a walk in shower. Reported there is also a ramp.  Previous level of function: IND c ADLs and mobility without AD   Roles and Routines:  to dog Beignet, mother, Works   Equipment Used at Home:  (Pt  has access to a shower chair, quad cane, rollator)  Assistance upon Discharge: Pt's friends and family     Pain/Comfort:  Pain Rating 1: 6/10  Location 1: abdomen  Pain Addressed 1: Reposition, Distraction    Patients cultural, spiritual, Sikhism conflicts given the current situation: no    Objective:     OT communicated with RN prior to session.      Patient was found up in chair with peripheral IV, oxygen, telemetry, NG tube upon OT entry to room.    General Precautions: Standard, fall  Orthopedic Precautions: N/A  Braces: N/A    Vital Signs: Supplemental 02: 2L nasal cannula     Functional Mobility/Transfers:  Patient completed Sit <> Stand Transfer with contact guard assistance  with  rolling walker   Patient completed Toilet Transfer Step Transfer technique with contact guard assistance with  rolling walker  Functional Mobility: Pt ambulated to bathroom using RW c CGA; No LOB    Activities of Daily Living:  Grooming: contact guard assistance for balance while pt washed face and perineal area in standing at sink  Lower Body Dressing: maximal assistance Doff/don socks   Toileting: stand by assistance pericare after +void    AMPAC 6 Click ADL:  AMPAC Total Score: 19    Functional Cognition:  Intact    Visual Perceptual Skills:  Intact    Upper Extremity Function:  Right Upper Extremity:   WFL    Left Upper Extremity:  WFL      Therapeutic Positioning  Risk for acquired pressure injuries is decreased due to ability to get to BSC/toilet with assist.    OT interventions performed during the course of today's session:   Education was provided on benefits of and recommendations for therapeutic positioning      Patient Education:  Patient and daughter/s were provided with verbal education education regarding OT role/goals/POC.  Understanding was verbalized.     Patient left up in chair with all lines intact and call button in reach.    GOALS:   Multidisciplinary Problems       Occupational Therapy Goals           Problem: Occupational Therapy    Goal Priority Disciplines Outcome Interventions   Occupational Therapy Goal     OT, PT/OT Progressing    Description: LTG: Pt will perform basic ADLs and ADL transfers with Modified independence using LRAD by discharge.    STG: to be met by 12/25/24:    Pt will complete grooming standing at sink with LRAD with SBA.  Pt will complete UB dressing with SBA.  Pt will complete LB dressing with SBA using LRAD.  Pt will complete toileting with SBA using LRAD.  Pt will complete functional mobility to/from toilet and toilet transfer with SBA using LRAD.                        History:     Past Medical History:   Diagnosis Date    Acute (reversible) ischemia of intestine, part and extent unspecified 08/24/2022    Acute cystitis without hematuria 08/14/2023    Cirrhosis of liver with ascites 02/14/2023    Continue to follow up with hepatologist    COVID     x 2-residual dysgeusia    Hepatitis C     treated/cured previously    Pacemaker     Paroxysmal atrial fibrillation     Pericardial cyst     Skin cancer     Thrombus          Past Surgical History:   Procedure Laterality Date    APPENDECTOMY      CARDIAC PACEMAKER PLACEMENT      cataract surgery      EXCISION, SMALL INTESTINE  11/26/2024    Procedure: EXCISION, SMALL INTESTINE;  Surgeon: Diogenes Velazquez MD;  Location: [facility];  Service: General;;  lysis of adhesions    FOOT SURGERY Left     HYSTERECTOMY      LAPAROSCOPIC CHOLECYSTECTOMY      LAPAROTOMY, EXPLORATORY N/A 11/26/2024    Procedure: LAPAROTOMY, EXPLORATORY;  Surgeon: Diogenes Velazquez MD;  Location: [facility] OR;  Service: General;  Laterality: N/A;    parathyroid resection      REPEAT ABDOMINAL PARACENTESIS      SKIN CANCER EXCISION      spot removed Left     left ankle spot removed    THROMBECTOMY         Time Tracking:     OT Date of Treatment: 11/27/24  OT Start Time: 1159  OT Stop Time: 1233  OT Total Time (min): 34 min    Billable Minutes:Evaluation Moderate complexity     11/27/2024

## 2024-11-27 NOTE — TELEPHONE ENCOUNTER
----- Message from ANDREW Walter sent at 11/27/2024  2:37 PM CST -----  Regarding: FW: Procedure/labs    ----- Message -----  From: Morenita Tse  Sent: 11/27/2024   1:55 PM CST  To: Francisco Gaffney Staff  Subject: Procedure/labs                                   Piotr [phone] pt son says she is currently in [facility]. They are asking that her procedure scheduled 12/6/24 for device battery change NOT be cancelled. Also the labs that were scheduled 11/26/24, they would like to know if there's a way if you can tell if those labs were done there since she's been in the hospital? If not can they be scheduled to be done since she's there and they're needed for her 12/6/24 procedure. Please call him at the number listed today.    Thanks

## 2024-11-27 NOTE — PROGRESS NOTES
" [facility] General Surgery   Progress Note  Admit Date: 11/25/2024  HD#1  POD#1 Day Post-Op    Subjective:   Interval history:  POD1 s/p ex lap with SBR 2/2 closed loop obstruction from volvulus w/ necrotic segment of small bowel   NGT 200cc bilious output   No flatus, AROBF   Denies n/v   Ruiz 40s overnight, in need of pacemaker interrogation/battery exchange per patient report      Scheduled Meds:   0.9%  NaCl infusion (for blood administration)   Intravenous Once    acetaminophen  1,000 mg Intravenous Q8H    enoxparin  40 mg Subcutaneous Daily    methocarbamoL  500 mg Intravenous Q8H    pantoprazole  40 mg Intravenous Daily     Continuous Infusions:   lactated ringers   Intravenous Continuous 100 mL/hr at 11/27/24 0848 New Bag at 11/27/24 0848     PRN Meds:  Current Facility-Administered Medications:     dextrose 10%, 12.5 g, Intravenous, PRN    dextrose 10%, 12.5 g, Intravenous, PRN    dextrose 10%, 25 g, Intravenous, PRN    dextrose 10%, 25 g, Intravenous, PRN    glucagon (human recombinant), 1 mg, Intramuscular, PRN    melatonin, 6 mg, Oral, Nightly PRN    morphine, 2 mg, Intravenous, Q3H PRN    morphine, 4 mg, Intravenous, Q4H PRN    naloxone, 0.02 mg, Intravenous, PRN    ondansetron, 4 mg, Intravenous, Q4H PRN    sodium chloride 0.9%, 10 mL, Intravenous, PRN     Objective:     VITAL SIGNS: 24 HR MIN & MAX LAST   Temp  Min: 97.6 °F (36.4 °C)  Max: 98.2 °F (36.8 °C)  97.6 °F (36.4 °C)   BP  Min: 108/61  Max: 145/66  119/70    Pulse  Min: 45  Max: 83  70    Resp  Min: 11  Max: 21  20    SpO2  Min: 96 %  Max: 100 %  99 %      HT: 5' 6" (167.6 cm)  WT: 81.6 kg (179 lb 14.3 oz)  BMI: 29     Intake/output:  Intake/Output - Last 3 Shifts         11/25 0700 11/26 0659 11/26 0700 11/27 0659 11/27 0700 11/28 0659    I.V. (mL/kg)  100 (1.2)     Blood  400     IV Piggyback  800     Total Intake(mL/kg)  1300 (15.9)     Urine (mL/kg/hr)  300 (0.2)     Drains  425     Blood  50     Total Output  775     Net  +525            " "Urine Occurrence  1 x             Intake/Output Summary (Last 24 hours) at 11/27/2024 0911  Last data filed at 11/27/2024 0620  Gross per 24 hour   Intake 1300 ml   Output 775 ml   Net 525 ml        Lines/drains/airway:       Peripheral IV - Single Lumen 11/25/24 2230 20 G Anterior;Proximal;Right Forearm (Active)   Site Assessment Clean;Dry;Intact;No redness;No swelling 11/27/24 0400   Extremity Assessment Distal to IV No abnormal discoloration 11/26/24 1615   Line Status Infusing 11/27/24 0400   Dressing Status Clean;Dry;Intact 11/27/24 0400   Dressing Intervention Integrity maintained 11/27/24 0400   Number of days: 1            NG/OG Tube 11/26/24 0135 16 Fr. Right nostril (Active)   Placement Check placement verified by aspirate characteristics 11/26/24 0800   External Tube Length (cm) 63 11/26/24 1615   Securement secured to nostril center 11/27/24 0400   Clamp Status/Tolerance unclamped 11/27/24 0400   Suction Setting/Drainage Method intermittent setting;low 11/27/24 0400   Drainage Green 11/27/24 0400   Tube Output(mL)(Include Discarded Residual) 225 mL 11/27/24 0620   Number of days: 1       Physical examination:  Gen: NAD, AAOx3, answering questions appropriately  HEENT: Atraumatic   CV: RR  Resp: NWOB  Abd: S/appropriately tender, incision c/d/I, abdominal binder in place   Skin/wounds:    Labs:  Renal:  Recent Labs     11/25/24 2255 11/26/24 0407 11/26/24 1655 11/27/24  0354   BUN 17.4 17.2 27.2* 34.8*   CREATININE 0.86 0.76 1.21* 1.01     No results for input(s): "LACTIC" in the last 72 hours.  FENGI:  Recent Labs     11/25/24 2255 11/26/24 0407 11/26/24 1655 11/27/24  0354    134* 138 138   K 4.1 4.4 4.0 4.1    105 107 105   CO2 23 21* 22* 22*   CALCIUM 9.6 8.6 8.7 8.4   MG 2.00 1.70 2.00 2.00   PHOS  --  3.0 3.8 3.3   ALBUMIN 3.4 2.9* 3.5 3.0*   BILITOT 3.0* 4.0* 2.9* 2.4*   AST 39* 39* 37* 46*   ALKPHOS 87 80 59 48   ALT 28 29 25 25     Heme:  Recent Labs     11/25/24  2255 " 11/26/24 0407 11/26/24 1859 11/27/24  0354   HGB 15.1 14.9 11.8* 10.8*   HCT 46.4 46.8 36.9* 33.4*   PLT 63* 49* 93* 61*   INR 1.2  --   --   --      ID:  Recent Labs     11/25/24 2255 11/26/24 0407 11/26/24 1859 11/27/24  0354   WBC 12.69* 10.76 10.03 6.49     CBG:  Recent Labs     11/25/24 2255 11/26/24 0407 11/26/24  1655 11/27/24  0354   GLUCOSE 139* 166* 168* 150*      Cardiovascular:  Recent Labs   Lab 11/25/24 2255   TROPONINI 0.020     I have reviewed all pertinent lab results within the past 24 hours.    Imaging:  XR Gastric tube check, non-radiologist performed   Final Result      Frontal image of the upper abdomen.  Enteric tube extends into the stomach with the proximal side hole just past the GE junction.         Electronically signed by: Tal Kirk   Date:    11/26/2024   Time:    06:50      CT Abdomen Pelvis With IV Contrast NO Oral Contrast   Final Result      X-Ray Chest AP Portable   Final Result      No acute findings in the chest         Electronically signed by: Bayron Flynn MD   Date:    11/26/2024   Time:    06:47         I have reviewed all pertinent imaging results/findings within the past 24 hours.    Micro/Path/Other:  Microbiology Results (last 7 days)       Procedure Component Value Units Date/Time    Blood Culture #1 **CANNOT BE ORDERED STAT** [[id number]]  (Normal) Collected: 11/26/24 0409    Order Status: Completed Specimen: Blood Updated: 11/27/24 0700     Blood Culture No Growth At 24 Hours    Blood Culture #1 **CANNOT BE ORDERED STAT** [[id number]] Collected: 11/26/24 1655    Order Status: Resulted Specimen: Blood Updated: 11/26/24 1713           Pathology Results  (Last 7 days)      None             Assessment & Plan:    84-year-old female s/p ex lap with 56cm SBR 2/2 closed loop obstruction from volvulus w/ necrotic segment of small bowel 11/26    -Continue NGT to LIWS with frequent flushing   -NPO, AROBF   -TPN   -Prealbumin today   -Encourage ambulation, PT/OT    -Cardiology consultation for pacemaker interrogation, patient states she was due for a battery exchange this week. Having bradycardia episodes   -mIVF while NPO   -CTM thrombocytopenia, received 2 units PLTs yesterday intra-op     Ibrahima Sosa MD   PGY-2   11/27/2024 9:11 AM

## 2024-11-27 NOTE — PT/OT/SLP EVAL
Physical Therapy Evaluation    Patient Name:  Joy Donahue   MRN:  [id number]    Recommendations:     Discharge therapy intensity: Low Intensity Therapy   Discharge Equipment Recommendations:  (TBD)   Barriers to discharge: Ongoing medical needs    Assessment:     Joy Donahue is a 84 y.o. female admitted with a medical diagnosis of s/p ex lap with SBR 2/2 closed loop obstruction from volvulus w/ necrotic segment of small bowel.  She presents with the following impairments/functional limitations: weakness, impaired endurance, impaired self care skills, impaired functional mobility, gait instability, impaired balance, pain. Pt tolerated PT eval well. She is independent at baseline and plans to d/c to her house in [place] which has a ramp entrance and where she has good support from nearby family/friends. On eval, pt is Rae for sit<>stand and ambulating 200' with CGA and RW. Recommending low intensity therapy at d/c.    Rehab Prognosis: Good; patient would benefit from acute skilled PT services to address these deficits and reach maximum level of function.    Recent Surgery: Procedure(s) (LRB):  LAPAROTOMY, EXPLORATORY (N/A)  EXCISION, SMALL INTESTINE 1 Day Post-Op    Plan:     During this hospitalization, patient would benefit from acute PT services 5 x/week to address the identified rehab impairments via gait training, therapeutic activities, therapeutic exercises and progress toward the following goals:    Plan of Care Expires:  12/27/24    Subjective     Chief Complaint: abdominal pain  Patient/Family Comments/goals: to return to PLOF  Pain/Comfort:  Pain Rating 1: 6/10  Location 1: abdomen  Pain Addressed 1: Reposition, Distraction, Nurse notified    Patients cultural, spiritual, Judaism conflicts given the current situation: no    Living Environment:  Pt has a residence in both [place] and [place]. Plan is to go stay in [place] which is a [facility] with 3 WIL but there is a ramp.   Prior to  admission, patients level of function was IND.  Equipment used at home:  (Pt has access to a shower chair, quad cane, rollator).  DME owned (not currently used):  shower chair, QC, rollator .  Upon discharge, patient will have assistance from friends and family.    Objective:     Communicated with RN prior to session.  Patient found up in chair with pulse ox (continuous), oxygen, peripheral IV, NG tube, PureWick  upon PT entry to room.    General Precautions: Standard, fall  Orthopedic Precautions:N/A   Braces: N/A  Respiratory Status: Nasal cannula, flow 2 L/min  Blood Pressure: N/A      Exams:  RLE ROM: WFL  RLE Strength: WFL  LLE ROM: WFL  LLE Strength: WFL  Skin integrity: Visible skin intact      Functional Mobility:  Transfers:     Sit to Stand:  minimum assistance with rolling walker and assist to rise 2/2 abd pain  Gait: Pt amb to and around bathroom with RW and CGA, then ambulated an additional 200' in hallway, no major balance concerns but one cue to prevent crossing feet when turning  Balance: good, able to stand at sink unsupported to perform ADLs      AM-PAC 6 CLICK MOBILITY  Total Score:        Treatment & Education:  Patient and daughter/s were provided with verbal education education regarding PT role/goals/POC, fall prevention, safety awareness, and discharge/DME recommendations.  Understanding was verbalized.     Patient left up in chair with all lines intact, call button in reach, and RN and dgtr present.    GOALS:   Multidisciplinary Problems       Physical Therapy Goals          Problem: Physical Therapy    Goal Priority Disciplines Outcome Interventions   Physical Therapy Goal     PT, PT/OT Progressing    Description: Goals to be met by: 24    Patient will increase functional independence with mobility by performin. Sit to stand transfer with Modified [place]  2. Bed to chair transfer with Modified [place] using LRAD  3. Gait  x 300 feet with Modified Arnold using  LRAD.                          History:     Past Medical History:   Diagnosis Date    Acute (reversible) ischemia of intestine, part and extent unspecified 08/24/2022    Acute cystitis without hematuria 08/14/2023    Cirrhosis of liver with ascites 02/14/2023    Continue to follow up with hepatologist    COVID     x 2-residual dysgeusia    Hepatitis C     treated/cured previously    Pacemaker     Paroxysmal atrial fibrillation     Pericardial cyst     Skin cancer     Thrombus        Past Surgical History:   Procedure Laterality Date    APPENDECTOMY      CARDIAC PACEMAKER PLACEMENT      cataract surgery      EXCISION, SMALL INTESTINE  11/26/2024    Procedure: EXCISION, SMALL INTESTINE;  Surgeon: Diogenes Velazquez MD;  Location: [facility];  Service: General;;  lysis of adhesions    FOOT SURGERY Left     HYSTERECTOMY      LAPAROSCOPIC CHOLECYSTECTOMY      LAPAROTOMY, EXPLORATORY N/A 11/26/2024    Procedure: LAPAROTOMY, EXPLORATORY;  Surgeon: Diogenes Velazquez MD;  Location: [facility] OR;  Service: General;  Laterality: N/A;    parathyroid resection      REPEAT ABDOMINAL PARACENTESIS      SKIN CANCER EXCISION      spot removed Left     left ankle spot removed    THROMBECTOMY         Time Tracking:     PT Received On: 11/27/24  PT Start Time: 1159     PT Stop Time: 1233  PT Total Time (min): 34 min     Billable Minutes: Evaluation 20 and Gait Training 14      11/27/2024

## 2024-11-27 NOTE — PLAN OF CARE
Problem: Occupational Therapy  Goal: Occupational Therapy Goal  Description: LTG: Pt will perform basic ADLs and ADL transfers with Modified independence using LRAD by discharge.    STG: to be met by 12/25/24:    Pt will complete grooming standing at sink with LRAD with SBA.  Pt will complete UB dressing with SBA.  Pt will complete LB dressing with SBA using LRAD.  Pt will complete toileting with SBA using LRAD.  Pt will complete functional mobility to/from toilet and toilet transfer with SBA using LRAD.   Outcome: Progressing

## 2024-11-27 NOTE — PLAN OF CARE
Problem: Physical Therapy  Goal: Physical Therapy Goal  Description: Goals to be met by: 24    Patient will increase functional independence with mobility by performin. Sit to stand transfer with Modified [place]  2. Bed to chair transfer with Modified [place] using LRAD  3. Gait  x 300 feet with Modified [place] using LRAD.     Outcome: Progressing

## 2024-11-27 NOTE — PLAN OF CARE
11/27/24 0953   Discharge Assessment   Assessment Type Discharge Planning Assessment   Confirmed/corrected address, phone number and insurance Yes   Confirmed Demographics Correct on Facesheet   Source of Information patient;family   Communicated ROMARIO with patient/caregiver Date not available/Unable to determine   Reason For Admission volvulus   People in Home alone   Do you expect to return to your current living situation? Yes   Do you have help at home or someone to help you manage your care at home? Yes   Who are your caregiver(s) and their phone number(s)? Johnnie Donahue [phone]   Prior to hospitilization cognitive status: Alert/Oriented   Current cognitive status: Alert/Oriented   Walking or Climbing Stairs Difficulty no   Dressing/Bathing Difficulty no   Home Accessibility stairs to enter home   Number of Stairs, Main Entrance three   Stair Railings, Main Entrance railings on both sides of stairs   Home Layout Able to live on 1st floor   Equipment Currently Used at Home shower chair  (she has a walkin tub)   Patient currently being followed by outpatient case management? No   Do you currently have service(s) that help you manage your care at home? No   Do you take prescription medications? Yes   Do you have prescription coverage? Yes   Do you have any problems affording any of your prescribed medications? No   Is the patient taking medications as prescribed? yes   Who is going to help you get home at discharge? Johnnie Donahue   How do you get to doctors appointments? car, drives self   Are you on dialysis? No   Do you take coumadin? No   Discharge Plan A Home Health   Discharge Plan B Home   DME Needed Upon Discharge    (TBD)   Discharge Plan discussed with: Patient;Adult children   Transition of Care Barriers None     Assessment done with pt in bed and daughter at bedside. Pt lives between her apartment in [place] and a cottage near family in [place]. 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 Her home in [place] is next door to her granddaughter.   Johnnie, daughter, also lives in [place]. She has some cousins in [place] to help but no immediate family. Pt has an appt to have her pacemaker changed next week in [place]. Daughter asked about home health (pt had in 2020 but company not known) but isn't sure pt will need it or if they'll wait until after her procedure in [place]. Encouraged pt and daughter to discuss with MD. They both verbalized understanding.

## 2024-11-28 LAB
ALBUMIN SERPL-MCNC: 2.9 G/DL (ref 3.4–4.8)
ALBUMIN/GLOB SERPL: 1.3 RATIO (ref 1.1–2)
ALP SERPL-CCNC: 50 UNIT/L (ref 40–150)
ALT SERPL-CCNC: 27 UNIT/L (ref 0–55)
ANION GAP SERPL CALC-SCNC: 7 MEQ/L
AST SERPL-CCNC: 46 UNIT/L (ref 5–34)
BASOPHILS # BLD AUTO: 0.01 X10(3)/MCL
BASOPHILS NFR BLD AUTO: 0.1 %
BILIRUB SERPL-MCNC: 1.9 MG/DL
BUN SERPL-MCNC: 37.7 MG/DL (ref 9.8–20.1)
CALCIUM SERPL-MCNC: 9.3 MG/DL (ref 8.4–10.2)
CHLORIDE SERPL-SCNC: 104 MMOL/L (ref 98–107)
CO2 SERPL-SCNC: 29 MMOL/L (ref 23–31)
CREAT SERPL-MCNC: 0.87 MG/DL (ref 0.55–1.02)
CREAT/UREA NIT SERPL: 43
EOSINOPHIL # BLD AUTO: 0 X10(3)/MCL (ref 0–0.9)
EOSINOPHIL NFR BLD AUTO: 0 %
ERYTHROCYTE [DISTWIDTH] IN BLOOD BY AUTOMATED COUNT: 16.1 % (ref 11.5–17)
GFR SERPLBLD CREATININE-BSD FMLA CKD-EPI: >60 ML/MIN/1.73/M2
GLOBULIN SER-MCNC: 2.2 GM/DL (ref 2.4–3.5)
GLUCOSE SERPL-MCNC: 165 MG/DL (ref 82–115)
HCT VFR BLD AUTO: 33.5 % (ref 37–47)
HGB BLD-MCNC: 10.5 G/DL (ref 12–16)
IMM GRANULOCYTES # BLD AUTO: 0.07 X10(3)/MCL (ref 0–0.04)
IMM GRANULOCYTES NFR BLD AUTO: 1 %
LYMPHOCYTES # BLD AUTO: 0.35 X10(3)/MCL (ref 0.6–4.6)
LYMPHOCYTES NFR BLD AUTO: 4.8 %
MAGNESIUM SERPL-MCNC: 2.5 MG/DL (ref 1.6–2.6)
MCH RBC QN AUTO: 30.3 PG (ref 27–31)
MCHC RBC AUTO-ENTMCNC: 31.3 G/DL (ref 33–36)
MCV RBC AUTO: 96.5 FL (ref 80–94)
MONOCYTES # BLD AUTO: 0.89 X10(3)/MCL (ref 0.1–1.3)
MONOCYTES NFR BLD AUTO: 12.1 %
NEUTROPHILS # BLD AUTO: 6.02 X10(3)/MCL (ref 2.1–9.2)
NEUTROPHILS NFR BLD AUTO: 82 %
NRBC BLD AUTO-RTO: 0 %
PHOSPHATE SERPL-MCNC: 2.4 MG/DL (ref 2.3–4.7)
PLATELET # BLD AUTO: 67 X10(3)/MCL (ref 130–400)
PLATELETS.RETICULATED NFR BLD AUTO: 6.8 % (ref 0.9–11.2)
PMV BLD AUTO: 11.1 FL (ref 7.4–10.4)
POTASSIUM SERPL-SCNC: 4.6 MMOL/L (ref 3.5–5.1)
PREALB SERPL-MCNC: 9.8 MG/DL (ref 14–37)
PROT SERPL-MCNC: 5.1 GM/DL (ref 5.8–7.6)
RBC # BLD AUTO: 3.47 X10(6)/MCL (ref 4.2–5.4)
SODIUM SERPL-SCNC: 140 MMOL/L (ref 136–145)
WBC # BLD AUTO: 7.34 X10(3)/MCL (ref 4.5–11.5)

## 2024-11-28 PROCEDURE — 25000003 PHARM REV CODE 250: Performed by: INTERNAL MEDICINE

## 2024-11-28 PROCEDURE — 83735 ASSAY OF MAGNESIUM: CPT | Performed by: STUDENT IN AN ORGANIZED HEALTH CARE EDUCATION/TRAINING PROGRAM

## 2024-11-28 PROCEDURE — 11000001 HC ACUTE MED/SURG PRIVATE ROOM

## 2024-11-28 PROCEDURE — 85025 COMPLETE CBC W/AUTO DIFF WBC: CPT | Performed by: STUDENT IN AN ORGANIZED HEALTH CARE EDUCATION/TRAINING PROGRAM

## 2024-11-28 PROCEDURE — 84100 ASSAY OF PHOSPHORUS: CPT | Performed by: STUDENT IN AN ORGANIZED HEALTH CARE EDUCATION/TRAINING PROGRAM

## 2024-11-28 PROCEDURE — 63600175 PHARM REV CODE 636 W HCPCS: Performed by: STUDENT IN AN ORGANIZED HEALTH CARE EDUCATION/TRAINING PROGRAM

## 2024-11-28 PROCEDURE — 80053 COMPREHEN METABOLIC PANEL: CPT | Performed by: STUDENT IN AN ORGANIZED HEALTH CARE EDUCATION/TRAINING PROGRAM

## 2024-11-28 PROCEDURE — 25000003 PHARM REV CODE 250

## 2024-11-28 PROCEDURE — 36415 COLL VENOUS BLD VENIPUNCTURE: CPT | Performed by: STUDENT IN AN ORGANIZED HEALTH CARE EDUCATION/TRAINING PROGRAM

## 2024-11-28 PROCEDURE — 21400001 HC TELEMETRY ROOM

## 2024-11-28 PROCEDURE — 84134 ASSAY OF PREALBUMIN: CPT

## 2024-11-28 RX ORDER — SODIUM,POTASSIUM PHOSPHATES 280-250MG
1 POWDER IN PACKET (EA) ORAL ONCE
Status: COMPLETED | OUTPATIENT
Start: 2024-11-28 | End: 2024-11-28

## 2024-11-28 RX ORDER — ACETAMINOPHEN 325 MG/1
650 TABLET ORAL EVERY 6 HOURS
Status: DISCONTINUED | OUTPATIENT
Start: 2024-11-28 | End: 2024-11-28

## 2024-11-28 RX ORDER — OXYCODONE HYDROCHLORIDE 5 MG/1
5 TABLET ORAL EVERY 6 HOURS PRN
Status: DISCONTINUED | OUTPATIENT
Start: 2024-11-28 | End: 2024-12-03 | Stop reason: HOSPADM

## 2024-11-28 RX ORDER — GABAPENTIN 100 MG/1
100 CAPSULE ORAL 2 TIMES DAILY
Status: DISCONTINUED | OUTPATIENT
Start: 2024-11-28 | End: 2024-11-29

## 2024-11-28 RX ORDER — OXYCODONE HYDROCHLORIDE 10 MG/1
10 TABLET ORAL EVERY 6 HOURS PRN
Status: DISCONTINUED | OUTPATIENT
Start: 2024-11-28 | End: 2024-12-03 | Stop reason: HOSPADM

## 2024-11-28 RX ADMIN — GABAPENTIN 100 MG: 100 CAPSULE ORAL at 12:11

## 2024-11-28 RX ADMIN — POTASSIUM & SODIUM PHOSPHATES POWDER PACK 280-160-250 MG 1 PACKET: 280-160-250 PACK at 09:11

## 2024-11-28 RX ADMIN — RIFAXIMIN 550 MG: 550 TABLET ORAL at 08:11

## 2024-11-28 RX ADMIN — LEUCINE, PHENYLALANINE, LYSINE, METHIONINE, ISOLEUCINE, VALINE, HISTIDINE, THREONINE, TRYPTOPHAN, ALANINE, GLYCINE, ARGININE, PROLINE, SERINE, TYROSINE, SODIUM ACETATE, DIBASIC POTASSIUM PHOSPHATE, MAGNESIUM CHLORIDE, SODIUM CHLORIDE, CALCIUM CHLORIDE, DEXTROSE
880; 489; 33; 5; 438; 204; 255; 311; 247; 51; 170; 238; 261; 289; 213; 297; 77; 179; 77; 17; 247 INJECTION INTRAVENOUS at 12:11

## 2024-11-28 RX ADMIN — ENOXAPARIN SODIUM 40 MG: 40 INJECTION SUBCUTANEOUS at 04:11

## 2024-11-28 RX ADMIN — RIFAXIMIN 550 MG: 550 TABLET ORAL at 09:11

## 2024-11-28 RX ADMIN — GABAPENTIN 100 MG: 100 CAPSULE ORAL at 08:11

## 2024-11-28 RX ADMIN — PANTOPRAZOLE SODIUM 40 MG: 40 INJECTION, POWDER, FOR SOLUTION INTRAVENOUS at 09:11

## 2024-11-28 NOTE — NURSING
"Notified by CNA that pt felt "weak and tired" when standing during hygiene care. Pt noted to have oxygen saturation of 80% and placed on 2L via NC. Pt found resting in chair with eyes closed. Oxygen sat reading 100%, HR maintaining in the 70s. Tele remains in place with no noted changes. Encouraged pt to continue IS use, oxygen left in place at this time.   "

## 2024-11-28 NOTE — PROGRESS NOTES
[facility] - 8th Floor [facility] MEDICINE ~ PROGRESS NOTE        CHIEF COMPLAINT   Hospital follow up volvulus    HOSPITAL COURSE   84-year-old female with history of hepatitis-C through blood transfusion s/p treatment, thrombocytopenia, prediabetes, pacemaker.  She had came in with complaints of abdominal pain along with nausea and vomiting.  She had been having intermittent abdominal pain occasionally.    CT scan showed proximal small-bowel jejunitis differential would include segmental volvulus.  Surgery was consulted and had taken her for emergent surgery 11/26 with findings of adhesions and volvulus of the omentum/bowel, 53 cm segment of proximal jejunum found to be discolored and darkened without peristalsis, resected.  She was also noted to be bradycardic into the 40s and Cardiology was consulted given pacemaker battery was nearing end of life already.    Today  She has continued to require NG tube to LIWS, output over the last 24 hours 300 cc.  Additionally cardiology interrogated ppm, and note that there is no indication for urgent generator change, recommend follow up with her regular cardiologist.        OBJECTIVE/PHYSICAL EXAM     VITAL SIGNS (MOST RECENT):  Temp: 98.5 °F (36.9 °C) (11/28/24 1138)  Pulse: 81 (11/28/24 1138)  Resp: 20 (11/28/24 1138)  BP: (!) 125/57 (11/28/24 1138)  SpO2: 96 % (11/28/24 1138) VITAL SIGNS (24 HOUR RANGE):  Temp:  [98 °F (36.7 °C)-98.5 °F (36.9 °C)] 98.5 °F (36.9 °C)  Pulse:  [50-81] 81  Resp:  [16-20] 20  SpO2:  [92 %-100 %] 96 %  BP: (109-135)/(57-70) 125/57   GENERAL: In no acute distress, afebrile  HEENT:  CHEST: Clear to auscultation bilaterally  HEART: S1, S2, no appreciable murmur  ABDOMEN: Soft, expected postoperative tenderness, NG tube  MSK: Warm, no lower extremity edema, no clubbing or cyanosis  NEUROLOGIC: Alert and oriented x4, moving all extremities with good strength   INTEGUMENTARY:  PSYCHIATRY:      DIAGNOSTICS/MEDICATIONS    LABS  Recent Labs     11/28/24 0408      K 4.6   CO2 29   BUN 37.7*   CREATININE 0.87   GLUCOSE 165*   CALCIUM 9.3   ALKPHOS 50   AST 46*   ALT 27   ALBUMIN 2.9*     Recent Labs     11/28/24 0408   WBC 7.34   RBC 3.47*   HCT 33.5*   MCV 96.5*   PLT 67*       MICROBIOLOGY  Microbiology Results (last 7 days)       Procedure Component Value Units Date/Time    Blood Culture #1 **CANNOT BE ORDERED STAT** [[id number]]  (Normal) Collected: 11/26/24 0409    Order Status: Completed Specimen: Blood Updated: 11/28/24 0700     Blood Culture No Growth At 48 Hours    Blood Culture #1 **CANNOT BE ORDERED STAT** [[id number]]  (Normal) Collected: 11/26/24 1655    Order Status: Completed Specimen: Blood Updated: 11/27/24 1901     Blood Culture No Growth At 24 Hours               MEDICATIONS   0.9%  NaCl infusion (for blood administration)   Intravenous Once    enoxparin  40 mg Subcutaneous Daily    gabapentin  100 mg Oral BID    methocarbamoL  500 mg Intravenous Q8H    pantoprazole  40 mg Intravenous Daily    rifAXIMin  550 mg Oral BID         INFUSIONS   Amino acid 4.25% - dextrose 5% (CLINIMIX-E) solution (1L provides 42.5 gm AA, 50 gm CHO (170 kcal/L dextrose), Na 35, K 30, Mg 5, Ca 4.5, Acetate 70, Cl 39, Phos 15)   Intravenous Continuous 80 mL/hr at 11/28/24 1232 New Bag at 11/28/24 1232          DIAGNOSTIC TESTS  XR Gastric tube check, non-radiologist performed   Final Result      Frontal image of the upper abdomen.  Enteric tube extends into the stomach with the proximal side hole just past the GE junction.         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    Continues with NG tube to LIWS at present  Continue PPN in the interim  Cardiology input appreciated, will continue to monitor on telemetry  Initiate PT/OT  Otherwise continue current management and monitoring in the interim      DVT prophylaxis:  JAY JAY Herron MD  [facility]  11/28/2024

## 2024-11-28 NOTE — PLAN OF CARE
Problem: Adult Inpatient Plan of Care  Goal: Plan of Care Review  Outcome: Progressing  Goal: Optimal Comfort and Wellbeing  Outcome: Progressing     Problem: Fall Injury Risk  Goal: Absence of Fall and Fall-Related Injury  Outcome: Progressing     Problem: Wound  Goal: Optimal Functional Ability  Outcome: Progressing  Goal: Optimal Pain Control and Function  Outcome: Progressing  Goal: Skin Health and Integrity  Outcome: Progressing  Goal: Optimal Wound Healing  Outcome: Progressing     Problem: Wound  Goal: Optimal Coping  Outcome: Not Progressing  Goal: Improved Oral Intake  Outcome: Not Progressing

## 2024-11-28 NOTE — PROGRESS NOTES
" [facility] Surgery   Progress Note  Admit Date: 11/25/2024  HD#2  POD#2 Days Post-Op    Subjective:   Interval history:  POD2 s/p ex lap with SBR 2/2 closed loop obstruction from volvulus w/ necrotic segment of small bowel   NGT 300cc bilious output   No flatus, AROBF   Denies n/v   Ruiz 50s overnight, pacemaker interrogation performed yesterday       Scheduled Meds:   0.9%  NaCl infusion (for blood administration)   Intravenous Once    enoxparin  40 mg Subcutaneous Daily    methocarbamoL  500 mg Intravenous Q8H    pantoprazole  40 mg Intravenous Daily    rifAXIMin  550 mg Oral BID     Continuous Infusions:   Amino acid 4.25% - dextrose 5% (CLINIMIX-E) solution (1L provides 42.5 gm AA, 50 gm CHO (170 kcal/L dextrose), Na 35, K 30, Mg 5, Ca 4.5, Acetate 70, Cl 39, Phos 15)   Intravenous Continuous 80 mL/hr at 11/27/24 1205 New Bag at 11/27/24 1205     PRN Meds:  Current Facility-Administered Medications:     dextrose 10%, 12.5 g, Intravenous, PRN    dextrose 10%, 12.5 g, Intravenous, PRN    dextrose 10%, 25 g, Intravenous, PRN    dextrose 10%, 25 g, Intravenous, PRN    glucagon (human recombinant), 1 mg, Intramuscular, PRN    melatonin, 6 mg, Oral, Nightly PRN    morphine, 2 mg, Intravenous, Q3H PRN    morphine, 4 mg, Intravenous, Q4H PRN    naloxone, 0.02 mg, Intravenous, PRN    ondansetron, 4 mg, Intravenous, Q4H PRN    sodium chloride 0.9%, 10 mL, Intravenous, PRN     Objective:     VITAL SIGNS: 24 HR MIN & MAX LAST   Temp  Min: 97.7 °F (36.5 °C)  Max: 98.4 °F (36.9 °C)  98.4 °F (36.9 °C)   BP  Min: 109/67  Max: 135/62  (!) 123/57    Pulse  Min: 50  Max: 72  (!) 57    Resp  Min: 16  Max: 20  20    SpO2  Min: 92 %  Max: 100 %  (!) 92 %      HT: 5' 6" (167.6 cm)  WT: 81.6 kg (179 lb 14.3 oz)  BMI: 29     Intake/output:  Intake/Output - Last 3 Shifts         11/26 0700 11/27 0659 11/27 0700 11/28 0659 11/28 0700 11/29 0659    I.V. (mL/kg) 100 (1.2)      Blood 400      IV Piggyback 800      TPN   1512.2    Total " "Intake(mL/kg) 1300 (15.9)  1512.2 (18.5)    Urine (mL/kg/hr) 300 (0.2) 1450 (0.7)     Drains 425 300     Blood 50      Total Output 775 1750     Net +525 -1750 +1512.2           Urine Occurrence 1 x              Intake/Output Summary (Last 24 hours) at 11/28/2024 1028  Last data filed at 11/28/2024 0700  Gross per 24 hour   Intake 1512.2 ml   Output 1750 ml   Net -237.8 ml        Lines/drains/airway:       Peripheral IV - Single Lumen 11/25/24 2230 20 G Anterior;Proximal;Right Forearm (Active)   Site Assessment Clean;Dry;Intact;No redness;No swelling 11/27/24 0400   Extremity Assessment Distal to IV No abnormal discoloration 11/26/24 1615   Line Status Infusing 11/27/24 0400   Dressing Status Clean;Dry;Intact 11/27/24 0400   Dressing Intervention Integrity maintained 11/27/24 0400   Number of days: 1            NG/OG Tube 11/26/24 0135 16 Fr. Right nostril (Active)   Placement Check placement verified by aspirate characteristics 11/26/24 0800   External Tube Length (cm) 63 11/26/24 1615   Securement secured to nostril center 11/27/24 0400   Clamp Status/Tolerance unclamped 11/27/24 0400   Suction Setting/Drainage Method intermittent setting;low 11/27/24 0400   Drainage Green 11/27/24 0400   Tube Output(mL)(Include Discarded Residual) 225 mL 11/27/24 0620   Number of days: 1       Physical examination:  Gen: NAD, AAOx3, answering questions appropriately  HEENT: Atraumatic   CV: RR  Resp: NWOB  Abd: S/appropriately tender, incision c/d/I, abdominal binder in place   Skin/wounds:    Labs:  Renal:  Recent Labs     11/26/24  0407 11/26/24 1655 11/27/24  0354 11/28/24  0408   BUN 17.2 27.2* 34.8* 37.7*   CREATININE 0.76 1.21* 1.01 0.87     No results for input(s): "LACTIC" in the last 72 hours.  FENGI:  Recent Labs     11/26/24  0407 11/26/24 1655 11/27/24  0354 11/28/24  0408   * 138 138 140   K 4.4 4.0 4.1 4.6    107 105 104   CO2 21* 22* 22* 29   CALCIUM 8.6 8.7 8.4 9.3   MG 1.70 2.00 2.00 2.50   PHOS 3.0 " 3.8 3.3 2.4   ALBUMIN 2.9* 3.5 3.0* 2.9*   BILITOT 4.0* 2.9* 2.4* 1.9*   AST 39* 37* 46* 46*   ALKPHOS 80 59 48 50   ALT 29 25 25 27     Heme:  Recent Labs     11/25/24 2255 11/26/24 0407 11/26/24 1859 11/27/24  0354 11/28/24 0408   HGB 15.1 14.9 11.8* 10.8* 10.5*   HCT 46.4 46.8 36.9* 33.4* 33.5*   PLT 63* 49* 93* 61* 67*   INR 1.2  --   --   --   --      ID:  Recent Labs     11/26/24 0407 11/26/24 1859 11/27/24 0354 11/28/24 0408   WBC 10.76 10.03 6.49 7.34     CBG:  Recent Labs     11/26/24 0407 11/26/24 1655 11/27/24 0354 11/28/24 0408   GLUCOSE 166* 168* 150* 165*      Cardiovascular:  Recent Labs   Lab 11/25/24 2255   TROPONINI 0.020     I have reviewed all pertinent lab results within the past 24 hours.    Imaging:  XR Gastric tube check, non-radiologist performed   Final Result      Frontal image of the upper abdomen.  Enteric tube extends into the stomach with the proximal side hole just past the GE junction.         Electronically signed by: Tal Kirk   Date:    11/26/2024   Time:    06:50      CT Abdomen Pelvis With IV Contrast NO Oral Contrast   Final Result      X-Ray Chest AP Portable   Final Result      No acute findings in the chest         Electronically signed by: Bayron Flynn MD   Date:    11/26/2024   Time:    06:47         I have reviewed all pertinent imaging results/findings within the past 24 hours.    Micro/Path/Other:  Microbiology Results (last 7 days)       Procedure Component Value Units Date/Time    Blood Culture #1 **CANNOT BE ORDERED STAT** [[id number]]  (Normal) Collected: 11/26/24 0409    Order Status: Completed Specimen: Blood Updated: 11/28/24 0700     Blood Culture No Growth At 48 Hours    Blood Culture #1 **CANNOT BE ORDERED STAT** [[id number]]  (Normal) Collected: 11/26/24 1655    Order Status: Completed Specimen: Blood Updated: 11/27/24 1901     Blood Culture No Growth At 24 Hours           Pathology Results  (Last 7 days)      None             Assessment &  Plan:    84-year-old female s/p ex lap with 56cm SBR 2/2 closed loop obstruction from volvulus w/ necrotic segment of small bowel 11/26    -Continue NGT to LIWS with frequent flushing   -NPO, AROBF   -TPN   -Encourage ambulation, PT/OT   -Cardiology consultation for pacemaker interrogation, no need for generator exchange for now   -mIVF while NPO   -CTM thrombocytopenia, stable   -MM pain control     Ibrahima Sosa MD   PGY-2   11/28/2024 9:11 AM

## 2024-11-29 PROBLEM — E44.0 MODERATE MALNUTRITION: Status: ACTIVE | Noted: 2024-11-29

## 2024-11-29 LAB
ALBUMIN SERPL-MCNC: 2.9 G/DL (ref 3.4–4.8)
ALBUMIN/GLOB SERPL: 1.2 RATIO (ref 1.1–2)
ALP SERPL-CCNC: 56 UNIT/L (ref 40–150)
ALT SERPL-CCNC: 28 UNIT/L (ref 0–55)
ANION GAP SERPL CALC-SCNC: 6 MEQ/L
AST SERPL-CCNC: 37 UNIT/L (ref 5–34)
BASOPHILS # BLD AUTO: 0 X10(3)/MCL
BASOPHILS NFR BLD AUTO: 0 %
BILIRUB SERPL-MCNC: 1.5 MG/DL
BUN SERPL-MCNC: 33.7 MG/DL (ref 9.8–20.1)
CALCIUM SERPL-MCNC: 9.6 MG/DL (ref 8.4–10.2)
CHLORIDE SERPL-SCNC: 109 MMOL/L (ref 98–107)
CO2 SERPL-SCNC: 25 MMOL/L (ref 23–31)
CREAT SERPL-MCNC: 0.7 MG/DL (ref 0.55–1.02)
CREAT/UREA NIT SERPL: 48
EOSINOPHIL # BLD AUTO: 0.01 X10(3)/MCL (ref 0–0.9)
EOSINOPHIL NFR BLD AUTO: 0.2 %
ERYTHROCYTE [DISTWIDTH] IN BLOOD BY AUTOMATED COUNT: 15.9 % (ref 11.5–17)
GFR SERPLBLD CREATININE-BSD FMLA CKD-EPI: >60 ML/MIN/1.73/M2
GLOBULIN SER-MCNC: 2.4 GM/DL (ref 2.4–3.5)
GLUCOSE SERPL-MCNC: 161 MG/DL (ref 82–115)
HCT VFR BLD AUTO: 34.6 % (ref 37–47)
HGB BLD-MCNC: 10.9 G/DL (ref 12–16)
IMM GRANULOCYTES # BLD AUTO: 0.09 X10(3)/MCL (ref 0–0.04)
IMM GRANULOCYTES NFR BLD AUTO: 1.5 %
LYMPHOCYTES # BLD AUTO: 0.36 X10(3)/MCL (ref 0.6–4.6)
LYMPHOCYTES NFR BLD AUTO: 5.8 %
MAGNESIUM SERPL-MCNC: 2.4 MG/DL (ref 1.6–2.6)
MCH RBC QN AUTO: 30.3 PG (ref 27–31)
MCHC RBC AUTO-ENTMCNC: 31.5 G/DL (ref 33–36)
MCV RBC AUTO: 96.1 FL (ref 80–94)
MONOCYTES # BLD AUTO: 0.59 X10(3)/MCL (ref 0.1–1.3)
MONOCYTES NFR BLD AUTO: 9.6 %
NEUTROPHILS # BLD AUTO: 5.12 X10(3)/MCL (ref 2.1–9.2)
NEUTROPHILS NFR BLD AUTO: 82.9 %
NRBC BLD AUTO-RTO: 0 %
PHOSPHATE SERPL-MCNC: 2.7 MG/DL (ref 2.3–4.7)
PLATELET # BLD AUTO: 72 X10(3)/MCL (ref 130–400)
PLATELETS.RETICULATED NFR BLD AUTO: 6.4 % (ref 0.9–11.2)
PMV BLD AUTO: 11.5 FL (ref 7.4–10.4)
POTASSIUM SERPL-SCNC: 4.7 MMOL/L (ref 3.5–5.1)
PROT SERPL-MCNC: 5.3 GM/DL (ref 5.8–7.6)
PSYCHE PATHOLOGY RESULT: NORMAL
RBC # BLD AUTO: 3.6 X10(6)/MCL (ref 4.2–5.4)
SODIUM SERPL-SCNC: 140 MMOL/L (ref 136–145)
WBC # BLD AUTO: 6.17 X10(3)/MCL (ref 4.5–11.5)

## 2024-11-29 PROCEDURE — B4185 PARENTERAL SOL 10 GM LIPIDS: HCPCS | Performed by: INTERNAL MEDICINE

## 2024-11-29 PROCEDURE — 83735 ASSAY OF MAGNESIUM: CPT | Performed by: STUDENT IN AN ORGANIZED HEALTH CARE EDUCATION/TRAINING PROGRAM

## 2024-11-29 PROCEDURE — 99233 SBSQ HOSP IP/OBS HIGH 50: CPT | Mod: FS,,, | Performed by: SURGERY

## 2024-11-29 PROCEDURE — 25000003 PHARM REV CODE 250: Performed by: INTERNAL MEDICINE

## 2024-11-29 PROCEDURE — 80053 COMPREHEN METABOLIC PANEL: CPT | Performed by: STUDENT IN AN ORGANIZED HEALTH CARE EDUCATION/TRAINING PROGRAM

## 2024-11-29 PROCEDURE — 25000003 PHARM REV CODE 250

## 2024-11-29 PROCEDURE — 63600175 PHARM REV CODE 636 W HCPCS: Performed by: STUDENT IN AN ORGANIZED HEALTH CARE EDUCATION/TRAINING PROGRAM

## 2024-11-29 PROCEDURE — 21400001 HC TELEMETRY ROOM

## 2024-11-29 PROCEDURE — 85025 COMPLETE CBC W/AUTO DIFF WBC: CPT | Performed by: STUDENT IN AN ORGANIZED HEALTH CARE EDUCATION/TRAINING PROGRAM

## 2024-11-29 PROCEDURE — 84100 ASSAY OF PHOSPHORUS: CPT | Performed by: STUDENT IN AN ORGANIZED HEALTH CARE EDUCATION/TRAINING PROGRAM

## 2024-11-29 PROCEDURE — 36415 COLL VENOUS BLD VENIPUNCTURE: CPT | Performed by: STUDENT IN AN ORGANIZED HEALTH CARE EDUCATION/TRAINING PROGRAM

## 2024-11-29 RX ORDER — GABAPENTIN 300 MG/1
300 CAPSULE ORAL 3 TIMES DAILY
Status: DISCONTINUED | OUTPATIENT
Start: 2024-11-29 | End: 2024-12-03 | Stop reason: HOSPADM

## 2024-11-29 RX ORDER — GABAPENTIN 300 MG/1
300 CAPSULE ORAL 2 TIMES DAILY
Status: DISCONTINUED | OUTPATIENT
Start: 2024-11-29 | End: 2024-11-29

## 2024-11-29 RX ADMIN — I.V. FAT EMULSION 250 ML: 20 EMULSION INTRAVENOUS at 03:11

## 2024-11-29 RX ADMIN — ENOXAPARIN SODIUM 40 MG: 40 INJECTION SUBCUTANEOUS at 04:11

## 2024-11-29 RX ADMIN — LEUCINE, PHENYLALANINE, LYSINE, METHIONINE, ISOLEUCINE, VALINE, HISTIDINE, THREONINE, TRYPTOPHAN, ALANINE, GLYCINE, ARGININE, PROLINE, SERINE, TYROSINE, SODIUM ACETATE, DIBASIC POTASSIUM PHOSPHATE, MAGNESIUM CHLORIDE, SODIUM CHLORIDE, CALCIUM CHLORIDE, DEXTROSE
880; 489; 33; 5; 438; 204; 255; 311; 247; 51; 170; 238; 261; 289; 213; 297; 77; 179; 77; 17; 247 INJECTION INTRAVENOUS at 03:11

## 2024-11-29 RX ADMIN — GABAPENTIN 300 MG: 300 CAPSULE ORAL at 02:11

## 2024-11-29 RX ADMIN — RIFAXIMIN 550 MG: 550 TABLET ORAL at 09:11

## 2024-11-29 RX ADMIN — PANTOPRAZOLE SODIUM 40 MG: 40 INJECTION, POWDER, FOR SOLUTION INTRAVENOUS at 07:11

## 2024-11-29 RX ADMIN — GABAPENTIN 300 MG: 300 CAPSULE ORAL at 09:11

## 2024-11-29 RX ADMIN — GABAPENTIN 100 MG: 100 CAPSULE ORAL at 09:11

## 2024-11-29 NOTE — PROGRESS NOTES
"[facility] Hospital Medicine Progress Note        Chief Complaint: Inpatient Follow-up     HPI:   "84-year-old female with history of hepatitis-C through blood transfusion s/p treatment, thrombocytopenia, prediabetes, pacemaker.  She had came in with complaints of abdominal pain along with nausea and vomiting.  She had been having intermittent abdominal pain occasionally.    CT scan showed proximal small-bowel jejunitis differential would include segmental volvulus.  Surgery was consulted and had taken her for emergent surgery 11/26 with findings of adhesions and volvulus of the omentum/bowel, 53 cm segment of proximal jejunum found to be discolored and darkened without peristalsis, resected.  She was also noted to be bradycardic into the 40s and Cardiology was consulted given pacemaker battery was nearing end of life already."     cardiology interrogated ppm, and note that there is no indication for urgent generator change, recommend follow up with her regular cardiologist     Interval Hx:   No acute events reported overnight   She was seen at bedside  this mrng , family member present  She was sitting up in chair, remained NPO, getting PPN, NG tube in place with bilious out put   Discussed plan of care   Case was discussed with patient's nurse     Labs this AM showed stable plat count 72k, hb stable 10.9    Objective/physical exam:  General: In no acute distress, afebrile, NG T  Chest: unlabored breathing   Heart: normal rate during the rounds  Abdomen: Soft, TTP appropriate   MSK: Warm  Neurologic: Alert and oriented     VITAL SIGNS: 24 HRS MIN & MAX LAST   Temp  Min: 97.9 °F (36.6 °C)  Max: 98.4 °F (36.9 °C) 98.2 °F (36.8 °C)   BP  Min: 119/63  Max: 149/62 132/65   Pulse  Min: 45  Max: 82  76   Resp  Min: 16  Max: 20 20   SpO2  Min: 93 %  Max: 100 % (!) 94 %     I have reviewed the following labs:  Recent Labs   Lab 11/27/24  0354 11/28/24  0408 11/29/24  0530   WBC 6.49 7.34 6.17 "   RBC 3.56* 3.47* 3.60*   HGB 10.8* 10.5* 10.9*   HCT 33.4* 33.5* 34.6*   MCV 93.8 96.5* 96.1*   MCH 30.3 30.3 30.3   MCHC 32.3* 31.3* 31.5*   RDW 16.0 16.1 15.9   PLT 61* 67* 72*   MPV 10.3 11.1* 11.5*     Recent Labs   Lab 11/27/24  0354 11/28/24  0408 11/29/24  0530    140 140   K 4.1 4.6 4.7    104 109*   CO2 22* 29 25   BUN 34.8* 37.7* 33.7*   CREATININE 1.01 0.87 0.70   CALCIUM 8.4 9.3 9.6   MG 2.00 2.50 2.40   ALBUMIN 3.0* 2.9* 2.9*   ALKPHOS 48 50 56   ALT 25 27 28   AST 46* 46* 37*   BILITOT 2.4* 1.9* 1.5     Microbiology Results (last 7 days)       Procedure Component Value Units Date/Time    Blood Culture #1 **CANNOT BE ORDERED STAT** [[id number]]  (Normal) Collected: 11/26/24 0409    Order Status: Completed Specimen: Blood Updated: 11/29/24 0701     Blood Culture No Growth At 72 Hours    Blood Culture #1 **CANNOT BE ORDERED STAT** [[id number]]  (Normal) Collected: 11/26/24 1655    Order Status: Completed Specimen: Blood Updated: 11/28/24 1901     Blood Culture No Growth At 48 Hours             See below for Radiology    Assessment/Plan:  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    Continues with NG tube to LIWS at present  Continue PPN in the interim  Cardiology inputs noted, No indication for urgent generator change.  follow up with Dr. Singh for generator change as scheduled, will continue to monitor on telemetry   PT/OT  Supportive care   Otherwise continue current management and monitoring in the interim    VTE prophylaxis:     Patient condition:  Fair              Portions of this note dictated using EMR integrated voice recognition software, and may be subject to voice recognition errors not corrected at proofreading. Please contact writer for clarification if needed.    _____________________________________________________________________    Malnutrition Status:  Nutrition consulted. Most recent weight and BMI monitored-     Measurements:  Wt Readings from Last 1 Encounters:   11/26/24 81.6 kg (179 lb 14.3 oz)   Body mass index is 29.04 kg/m².    Patient has been screened and assessed by RD.    Malnutrition Type:  Context:    Level:      Malnutrition Characteristic Summary:       Interventions/Recommendations (treatment strategy):        Scheduled Med:   0.9%  NaCl infusion (for blood administration)   Intravenous Once    enoxparin  40 mg Subcutaneous Daily    gabapentin  300 mg Oral TID    pantoprazole  40 mg Intravenous Daily    rifAXIMin  550 mg Oral BID      Continuous Infusions:   Amino acid 4.25% - dextrose 5% (CLINIMIX-E) solution (1L provides 42.5 gm AA, 50 gm CHO (170 kcal/L dextrose), Na 35, K 30, Mg 5, Ca 4.5, Acetate 70, Cl 39, Phos 15)   Intravenous Continuous 80 mL/hr at 11/28/24 1232 New Bag at 11/28/24 1232    Amino acid 4.25% - dextrose 5% (CLINIMIX-E) solution (1L provides 42.5 gm AA, 50 gm CHO (170 kcal/L dextrose), Na 35, K 30, Mg 5, Ca 4.5, Acetate 70, Cl 39, Phos 15)   Intravenous Continuous          PRN Meds:    Current Facility-Administered Medications:     dextrose 10%, 12.5 g, Intravenous, PRN    dextrose 10%, 12.5 g, Intravenous, PRN    dextrose 10%, 25 g, Intravenous, PRN    dextrose 10%, 25 g, Intravenous, PRN    glucagon (human recombinant), 1 mg, Intramuscular, PRN    melatonin, 6 mg, Oral, Nightly PRN    naloxone, 0.02 mg, Intravenous, PRN    ondansetron, 4 mg, Intravenous, Q4H PRN    oxyCODONE, 5 mg, Oral, Q6H PRN    oxyCODONE, 10 mg, Oral, Q6H PRN    sodium chloride 0.9%, 10 mL, Intravenous, PRN     Radiology:  I have personally reviewed the following imaging and agree with the radiologist.     CT Abdomen Pelvis With IV Contrast NO Oral Contrast  EXAMINATION  CT ABDOMEN PELVIS WITH IV CONTRAST    CLINICAL HISTORY  Nausea/vomiting;Epigastric  pain;    TECHNIQUE  Post-contrast helical-acquisition CT images were obtained and multiplanar reformats accomplished by a CT technologist at a separate workstation, pushed to PACS for physician review.    CONTRAST  *IV: Omnipaque 350, 100 mL  *Enteric: none    COMPARISON  5 July 2024    FINDINGS  Images were reviewed in soft tissue, lung, and bone windows.    Exam quality: adequate for evaluation    Lines/tubes: Right heart leads are again visualized.    Chest: Stable heart chamber size. No pericardial effusion. No interval change of the visualized vasculature. No airspace consolidation or suspicious lung lesion. No worsening pleural effusion or evidence for loculation.    Hepatobiliary/Pancreas: No new or enlarging hepatic lesion.  Multiple cystic appearing foci are again appreciated.  There is similar nodular irregularity of the liver surface suggesting changes of cirrhosis.  Redemonstrated cholecystectomy.  No development of biliary dilatation.  No convincing acute changes involving the pancreas or development of expansile mass-like lesion.  Subcentimeter simple appearing cystic structure incidentally noted at the pancreatic tail.    Spleen: There are scattered cystic structures throughout the spleen, with no definite new or enlarging mass-like lesion.    Adrenal/: No suspicious adrenal nodule.  Bilateral kidney cysts are similar to the comparison, with symmetric appearance of contrast enhancement.  No interval development of findings to suggest obstructive uropathy. Urinary bladder and adjacent reproductive structures are unchanged.    Esophagus/GI tract: The included lower esophagus is unremarkable. No evidence of gastric outlet or small bowel obstruction. There are proximal loops of jejunum (series 2, images ), which demonstrate profound wall thickening and edema, as well as interloop fluid and pronounced mucosal enhancement. There is a small mesenteric swirl just to the left of midline anterior to the  abnormal loops (images 66-80).  The transition from the normal duodenum and most proximal jejunum to the abnormal segments is relatively gradual, as is the transition from the abnormal jejunal segments to the normal jejunum. The rest of the small bowel appears unremarkable. The appendix is not clearly identified but there are no secondary findings of the right lower quadrant to suggest acute appendicitis.  No acute abnormality of the large bowel is evident.  There is similar scattered sigmoid diverticulosis with no findings of active diverticulitis.    Musculoskeletal: Similar advanced degenerative changes through the spinal column and bony pelvis.  No acute or destructive osseous process is identified.  Regional musculature is unremarkable.    Other findings: Minimal volume free fluid is present through the abdomen.  No pneumoperitoneum is identified.  No drainable collections. Aortoiliac vascular structures are similar in comparison. No development of pathologic anna enlargement or necrotic adenopathy.    IMPRESSION  1. Proximal small bowel findings suggestive of profound jejunitis of otherwise indeterminate etiology, differential includes segmental volvulus versus inflammatory or infectious process considered.  2. No other convincing acute abdominopelvic abnormality.  3. Chronic secondary findings discussed above, similar in the interval.  ==========    No significant discrepancy identified in relation to the teleradiology preliminary report.    RADIATION DOSE  Automated tube current modulation, weight-based exposure dosing, and/or iterative reconstruction technique utilized to reach lowest reasonably achievable exposure rate.    DLP: 758 mGy*cm    Electronically signed by: August Murray  Date:    11/26/2024  Time:    12:05  XR Gastric tube check, non-radiologist performed  Narrative: EXAMINATION:  XR GASTRIC TUBE CHECK, NON-RADIOLOGIST PERFORMED    CLINICAL HISTORY:  Ng tube placement;    COMPARISON:  12  September 2015  Impression: Frontal image of the upper abdomen.  Enteric tube extends into the stomach with the proximal side hole just past the GE junction.    Electronically signed by: Tal Kirk  Date:    11/26/2024  Time:    06:50  X-Ray Chest AP Portable  Narrative: EXAMINATION:  XR CHEST AP PORTABLE    CLINICAL HISTORY:  Epigastric pain    COMPARISON:  02/04/2021    FINDINGS:  Single view of the chest shows no focal consolidation, pneumothorax or pleural effusion.  Cardiomediastinal silhouette and cardiac device are stable.  Aorta is partially calcified.  Impression: No acute findings in the chest    Electronically signed by: Bayron Flynn MD  Date:    11/26/2024  Time:    06:47      Jamie Philip MD  Department of Hospital Medicine   [facility]   11/29/2024

## 2024-11-29 NOTE — PLAN OF CARE
Problem: Adult Inpatient Plan of Care  Goal: Plan of Care Review  Outcome: Progressing  Goal: Absence of Hospital-Acquired Illness or Injury  Outcome: Progressing  Goal: Optimal Comfort and Wellbeing  Outcome: Progressing  Goal: Readiness for Transition of Care  Outcome: Progressing     Problem: Fall Injury Risk  Goal: Absence of Fall and Fall-Related Injury  Outcome: Progressing     Problem: Wound  Goal: Optimal Coping  Outcome: Progressing  Goal: Optimal Functional Ability  Outcome: Progressing  Goal: Absence of Infection Signs and Symptoms  Outcome: Progressing  Goal: Optimal Pain Control and Function  Outcome: Progressing  Goal: Optimal Wound Healing  Outcome: Progressing     Problem: Infection  Goal: Absence of Infection Signs and Symptoms  Outcome: Progressing     Problem: Wound  Goal: Improved Oral Intake  Outcome: Not Progressing

## 2024-11-29 NOTE — NURSING
On bedside report, pt refused offer of PRN pain medication stating she only wants the gabapentin, which is scheduled BID. This was explained to pt and PRN pain meds again offer. Pt again refused PRN pain meds and states she will wait for scheduled AM dose of gabapentin. Pt was repositioned in bed which she stated did help with pain and discomfort level. Day shift nurse, Nanda, will continue to monitor.

## 2024-11-29 NOTE — PROGRESS NOTES
" [facility]   Progress Note  Admit Date: 11/25/2024  HD#3  POD#3 Days Post-Op    Subjective:   Interval history:  POD3 s/p ex lap with SBR 2/2 closed loop obstruction from volvulus w/ necrotic segment of small bowel   NGT 350cc bilious output overnight   + flatus, no BM   Denies n/v     Scheduled Meds:   0.9%  NaCl infusion (for blood administration)   Intravenous Once    enoxparin  40 mg Subcutaneous Daily    gabapentin  100 mg Oral BID    pantoprazole  40 mg Intravenous Daily    rifAXIMin  550 mg Oral BID     Continuous Infusions:   Amino acid 4.25% - dextrose 5% (CLINIMIX-E) solution (1L provides 42.5 gm AA, 50 gm CHO (170 kcal/L dextrose), Na 35, K 30, Mg 5, Ca 4.5, Acetate 70, Cl 39, Phos 15)   Intravenous Continuous 80 mL/hr at 11/28/24 1232 New Bag at 11/28/24 1232     PRN Meds:  Current Facility-Administered Medications:     dextrose 10%, 12.5 g, Intravenous, PRN    dextrose 10%, 12.5 g, Intravenous, PRN    dextrose 10%, 25 g, Intravenous, PRN    dextrose 10%, 25 g, Intravenous, PRN    glucagon (human recombinant), 1 mg, Intramuscular, PRN    melatonin, 6 mg, Oral, Nightly PRN    naloxone, 0.02 mg, Intravenous, PRN    ondansetron, 4 mg, Intravenous, Q4H PRN    oxyCODONE, 5 mg, Oral, Q6H PRN    oxyCODONE, 10 mg, Oral, Q6H PRN    sodium chloride 0.9%, 10 mL, Intravenous, PRN     Objective:     VITAL SIGNS: 24 HR MIN & MAX LAST   Temp  Min: 97.9 °F (36.6 °C)  Max: 98.5 °F (36.9 °C)  98.1 °F (36.7 °C)   BP  Min: 119/63  Max: 149/62  (!) 135/55    Pulse  Min: 45  Max: 82  62    Resp  Min: 16  Max: 20  16    SpO2  Min: 92 %  Max: 100 %  (!) 94 %      HT: 5' 6" (167.6 cm)  WT: 81.6 kg (179 lb 14.3 oz)  BMI: 29     Intake/output:  Intake/Output - Last 3 Shifts         11/27 0700  11/28 0659 11/28 0700 11/29 0659    NG/GT  120    TPN  1512.2    Total Intake(mL/kg)  1632.2 (20)    Urine (mL/kg/hr) 1450 (0.7) 900 (0.5)    Drains 300 250    Total Output 1750 1150    Net -1750 +482.2          Urine Occurrence  " "1 x            Intake/Output Summary (Last 24 hours) at 11/29/2024 0626  Last data filed at 11/28/2024 2300  Gross per 24 hour   Intake 1632.2 ml   Output 1150 ml   Net 482.2 ml        Lines/drains/airway:       Peripheral IV - Single Lumen 11/25/24 2230 20 G Anterior;Proximal;Right Forearm (Active)   Site Assessment Clean;Dry;Intact;No redness;No swelling 11/27/24 0400   Extremity Assessment Distal to IV No abnormal discoloration 11/26/24 1615   Line Status Infusing 11/27/24 0400   Dressing Status Clean;Dry;Intact 11/27/24 0400   Dressing Intervention Integrity maintained 11/27/24 0400   Number of days: 1            NG/OG Tube 11/26/24 0135 16 Fr. Right nostril (Active)   Placement Check placement verified by aspirate characteristics 11/26/24 0800   External Tube Length (cm) 63 11/26/24 1615   Securement secured to nostril center 11/27/24 0400   Clamp Status/Tolerance unclamped 11/27/24 0400   Suction Setting/Drainage Method intermittent setting;low 11/27/24 0400   Drainage Green 11/27/24 0400   Tube Output(mL)(Include Discarded Residual) 225 mL 11/27/24 0620   Number of days: 1       Physical examination:  Gen: NAD, AAOx3, answering questions appropriately  HEENT: Atraumatic   CV: RR  Resp: NWOB  Abd: S/appropriately tender, midline incision staples c/d/I  Skin/wounds:    Labs:  Renal:  Recent Labs     11/26/24 1655 11/27/24 0354 11/28/24  0408 11/29/24  0530   BUN 27.2* 34.8* 37.7* 33.7*   CREATININE 1.21* 1.01 0.87 0.70     No results for input(s): "LACTIC" in the last 72 hours.  FENGI:  Recent Labs     11/26/24 1655 11/27/24 0354 11/28/24  0408 11/29/24  0530    138 140 140   K 4.0 4.1 4.6 4.7    105 104 109*   CO2 22* 22* 29 25   CALCIUM 8.7 8.4 9.3 9.6   MG 2.00 2.00 2.50 2.40   PHOS 3.8 3.3 2.4 2.7   ALBUMIN 3.5 3.0* 2.9* 2.9*   BILITOT 2.9* 2.4* 1.9* 1.5   AST 37* 46* 46* 37*   ALKPHOS 59 48 50 56   ALT 25 25 27 28     Heme:  Recent Labs     11/26/24  1859 11/27/24  0354 11/28/24  0408 " 11/29/24  0530   HGB 11.8* 10.8* 10.5* 10.9*   HCT 36.9* 33.4* 33.5* 34.6*   PLT 93* 61* 67* 72*     ID:  Recent Labs     11/26/24  1859 11/27/24  0354 11/28/24  0408 11/29/24  0530   WBC 10.03 6.49 7.34 6.17     CBG:  Recent Labs     11/26/24  1655 11/27/24  0354 11/28/24  0408 11/29/24  0530   GLUCOSE 168* 150* 165* 161*      Cardiovascular:  Recent Labs   Lab 11/25/24  2255   TROPONINI 0.020     I have reviewed all pertinent lab results within the past 24 hours.    Imaging:  XR Gastric tube check, non-radiologist performed   Final Result      Frontal image of the upper abdomen.  Enteric tube extends into the stomach with the proximal side hole just past the GE junction.         Electronically signed by: Tal Kirk   Date:    11/26/2024   Time:    06:50      CT Abdomen Pelvis With IV Contrast NO Oral Contrast   Final Result      X-Ray Chest AP Portable   Final Result      No acute findings in the chest         Electronically signed by: Bayron Flynn MD   Date:    11/26/2024   Time:    06:47         I have reviewed all pertinent imaging results/findings within the past 24 hours.    Micro/Path/Other:  Microbiology Results (last 7 days)       Procedure Component Value Units Date/Time    Blood Culture #1 **CANNOT BE ORDERED STAT** [[id number]]  (Normal) Collected: 11/26/24 1655    Order Status: Completed Specimen: Blood Updated: 11/28/24 1901     Blood Culture No Growth At 48 Hours    Blood Culture #1 **CANNOT BE ORDERED STAT** [[id number]]  (Normal) Collected: 11/26/24 0409    Order Status: Completed Specimen: Blood Updated: 11/28/24 0700     Blood Culture No Growth At 48 Hours           Pathology Results  (Last 7 days)      None             Assessment & Plan:    84-year-old female s/p ex lap with 56cm SBR 2/2 closed loop obstruction from volvulus w/ necrotic segment of small bowel 11/26    -Continue NGT to LIWS with frequent flushing   -NPO, AROBF   -TPN   -Encourage ambulation, PT/OT   -mIVF while NPO   -CTM  thrombocytopenia, stable   -MM pain control     .  11/29/2024     The above findings, diagnostics, and treatment plan were discussed with Dr. Andrea Steel who will follow with further assessments and recommendations. Please call with any questions, concerns, or clinical status changes.  This note/OR report was created with the assistance of  voice recognition software or phone  dictation.  There may be transcription errors as a result of using this technology however minimal. Effort has been made to assure accuracy of transcription but any obvious errors or omissions should be clarified with the author of the document.

## 2024-11-29 NOTE — PROGRESS NOTES
Inpatient Nutrition Assessment    Admit Date: 11/25/2024   Total duration of encounter: 4 days   Patient Age: 84 y.o.    Nutrition Recommendation/Prescription     -Advance diet as tolerated per MD. Goal Diet: Low Residue Diet.   -Continue current PPN for now; add IVPB IL 20% 250 mL daily for extra calories. Consider TPN if unable to advance diet in the next 3-4 days.   -Monitor wt and labs.     Communication of Recommendations: reviewed with nurse    Nutrition Assessment     Malnutrition Assessment/Nutrition-Focused Physical Exam    Malnutrition Context: acute illness or injury (11/29/24 1254)  Malnutrition Level: moderate (11/29/24 1254)  Energy Intake (Malnutrition): less than or equal to 50% for greater than or equal to 5 days (11/29/24 1254)  Weight Loss (Malnutrition): other (see comments) (Does not meet criteria) (11/29/24 1254)  Subcutaneous Fat (Malnutrition): moderate depletion (11/29/24 1254)  Orbital Region (Subcutaneous Fat Loss): moderate depletion        Muscle Mass (Malnutrition): moderate depletion (11/29/24 1254)  Buddhism Region (Muscle Loss): moderate depletion                       Fluid Accumulation (Malnutrition): other (see comments) (Not present) (11/29/24 1254)        A minimum of two characteristics is recommended for diagnosis of either severe or non-severe malnutrition.    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  Chronic thrombocytopenia   Bradycardia, h/o PPM, nearing end of life battery    Relevant Medical History:  hepatitis-C through blood transfusion s/p treatment, thrombocytopenia, prediabetes, pacemaker     Scheduled Medications:  0.9%  NaCl infusion (for blood administration), , Once  enoxparin, 40 mg,  Daily  gabapentin, 300 mg, TID  pantoprazole, 40 mg, Daily  rifAXIMin, 550 mg, BID    Continuous Infusions:  Amino acid 4.25% - dextrose 5% (CLINIMIX-E) solution (1L provides 42.5 gm AA, 50 gm CHO (170 kcal/L dextrose), Na 35, K 30, Mg 5, Ca 4.5, Acetate 70, Cl 39, Phos 15)    PRN Medications:  dextrose 10%, 12.5 g, PRN  dextrose 10%, 12.5 g, PRN  dextrose 10%, 25 g, PRN  dextrose 10%, 25 g, PRN  glucagon (human recombinant), 1 mg, PRN  melatonin, 6 mg, Nightly PRN  naloxone, 0.02 mg, PRN  ondansetron, 4 mg, Q4H PRN  oxyCODONE, 5 mg, Q6H PRN  oxyCODONE, 10 mg, Q6H PRN  sodium chloride 0.9%, 10 mL, PRN    Calorie Containing IV Medications: Clinimix    Recent Labs   Lab 11/25/24  2255 11/26/24  0407 11/26/24  1655 11/26/24  1859 11/27/24  0354 11/27/24  0812 11/28/24  0408 11/29/24  0530    134* 138  --  138  --  140 140   K 4.1 4.4 4.0  --  4.1  --  4.6 4.7   CALCIUM 9.6 8.6 8.7  --  8.4  --  9.3 9.6   PHOS  --  3.0 3.8  --  3.3  --  2.4 2.7   MG 2.00 1.70 2.00  --  2.00  --  2.50 2.40    105 107  --  105  --  104 109*   CO2 23 21* 22*  --  22*  --  29 25   BUN 17.4 17.2 27.2*  --  34.8*  --  37.7* 33.7*   CREATININE 0.86 0.76 1.21*  --  1.01  --  0.87 0.70   EGFRNORACEVR >60 >60 44  --  55  --  >60 >60   GLUCOSE 139* 166* 168*  --  150*  --  165* 161*   BILITOT 3.0* 4.0* 2.9*  --  2.4*  --  1.9* 1.5   ALKPHOS 87 80 59  --  48  --  50 56   ALT 28 29 25  --  25  --  27 28   AST 39* 39* 37*  --  46*  --  46* 37*   ALBUMIN 3.4 2.9* 3.5  --  3.0*  --  2.9* 2.9*   PREALB  --   --   --   --   --  11.0* 9.8*  --    LIPASE 26  --   --   --   --   --   --   --    WBC 12.69* 10.76  --  10.03 6.49  --  7.34 6.17   HGB 15.1 14.9  --  11.8* 10.8*  --  10.5* 10.9*   HCT 46.4 46.8  --  36.9* 33.4*  --  33.5* 34.6*     Nutrition Orders:  Diet NPO Except for: Ice Chips  Amino acid 4.25% - dextrose 5% (CLINIMIX-E) solution (1L provides 42.5 gm AA, 50 gm CHO (170 kcal/L dextrose), Na 35, K 30, Mg 5, Ca 4.5, Acetate 70, Cl  "39, Phos 15)      Appetite/Oral Intake: NPO/NPO  Factors Affecting Nutritional Intake: NPO  Social Needs Impacting Access to Food: unable to assess at this time; will attempt on follow-up  Food/Mu-ism/Cultural Preferences: none reported  Food Allergies:  latex, iodine  Last Bowel Movement: 24  Wound(s):  surgical incision    Comments    24: Pt NPO with NG tube to suction. Has been NPO with NG tube to suction since admit (); most likely not able to advance diet soon; PPN running. Will add IVPB IL daily.     Anthropometrics    Height: 5' 5.98" (167.6 cm),    Last Weight: 81.6 kg (179 lb 14.3 oz) (24 1245), Weight Method: Standard Scale  BMI (Calculated): 29  BMI Classification: overweight (BMI 25-29.9)     Ideal Body Weight (IBW), Female: 129.9 lb     % Ideal Body Weight, Female (lb): 138.49 %                    Usual Body Weight (UBW), k.3 kg (per EMR)  % Usual Body Weight: 108.59  % Weight Change From Usual Weight: 8.37 %  Usual Weight Provided By: EMR weight history    Wt Readings from Last 5 Encounters:   24 81.6 kg (179 lb 14.3 oz)   10/10/24 81.7 kg (180 lb 1.6 oz)   24 75.5 kg (166 lb 7.2 oz)   24 75.3 kg (166 lb 0.1 oz)   24 75.3 kg (165 lb 14.4 oz)     Weight Change(s) Since Admission:   Wt Readings from Last 1 Encounters:   24 1245 81.6 kg (179 lb 14.3 oz)   24 0847 81.6 kg (179 lb 14.3 oz)   24 2203 81.6 kg (180 lb)   Admit Weight: 81.6 kg (180 lb) (24 2203), Weight Method: Standard Scale    Estimated Needs    Weight Used For Calorie Calculations: 81.6 kg (179 lb 14.3 oz)  Energy Calorie Requirements (kcal): 4506-8262 kcal (25-30 kcal/kg)  Energy Need Method: Kcal/kg  Weight Used For Protein Calculations: 81.6 kg (179 lb 14.3 oz)  Protein Requirements: 106 gm (1.3g/kg)  Fluid Requirements (mL): 2040 mL  CHO Requirement: 270-324 gm (45% EEN)     Enteral Nutrition     Patient not receiving enteral nutrition at this " time.    Parenteral Nutrition     Standard Formula: Clinimix E 4.25/5  Custom Formula: not applicable  Additives: none  Rate/Volume: 80 mL/hr  Lipids: none  Total Nutrition Provided by Parenteral Nutrition:  Calories Provided  654 kcal/d, 32% needs   Protein Provided  82 g/d, 77% needs   Dextrose Provided  96 g/d, GIR 0.82 mg CHO/kg/min   Fluid Provided  1920 ml/d, 94% needs       Evaluation of Received Nutrient Intake    Calories: not meeting estimated needs  Protein: not meeting estimated needs    Patient Education     Not applicable.    Nutrition Diagnosis     PES: Inadequate oral intake related to acute illness as evidenced by NPO since admit. (new)     PES: Moderate acute disease or injury related malnutrition Related to altered GI as Evidenced by:  - energy intake: <= 50% for 5 days (meets criteria for <= 50% >= 5 days (severe - acute)) - muscle mass depletion: 1 area of moderate muscle loss (Temporalis) - loss of subcutaneous fat: 1 area of moderate fat loss (Infraorbital) new    Nutrition Interventions     Intervention(s): modified composition of parenteral nutrition, modified rate of parenteral nutrition, and collaboration with other providers  Intervention(s): Parenteral nutrition or supplemental parenteral nutrition    Goal: Meet greater than 80% of nutritional needs by follow-up. (new)  Goal: Maintain weight throughout hospitalization. (new)    Nutrition Goals & Monitoring     Dietitian will monitor: parenteral nutrition intake and weight  Discharge planning: too early to determine; pending clinical course  Nutrition Risk/Follow-Up: high (follow-up in 1-4 days)   Please consult if re-assessment needed sooner.

## 2024-11-30 LAB
ALBUMIN SERPL-MCNC: 2.7 G/DL (ref 3.4–4.8)
ALBUMIN/GLOB SERPL: 1.1 RATIO (ref 1.1–2)
ALP SERPL-CCNC: 92 UNIT/L (ref 40–150)
ALT SERPL-CCNC: 32 UNIT/L (ref 0–55)
ANION GAP SERPL CALC-SCNC: 9 MEQ/L
AST SERPL-CCNC: 45 UNIT/L (ref 5–34)
BASOPHILS # BLD AUTO: 0.02 X10(3)/MCL
BASOPHILS NFR BLD AUTO: 0.5 %
BILIRUB SERPL-MCNC: 1.2 MG/DL
BUN SERPL-MCNC: 27.1 MG/DL (ref 9.8–20.1)
CALCIUM SERPL-MCNC: 9 MG/DL (ref 8.4–10.2)
CHLORIDE SERPL-SCNC: 108 MMOL/L (ref 98–107)
CO2 SERPL-SCNC: 23 MMOL/L (ref 23–31)
CREAT SERPL-MCNC: 0.71 MG/DL (ref 0.55–1.02)
CREAT/UREA NIT SERPL: 38
EOSINOPHIL # BLD AUTO: 0.03 X10(3)/MCL (ref 0–0.9)
EOSINOPHIL NFR BLD AUTO: 0.7 %
ERYTHROCYTE [DISTWIDTH] IN BLOOD BY AUTOMATED COUNT: 15.6 % (ref 11.5–17)
GFR SERPLBLD CREATININE-BSD FMLA CKD-EPI: >60 ML/MIN/1.73/M2
GLOBULIN SER-MCNC: 2.4 GM/DL (ref 2.4–3.5)
GLUCOSE SERPL-MCNC: 156 MG/DL (ref 82–115)
HCT VFR BLD AUTO: 34.8 % (ref 37–47)
HGB BLD-MCNC: 11 G/DL (ref 12–16)
IMM GRANULOCYTES # BLD AUTO: 0.14 X10(3)/MCL (ref 0–0.04)
IMM GRANULOCYTES NFR BLD AUTO: 3.2 %
LYMPHOCYTES # BLD AUTO: 0.34 X10(3)/MCL (ref 0.6–4.6)
LYMPHOCYTES NFR BLD AUTO: 7.8 %
MAGNESIUM SERPL-MCNC: 2.1 MG/DL (ref 1.6–2.6)
MCH RBC QN AUTO: 30.3 PG (ref 27–31)
MCHC RBC AUTO-ENTMCNC: 31.6 G/DL (ref 33–36)
MCV RBC AUTO: 95.9 FL (ref 80–94)
MONOCYTES # BLD AUTO: 0.42 X10(3)/MCL (ref 0.1–1.3)
MONOCYTES NFR BLD AUTO: 9.6 %
NEUTROPHILS # BLD AUTO: 3.43 X10(3)/MCL (ref 2.1–9.2)
NEUTROPHILS NFR BLD AUTO: 78.2 %
NRBC BLD AUTO-RTO: 0 %
PHOSPHATE SERPL-MCNC: 3.4 MG/DL (ref 2.3–4.7)
PLATELET # BLD AUTO: 64 X10(3)/MCL (ref 130–400)
PLATELETS.RETICULATED NFR BLD AUTO: 5.1 % (ref 0.9–11.2)
PMV BLD AUTO: 11.5 FL (ref 7.4–10.4)
POTASSIUM SERPL-SCNC: 4.4 MMOL/L (ref 3.5–5.1)
PROT SERPL-MCNC: 5.1 GM/DL (ref 5.8–7.6)
RBC # BLD AUTO: 3.63 X10(6)/MCL (ref 4.2–5.4)
SODIUM SERPL-SCNC: 140 MMOL/L (ref 136–145)
WBC # BLD AUTO: 4.38 X10(3)/MCL (ref 4.5–11.5)

## 2024-11-30 PROCEDURE — 25000003 PHARM REV CODE 250

## 2024-11-30 PROCEDURE — 25000003 PHARM REV CODE 250: Performed by: INTERNAL MEDICINE

## 2024-11-30 PROCEDURE — 85025 COMPLETE CBC W/AUTO DIFF WBC: CPT | Performed by: STUDENT IN AN ORGANIZED HEALTH CARE EDUCATION/TRAINING PROGRAM

## 2024-11-30 PROCEDURE — 84100 ASSAY OF PHOSPHORUS: CPT | Performed by: STUDENT IN AN ORGANIZED HEALTH CARE EDUCATION/TRAINING PROGRAM

## 2024-11-30 PROCEDURE — 83735 ASSAY OF MAGNESIUM: CPT | Performed by: STUDENT IN AN ORGANIZED HEALTH CARE EDUCATION/TRAINING PROGRAM

## 2024-11-30 PROCEDURE — 36415 COLL VENOUS BLD VENIPUNCTURE: CPT | Performed by: STUDENT IN AN ORGANIZED HEALTH CARE EDUCATION/TRAINING PROGRAM

## 2024-11-30 PROCEDURE — 63600175 PHARM REV CODE 636 W HCPCS: Performed by: STUDENT IN AN ORGANIZED HEALTH CARE EDUCATION/TRAINING PROGRAM

## 2024-11-30 PROCEDURE — 97535 SELF CARE MNGMENT TRAINING: CPT | Mod: CO

## 2024-11-30 PROCEDURE — 80053 COMPREHEN METABOLIC PANEL: CPT | Performed by: STUDENT IN AN ORGANIZED HEALTH CARE EDUCATION/TRAINING PROGRAM

## 2024-11-30 PROCEDURE — 21400001 HC TELEMETRY ROOM

## 2024-11-30 PROCEDURE — 99233 SBSQ HOSP IP/OBS HIGH 50: CPT | Mod: ,,, | Performed by: SURGERY

## 2024-11-30 PROCEDURE — 97110 THERAPEUTIC EXERCISES: CPT | Mod: CO

## 2024-11-30 RX ADMIN — RIFAXIMIN 550 MG: 550 TABLET ORAL at 09:11

## 2024-11-30 RX ADMIN — PANTOPRAZOLE SODIUM 40 MG: 40 INJECTION, POWDER, FOR SOLUTION INTRAVENOUS at 09:11

## 2024-11-30 RX ADMIN — GABAPENTIN 300 MG: 300 CAPSULE ORAL at 09:11

## 2024-11-30 RX ADMIN — OXYCODONE HYDROCHLORIDE 10 MG: 10 TABLET ORAL at 03:11

## 2024-11-30 RX ADMIN — ENOXAPARIN SODIUM 40 MG: 40 INJECTION SUBCUTANEOUS at 05:11

## 2024-11-30 RX ADMIN — LEUCINE, PHENYLALANINE, LYSINE, METHIONINE, ISOLEUCINE, VALINE, HISTIDINE, THREONINE, TRYPTOPHAN, ALANINE, GLYCINE, ARGININE, PROLINE, SERINE, TYROSINE, SODIUM ACETATE, DIBASIC POTASSIUM PHOSPHATE, MAGNESIUM CHLORIDE, SODIUM CHLORIDE, CALCIUM CHLORIDE, DEXTROSE
880; 489; 33; 5; 438; 204; 255; 311; 247; 51; 170; 238; 261; 289; 213; 297; 77; 179; 77; 17; 247 INJECTION INTRAVENOUS at 05:11

## 2024-11-30 NOTE — PROGRESS NOTES
"[facility] Hospital Medicine Progress Note        Chief Complaint: Inpatient Follow-up     HPI:   "84-year-old female with history of hepatitis-C through blood transfusion s/p treatment, thrombocytopenia, prediabetes, pacemaker.  She had came in with complaints of abdominal pain along with nausea and vomiting.  She had been having intermittent abdominal pain occasionally.    CT scan showed proximal small-bowel jejunitis differential would include segmental volvulus.  Surgery was consulted and had taken her for emergent surgery 11/26 with findings of adhesions and volvulus of the omentum/bowel, 53 cm segment of proximal jejunum found to be discolored and darkened without peristalsis, resected.  She was also noted to be bradycardic into the 40s and Cardiology was consulted given pacemaker battery was nearing end of life already."     cardiology interrogated ppm, and note that there is no indication for urgent generator change, recommend follow up with her regular cardiologist     Interval Hx:   No acute events reported overnight . She was seen at bedside  this mrng , no family member present Patient was cleared for p.o. diet  yesterday by surgery, noted to have abdomen pain, required p.o. Oxy early a.m., patient reported very minimally tolerated, she had BM this morning    Case was discussed with patient's nurse     Hemodynamically stable, intervene bradycardia     Labs this AM showed stable hemoglobin at 11, platelet count 64 K, improving BUN 27.1    Objective/physical exam:  General: In no acute distress, afebrile, NG T  Chest: unlabored breathing   Heart: normal rate   Abdomen: Soft, TTP    MSK: Warm  Neurologic: Alert and oriented     VITAL SIGNS: 24 HRS MIN & MAX LAST   Temp  Min: 97.9 °F (36.6 °C)  Max: 98.2 °F (36.8 °C) 98 °F (36.7 °C)   BP  Min: 109/72  Max: 139/73 117/60   Pulse  Min: 49  Max: 79  (!) 49   Resp  Min: 16  Max: 18 16   SpO2  Min: 93 %  Max: 97 % (!) 93 %     I " have reviewed the following labs:  Recent Labs   Lab 11/28/24 0408 11/29/24 0530 11/30/24  0345   WBC 7.34 6.17 4.38*   RBC 3.47* 3.60* 3.63*   HGB 10.5* 10.9* 11.0*   HCT 33.5* 34.6* 34.8*   MCV 96.5* 96.1* 95.9*   MCH 30.3 30.3 30.3   MCHC 31.3* 31.5* 31.6*   RDW 16.1 15.9 15.6   PLT 67* 72* 64*   MPV 11.1* 11.5* 11.5*     Recent Labs   Lab 11/28/24 0408 11/29/24 0530 11/30/24 0345    140 140   K 4.6 4.7 4.4    109* 108*   CO2 29 25 23   BUN 37.7* 33.7* 27.1*   CREATININE 0.87 0.70 0.71   CALCIUM 9.3 9.6 9.0   MG 2.50 2.40 2.10   ALBUMIN 2.9* 2.9* 2.7*   ALKPHOS 50 56 92   ALT 27 28 32   AST 46* 37* 45*   BILITOT 1.9* 1.5 1.2     Microbiology Results (last 7 days)       Procedure Component Value Units Date/Time    Blood Culture #1 **CANNOT BE ORDERED STAT** [[id number]]  (Normal) Collected: 11/26/24 0409    Order Status: Completed Specimen: Blood Updated: 11/30/24 0701     Blood Culture No Growth At 96 Hours    Blood Culture #1 **CANNOT BE ORDERED STAT** [[id number]]  (Normal) Collected: 11/26/24 1655    Order Status: Completed Specimen: Blood Updated: 11/29/24 1901     Blood Culture No Growth At 72 Hours             See below for Radiology    Assessment/Plan:  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    Continue PPN until p.o. intake adequate  Cardiology signed off, No indication for urgent generator change.  follow up with Dr. Singh for generator change as scheduled, will continue to monitor on telemetry  Mobilize, PT/OT services-low intensity  Supportive care   Nutritional support  Otherwise continue current management and monitoring in the interim    VTE prophylaxis:  Lovenox    Patient condition:  Fair              Portions of this note dictated using EMR integrated voice recognition software, and may be subject to  voice recognition errors not corrected at proofreading. Please contact writer for clarification if needed.   _____________________________________________________________________    Malnutrition Status:  Nutrition consulted. Most recent weight and BMI monitored-     Measurements:  Wt Readings from Last 1 Encounters:   11/29/24 81.6 kg (179 lb 14.3 oz)   Body mass index is 29.05 kg/m².    Patient has been screened and assessed by RD.    Malnutrition Type:  Context: acute illness or injury  Level: moderate    Malnutrition Characteristic Summary:  Weight Loss (Malnutrition): other (see comments) (Does not meet criteria)  Energy Intake (Malnutrition): less than or equal to 50% for greater than or equal to 5 days  Subcutaneous Fat (Malnutrition): moderate depletion  Muscle Mass (Malnutrition): moderate depletion  Fluid Accumulation (Malnutrition): other (see comments) (Not present)    Interventions/Recommendations (treatment strategy):  Parenteral nutrition or supplemental parenteral nutrition     Scheduled Med:   0.9%  NaCl infusion (for blood administration)   Intravenous Once    enoxparin  40 mg Subcutaneous Daily    gabapentin  300 mg Oral TID    pantoprazole  40 mg Intravenous Daily    rifAXIMin  550 mg Oral BID      Continuous Infusions:   Amino acid 4.25% - dextrose 5% (CLINIMIX-E) solution (1L provides 42.5 gm AA, 50 gm CHO (170 kcal/L dextrose), Na 35, K 30, Mg 5, Ca 4.5, Acetate 70, Cl 39, Phos 15)   Intravenous Continuous 80 mL/hr at 11/29/24 1533 New Bag at 11/29/24 1533      PRN Meds:    Current Facility-Administered Medications:     dextrose 10%, 12.5 g, Intravenous, PRN    dextrose 10%, 12.5 g, Intravenous, PRN    dextrose 10%, 25 g, Intravenous, PRN    dextrose 10%, 25 g, Intravenous, PRN    glucagon (human recombinant), 1 mg, Intramuscular, PRN    melatonin, 6 mg, Oral, Nightly PRN    naloxone, 0.02 mg, Intravenous, PRN    ondansetron, 4 mg, Intravenous, Q4H PRN    oxyCODONE, 5 mg, Oral, Q6H PRN     oxyCODONE, 10 mg, Oral, Q6H PRN    sodium chloride 0.9%, 10 mL, Intravenous, PRN     Radiology:  I have personally reviewed the following imaging and agree with the radiologist.     CT Abdomen Pelvis With IV Contrast NO Oral Contrast  EXAMINATION  CT ABDOMEN PELVIS WITH IV CONTRAST    CLINICAL HISTORY  Nausea/vomiting;Epigastric pain;    TECHNIQUE  Post-contrast helical-acquisition CT images were obtained and multiplanar reformats accomplished by a CT technologist at a separate workstation, pushed to PACS for physician review.    CONTRAST  *IV: Omnipaque 350, 100 mL  *Enteric: none    COMPARISON  5 July 2024    FINDINGS  Images were reviewed in soft tissue, lung, and bone windows.    Exam quality: adequate for evaluation    Lines/tubes: Right heart leads are again visualized.    Chest: Stable heart chamber size. No pericardial effusion. No interval change of the visualized vasculature. No airspace consolidation or suspicious lung lesion. No worsening pleural effusion or evidence for loculation.    Hepatobiliary/Pancreas: No new or enlarging hepatic lesion.  Multiple cystic appearing foci are again appreciated.  There is similar nodular irregularity of the liver surface suggesting changes of cirrhosis.  Redemonstrated cholecystectomy.  No development of biliary dilatation.  No convincing acute changes involving the pancreas or development of expansile mass-like lesion.  Subcentimeter simple appearing cystic structure incidentally noted at the pancreatic tail.    Spleen: There are scattered cystic structures throughout the spleen, with no definite new or enlarging mass-like lesion.    Adrenal/: No suspicious adrenal nodule.  Bilateral kidney cysts are similar to the comparison, with symmetric appearance of contrast enhancement.  No interval development of findings to suggest obstructive uropathy. Urinary bladder and adjacent reproductive structures are unchanged.    Esophagus/GI tract: The included lower esophagus  is unremarkable. No evidence of gastric outlet or small bowel obstruction. There are proximal loops of jejunum (series 2, images ), which demonstrate profound wall thickening and edema, as well as interloop fluid and pronounced mucosal enhancement. There is a small mesenteric swirl just to the left of midline anterior to the abnormal loops (images 66-80).  The transition from the normal duodenum and most proximal jejunum to the abnormal segments is relatively gradual, as is the transition from the abnormal jejunal segments to the normal jejunum. The rest of the small bowel appears unremarkable. The appendix is not clearly identified but there are no secondary findings of the right lower quadrant to suggest acute appendicitis.  No acute abnormality of the large bowel is evident.  There is similar scattered sigmoid diverticulosis with no findings of active diverticulitis.    Musculoskeletal: Similar advanced degenerative changes through the spinal column and bony pelvis.  No acute or destructive osseous process is identified.  Regional musculature is unremarkable.    Other findings: Minimal volume free fluid is present through the abdomen.  No pneumoperitoneum is identified.  No drainable collections. Aortoiliac vascular structures are similar in comparison. No development of pathologic anna enlargement or necrotic adenopathy.    IMPRESSION  1. Proximal small bowel findings suggestive of profound jejunitis of otherwise indeterminate etiology, differential includes segmental volvulus versus inflammatory or infectious process considered.  2. No other convincing acute abdominopelvic abnormality.  3. Chronic secondary findings discussed above, similar in the interval.  ==========    No significant discrepancy identified in relation to the teleradiology preliminary report.    RADIATION DOSE  Automated tube current modulation, weight-based exposure dosing, and/or iterative reconstruction technique utilized to reach  lowest reasonably achievable exposure rate.    DLP: 758 mGy*cm    Electronically signed by: August Murray  Date:    11/26/2024  Time:    12:05  XR Gastric tube check, non-radiologist performed  Narrative: EXAMINATION:  XR GASTRIC TUBE CHECK, NON-RADIOLOGIST PERFORMED    CLINICAL HISTORY:  Ng tube placement;    COMPARISON:  12 September 2015  Impression: Frontal image of the upper abdomen.  Enteric tube extends into the stomach with the proximal side hole just past the GE junction.    Electronically signed by: Tal Kirk  Date:    11/26/2024  Time:    06:50  X-Ray Chest AP Portable  Narrative: EXAMINATION:  XR CHEST AP PORTABLE    CLINICAL HISTORY:  Epigastric pain    COMPARISON:  02/04/2021    FINDINGS:  Single view of the chest shows no focal consolidation, pneumothorax or pleural effusion.  Cardiomediastinal silhouette and cardiac device are stable.  Aorta is partially calcified.  Impression: No acute findings in the chest    Electronically signed by: Bayron Flynn MD  Date:    11/26/2024  Time:    06:47      Jamie Philip MD  Department of Hospital Medicine   [facility]   11/30/2024

## 2024-11-30 NOTE — PROGRESS NOTES
" [facility] General Surgery   Progress Note  Admit Date: 11/25/2024  HD#4  POD#4 Days Post-Op    Subjective:   Interval history:  POD4 s/p ex lap with SBR 2/2 closed loop obstruction from volvulus w/ necrotic segment of small bowel   Increased pain ON, slept well after adequate pain control  AF, VSS   NGT 120cc bilious output overnight   + flatus, no BM   Denies n/v     Scheduled Meds:   0.9%  NaCl infusion (for blood administration)   Intravenous Once    enoxparin  40 mg Subcutaneous Daily    gabapentin  300 mg Oral TID    pantoprazole  40 mg Intravenous Daily    rifAXIMin  550 mg Oral BID     Continuous Infusions:   Amino acid 4.25% - dextrose 5% (CLINIMIX-E) solution (1L provides 42.5 gm AA, 50 gm CHO (170 kcal/L dextrose), Na 35, K 30, Mg 5, Ca 4.5, Acetate 70, Cl 39, Phos 15)   Intravenous Continuous 80 mL/hr at 11/29/24 1533 New Bag at 11/29/24 1533     PRN Meds:  Current Facility-Administered Medications:     dextrose 10%, 12.5 g, Intravenous, PRN    dextrose 10%, 12.5 g, Intravenous, PRN    dextrose 10%, 25 g, Intravenous, PRN    dextrose 10%, 25 g, Intravenous, PRN    glucagon (human recombinant), 1 mg, Intramuscular, PRN    melatonin, 6 mg, Oral, Nightly PRN    naloxone, 0.02 mg, Intravenous, PRN    ondansetron, 4 mg, Intravenous, Q4H PRN    oxyCODONE, 5 mg, Oral, Q6H PRN    oxyCODONE, 10 mg, Oral, Q6H PRN    sodium chloride 0.9%, 10 mL, Intravenous, PRN     Objective:     VITAL SIGNS: 24 HR MIN & MAX LAST   Temp  Min: 97.9 °F (36.6 °C)  Max: 98.2 °F (36.8 °C)  98 °F (36.7 °C)   BP  Min: 109/72  Max: 139/73  117/60    Pulse  Min: 49  Max: 79  (!) 49    Resp  Min: 16  Max: 18  16    SpO2  Min: 93 %  Max: 97 %  (!) 93 %      HT: 5' 5.98" (167.6 cm)  WT: 81.6 kg (179 lb 14.3 oz)  BMI: 29     Intake/output:  Intake/Output - Last 3 Shifts         11/28 0700 11/29 0659 11/29 0700 11/30 0659 11/30 0700 12/01 0659    P.O.  240     NG/ 210     TPN 1512.2      Total Intake(mL/kg) 1632.2 (20) 450 (5.5)     Urine " "(mL/kg/hr) 900 (0.5) 1400 (0.7)     Drains 250 290     Stool  0     Total Output 1150 1690     Net +482.2 -1240            Urine Occurrence 1 x 5 x     Stool Occurrence  1 x             Intake/Output Summary (Last 24 hours) at 11/30/2024 1008  Last data filed at 11/30/2024 0548  Gross per 24 hour   Intake 300 ml   Output 1120 ml   Net -820 ml        Lines/drains/airway:       Peripheral IV - Single Lumen 11/25/24 2230 20 G Anterior;Proximal;Right Forearm (Active)   Site Assessment Clean;Dry;Intact;No redness;No swelling 11/27/24 0400   Extremity Assessment Distal to IV No abnormal discoloration 11/26/24 1615   Line Status Infusing 11/27/24 0400   Dressing Status Clean;Dry;Intact 11/27/24 0400   Dressing Intervention Integrity maintained 11/27/24 0400   Number of days: 1            NG/OG Tube 11/26/24 0135 16 Fr. Right nostril (Active)   Placement Check placement verified by aspirate characteristics 11/26/24 0800   External Tube Length (cm) 63 11/26/24 1615   Securement secured to nostril center 11/27/24 0400   Clamp Status/Tolerance unclamped 11/27/24 0400   Suction Setting/Drainage Method intermittent setting;low 11/27/24 0400   Drainage Green 11/27/24 0400   Tube Output(mL)(Include Discarded Residual) 225 mL 11/27/24 0620   Number of days: 1       Physical examination:  Gen: NAD, AAOx3, answering questions appropriately  HEENT: Atraumatic   CV: RR  Resp: NWOB  Abd: S/appropriately tender, midline incision staples c/d/I  Skin/wounds:    Labs:  Renal:  Recent Labs     11/28/24  0408 11/29/24  0530 11/30/24  0345   BUN 37.7* 33.7* 27.1*   CREATININE 0.87 0.70 0.71     No results for input(s): "LACTIC" in the last 72 hours.  FENGI:  Recent Labs     11/28/24  0408 11/29/24  0530 11/30/24  0345    140 140   K 4.6 4.7 4.4    109* 108*   CO2 29 25 23   CALCIUM 9.3 9.6 9.0   MG 2.50 2.40 2.10   PHOS 2.4 2.7 3.4   ALBUMIN 2.9* 2.9* 2.7*   BILITOT 1.9* 1.5 1.2   AST 46* 37* 45*   ALKPHOS 50 56 92   ALT 27 28 32 "     Heme:  Recent Labs     11/28/24  0408 11/29/24  0530 11/30/24  0345   HGB 10.5* 10.9* 11.0*   HCT 33.5* 34.6* 34.8*   PLT 67* 72* 64*     ID:  Recent Labs     11/28/24  0408 11/29/24  0530 11/30/24  0345   WBC 7.34 6.17 4.38*     CBG:  Recent Labs     11/28/24 0408 11/29/24  0530 11/30/24  0345   GLUCOSE 165* 161* 156*      Cardiovascular:  Recent Labs   Lab 11/25/24  2255   TROPONINI 0.020     I have reviewed all pertinent lab results within the past 24 hours.    Imaging:  XR Gastric tube check, non-radiologist performed   Final Result      Frontal image of the upper abdomen.  Enteric tube extends into the stomach with the proximal side hole just past the GE junction.         Electronically signed by: Tal Kirk   Date:    11/26/2024   Time:    06:50      CT Abdomen Pelvis With IV Contrast NO Oral Contrast   Final Result      X-Ray Chest AP Portable   Final Result      No acute findings in the chest         Electronically signed by: Bayron Flynn MD   Date:    11/26/2024   Time:    06:47         I have reviewed all pertinent imaging results/findings within the past 24 hours.    Micro/Path/Other:  Microbiology Results (last 7 days)       Procedure Component Value Units Date/Time    Blood Culture #1 **CANNOT BE ORDERED STAT** [[id number]]  (Normal) Collected: 11/26/24 0409    Order Status: Completed Specimen: Blood Updated: 11/30/24 0701     Blood Culture No Growth At 96 Hours    Blood Culture #1 **CANNOT BE ORDERED STAT** [[id number]]  (Normal) Collected: 11/26/24 1655    Order Status: Completed Specimen: Blood Updated: 11/29/24 1901     Blood Culture No Growth At 72 Hours           Pathology Results  (Last 7 days)                 11/26/24 1340  Specimen to Pathology Final result             Assessment & Plan:    84-year-old female s/p ex lap with 56cm SBR 2/2 closed loop obstruction from volvulus w/ necrotic segment of small bowel 11/26    -Upper GI study today 11/30   -Remove bernal   -Continue NGT to LIWS  with frequent flushing   -NPO, AROBF   -TPN   -Encourage ambulation, PT/OT   -mIVF while NPO   -CTM thrombocytopenia, stable   -MM pain control     July Eaton MD   [facility] General Surgery PGY-1

## 2024-11-30 NOTE — PT/OT/SLP PROGRESS
Occupational Therapy   Treatment    Name: Joy Donahue  MRN: [id number]  Admitting Diagnosis:  s/p ex lap with SBR 2/2 closed loop obstruction from volvulus w/ necrotic segment of small bowel   4 Days Post-Op    Recommendations:     Recommended therapy intensity at discharge: Low Intensity Therapy   Discharge Equipment Recommendations:   (TBD)  Barriers to discharge:       Assessment:     Joy Donahue is a 84 y.o. female with a medical diagnosis of /p ex lap with SBR 2/2 closed loop obstruction from volvulus w/ necrotic segment of small bowel.  She presents with pleasant demeanor and willingness to participate in skilled session. Performance deficits affecting function are weakness, impaired endurance, impaired self care skills, impaired functional mobility, impaired balance, pain.     Rehab Prognosis:  Good; patient would benefit from acute skilled OT services to address these deficits and reach maximum level of function.       Plan:     Patient to be seen 4 x/week to address the above listed problems via self-care/home management, therapeutic activities, therapeutic exercises  Plan of Care Expires: 12/25/24  Plan of Care Reviewed with: patient    Subjective     Pain/Comfort:  Pain Rating 1: 5/10  Location - Side 1: Right  Location 1: flank  Pain Addressed 1: Reposition, Distraction    Objective:     Communicated with: Nurse prior to session.  Patient found  sitting on BSC    upon OT entry to room. Reports she is done and ready to return to bed, as she believes her NG tube is being removed soon.     General Precautions: Standard, fall    Orthopedic Precautions:N/A  Braces: N/A  Respiratory Status: Room air  Vital Signs: Blood Pressure: 128/69  HR: 65-71 bpm  Sp02: 93-95%     Occupational Performance:     Bed Mobility:    Patient completed Scooting/Bridging with minimum assistance  Patient completed Sit to Supine with minimum assistance     Functional Mobility/Transfers:  Patient completed Sit <> Stand  Transfer with contact guard assistance  with  rolling walker   Patient completed Toilet Transfer Step Transfer technique with contact guard assistance with  rolling walker (from BSC to bed)    Activities of Daily Living:  Toileting: contact guard assistance while standing to clean self after BM on BSC.    Therapeutic Exercise:  While supine with HOB elevated, patient uses 3# dowel to perform 3 x 20 bicep curls and 3 x 15 chest press to increase UB strength and endurance.      Therapeutic Positioning  OT interventions performed during the course of today's session in an effort to prevent and/or reduce acquired pressure injuries:   Education was provided on benefits of and recommendations for therapeutic positioning      [facility] 6 Click ADL: 20    Patient Education:  Patient and daughter/s were provided with verbal education education regarding OT role/goals/POC, fall prevention, and call bell use .  Understanding was verbalized.      Patient left HOB elevated with all lines intact, call button in reach, and daughter present.    GOALS:   Multidisciplinary Problems       Occupational Therapy Goals          Problem: Occupational Therapy    Goal Priority Disciplines Outcome Interventions   Occupational Therapy Goal     OT, PT/OT Progressing    Description: LTG: Pt will perform basic ADLs and ADL transfers with Modified independence using LRAD by discharge.    STG: to be met by 12/25/24:    Pt will complete grooming standing at sink with LRAD with SBA.  Pt will complete UB dressing with SBA.  Pt will complete LB dressing with SBA using LRAD.  Pt will complete toileting with SBA using LRAD.  Pt will complete functional mobility to/from toilet and toilet transfer with SBA using LRAD.                        Time Tracking:     OT Date of Treatment: 11/30/24  OT Start Time: 1144  OT Stop Time: 1220  OT Total Time (min): 36 min    Billable Minutes:Self Care/Home Management 20  Therapeutic Exercise 16    OT/ROBERTO CARLOS: ROBERTO CARLOS     Number of  ROBERTO CARLOS visits since last OT visit: 1 11/30/2024

## 2024-12-01 LAB
BACTERIA BLD CULT: NORMAL
BACTERIA BLD CULT: NORMAL

## 2024-12-01 PROCEDURE — 21400001 HC TELEMETRY ROOM

## 2024-12-01 PROCEDURE — 63600175 PHARM REV CODE 636 W HCPCS: Performed by: STUDENT IN AN ORGANIZED HEALTH CARE EDUCATION/TRAINING PROGRAM

## 2024-12-01 PROCEDURE — 25000003 PHARM REV CODE 250

## 2024-12-01 PROCEDURE — 25000003 PHARM REV CODE 250: Performed by: INTERNAL MEDICINE

## 2024-12-01 RX ORDER — ACETAMINOPHEN 325 MG/1
650 TABLET ORAL EVERY 6 HOURS PRN
Status: DISCONTINUED | OUTPATIENT
Start: 2024-12-01 | End: 2024-12-03 | Stop reason: HOSPADM

## 2024-12-01 RX ADMIN — GABAPENTIN 300 MG: 300 CAPSULE ORAL at 08:12

## 2024-12-01 RX ADMIN — PANTOPRAZOLE SODIUM 40 MG: 40 INJECTION, POWDER, FOR SOLUTION INTRAVENOUS at 09:12

## 2024-12-01 RX ADMIN — RIFAXIMIN 550 MG: 550 TABLET ORAL at 08:12

## 2024-12-01 RX ADMIN — RIFAXIMIN 550 MG: 550 TABLET ORAL at 09:12

## 2024-12-01 RX ADMIN — GABAPENTIN 300 MG: 300 CAPSULE ORAL at 04:12

## 2024-12-01 RX ADMIN — GABAPENTIN 300 MG: 300 CAPSULE ORAL at 09:12

## 2024-12-01 RX ADMIN — ENOXAPARIN SODIUM 40 MG: 40 INJECTION SUBCUTANEOUS at 04:12

## 2024-12-01 NOTE — PROGRESS NOTES
" [facility]   Progress Note  Admit Date: 11/25/2024  HD#5  POD#5 Days Post-Op    Subjective:   Interval history:  POD5 s/p ex lap with SBR 2/2 closed loop obstruction from volvulus w/ necrotic segment of small bowel   Slept well  AF, VSS   Tolerating some clears  + flatus, having BM  Denies n/v     Scheduled Meds:   0.9%  NaCl infusion (for blood administration)   Intravenous Once    enoxparin  40 mg Subcutaneous Daily    gabapentin  300 mg Oral TID    pantoprazole  40 mg Intravenous Daily    rifAXIMin  550 mg Oral BID     Continuous Infusions:   Amino acid 4.25% - dextrose 5% (CLINIMIX-E) solution (1L provides 42.5 gm AA, 50 gm CHO (170 kcal/L dextrose), Na 35, K 30, Mg 5, Ca 4.5, Acetate 70, Cl 39, Phos 15)   Intravenous Continuous 80 mL/hr at 11/30/24 1733 New Bag at 11/30/24 1733     PRN Meds:  Current Facility-Administered Medications:     acetaminophen, 650 mg, Oral, Q6H PRN    dextrose 10%, 12.5 g, Intravenous, PRN    dextrose 10%, 12.5 g, Intravenous, PRN    dextrose 10%, 25 g, Intravenous, PRN    dextrose 10%, 25 g, Intravenous, PRN    glucagon (human recombinant), 1 mg, Intramuscular, PRN    melatonin, 6 mg, Oral, Nightly PRN    naloxone, 0.02 mg, Intravenous, PRN    ondansetron, 4 mg, Intravenous, Q4H PRN    oxyCODONE, 5 mg, Oral, Q6H PRN    oxyCODONE, 10 mg, Oral, Q6H PRN    sodium chloride 0.9%, 10 mL, Intravenous, PRN     Objective:     VITAL SIGNS: 24 HR MIN & MAX LAST   Temp  Min: 97.5 °F (36.4 °C)  Max: 98.3 °F (36.8 °C)  97.5 °F (36.4 °C)   BP  Min: 112/83  Max: 148/74  (!) 141/68    Pulse  Min: 53  Max: 84  71    Resp  Min: 16  Max: 18  16    SpO2  Min: 90 %  Max: 98 %  (!) 90 %      HT: 5' 5.98" (167.6 cm)  WT: 81.6 kg (179 lb 14.3 oz)  BMI: 29     Intake/output:  Intake/Output - Last 3 Shifts         11/29 0700 11/30 0659 11/30 0700 12/01 0659 12/01 0700 12/02 0659    P.O. 240 840     NG/      TPN       Total Intake(mL/kg) 450 (5.5) 840 (10.3)     Urine (mL/kg/hr) 1400 (0.7) " "1250 (0.6)     Drains 290      Stool 0 0     Total Output 1690 1250     Net -1240 -410            Urine Occurrence 5 x 3 x     Stool Occurrence 1 x 2 x             Intake/Output Summary (Last 24 hours) at 12/1/2024 1242  Last data filed at 12/1/2024 0500  Gross per 24 hour   Intake 840 ml   Output 1250 ml   Net -410 ml        Lines/drains/airway:       Peripheral IV - Single Lumen 11/25/24 2230 20 G Anterior;Proximal;Right Forearm (Active)   Site Assessment Clean;Dry;Intact;No redness;No swelling 11/27/24 0400   Extremity Assessment Distal to IV No abnormal discoloration 11/26/24 1615   Line Status Infusing 11/27/24 0400   Dressing Status Clean;Dry;Intact 11/27/24 0400   Dressing Intervention Integrity maintained 11/27/24 0400   Number of days: 1            NG/OG Tube 11/26/24 0135 16 Fr. Right nostril (Active)   Placement Check placement verified by aspirate characteristics 11/26/24 0800   External Tube Length (cm) 63 11/26/24 1615   Securement secured to nostril center 11/27/24 0400   Clamp Status/Tolerance unclamped 11/27/24 0400   Suction Setting/Drainage Method intermittent setting;low 11/27/24 0400   Drainage Green 11/27/24 0400   Tube Output(mL)(Include Discarded Residual) 225 mL 11/27/24 0620   Number of days: 1       Physical examination:  Gen: NAD, AAOx3, answering questions appropriately  HEENT: Atraumatic   CV: RR  Resp: NWOB  Abd: S/appropriately tender, midline incision staples c/d/I  Skin/wounds:    Labs:  Renal:  Recent Labs     11/29/24  0530 11/30/24  0345   BUN 33.7* 27.1*   CREATININE 0.70 0.71     No results for input(s): "LACTIC" in the last 72 hours.  FENGI:  Recent Labs     11/29/24  0530 11/30/24  0345    140   K 4.7 4.4   * 108*   CO2 25 23   CALCIUM 9.6 9.0   MG 2.40 2.10   PHOS 2.7 3.4   ALBUMIN 2.9* 2.7*   BILITOT 1.5 1.2   AST 37* 45*   ALKPHOS 56 92   ALT 28 32     Heme:  Recent Labs     11/29/24  0530 11/30/24  0345   HGB 10.9* 11.0*   HCT 34.6* 34.8*   PLT 72* 64* "     ID:  Recent Labs     11/29/24  0530 11/30/24  0345   WBC 6.17 4.38*     CBG:  Recent Labs     11/29/24  0530 11/30/24  0345   GLUCOSE 161* 156*      Cardiovascular:  Recent Labs   Lab 11/25/24  2255   TROPONINI 0.020     I have reviewed all pertinent lab results within the past 24 hours.    Imaging:  XR Gastric tube check, non-radiologist performed   Final Result      Frontal image of the upper abdomen.  Enteric tube extends into the stomach with the proximal side hole just past the GE junction.         Electronically signed by: Tal Kirk   Date:    11/26/2024   Time:    06:50      CT Abdomen Pelvis With IV Contrast NO Oral Contrast   Final Result      X-Ray Chest AP Portable   Final Result      No acute findings in the chest         Electronically signed by: Bayron Flynn MD   Date:    11/26/2024   Time:    06:47         I have reviewed all pertinent imaging results/findings within the past 24 hours.    Micro/Path/Other:  Microbiology Results (last 7 days)       Procedure Component Value Units Date/Time    Blood Culture #1 **CANNOT BE ORDERED STAT** [[id number]]  (Normal) Collected: 11/26/24 0409    Order Status: Completed Specimen: Blood Updated: 12/01/24 0701     Blood Culture No Growth at 5 days    Blood Culture #1 **CANNOT BE ORDERED STAT** [[id number]]  (Normal) Collected: 11/26/24 1655    Order Status: Completed Specimen: Blood Updated: 11/30/24 1901     Blood Culture No Growth At 96 Hours           Pathology Results  (Last 7 days)                 11/26/24 1340  Specimen to Pathology Final result             Assessment & Plan:    84-year-old female s/p ex lap with 56cm SBR 2/2 closed loop obstruction from volvulus w/ necrotic segment of small bowel 11/26    -Advance to fulls  -TPN, can stop/wean tpn  -Encourage ambulation, PT/OT   -CTM thrombocytopenia, stable   -MM pain control     Long Hansen MD  [facility] PGY4

## 2024-12-01 NOTE — PROGRESS NOTES
"[facility] Hospital Medicine Progress Note        Chief Complaint: Inpatient Follow-up     HPI:   "84-year-old female with history of hepatitis-C through blood transfusion s/p treatment, thrombocytopenia, prediabetes, pacemaker.  She had came in with complaints of abdominal pain along with nausea and vomiting.  She had been having intermittent abdominal pain occasionally.    CT scan showed proximal small-bowel jejunitis differential would include segmental volvulus.  Surgery was consulted and had taken her for emergent surgery 11/26 with findings of adhesions and volvulus of the omentum/bowel, 53 cm segment of proximal jejunum found to be discolored and darkened without peristalsis, resected.  She was also noted to be bradycardic into the 40s and Cardiology was consulted given pacemaker battery was nearing end of life already."     cardiology interrogated ppm, and note that there is no indication for urgent generator change, recommend follow up with her regular cardiologist     Interval Hx:   No acute events reported overnight .  NG tube discontinued yesterday.  She was seen at bedside  this mrng ,  family member present ,Patient reported tolerating clears, feels improving, patient having BMs, reported dark-colored ,discussed plan of care    Case was discussed with patient's nurse     Hemodynamically stable , heart rate in the 70s 80s      Objective/physical exam:  General: In no acute distress, afebrile, NG T  Chest: unlabored breathing   Heart: normal rate   Abdomen: Soft, TTP    MSK: Warm  Neurologic: Alert and oriented     VITAL SIGNS: 24 HRS MIN & MAX LAST   Temp  Min: 97.7 °F (36.5 °C)  Max: 98.6 °F (37 °C) 98.2 °F (36.8 °C)   BP  Min: 112/83  Max: 151/64 112/83   Pulse  Min: 53  Max: 84  84   Resp  Min: 16  Max: 18 16   SpO2  Min: 94 %  Max: 98 % 96 %     I have reviewed the following labs:  Recent Labs   Lab 11/28/24  0408 11/29/24  0530 11/30/24  0345   WBC 7.34 6.17 4.38* "   RBC 3.47* 3.60* 3.63*   HGB 10.5* 10.9* 11.0*   HCT 33.5* 34.6* 34.8*   MCV 96.5* 96.1* 95.9*   MCH 30.3 30.3 30.3   MCHC 31.3* 31.5* 31.6*   RDW 16.1 15.9 15.6   PLT 67* 72* 64*   MPV 11.1* 11.5* 11.5*     Recent Labs   Lab 11/28/24  0408 11/29/24  0530 11/30/24  0345    140 140   K 4.6 4.7 4.4    109* 108*   CO2 29 25 23   BUN 37.7* 33.7* 27.1*   CREATININE 0.87 0.70 0.71   CALCIUM 9.3 9.6 9.0   MG 2.50 2.40 2.10   ALBUMIN 2.9* 2.9* 2.7*   ALKPHOS 50 56 92   ALT 27 28 32   AST 46* 37* 45*   BILITOT 1.9* 1.5 1.2     Microbiology Results (last 7 days)       Procedure Component Value Units Date/Time    Blood Culture #1 **CANNOT BE ORDERED STAT** [[id number]]  (Normal) Collected: 11/26/24 0409    Order Status: Completed Specimen: Blood Updated: 12/01/24 0701     Blood Culture No Growth at 5 days    Blood Culture #1 **CANNOT BE ORDERED STAT** [[id number]]  (Normal) Collected: 11/26/24 1655    Order Status: Completed Specimen: Blood Updated: 11/30/24 1901     Blood Culture No Growth At 96 Hours             See below for Radiology    Assessment/Plan:  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    Monitor BMs , color  Cardiology signed off, No indication for urgent generator change.  follow up with Dr. Singh for generator change as scheduled, will continue to monitor on telemetry  Mobilize, PT/OT services-low intensity  Supportive care   Nutritional support  Otherwise continue current management and monitoring in the interim  Check labs     VTE prophylaxis:  Lovenox    Patient condition:  Fair              Portions of this note dictated using EMR integrated voice recognition software, and may be subject to voice recognition errors not corrected at proofreading. Please contact writer for clarification if needed.    _____________________________________________________________________    Malnutrition Status:  Nutrition consulted. Most recent weight and BMI monitored-     Measurements:  Wt Readings from Last 1 Encounters:   11/29/24 81.6 kg (179 lb 14.3 oz)   Body mass index is 29.05 kg/m².    Patient has been screened and assessed by RD.    Malnutrition Type:  Context: acute illness or injury  Level: moderate    Malnutrition Characteristic Summary:  Weight Loss (Malnutrition): other (see comments) (Does not meet criteria)  Energy Intake (Malnutrition): less than or equal to 50% for greater than or equal to 5 days  Subcutaneous Fat (Malnutrition): moderate depletion  Muscle Mass (Malnutrition): moderate depletion  Fluid Accumulation (Malnutrition): other (see comments) (Not present)    Interventions/Recommendations (treatment strategy):  Parenteral nutrition or supplemental parenteral nutrition     Scheduled Med:   0.9%  NaCl infusion (for blood administration)   Intravenous Once    enoxparin  40 mg Subcutaneous Daily    gabapentin  300 mg Oral TID    pantoprazole  40 mg Intravenous Daily    rifAXIMin  550 mg Oral BID      Continuous Infusions:   Amino acid 4.25% - dextrose 5% (CLINIMIX-E) solution (1L provides 42.5 gm AA, 50 gm CHO (170 kcal/L dextrose), Na 35, K 30, Mg 5, Ca 4.5, Acetate 70, Cl 39, Phos 15)   Intravenous Continuous 80 mL/hr at 11/30/24 1733 New Bag at 11/30/24 1733      PRN Meds:    Current Facility-Administered Medications:     dextrose 10%, 12.5 g, Intravenous, PRN    dextrose 10%, 12.5 g, Intravenous, PRN    dextrose 10%, 25 g, Intravenous, PRN    dextrose 10%, 25 g, Intravenous, PRN    glucagon (human recombinant), 1 mg, Intramuscular, PRN    melatonin, 6 mg, Oral, Nightly PRN    naloxone, 0.02 mg, Intravenous, PRN    ondansetron, 4 mg, Intravenous, Q4H PRN    oxyCODONE, 5 mg, Oral, Q6H PRN    oxyCODONE, 10 mg, Oral, Q6H PRN    sodium chloride 0.9%, 10 mL, Intravenous, PRN     Radiology:  I have  personally reviewed the following imaging and agree with the radiologist.     CT Abdomen Pelvis With IV Contrast NO Oral Contrast  EXAMINATION  CT ABDOMEN PELVIS WITH IV CONTRAST    CLINICAL HISTORY  Nausea/vomiting;Epigastric pain;    TECHNIQUE  Post-contrast helical-acquisition CT images were obtained and multiplanar reformats accomplished by a CT technologist at a separate workstation, pushed to PACS for physician review.    CONTRAST  *IV: Omnipaque 350, 100 mL  *Enteric: none    COMPARISON  5 July 2024    FINDINGS  Images were reviewed in soft tissue, lung, and bone windows.    Exam quality: adequate for evaluation    Lines/tubes: Right heart leads are again visualized.    Chest: Stable heart chamber size. No pericardial effusion. No interval change of the visualized vasculature. No airspace consolidation or suspicious lung lesion. No worsening pleural effusion or evidence for loculation.    Hepatobiliary/Pancreas: No new or enlarging hepatic lesion.  Multiple cystic appearing foci are again appreciated.  There is similar nodular irregularity of the liver surface suggesting changes of cirrhosis.  Redemonstrated cholecystectomy.  No development of biliary dilatation.  No convincing acute changes involving the pancreas or development of expansile mass-like lesion.  Subcentimeter simple appearing cystic structure incidentally noted at the pancreatic tail.    Spleen: There are scattered cystic structures throughout the spleen, with no definite new or enlarging mass-like lesion.    Adrenal/: No suspicious adrenal nodule.  Bilateral kidney cysts are similar to the comparison, with symmetric appearance of contrast enhancement.  No interval development of findings to suggest obstructive uropathy. Urinary bladder and adjacent reproductive structures are unchanged.    Esophagus/GI tract: The included lower esophagus is unremarkable. No evidence of gastric outlet or small bowel obstruction. There are proximal loops of  jejunum (series 2, images ), which demonstrate profound wall thickening and edema, as well as interloop fluid and pronounced mucosal enhancement. There is a small mesenteric swirl just to the left of midline anterior to the abnormal loops (images 66-80).  The transition from the normal duodenum and most proximal jejunum to the abnormal segments is relatively gradual, as is the transition from the abnormal jejunal segments to the normal jejunum. The rest of the small bowel appears unremarkable. The appendix is not clearly identified but there are no secondary findings of the right lower quadrant to suggest acute appendicitis.  No acute abnormality of the large bowel is evident.  There is similar scattered sigmoid diverticulosis with no findings of active diverticulitis.    Musculoskeletal: Similar advanced degenerative changes through the spinal column and bony pelvis.  No acute or destructive osseous process is identified.  Regional musculature is unremarkable.    Other findings: Minimal volume free fluid is present through the abdomen.  No pneumoperitoneum is identified.  No drainable collections. Aortoiliac vascular structures are similar in comparison. No development of pathologic anna enlargement or necrotic adenopathy.    IMPRESSION  1. Proximal small bowel findings suggestive of profound jejunitis of otherwise indeterminate etiology, differential includes segmental volvulus versus inflammatory or infectious process considered.  2. No other convincing acute abdominopelvic abnormality.  3. Chronic secondary findings discussed above, similar in the interval.  ==========    No significant discrepancy identified in relation to the teleradiology preliminary report.    RADIATION DOSE  Automated tube current modulation, weight-based exposure dosing, and/or iterative reconstruction technique utilized to reach lowest reasonably achievable exposure rate.    DLP: 758 mGy*cm    Electronically signed by: August  Trevor  Date:    11/26/2024  Time:    12:05  XR Gastric tube check, non-radiologist performed  Narrative: EXAMINATION:  XR GASTRIC TUBE CHECK, NON-RADIOLOGIST PERFORMED    CLINICAL HISTORY:  Ng tube placement;    COMPARISON:  12 September 2015  Impression: Frontal image of the upper abdomen.  Enteric tube extends into the stomach with the proximal side hole just past the GE junction.    Electronically signed by: Tal Kirk  Date:    11/26/2024  Time:    06:50  X-Ray Chest AP Portable  Narrative: EXAMINATION:  XR CHEST AP PORTABLE    CLINICAL HISTORY:  Epigastric pain    COMPARISON:  02/04/2021    FINDINGS:  Single view of the chest shows no focal consolidation, pneumothorax or pleural effusion.  Cardiomediastinal silhouette and cardiac device are stable.  Aorta is partially calcified.  Impression: No acute findings in the chest    Electronically signed by: Bayron Flynn MD  Date:    11/26/2024  Time:    06:47      Jamie Philip MD  Department of Hospital Medicine   [facility]   12/01/2024

## 2024-12-02 ENCOUNTER — TELEPHONE (OUTPATIENT)
Dept: ELECTROPHYSIOLOGY | Facility: CLINIC | Age: 84
End: 2024-12-02
Payer: MEDICARE

## 2024-12-02 LAB
ABS NEUT (OLG): 4.17 X10(3)/MCL (ref 2.1–9.2)
ALBUMIN SERPL-MCNC: 2.7 G/DL (ref 3.4–4.8)
ALBUMIN/GLOB SERPL: 1.1 RATIO (ref 1.1–2)
ALP SERPL-CCNC: 115 UNIT/L (ref 40–150)
ALT SERPL-CCNC: 31 UNIT/L (ref 0–55)
ANION GAP SERPL CALC-SCNC: 7 MEQ/L
AST SERPL-CCNC: 35 UNIT/L (ref 5–34)
BILIRUB SERPL-MCNC: 1.6 MG/DL
BUN SERPL-MCNC: 24.7 MG/DL (ref 9.8–20.1)
CALCIUM SERPL-MCNC: 9.4 MG/DL (ref 8.4–10.2)
CHLORIDE SERPL-SCNC: 106 MMOL/L (ref 98–107)
CO2 SERPL-SCNC: 24 MMOL/L (ref 23–31)
CREAT SERPL-MCNC: 0.68 MG/DL (ref 0.55–1.02)
CREAT/UREA NIT SERPL: 36
EOSINOPHIL NFR BLD MANUAL: 0.05 X10(3)/MCL (ref 0–0.9)
EOSINOPHIL NFR BLD MANUAL: 1 %
ERYTHROCYTE [DISTWIDTH] IN BLOOD BY AUTOMATED COUNT: 15.4 % (ref 11.5–17)
GFR SERPLBLD CREATININE-BSD FMLA CKD-EPI: >60 ML/MIN/1.73/M2
GLOBULIN SER-MCNC: 2.5 GM/DL (ref 2.4–3.5)
GLUCOSE SERPL-MCNC: 119 MG/DL (ref 82–115)
HCT VFR BLD AUTO: 34.1 % (ref 37–47)
HGB BLD-MCNC: 11 G/DL (ref 12–16)
INSTRUMENT WBC (OLG): 5.42 X10(3)/MCL
LYMPHOCYTES NFR BLD MANUAL: 0.43 X10(3)/MCL
LYMPHOCYTES NFR BLD MANUAL: 8 %
MAGNESIUM SERPL-MCNC: 2 MG/DL (ref 1.6–2.6)
MCH RBC QN AUTO: 30.2 PG (ref 27–31)
MCHC RBC AUTO-ENTMCNC: 32.3 G/DL (ref 33–36)
MCV RBC AUTO: 93.7 FL (ref 80–94)
METAMYELOCYTES NFR BLD MANUAL: 2 %
MONOCYTES NFR BLD MANUAL: 0.6 X10(3)/MCL (ref 0.1–1.3)
MONOCYTES NFR BLD MANUAL: 11 %
MYELOCYTES NFR BLD MANUAL: 2 %
NEUTROPHILS NFR BLD MANUAL: 77 %
NRBC BLD AUTO-RTO: 0 %
PHOSPHATE SERPL-MCNC: 2.8 MG/DL (ref 2.3–4.7)
PLATELET # BLD AUTO: 59 X10(3)/MCL (ref 130–400)
PLATELET # BLD EST: ABNORMAL 10*3/UL
PLATELETS.RETICULATED NFR BLD AUTO: 8.7 % (ref 0.9–11.2)
PMV BLD AUTO: 11.5 FL (ref 7.4–10.4)
POTASSIUM SERPL-SCNC: 4.5 MMOL/L (ref 3.5–5.1)
PREALB SERPL-MCNC: 14.8 MG/DL (ref 14–37)
PROT SERPL-MCNC: 5.2 GM/DL (ref 5.8–7.6)
RBC # BLD AUTO: 3.64 X10(6)/MCL (ref 4.2–5.4)
RBC MORPH BLD: NORMAL
SODIUM SERPL-SCNC: 137 MMOL/L (ref 136–145)
WBC # BLD AUTO: 5.42 X10(3)/MCL (ref 4.5–11.5)

## 2024-12-02 PROCEDURE — 25000003 PHARM REV CODE 250: Performed by: INTERNAL MEDICINE

## 2024-12-02 PROCEDURE — 80053 COMPREHEN METABOLIC PANEL: CPT

## 2024-12-02 PROCEDURE — 84100 ASSAY OF PHOSPHORUS: CPT

## 2024-12-02 PROCEDURE — 36415 COLL VENOUS BLD VENIPUNCTURE: CPT

## 2024-12-02 PROCEDURE — 97116 GAIT TRAINING THERAPY: CPT | Mod: CQ

## 2024-12-02 PROCEDURE — 83735 ASSAY OF MAGNESIUM: CPT

## 2024-12-02 PROCEDURE — 99024 POSTOP FOLLOW-UP VISIT: CPT | Mod: GC,,, | Performed by: STUDENT IN AN ORGANIZED HEALTH CARE EDUCATION/TRAINING PROGRAM

## 2024-12-02 PROCEDURE — 21400001 HC TELEMETRY ROOM

## 2024-12-02 PROCEDURE — 63600175 PHARM REV CODE 636 W HCPCS: Performed by: STUDENT IN AN ORGANIZED HEALTH CARE EDUCATION/TRAINING PROGRAM

## 2024-12-02 PROCEDURE — 25000003 PHARM REV CODE 250

## 2024-12-02 PROCEDURE — 97110 THERAPEUTIC EXERCISES: CPT | Mod: CQ

## 2024-12-02 PROCEDURE — 84134 ASSAY OF PREALBUMIN: CPT

## 2024-12-02 PROCEDURE — 85025 COMPLETE CBC W/AUTO DIFF WBC: CPT

## 2024-12-02 RX ADMIN — GABAPENTIN 300 MG: 300 CAPSULE ORAL at 02:12

## 2024-12-02 RX ADMIN — GABAPENTIN 300 MG: 300 CAPSULE ORAL at 09:12

## 2024-12-02 RX ADMIN — RIFAXIMIN 550 MG: 550 TABLET ORAL at 09:12

## 2024-12-02 RX ADMIN — GABAPENTIN 300 MG: 300 CAPSULE ORAL at 08:12

## 2024-12-02 RX ADMIN — PANTOPRAZOLE SODIUM 40 MG: 40 INJECTION, POWDER, FOR SOLUTION INTRAVENOUS at 09:12

## 2024-12-02 RX ADMIN — RIFAXIMIN 550 MG: 550 TABLET ORAL at 08:12

## 2024-12-02 RX ADMIN — ENOXAPARIN SODIUM 40 MG: 40 INJECTION SUBCUTANEOUS at 04:12

## 2024-12-02 NOTE — PROGRESS NOTES
"[facility] Hospital Medicine Progress Note        Chief Complaint: Inpatient Follow-up     HPI:   "84-year-old female with history of hepatitis-C through blood transfusion s/p treatment, thrombocytopenia, prediabetes, pacemaker.  She had came in with complaints of abdominal pain along with nausea and vomiting.  She had been having intermittent abdominal pain occasionally.    CT scan showed proximal small-bowel jejunitis differential would include segmental volvulus.  Surgery was consulted and had taken her for emergent surgery 11/26 with findings of adhesions and volvulus of the omentum/bowel, 53 cm segment of proximal jejunum found to be discolored and darkened without peristalsis, resected.  She was also noted to be bradycardic into the 40s and Cardiology was consulted given pacemaker battery was nearing end of life already."     cardiology interrogated ppm, and note that there is no indication for urgent generator change, recommend follow up with her regular cardiologist     Interval Hx:   No acute events reported overnight .  Seen at bedside this morning, patient's daughter present, patient reported she feels improving overall, tolerating diet,  requesting something softer diet    I have discussed the case with General surgery Dr. Diogenes gray today    Case was discussed with patient's nurse     Hemodynamically stable     Labs this morning showed BUN 24.7, no leukocytosis, hemoglobin stable at 11 platelets 59 K    Objective/physical exam:  General: In no acute distress, afebrile  Chest: unlabored breathing   Heart: normal rate   Abdomen: Soft, tender appropriately, midline incision staples  MSK: Warm  Neurologic: Alert and oriented     VITAL SIGNS: 24 HRS MIN & MAX LAST   Temp  Min: 97.5 °F (36.4 °C)  Max: 98.3 °F (36.8 °C) 97.5 °F (36.4 °C)   BP  Min: 119/85  Max: 141/68 (!) 128/59   Pulse  Min: 62  Max: 97  67   Resp  Min: 16  Max: 18 18   SpO2  Min: 90 %  Max: 96 % 96 %     I have " reviewed the following labs:  Recent Labs   Lab 11/29/24 0530 11/30/24 0345 12/02/24 0347   WBC 6.17 4.38* 5.42  5.42   RBC 3.60* 3.63* 3.64*   HGB 10.9* 11.0* 11.0*   HCT 34.6* 34.8* 34.1*   MCV 96.1* 95.9* 93.7   MCH 30.3 30.3 30.2   MCHC 31.5* 31.6* 32.3*   RDW 15.9 15.6 15.4   PLT 72* 64* 59*   MPV 11.5* 11.5* 11.5*     Recent Labs   Lab 11/29/24 0530 11/30/24 0345 12/02/24 0347    140 137   K 4.7 4.4 4.5   * 108* 106   CO2 25 23 24   BUN 33.7* 27.1* 24.7*   CREATININE 0.70 0.71 0.68   CALCIUM 9.6 9.0 9.4   MG 2.40 2.10 2.00   ALBUMIN 2.9* 2.7* 2.7*   ALKPHOS 56 92 115   ALT 28 32 31   AST 37* 45* 35*   BILITOT 1.5 1.2 1.6*     Microbiology Results (last 7 days)       Procedure Component Value Units Date/Time    Blood Culture #1 **CANNOT BE ORDERED STAT** [[id number]]  (Normal) Collected: 11/26/24 1655    Order Status: Completed Specimen: Blood Updated: 12/01/24 1901     Blood Culture No Growth at 5 days    Blood Culture #1 **CANNOT BE ORDERED STAT** [[id number]]  (Normal) Collected: 11/26/24 0409    Order Status: Completed Specimen: Blood Updated: 12/01/24 0701     Blood Culture No Growth at 5 days             See below for Radiology    Assessment/Plan:  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    Monitor p.o. tolerance to diet, bowel movements  Cardiology signed off, No indication for urgent generator change.  follow up with Dr. Singh for generator change as scheduled, will continue to monitor on telemetry  Mobilize, PT/OT services-low intensity  Supportive care   Nutritional support  Otherwise continue current management and monitoring in the interim      VTE prophylaxis:  Lovenox    Patient condition:  Fair      Disposition:  TBD/Home with home health once surgery clears        Portions of this note dictated using EMR  integrated voice recognition software, and may be subject to voice recognition errors not corrected at proofreading. Please contact writer for clarification if needed.   _____________________________________________________________________    Malnutrition Status:  Nutrition consulted. Most recent weight and BMI monitored-     Measurements:  Wt Readings from Last 1 Encounters:   11/29/24 81.6 kg (179 lb 14.3 oz)   Body mass index is 29.05 kg/m².    Patient has been screened and assessed by RD.    Malnutrition Type:  Context: acute illness or injury  Level: moderate    Malnutrition Characteristic Summary:  Weight Loss (Malnutrition): other (see comments) (Does not meet criteria)  Energy Intake (Malnutrition): less than or equal to 50% for greater than or equal to 5 days  Subcutaneous Fat (Malnutrition): moderate depletion  Muscle Mass (Malnutrition): moderate depletion  Fluid Accumulation (Malnutrition): other (see comments) (Not present)    Interventions/Recommendations (treatment strategy):  Parenteral nutrition or supplemental parenteral nutrition     Scheduled Med:   0.9%  NaCl infusion (for blood administration)   Intravenous Once    enoxparin  40 mg Subcutaneous Daily    gabapentin  300 mg Oral TID    pantoprazole  40 mg Intravenous Daily    rifAXIMin  550 mg Oral BID      Continuous Infusions:       PRN Meds:    Current Facility-Administered Medications:     acetaminophen, 650 mg, Oral, Q6H PRN    dextrose 10%, 12.5 g, Intravenous, PRN    dextrose 10%, 12.5 g, Intravenous, PRN    dextrose 10%, 25 g, Intravenous, PRN    dextrose 10%, 25 g, Intravenous, PRN    glucagon (human recombinant), 1 mg, Intramuscular, PRN    melatonin, 6 mg, Oral, Nightly PRN    naloxone, 0.02 mg, Intravenous, PRN    ondansetron, 4 mg, Intravenous, Q4H PRN    oxyCODONE, 5 mg, Oral, Q6H PRN    oxyCODONE, 10 mg, Oral, Q6H PRN    sodium chloride 0.9%, 10 mL, Intravenous, PRN     Radiology:  I have personally reviewed the following imaging  and agree with the radiologist.     CT Abdomen Pelvis With IV Contrast NO Oral Contrast  EXAMINATION  CT ABDOMEN PELVIS WITH IV CONTRAST    CLINICAL HISTORY  Nausea/vomiting;Epigastric pain;    TECHNIQUE  Post-contrast helical-acquisition CT images were obtained and multiplanar reformats accomplished by a CT technologist at a separate workstation, pushed to PACS for physician review.    CONTRAST  *IV: Omnipaque 350, 100 mL  *Enteric: none    COMPARISON  5 July 2024    FINDINGS  Images were reviewed in soft tissue, lung, and bone windows.    Exam quality: adequate for evaluation    Lines/tubes: Right heart leads are again visualized.    Chest: Stable heart chamber size. No pericardial effusion. No interval change of the visualized vasculature. No airspace consolidation or suspicious lung lesion. No worsening pleural effusion or evidence for loculation.    Hepatobiliary/Pancreas: No new or enlarging hepatic lesion.  Multiple cystic appearing foci are again appreciated.  There is similar nodular irregularity of the liver surface suggesting changes of cirrhosis.  Redemonstrated cholecystectomy.  No development of biliary dilatation.  No convincing acute changes involving the pancreas or development of expansile mass-like lesion.  Subcentimeter simple appearing cystic structure incidentally noted at the pancreatic tail.    Spleen: There are scattered cystic structures throughout the spleen, with no definite new or enlarging mass-like lesion.    Adrenal/: No suspicious adrenal nodule.  Bilateral kidney cysts are similar to the comparison, with symmetric appearance of contrast enhancement.  No interval development of findings to suggest obstructive uropathy. Urinary bladder and adjacent reproductive structures are unchanged.    Esophagus/GI tract: The included lower esophagus is unremarkable. No evidence of gastric outlet or small bowel obstruction. There are proximal loops of jejunum (series 2, images ), which  demonstrate profound wall thickening and edema, as well as interloop fluid and pronounced mucosal enhancement. There is a small mesenteric swirl just to the left of midline anterior to the abnormal loops (images 66-80).  The transition from the normal duodenum and most proximal jejunum to the abnormal segments is relatively gradual, as is the transition from the abnormal jejunal segments to the normal jejunum. The rest of the small bowel appears unremarkable. The appendix is not clearly identified but there are no secondary findings of the right lower quadrant to suggest acute appendicitis.  No acute abnormality of the large bowel is evident.  There is similar scattered sigmoid diverticulosis with no findings of active diverticulitis.    Musculoskeletal: Similar advanced degenerative changes through the spinal column and bony pelvis.  No acute or destructive osseous process is identified.  Regional musculature is unremarkable.    Other findings: Minimal volume free fluid is present through the abdomen.  No pneumoperitoneum is identified.  No drainable collections. Aortoiliac vascular structures are similar in comparison. No development of pathologic anna enlargement or necrotic adenopathy.    IMPRESSION  1. Proximal small bowel findings suggestive of profound jejunitis of otherwise indeterminate etiology, differential includes segmental volvulus versus inflammatory or infectious process considered.  2. No other convincing acute abdominopelvic abnormality.  3. Chronic secondary findings discussed above, similar in the interval.  ==========    No significant discrepancy identified in relation to the teleradiology preliminary report.    RADIATION DOSE  Automated tube current modulation, weight-based exposure dosing, and/or iterative reconstruction technique utilized to reach lowest reasonably achievable exposure rate.    DLP: 758 mGy*cm    Electronically signed by: August Murray  Date:    11/26/2024  Time:    12:05  XR  Gastric tube check, non-radiologist performed  Narrative: EXAMINATION:  XR GASTRIC TUBE CHECK, NON-RADIOLOGIST PERFORMED    CLINICAL HISTORY:  Ng tube placement;    COMPARISON:  12 September 2015  Impression: Frontal image of the upper abdomen.  Enteric tube extends into the stomach with the proximal side hole just past the GE junction.    Electronically signed by: Tal Kirk  Date:    11/26/2024  Time:    06:50  X-Ray Chest AP Portable  Narrative: EXAMINATION:  XR CHEST AP PORTABLE    CLINICAL HISTORY:  Epigastric pain    COMPARISON:  02/04/2021    FINDINGS:  Single view of the chest shows no focal consolidation, pneumothorax or pleural effusion.  Cardiomediastinal silhouette and cardiac device are stable.  Aorta is partially calcified.  Impression: No acute findings in the chest    Electronically signed by: Bayron Flynn MD  Date:    11/26/2024  Time:    06:47      Jamie Philip MD  Department of Hospital Medicine   [facility]   12/02/2024

## 2024-12-02 NOTE — PT/OT/SLP PROGRESS
Physical Therapy Treatment    Patient Name:  Joy Donahue   MRN:  [id number]    Recommendations:     Discharge therapy intensity: Low Intensity Therapy   Discharge Equipment Recommendations:  (TBD)  Barriers to discharge: Ongoing medical needs    Assessment:     Joy Donahue is a 84 y.o. female admitted with a medical diagnosis of s/p ex lap with SBR 2/2 closed loop obstruction from volvulus w/ necrotic segment of small bowel.  She presents with the following impairments/functional limitations: weakness, impaired endurance, impaired self care skills, impaired functional mobility, gait instability, impaired balance, pain.    Rehab Prognosis: Good; patient would benefit from acute skilled PT services to address these deficits and reach maximum level of function.    Recent Surgery: Procedure(s) (LRB):  LAPAROTOMY, EXPLORATORY (N/A)  EXCISION, SMALL INTESTINE 6 Days Post-Op    Plan:     During this hospitalization, patient would benefit from acute PT services 5 x/week to address the identified rehab impairments via gait training, therapeutic activities, therapeutic exercises and progress toward the following goals:    Plan of Care Expires:  12/27/24    Subjective     Chief Complaint: 5/10 pain at incision site  Patient/Family Comments/goals:   Pain/Comfort:         Objective:     Communicated with RN prior to session.  Patient found up in chair with pulse ox (continuous), oxygen, peripheral IV, NG tube, PureWick upon PT entry to room.     General Precautions: Standard, fall  Orthopedic Precautions: N/A  Braces: N/A  Respiratory Status: Room air  Skin Integrity: Visible skin intact    Functional Mobility:  Transfers:     Sit to Stand:  contact guard assistance with rolling walker  Gait: 250' cga/sba w/RW; no LOB; slow stepping armani noted.    Therapeutic Activities/Exercises:  Seated therex BLE x 30   Ankle DF/PF  LAQ  Marches  Hip abd/add    Education:  Patient provided with verbal education education regarding  PT role/goals/POC, fall prevention, safety awareness, and home exercise program.  Understanding was verbalized.     Patient left up in chair with all lines intact, call button in reach, and chair alarm on    GOALS:   Multidisciplinary Problems       Physical Therapy Goals          Problem: Physical Therapy    Goal Priority Disciplines Outcome Interventions   Physical Therapy Goal     PT, PT/OT Progressing    Description: Goals to be met by: 24    Patient will increase functional independence with mobility by performin. Sit to stand transfer with Modified [place]  2. Bed to chair transfer with Modified [place] using LRAD  3. Gait  x 300 feet with Modified [place] using LRAD.                          Time Tracking:     PT Received On: 24  PT Start Time: 1355     PT Stop Time: 1418  PT Total Time (min): 23 min     Billable Minutes: Gait Training 1 and Therapeutic Exercise 1    Treatment Type: Treatment  PT/PTA: PTA     Number of PTA visits since last PT visit: 2024

## 2024-12-02 NOTE — TELEPHONE ENCOUNTER
----- Message from ANDREW Hairston sent at 12/2/2024 10:30 AM CST -----  Regarding: FW: Rs procedure  Dr Singh patient  ----- Message -----  From: Morenita Tse  Sent: 12/2/2024  10:19 AM CST  To: Marika Arredondo RN  Subject: Rs procedure                                     Johnnie [phone] or [phone] pt daughter calling to Rs procedure because the patient is currently in the hospital in [place] after having surgery.    Thanks

## 2024-12-02 NOTE — PROGRESS NOTES
" [facility] General Surgery   Progress Note  Admit Date: 11/25/2024  HD#6  POD#6 Days Post-Op    Subjective:   Interval history:  POD6 s/p ex lap with SBR 2/2 closed loop obstruction from volvulus w/ necrotic segment of small bowel   AF, HDS  Tolerating diet  + flatus, having BM  Denies n/v     Scheduled Meds:   0.9%  NaCl infusion (for blood administration)   Intravenous Once    enoxparin  40 mg Subcutaneous Daily    gabapentin  300 mg Oral TID    pantoprazole  40 mg Intravenous Daily    rifAXIMin  550 mg Oral BID     Continuous Infusions:      PRN Meds:  Current Facility-Administered Medications:     acetaminophen, 650 mg, Oral, Q6H PRN    dextrose 10%, 12.5 g, Intravenous, PRN    dextrose 10%, 12.5 g, Intravenous, PRN    dextrose 10%, 25 g, Intravenous, PRN    dextrose 10%, 25 g, Intravenous, PRN    glucagon (human recombinant), 1 mg, Intramuscular, PRN    melatonin, 6 mg, Oral, Nightly PRN    naloxone, 0.02 mg, Intravenous, PRN    ondansetron, 4 mg, Intravenous, Q4H PRN    oxyCODONE, 5 mg, Oral, Q6H PRN    oxyCODONE, 10 mg, Oral, Q6H PRN    sodium chloride 0.9%, 10 mL, Intravenous, PRN     Objective:     VITAL SIGNS: 24 HR MIN & MAX LAST   Temp  Min: 97.5 °F (36.4 °C)  Max: 98.3 °F (36.8 °C)  98 °F (36.7 °C)   BP  Min: 117/54  Max: 140/56  (!) 117/54    Pulse  Min: 58  Max: 97  (!) 58    Resp  Min: 16  Max: 18  16    SpO2  Min: 93 %  Max: 99 %  99 %      HT: 5' 5.98" (167.6 cm)  WT: 81.6 kg (179 lb 14.3 oz)  BMI: 29     Intake/output:  Intake/Output - Last 3 Shifts         11/30 0700 12/01 0659 12/01 0700 12/02 0659 12/02 0700  12/03 0659    P.O. 840 1210     NG/GT       Total Intake(mL/kg) 840 (10.3) 1210 (14.8)     Urine (mL/kg/hr) 1250 (0.6) 500 (0.3)     Drains       Stool 0 0     Total Output 1250 500     Net -410 +710            Urine Occurrence 3 x 5 x     Stool Occurrence 2 x 2 x             Intake/Output Summary (Last 24 hours) at 12/2/2024 1219  Last data filed at 12/2/2024 0500  Gross per 24 hour   Intake " "1210 ml   Output 500 ml   Net 710 ml        Lines/drains/airway:       Peripheral IV - Single Lumen 11/25/24 2230 20 G Anterior;Proximal;Right Forearm (Active)   Site Assessment Clean;Dry;Intact;No redness;No swelling 11/27/24 0400   Extremity Assessment Distal to IV No abnormal discoloration 11/26/24 1615   Line Status Infusing 11/27/24 0400   Dressing Status Clean;Dry;Intact 11/27/24 0400   Dressing Intervention Integrity maintained 11/27/24 0400   Number of days: 1            NG/OG Tube 11/26/24 0135 16 Fr. Right nostril (Active)   Placement Check placement verified by aspirate characteristics 11/26/24 0800   External Tube Length (cm) 63 11/26/24 1615   Securement secured to nostril center 11/27/24 0400   Clamp Status/Tolerance unclamped 11/27/24 0400   Suction Setting/Drainage Method intermittent setting;low 11/27/24 0400   Drainage Green 11/27/24 0400   Tube Output(mL)(Include Discarded Residual) 225 mL 11/27/24 0620   Number of days: 1       Physical examination:  Gen: NAD, AAOx3, answering questions appropriately  HEENT: NCAT  CV: RR  Resp: NWOB  Abd: S/appropriately tender, midline incision staples c/d/I  Skin/wounds: warm, dry    Labs:  Renal:  Recent Labs     11/30/24 0345 12/02/24 0347   BUN 27.1* 24.7*   CREATININE 0.71 0.68     No results for input(s): "LACTIC" in the last 72 hours.  FENGI:  Recent Labs     11/30/24 0345 12/02/24 0347    137   K 4.4 4.5   * 106   CO2 23 24   CALCIUM 9.0 9.4   MG 2.10 2.00   PHOS 3.4 2.8   ALBUMIN 2.7* 2.7*   BILITOT 1.2 1.6*   AST 45* 35*   ALKPHOS 92 115   ALT 32 31     Heme:  Recent Labs     11/30/24 0345 12/02/24 0347   HGB 11.0* 11.0*   HCT 34.8* 34.1*   PLT 64* 59*     ID:  Recent Labs     11/30/24 0345 12/02/24 0347   WBC 4.38* 5.42  5.42     CBG:  Recent Labs     11/30/24  0345 12/02/24  0347   GLUCOSE 156* 119*      Cardiovascular:  Recent Labs   Lab 11/25/24  2255   TROPONINI 0.020     I have reviewed all pertinent lab results within the " past 24 hours.    Imaging:  XR Gastric tube check, non-radiologist performed   Final Result      Frontal image of the upper abdomen.  Enteric tube extends into the stomach with the proximal side hole just past the GE junction.         Electronically signed by: Tal Kirk   Date:    11/26/2024   Time:    06:50      CT Abdomen Pelvis With IV Contrast NO Oral Contrast   Final Result      X-Ray Chest AP Portable   Final Result      No acute findings in the chest         Electronically signed by: Bayron Flynn MD   Date:    11/26/2024   Time:    06:47         I have reviewed all pertinent imaging results/findings within the past 24 hours.    Micro/Path/Other:  Microbiology Results (last 7 days)       Procedure Component Value Units Date/Time    Blood Culture #1 **CANNOT BE ORDERED STAT** [[id number]]  (Normal) Collected: 11/26/24 1655    Order Status: Completed Specimen: Blood Updated: 12/01/24 1901     Blood Culture No Growth at 5 days    Blood Culture #1 **CANNOT BE ORDERED STAT** [[id number]]  (Normal) Collected: 11/26/24 0409    Order Status: Completed Specimen: Blood Updated: 12/01/24 0701     Blood Culture No Growth at 5 days           Pathology Results  (Last 7 days)                 11/26/24 1340  Specimen to Pathology Final result             Assessment & Plan:    84-year-old female s/p ex lap with 56cm SBR 2/2 closed loop obstruction from volvulus w/ necrotic segment of small bowel 11/26    -Advance to regular diet  -Encourage ambulation, PT/OT   -Recommend home health as needed  -CTM thrombocytopenia, stable   -MM pain control   -General surgery will continue to follow  -Rest of care per primary    Ty Mike MD  [facility] General Surgery PGY-1

## 2024-12-02 NOTE — TELEPHONE ENCOUNTER
Spoke with pt, she is still in hospital, expecting discharge tomorrow, will discuss when to reschedule gen change with Dr Singh

## 2024-12-03 ENCOUNTER — TELEPHONE (OUTPATIENT)
Dept: SURGERY | Facility: CLINIC | Age: 84
End: 2024-12-03
Payer: MEDICARE

## 2024-12-03 ENCOUNTER — TELEPHONE (OUTPATIENT)
Dept: HEMATOLOGY/ONCOLOGY | Facility: CLINIC | Age: 84
End: 2024-12-03
Payer: MEDICARE

## 2024-12-03 VITALS
HEART RATE: 81 BPM | OXYGEN SATURATION: 97 % | HEIGHT: 66 IN | WEIGHT: 179.88 LBS | DIASTOLIC BLOOD PRESSURE: 77 MMHG | RESPIRATION RATE: 20 BRPM | TEMPERATURE: 98 F | SYSTOLIC BLOOD PRESSURE: 114 MMHG | BODY MASS INDEX: 28.91 KG/M2

## 2024-12-03 PROBLEM — K56.2 VOLVULUS: Status: ACTIVE | Noted: 2024-12-03

## 2024-12-03 LAB
ABS NEUT (OLG): 5.17 X10(3)/MCL (ref 2.1–9.2)
ALBUMIN SERPL-MCNC: 2.6 G/DL (ref 3.4–4.8)
ALBUMIN/GLOB SERPL: 1.2 RATIO (ref 1.1–2)
ALP SERPL-CCNC: 138 UNIT/L (ref 40–150)
ALT SERPL-CCNC: 27 UNIT/L (ref 0–55)
ANION GAP SERPL CALC-SCNC: 7 MEQ/L
AST SERPL-CCNC: 34 UNIT/L (ref 5–34)
BILIRUB SERPL-MCNC: 1.5 MG/DL
BUN SERPL-MCNC: 22.5 MG/DL (ref 9.8–20.1)
CALCIUM SERPL-MCNC: 8.7 MG/DL (ref 8.4–10.2)
CHLORIDE SERPL-SCNC: 104 MMOL/L (ref 98–107)
CO2 SERPL-SCNC: 23 MMOL/L (ref 23–31)
CREAT SERPL-MCNC: 0.74 MG/DL (ref 0.55–1.02)
CREAT/UREA NIT SERPL: 30
EOSINOPHIL NFR BLD MANUAL: 0.05 X10(3)/MCL (ref 0–0.9)
EOSINOPHIL NFR BLD MANUAL: 1 %
ERYTHROCYTE [DISTWIDTH] IN BLOOD BY AUTOMATED COUNT: 15.7 % (ref 11.5–17)
GFR SERPLBLD CREATININE-BSD FMLA CKD-EPI: >60 ML/MIN/1.73/M2
GLOBULIN SER-MCNC: 2.1 GM/DL (ref 2.4–3.5)
GLUCOSE SERPL-MCNC: 119 MG/DL (ref 82–115)
HCT VFR BLD AUTO: 32.4 % (ref 37–47)
HGB BLD-MCNC: 10.3 G/DL (ref 12–16)
INSTRUMENT WBC (OLG): 5.44 X10(3)/MCL
LYMPHOCYTES NFR BLD MANUAL: 0.11 X10(3)/MCL
LYMPHOCYTES NFR BLD MANUAL: 2 %
MAGNESIUM SERPL-MCNC: 2 MG/DL (ref 1.6–2.6)
MCH RBC QN AUTO: 30.2 PG (ref 27–31)
MCHC RBC AUTO-ENTMCNC: 31.8 G/DL (ref 33–36)
MCV RBC AUTO: 95 FL (ref 80–94)
METAMYELOCYTES NFR BLD MANUAL: 1 %
MONOCYTES NFR BLD MANUAL: 0.11 X10(3)/MCL (ref 0.1–1.3)
MONOCYTES NFR BLD MANUAL: 2 %
NEUTROPHILS NFR BLD MANUAL: 95 %
NRBC BLD AUTO-RTO: 0 %
PHOSPHATE SERPL-MCNC: 2.9 MG/DL (ref 2.3–4.7)
PLATELET # BLD AUTO: 52 X10(3)/MCL (ref 130–400)
PLATELET # BLD EST: ABNORMAL 10*3/UL
PMV BLD AUTO: 12.1 FL (ref 7.4–10.4)
POTASSIUM SERPL-SCNC: 4.3 MMOL/L (ref 3.5–5.1)
PROT SERPL-MCNC: 4.7 GM/DL (ref 5.8–7.6)
RBC # BLD AUTO: 3.41 X10(6)/MCL (ref 4.2–5.4)
RBC MORPH BLD: NORMAL
SODIUM SERPL-SCNC: 134 MMOL/L (ref 136–145)
WBC # BLD AUTO: 5.44 X10(3)/MCL (ref 4.5–11.5)

## 2024-12-03 PROCEDURE — 84100 ASSAY OF PHOSPHORUS: CPT

## 2024-12-03 PROCEDURE — 63600175 PHARM REV CODE 636 W HCPCS: Performed by: STUDENT IN AN ORGANIZED HEALTH CARE EDUCATION/TRAINING PROGRAM

## 2024-12-03 PROCEDURE — 83735 ASSAY OF MAGNESIUM: CPT

## 2024-12-03 PROCEDURE — 36415 COLL VENOUS BLD VENIPUNCTURE: CPT

## 2024-12-03 PROCEDURE — 99024 POSTOP FOLLOW-UP VISIT: CPT | Mod: GC,,, | Performed by: STUDENT IN AN ORGANIZED HEALTH CARE EDUCATION/TRAINING PROGRAM

## 2024-12-03 PROCEDURE — 85025 COMPLETE CBC W/AUTO DIFF WBC: CPT

## 2024-12-03 PROCEDURE — 25000003 PHARM REV CODE 250

## 2024-12-03 PROCEDURE — 25000003 PHARM REV CODE 250: Performed by: INTERNAL MEDICINE

## 2024-12-03 PROCEDURE — 80053 COMPREHEN METABOLIC PANEL: CPT

## 2024-12-03 RX ORDER — PANTOPRAZOLE SODIUM 40 MG/1
40 TABLET, DELAYED RELEASE ORAL DAILY
Qty: 30 TABLET | Refills: 0 | Status: ON HOLD | OUTPATIENT
Start: 2024-12-03 | End: 2024-12-13

## 2024-12-03 RX ORDER — GABAPENTIN 100 MG/1
CAPSULE ORAL
Qty: 42 CAPSULE | Refills: 0 | Status: ON HOLD | OUTPATIENT
Start: 2024-12-03 | End: 2024-12-13

## 2024-12-03 RX ADMIN — PANTOPRAZOLE SODIUM 40 MG: 40 INJECTION, POWDER, FOR SOLUTION INTRAVENOUS at 09:12

## 2024-12-03 RX ADMIN — GABAPENTIN 300 MG: 300 CAPSULE ORAL at 09:12

## 2024-12-03 RX ADMIN — RIFAXIMIN 550 MG: 550 TABLET ORAL at 09:12

## 2024-12-03 NOTE — PLAN OF CARE
Problem: Adult Inpatient Plan of Care  Goal: Plan of Care Review  Outcome: Met  Goal: Patient-Specific Goal (Individualized)  Outcome: Met  Goal: Absence of Hospital-Acquired Illness or Injury  Outcome: Met  Goal: Optimal Comfort and Wellbeing  Outcome: Met  Goal: Readiness for Transition of Care  Outcome: Met     Problem: Fall Injury Risk  Goal: Absence of Fall and Fall-Related Injury  Outcome: Met     Problem: Wound  Goal: Optimal Coping  Outcome: Met  Goal: Optimal Functional Ability  Outcome: Met  Goal: Absence of Infection Signs and Symptoms  Outcome: Met  Goal: Improved Oral Intake  Outcome: Met  Goal: Optimal Pain Control and Function  Outcome: Met  Goal: Skin Health and Integrity  Outcome: Met  Goal: Optimal Wound Healing  Outcome: Met     Problem: Infection  Goal: Absence of Infection Signs and Symptoms  Outcome: Met

## 2024-12-03 NOTE — PROGRESS NOTES
" [facility] General Surgery   Progress Note  Admit Date: 11/25/2024  HD#7  POD#7 Days Post-Op    Subjective:   Interval history:  POD7 s/p ex lap with SBR 2/2 closed loop obstruction from volvulus w/ necrotic segment of small bowel     AF, HDS  Tolerating diet  + flatus, having BM  Denies n/v     Scheduled Meds:   0.9%  NaCl infusion (for blood administration)   Intravenous Once    enoxparin  40 mg Subcutaneous Daily    gabapentin  300 mg Oral TID    pantoprazole  40 mg Intravenous Daily    rifAXIMin  550 mg Oral BID     Continuous Infusions:      PRN Meds:  Current Facility-Administered Medications:     acetaminophen, 650 mg, Oral, Q6H PRN    dextrose 10%, 12.5 g, Intravenous, PRN    dextrose 10%, 12.5 g, Intravenous, PRN    dextrose 10%, 25 g, Intravenous, PRN    dextrose 10%, 25 g, Intravenous, PRN    glucagon (human recombinant), 1 mg, Intramuscular, PRN    melatonin, 6 mg, Oral, Nightly PRN    naloxone, 0.02 mg, Intravenous, PRN    ondansetron, 4 mg, Intravenous, Q4H PRN    oxyCODONE, 5 mg, Oral, Q6H PRN    oxyCODONE, 10 mg, Oral, Q6H PRN    sodium chloride 0.9%, 10 mL, Intravenous, PRN     Objective:     VITAL SIGNS: 24 HR MIN & MAX LAST   Temp  Min: 97.5 °F (36.4 °C)  Max: 98.2 °F (36.8 °C)  97.5 °F (36.4 °C)   BP  Min: 104/59  Max: 135/75  (!) 125/54    Pulse  Min: 58  Max: 79  79    Resp  Min: 16  Max: 18  18    SpO2  Min: 93 %  Max: 99 %  (!) 93 %      HT: 5' 5.98" (167.6 cm)  WT: 81.6 kg (179 lb 14.3 oz)  BMI: 29     Intake/output:  Intake/Output - Last 3 Shifts         12/01 0700  12/02 0659 12/02 0700 12/03 0659 12/03 0700 12/04 0659    P.O. 1210 880     Total Intake(mL/kg) 1210 (14.8) 880 (10.8)     Urine (mL/kg/hr) 500 (0.3) 650 (0.3)     Stool 0      Total Output 500 650     Net +710 +230            Urine Occurrence 5 x 2 x     Stool Occurrence 2 x 0 x             Intake/Output Summary (Last 24 hours) at 12/3/2024 0714  Last data filed at 12/2/2024 2300  Gross per 24 hour   Intake 880 ml   Output 650 ml " "  Net 230 ml        Lines/drains/airway:       Peripheral IV - Single Lumen 11/25/24 2230 20 G Anterior;Proximal;Right Forearm (Active)   Site Assessment Clean;Dry;Intact;No redness;No swelling 11/27/24 0400   Extremity Assessment Distal to IV No abnormal discoloration 11/26/24 1615   Line Status Infusing 11/27/24 0400   Dressing Status Clean;Dry;Intact 11/27/24 0400   Dressing Intervention Integrity maintained 11/27/24 0400   Number of days: 1            NG/OG Tube 11/26/24 0135 16 Fr. Right nostril (Active)   Placement Check placement verified by aspirate characteristics 11/26/24 0800   External Tube Length (cm) 63 11/26/24 1615   Securement secured to nostril center 11/27/24 0400   Clamp Status/Tolerance unclamped 11/27/24 0400   Suction Setting/Drainage Method intermittent setting;low 11/27/24 0400   Drainage Green 11/27/24 0400   Tube Output(mL)(Include Discarded Residual) 225 mL 11/27/24 0620   Number of days: 1       Physical examination:  Gen: NAD, AAOx3, answering questions appropriately  HEENT: NCAT  CV: RR  Resp: NWOB  Abd: S/appropriately tender, midline incision staples c/d/I /ND  Skin/wounds: warm, dry    Labs:  Renal:  Recent Labs     12/02/24 0347 12/03/24 0331   BUN 24.7* 22.5*   CREATININE 0.68 0.74     No results for input(s): "LACTIC" in the last 72 hours.  FENGI:  Recent Labs     12/02/24 0347 12/03/24 0331    134*   K 4.5 4.3    104   CO2 24 23   CALCIUM 9.4 8.7   MG 2.00 2.00   PHOS 2.8 2.9   ALBUMIN 2.7* 2.6*   BILITOT 1.6* 1.5   AST 35* 34   ALKPHOS 115 138   ALT 31 27     Heme:  Recent Labs     12/02/24 0347 12/03/24 0331   HGB 11.0* 10.3*   HCT 34.1* 32.4*   PLT 59* 52*     ID:  Recent Labs     12/02/24 0347 12/03/24 0331   WBC 5.42  5.42 5.44  5.44     CBG:  Recent Labs     12/02/24  0347 12/03/24  0331   GLUCOSE 119* 119*      Cardiovascular:  No results for input(s): "TROPONINI", "CKTOTAL", "CKMB", "BNP" in the last 168 hours.    I have reviewed all pertinent lab " results within the past 24 hours.    Imaging:  XR Gastric tube check, non-radiologist performed   Final Result      Frontal image of the upper abdomen.  Enteric tube extends into the stomach with the proximal side hole just past the GE junction.         Electronically signed by: Tal Kirk   Date:    11/26/2024   Time:    06:50      CT Abdomen Pelvis With IV Contrast NO Oral Contrast   Final Result      X-Ray Chest AP Portable   Final Result      No acute findings in the chest         Electronically signed by: Bayron Flynn MD   Date:    11/26/2024   Time:    06:47         I have reviewed all pertinent imaging results/findings within the past 24 hours.    Micro/Path/Other:  Microbiology Results (last 7 days)       Procedure Component Value Units Date/Time    Blood Culture #1 **CANNOT BE ORDERED STAT** [[id number]]  (Normal) Collected: 11/26/24 1655    Order Status: Completed Specimen: Blood Updated: 12/01/24 1901     Blood Culture No Growth at 5 days    Blood Culture #1 **CANNOT BE ORDERED STAT** [[id number]]  (Normal) Collected: 11/26/24 0409    Order Status: Completed Specimen: Blood Updated: 12/01/24 0701     Blood Culture No Growth at 5 days           Pathology Results  (Last 7 days)                 11/26/24 1340  Specimen to Pathology Final result             Assessment & Plan:    84-year-old female s/p ex lap with 56cm SBR 2/2 closed loop obstruction from volvulus w/ necrotic segment of small bowel 11/26    -Continue regular diet  -Encourage ambulation, PT/OT   -Recommend home health as needed  -CTM thrombocytopenia  -MM pain control   -Okay for discharge from a surgery standpoint once outpatient services arranged  -Please reach out to surgery with any questions or concerns  -Rest of care per primary    Ty Mike MD  [facility] General Surgery PGY-1

## 2024-12-03 NOTE — TELEPHONE ENCOUNTER
Name....: Rosie EDWARDS/  ......w/: [facility]  Phone #.: [phone]  Patient.: Joy Donahue  Pt .: 1940  Details.: calling to schedule follow-up appt for a week out, Pls contact pt with schedule # [phone]. Daughter Johnnie # [phone]    Called and made pt daughter aware then called and spoke with ms Velez

## 2024-12-03 NOTE — PLAN OF CARE
12/03/24 1018   Final Note   Assessment Type Final Discharge Note   Anticipated Discharge Disposition Home-Health   Hospital Resources/Appts/Education Provided Post-Acute resouces added to AVS   Post-Acute Status   Post-Acute Authorization Home Health   Home Health Status Set-up Complete/Auth obtained   Patient choice form signed by patient/caregiver List with quality metrics by geographic area provided   Discharge Delays None known at this time     Pt being discharged home today with HH. Freedom of Choice signed. Referral sent to [facility] via [facility] [place]. Pt's son and nephew are in the room. They are arranging someone to sit with pt until she gets stronger. Pt's plan is to do HH and then progress to outpatient therapy so she can return to work.

## 2024-12-03 NOTE — DISCHARGE SUMMARY
[facility] Hospital Medicine Discharge Summary    Admit Date: 11/25/2024  Discharge Date and Time: 12/3/815244:31 PM  Admitting Physician:  Team  Discharging Physician: Jamie Philip MD.  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  She had came in with complaints of abdominal pain along with nausea and vomiting.  She had been having intermittent abdominal pain occasionally.      CT scan showed proximal small-bowel jejunitis differential would include segmental volvulus.  Surgery was consulted and had taken her for emergent surgery 11/26 with findings of adhesions and volvulus of the omentum/bowel, 53 cm segment of proximal jejunum found to be discolored and darkened without peristalsis, resected.    She was also noted to be bradycardic into the 40s and Cardiology was consulted given pacemaker battery was nearing end of life already.cardiology interrogated ppm, and note that there is no indication for urgent generator change, recommend follow up with her regular cardiologist.    Pt progressed well post operatively,tolerated po, had Bms,PT rec'd low intensity.      Pt was seen and examined on the day of discharge,stable,  cleared by surgery team ,discharged to home with ,discussed follow ups, pt and family member at bedside in agreement with discharge plan.  Vitals:  VITAL SIGNS: 24 HRS MIN & MAX LAST   Temp  Min: 97.5 °F (36.4 °C)  Max: 98.2 °F (36.8 °C) 98.1 °F (36.7 °C)   BP  Min: 104/57  Max: 135/75 114/77   Pulse  Min: 71  Max: 81  81   Resp  Min: 16  Max: 20 20   SpO2  Min: 93 %  Max: 97 % 97 %       Physical  Exam:  General: In no acute distress, afebrile  Chest: unlabored breathing   Heart: normal rate   Abdomen: Soft, appropriately tender  MSK: Warm  Neurologic: Alert and oriented     Procedures Performed: s/p ex lap with small bowel resection  2/2 closed loop obstruction from volvulus w/ necrotic segment of small bowel 11/26      Significant Diagnostic Studies: See Full reports for all details    Recent Labs   Lab 11/30/24 0345 12/02/24 0347 12/03/24  0331   WBC 4.38* 5.42  5.42 5.44  5.44   RBC 3.63* 3.64* 3.41*   HGB 11.0* 11.0* 10.3*   HCT 34.8* 34.1* 32.4*   MCV 95.9* 93.7 95.0*   MCH 30.3 30.2 30.2   MCHC 31.6* 32.3* 31.8*   RDW 15.6 15.4 15.7   PLT 64* 59* 52*   MPV 11.5* 11.5* 12.1*       Recent Labs   Lab 11/30/24 0345 12/02/24 0347 12/03/24  0331    137 134*   K 4.4 4.5 4.3   * 106 104   CO2 23 24 23   BUN 27.1* 24.7* 22.5*   CREATININE 0.71 0.68 0.74   CALCIUM 9.0 9.4 8.7   MG 2.10 2.00 2.00   ALBUMIN 2.7* 2.7* 2.6*   ALKPHOS 92 115 138   ALT 32 31 27   AST 45* 35* 34   BILITOT 1.2 1.6* 1.5        Microbiology Results (last 7 days)       Procedure Component Value Units Date/Time    Blood Culture #1 **CANNOT BE ORDERED STAT** [[id number]]  (Normal) Collected: 11/26/24 1655    Order Status: Completed Specimen: Blood Updated: 12/01/24 1901     Blood Culture No Growth at 5 days    Blood Culture #1 **CANNOT BE ORDERED STAT** [[id number]]  (Normal) Collected: 11/26/24 0409    Order Status: Completed Specimen: Blood Updated: 12/01/24 0701     Blood Culture No Growth at 5 days             CT Abdomen Pelvis With IV Contrast NO Oral Contrast  EXAMINATION  CT ABDOMEN PELVIS WITH IV CONTRAST    CLINICAL HISTORY  Nausea/vomiting;Epigastric pain;    TECHNIQUE  Post-contrast helical-acquisition CT images were obtained and multiplanar reformats accomplished by a CT technologist at a separate workstation, pushed to PACS for physician review.    CONTRAST  *IV: Omnipaque 350, 100 mL  *Enteric:  none    COMPARISON  5 July 2024    FINDINGS  Images were reviewed in soft tissue, lung, and bone windows.    Exam quality: adequate for evaluation    Lines/tubes: Right heart leads are again visualized.    Chest: Stable heart chamber size. No pericardial effusion. No interval change of the visualized vasculature. No airspace consolidation or suspicious lung lesion. No worsening pleural effusion or evidence for loculation.    Hepatobiliary/Pancreas: No new or enlarging hepatic lesion.  Multiple cystic appearing foci are again appreciated.  There is similar nodular irregularity of the liver surface suggesting changes of cirrhosis.  Redemonstrated cholecystectomy.  No development of biliary dilatation.  No convincing acute changes involving the pancreas or development of expansile mass-like lesion.  Subcentimeter simple appearing cystic structure incidentally noted at the pancreatic tail.    Spleen: There are scattered cystic structures throughout the spleen, with no definite new or enlarging mass-like lesion.    Adrenal/: No suspicious adrenal nodule.  Bilateral kidney cysts are similar to the comparison, with symmetric appearance of contrast enhancement.  No interval development of findings to suggest obstructive uropathy. Urinary bladder and adjacent reproductive structures are unchanged.    Esophagus/GI tract: The included lower esophagus is unremarkable. No evidence of gastric outlet or small bowel obstruction. There are proximal loops of jejunum (series 2, images ), which demonstrate profound wall thickening and edema, as well as interloop fluid and pronounced mucosal enhancement. There is a small mesenteric swirl just to the left of midline anterior to the abnormal loops (images 66-80).  The transition from the normal duodenum and most proximal jejunum to the abnormal segments is relatively gradual, as is the transition from the abnormal jejunal segments to the normal jejunum. The rest of the small bowel  appears unremarkable. The appendix is not clearly identified but there are no secondary findings of the right lower quadrant to suggest acute appendicitis.  No acute abnormality of the large bowel is evident.  There is similar scattered sigmoid diverticulosis with no findings of active diverticulitis.    Musculoskeletal: Similar advanced degenerative changes through the spinal column and bony pelvis.  No acute or destructive osseous process is identified.  Regional musculature is unremarkable.    Other findings: Minimal volume free fluid is present through the abdomen.  No pneumoperitoneum is identified.  No drainable collections. Aortoiliac vascular structures are similar in comparison. No development of pathologic anna enlargement or necrotic adenopathy.    IMPRESSION  1. Proximal small bowel findings suggestive of profound jejunitis of otherwise indeterminate etiology, differential includes segmental volvulus versus inflammatory or infectious process considered.  2. No other convincing acute abdominopelvic abnormality.  3. Chronic secondary findings discussed above, similar in the interval.  ==========    No significant discrepancy identified in relation to the teleradiology preliminary report.    RADIATION DOSE  Automated tube current modulation, weight-based exposure dosing, and/or iterative reconstruction technique utilized to reach lowest reasonably achievable exposure rate.    DLP: 758 mGy*cm    Electronically signed by: August Murray  Date:    11/26/2024  Time:    12:05  XR Gastric tube check, non-radiologist performed  Narrative: EXAMINATION:  XR GASTRIC TUBE CHECK, NON-RADIOLOGIST PERFORMED    CLINICAL HISTORY:  Ng tube placement;    COMPARISON:  12 September 2015  Impression: Frontal image of the upper abdomen.  Enteric tube extends into the stomach with the proximal side hole just past the GE junction.    Electronically signed by: Tal Kirk  Date:    11/26/2024  Time:    06:50  X-Ray Chest AP  Portable  Narrative: EXAMINATION:  XR CHEST AP PORTABLE    CLINICAL HISTORY:  Epigastric pain    COMPARISON:  02/04/2021    FINDINGS:  Single view of the chest shows no focal consolidation, pneumothorax or pleural effusion.  Cardiomediastinal silhouette and cardiac device are stable.  Aorta is partially calcified.  Impression: No acute findings in the chest    Electronically signed by: Bayron Flynn MD  Date:    11/26/2024  Time:    06:47         Medication List        START taking these medications      gabapentin 100 MG capsule  Commonly known as: NEURONTIN  Take 3 capsules (300 mg total) by mouth 3 (three) times daily for 2 days, THEN 2 capsules (200 mg total) 3 (three) times daily for 2 days, THEN 1 capsule (100 mg total) 3 (three) times daily for 2 days, THEN 1 capsule (100 mg total) 2 (two) times daily for 2 days, THEN 1 capsule (100 mg total) Daily for 2 days.  Start taking on: December 3, 2024     pantoprazole 40 MG tablet  Commonly known as: PROTONIX  Take 1 tablet (40 mg total) by mouth once daily.            CONTINUE taking these medications      lactulose 10 gram/15 mL solution  Commonly known as: CHRONULAC  Take 30 mLs (20 g total) by mouth daily as needed.     rifAXIMin 550 mg Tab  Commonly known as: XIFAXAN  Take 1 tablet (550 mg total) by mouth 2 (two) times daily.     spironolactone 25 MG tablet  Commonly known as: ALDACTONE            ASK your doctor about these medications      sucralfate 1 gram tablet  Commonly known as: CARAFATE               Where to Get Your Medications        These medications were sent to [facility]/pharmacy #[id number] - [place]  [address] [facility]  [address] [address]      Hours: 24-hours Phone: [phone]   gabapentin 100 MG capsule  pantoprazole 40 MG tablet          Explained in detail to the patient about the discharge plan, medications, and follow-up visits. Pt understands and agrees with the treatment  plan  Discharge Disposition: Home w HH   Discharged Condition: stable  Diet-   Dietary Orders (From admission, onward)       Start     Ordered    12/02/24 1116  Diet GI Soft  Diet effective now         12/02/24 1115                   Medications Per DC med rec  Activities as tolerated   Follow-up Information       [facility] Follow up on 12/10/2024.    Why: @10:20AM.  Contact information:  [address] [place] [address]  [phone]               [facility] Follow up.    Specialties: Home Health Services, Home Therapy Services, Home Living Aide Services  Why: This is your home health provider. You can contact them with any questions or concerns.  Contact information:  [address]  [place] [address]  [phone]             Miya Silverio NP Follow up on 12/19/2024.    Specialty: Internal Medicine  Why: Follow up appt. scheduled for December 19, 2024 @ 9:30AM  Contact information:  [address] [facility] [address]  [phone]                           For further questions contact hospitalist office    Discharge time 33 minutes    For worsening symptoms, chest pain, shortness of breath, increased abdominal pain, high grade fever, stroke or stroke like symptoms, immediately go to the nearest Emergency Room or call 911 as soon as possible.      Jamie Hannah M.D, on 12/3/2024. at 12:31 PM.

## 2024-12-03 NOTE — PROGRESS NOTES
Inpatient Nutrition Assessment    Admit Date: 11/25/2024   Total duration of encounter: 8 days   Patient Age: 84 y.o.    Nutrition Recommendation/Prescription     -Continue GI Soft Diet as tolerated.   -Monitor wt, labs, and intake.     Communication of Recommendations: reviewed with patient and reviewed with family    Nutrition Assessment     Malnutrition Assessment/Nutrition-Focused Physical Exam    Malnutrition Context: acute illness or injury (11/29/24 1254)  Malnutrition Level: moderate (11/29/24 1254)  Energy Intake (Malnutrition): less than or equal to 50% for greater than or equal to 5 days (11/29/24 1254)  Weight Loss (Malnutrition): other (see comments) (Does not meet criteria) (11/29/24 1254)  Subcutaneous Fat (Malnutrition): moderate depletion (11/29/24 1254)  Orbital Region (Subcutaneous Fat Loss): moderate depletion        Muscle Mass (Malnutrition): moderate depletion (11/29/24 1254)  Delta Region (Muscle Loss): moderate depletion                       Fluid Accumulation (Malnutrition): other (see comments) (Not present) (11/29/24 1254)        A minimum of two characteristics is recommended for diagnosis of either severe or non-severe malnutrition.    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  Chronic thrombocytopenia   Bradycardia, h/o PPM, nearing end of life battery    Relevant Medical History:  hepatitis-C through blood transfusion s/p treatment, thrombocytopenia, prediabetes, pacemaker     Scheduled Medications:  0.9%  NaCl infusion (for blood administration), , Once  enoxparin, 40 mg, Daily  gabapentin, 300 mg, TID  pantoprazole, 40 mg, Daily  rifAXIMin, 550 mg, BID    Continuous Infusions:     PRN  Medications:  acetaminophen, 650 mg, Q6H PRN  dextrose 10%, 12.5 g, PRN  dextrose 10%, 12.5 g, PRN  dextrose 10%, 25 g, PRN  dextrose 10%, 25 g, PRN  glucagon (human recombinant), 1 mg, PRN  melatonin, 6 mg, Nightly PRN  naloxone, 0.02 mg, PRN  ondansetron, 4 mg, Q4H PRN  oxyCODONE, 5 mg, Q6H PRN  oxyCODONE, 10 mg, Q6H PRN  sodium chloride 0.9%, 10 mL, PRN    Calorie Containing IV Medications: no significant kcals from medications at this time    Recent Labs   Lab 11/26/24  1655 11/26/24  1859 11/26/24  1859 11/27/24  0354 11/27/24  0812 11/28/24  0408 11/29/24  0530 11/30/24  0345 12/02/24  0347 12/03/24  0331     --   --  138  --  140 140 140 137 134*   K 4.0  --   --  4.1  --  4.6 4.7 4.4 4.5 4.3   CALCIUM 8.7  --   --  8.4  --  9.3 9.6 9.0 9.4 8.7   PHOS 3.8  --   --  3.3  --  2.4 2.7 3.4 2.8 2.9   MG 2.00  --   --  2.00  --  2.50 2.40 2.10 2.00 2.00     --   --  105  --  104 109* 108* 106 104   CO2 22*  --   --  22*  --  29 25 23 24 23   BUN 27.2*  --   --  34.8*  --  37.7* 33.7* 27.1* 24.7* 22.5*   CREATININE 1.21*  --   --  1.01  --  0.87 0.70 0.71 0.68 0.74   EGFRNORACEVR 44  --   --  55  --  >60 >60 >60 >60 >60   GLUCOSE 168*  --   --  150*  --  165* 161* 156* 119* 119*   BILITOT 2.9*  --   --  2.4*  --  1.9* 1.5 1.2 1.6* 1.5   ALKPHOS 59  --   --  48  --  50 56 92 115 138   ALT 25  --   --  25  --  27 28 32 31 27   AST 37*  --   --  46*  --  46* 37* 45* 35* 34   ALBUMIN 3.5  --   --  3.0*  --  2.9* 2.9* 2.7* 2.7* 2.6*   PREALB  --   --   --   --  11.0* 9.8*  --   --  14.8  --    WBC  --  10.03   < > 6.49  --  7.34 6.17 4.38* 5.42  5.42 5.44  5.44   HGB  --  11.8*  --  10.8*  --  10.5* 10.9* 11.0* 11.0* 10.3*   HCT  --  36.9*  --  33.4*  --  33.5* 34.6* 34.8* 34.1* 32.4*    < > = values in this interval not displayed.     Nutrition Orders:  Diet GI Soft      Appetite/Oral Intake: fair/50-75% of meals  Factors Affecting Nutritional Intake: none identified  Social Needs Impacting Access to Food:  "none identified  Food/Pent[employer]al[employer]/Cultural Preferences: none reported  Food Allergies:  latex, iodine  Last Bowel Movement: 24  Wound(s):  surgical incision    Comments    24: Pt NPO with NG tube to suction. Has been NPO with NG tube to suction since admit (); most likely not able to advance diet soon; PPN running. Will add IVPB IL daily.     12/3/24: Pt no longer receiving PPN and is now on solids; pt is tolerating meals well with a fair intake/appetite. Pt is anticipated to be d/c'ed today.     Anthropometrics    Height: 5' 5.98" (167.6 cm),    Last Weight: 81.6 kg (179 lb 14.3 oz) (24 1245), Weight Method: Standard Scale  BMI (Calculated): 29  BMI Classification: overweight (BMI 25-29.9)     Ideal Body Weight (IBW), Female: 129.9 lb     % Ideal Body Weight, Female (lb): 138.49 %                    Usual Body Weight (UBW), k.3 kg (per EMR)  % Usual Body Weight: 108.59  % Weight Change From Usual Weight: 8.37 %  Usual Weight Provided By: EMR weight history    Wt Readings from Last 5 Encounters:   24 81.6 kg (179 lb 14.3 oz)   10/10/24 81.7 kg (180 lb 1.6 oz)   24 75.5 kg (166 lb 7.2 oz)   24 75.3 kg (166 lb 0.1 oz)   24 75.3 kg (165 lb 14.4 oz)     Weight Change(s) Since Admission:   Wt Readings from Last 1 Encounters:   24 1245 81.6 kg (179 lb 14.3 oz)   24 0847 81.6 kg (179 lb 14.3 oz)   24 2203 81.6 kg (180 lb)   Admit Weight: 81.6 kg (180 lb) (24 2203), Weight Method: Standard Scale    Estimated Needs    Weight Used For Calorie Calculations: 81.6 kg (179 lb 14.3 oz)  Energy Calorie Requirements (kcal): 4001-6565 kcal (25-30 kcal/kg)  Energy Need Method: Kcal/kg  Weight Used For Protein Calculations: 81.6 kg (179 lb 14.3 oz)  Protein Requirements: 106 gm (1.3g/kg)  Fluid Requirements (mL): 2040 mL  CHO Requirement: 270-324 gm (45% EEN)     Enteral Nutrition     Patient not receiving enteral nutrition at this time.    Parenteral Nutrition "     Patient not receiving parenteral nutrition at this time.     Evaluation of Received Nutrient Intake    Calories: meeting estimated needs  Protein: meeting estimated needs    Patient Education     Not applicable.    Nutrition Diagnosis     PES: Inadequate oral intake related to acute illness as evidenced by NPO since admit. (resolved)     PES: Moderate acute disease or injury related malnutrition Related to altered GI as Evidenced by:  - energy intake: <= 50% for 5 days (meets criteria for <= 50% >= 5 days (severe - acute)) - muscle mass depletion: 1 area of moderate muscle loss (Temporalis) - loss of subcutaneous fat: 1 area of moderate fat loss (Infraorbital) active    Nutrition Interventions     Intervention(s): general/healthful diet and collaboration with other providers    Goal: Meet greater than 80% of nutritional needs by follow-up. (goal progressing)  Goal: Maintain weight throughout hospitalization. (goal progressing)    Nutrition Goals & Monitoring     Dietitian will monitor: parenteral nutrition intake and weight  Discharge planning: continue GI Soft diet  Nutrition Risk/Follow-Up: high (follow-up in 1-4 days)   Please consult if re-assessment needed sooner.

## 2024-12-03 NOTE — TELEPHONE ENCOUNTER
Sandra with [facility] would like to know if you would be willing to sign home health orders on this patient. Recently discharged after surgery for small bowel obstruction. They are not able to get in touch with PCP. You see this patient on Monday. Please advise.

## 2024-12-04 ENCOUNTER — PATIENT OUTREACH (OUTPATIENT)
Dept: ADMINISTRATIVE | Facility: CLINIC | Age: 84
End: 2024-12-04
Payer: MEDICARE

## 2024-12-04 RX ORDER — NADOLOL 40 MG/1
40 TABLET ORAL DAILY
COMMUNITY
Start: 2024-11-12

## 2024-12-04 NOTE — PROGRESS NOTES
C3 nurse spoke with Joy Donahue for a TCC post hospital discharge follow up call. The patient has a scheduled HOSFU appointment with Miya Silverio NP (PCP) on 12/19/24 @ 9:30am. She also has a Post-Op appointment with staple removal scheduled with [facility] on 12/10/24 @ 10:20am.     The patient questioned if she is to resume taking Nadolol, but this was not listed in her medications prior to my call. She will begin monitoring BP while awaiting a call to determine if she is to resume this or not. She also questioned if her surgery on 12/6/24 was rescheduled, since she just had a significant surgery?     Message routed to Dr. Gulshan Singh's staff to inquire about the upcoming surgery, and to have them contact the patient regarding this. Message also routed to PCP staff to advise on whether or not to take Nadolol, and have them contact the patient regarding this.

## 2024-12-05 ENCOUNTER — TELEPHONE (OUTPATIENT)
Dept: ELECTROPHYSIOLOGY | Facility: CLINIC | Age: 84
End: 2024-12-05
Payer: MEDICARE

## 2024-12-05 NOTE — TELEPHONE ENCOUNTER
----- Message from Tech Keri sent at 12/4/2024  4:30 PM CST -----  Patients son is calling to reschedule his mothers generator change out that is scheduled for 12/06/2024.  He stated she recently had surgery. He is also asking if there will be any labs ordered. He can be reached at the number below. Thanks  ----- Message -----  From: Ciara Hodge MA  Sent: 12/4/2024   3:26 PM CST  To: [facility]rhythmia Device Staff    The patient son Piotr would like to talk to someone about his mother device please call [phone]. Thank you

## 2024-12-05 NOTE — TELEPHONE ENCOUNTER
Spoke with Dr Singh about pt having procedure last week and when can she be rescheduled for gen change, ok for 12/20. Called pt to schedule with her, no answer left message. Spoke with son, agreed to 12/20 date, informed him will call him back and his mother back with arrival time for that date once confirmed with other cases.

## 2024-12-05 NOTE — TELEPHONE ENCOUNTER
Spoke with patient, rescheduled for 12/20 1pm arrival time. Called son, no answer mailbox full, unable to leave a message

## 2024-12-06 ENCOUNTER — PATIENT MESSAGE (OUTPATIENT)
Dept: ELECTROPHYSIOLOGY | Facility: CLINIC | Age: 84
End: 2024-12-06
Payer: MEDICARE

## 2024-12-10 ENCOUNTER — OFFICE VISIT (OUTPATIENT)
Dept: SURGERY | Facility: CLINIC | Age: 84
End: 2024-12-10
Payer: MEDICARE

## 2024-12-10 ENCOUNTER — HOSPITAL ENCOUNTER (EMERGENCY)
Facility: HOSPITAL | Age: 84
Discharge: HOME | End: 2024-12-10
Attending: STUDENT IN AN ORGANIZED HEALTH CARE EDUCATION/TRAINING PROGRAM
Payer: MEDICARE

## 2024-12-10 ENCOUNTER — HOSPITAL ENCOUNTER (OUTPATIENT)
Dept: CARDIOLOGY | Facility: HOSPITAL | Age: 84
Discharge: HOME | End: 2024-12-10
Attending: NURSE PRACTITIONER
Payer: MEDICARE

## 2024-12-10 VITALS
BODY MASS INDEX: 26.03 KG/M2 | RESPIRATION RATE: 20 BRPM | TEMPERATURE: 98 F | HEART RATE: 71 BPM | WEIGHT: 162 LBS | HEIGHT: 66 IN | DIASTOLIC BLOOD PRESSURE: 60 MMHG | OXYGEN SATURATION: 97 % | SYSTOLIC BLOOD PRESSURE: 125 MMHG

## 2024-12-10 VITALS
HEIGHT: 65 IN | BODY MASS INDEX: 29.97 KG/M2 | HEART RATE: 70 BPM | WEIGHT: 179.88 LBS | DIASTOLIC BLOOD PRESSURE: 84 MMHG | SYSTOLIC BLOOD PRESSURE: 129 MMHG

## 2024-12-10 DIAGNOSIS — R60.0 LOCALIZED EDEMA: ICD-10-CM

## 2024-12-10 DIAGNOSIS — I82.4Y1 ACUTE DEEP VEIN THROMBOSIS (DVT) OF PROXIMAL VEIN OF RIGHT LOWER EXTREMITY: Primary | ICD-10-CM

## 2024-12-10 DIAGNOSIS — M79.604 RIGHT LEG PAIN: ICD-10-CM

## 2024-12-10 DIAGNOSIS — R60.0 LOCALIZED EDEMA: Primary | ICD-10-CM

## 2024-12-10 DIAGNOSIS — Z90.49 S/P SMALL BOWEL RESECTION: Primary | ICD-10-CM

## 2024-12-10 LAB
ALBUMIN SERPL-MCNC: 3.1 G/DL (ref 3.4–4.8)
ALBUMIN/GLOB SERPL: 1 RATIO (ref 1.1–2)
ALP SERPL-CCNC: 133 UNIT/L (ref 40–150)
ALT SERPL-CCNC: 20 UNIT/L (ref 0–55)
ANION GAP SERPL CALC-SCNC: 4 MEQ/L
APTT PPP: 26 SECONDS (ref 23.2–33.7)
AST SERPL-CCNC: 28 UNIT/L (ref 5–34)
BASOPHILS # BLD AUTO: 0.02 X10(3)/MCL
BASOPHILS NFR BLD AUTO: 0.2 %
BILIRUB SERPL-MCNC: 1.7 MG/DL
BUN SERPL-MCNC: 18.2 MG/DL (ref 9.8–20.1)
CALCIUM SERPL-MCNC: 8.3 MG/DL (ref 8.4–10.2)
CHLORIDE SERPL-SCNC: 106 MMOL/L (ref 98–107)
CO2 SERPL-SCNC: 27 MMOL/L (ref 23–31)
CREAT SERPL-MCNC: 0.7 MG/DL (ref 0.55–1.02)
CREAT/UREA NIT SERPL: 26
EOSINOPHIL # BLD AUTO: 0.06 X10(3)/MCL (ref 0–0.9)
EOSINOPHIL NFR BLD AUTO: 0.7 %
ERYTHROCYTE [DISTWIDTH] IN BLOOD BY AUTOMATED COUNT: 16.2 % (ref 11.5–17)
GFR SERPLBLD CREATININE-BSD FMLA CKD-EPI: >60 ML/MIN/1.73/M2
GLOBULIN SER-MCNC: 3.1 GM/DL (ref 2.4–3.5)
GLUCOSE SERPL-MCNC: 88 MG/DL (ref 82–115)
HCT VFR BLD AUTO: 34.9 % (ref 37–47)
HGB BLD-MCNC: 11.1 G/DL (ref 12–16)
IMM GRANULOCYTES # BLD AUTO: 0.15 X10(3)/MCL (ref 0–0.04)
IMM GRANULOCYTES NFR BLD AUTO: 1.8 %
INR PPP: 1.2
LYMPHOCYTES # BLD AUTO: 0.71 X10(3)/MCL (ref 0.6–4.6)
LYMPHOCYTES NFR BLD AUTO: 8.7 %
MCH RBC QN AUTO: 30.2 PG (ref 27–31)
MCHC RBC AUTO-ENTMCNC: 31.8 G/DL (ref 33–36)
MCV RBC AUTO: 94.8 FL (ref 80–94)
MONOCYTES # BLD AUTO: 0.89 X10(3)/MCL (ref 0.1–1.3)
MONOCYTES NFR BLD AUTO: 10.9 %
NEUTROPHILS # BLD AUTO: 6.31 X10(3)/MCL (ref 2.1–9.2)
NEUTROPHILS NFR BLD AUTO: 77.7 %
NRBC BLD AUTO-RTO: 0 %
PLATELET # BLD AUTO: 57 X10(3)/MCL (ref 130–400)
PLATELETS.RETICULATED NFR BLD AUTO: 6.2 % (ref 0.9–11.2)
PMV BLD AUTO: 10.5 FL (ref 7.4–10.4)
POTASSIUM SERPL-SCNC: 3.9 MMOL/L (ref 3.5–5.1)
PROT SERPL-MCNC: 6.2 GM/DL (ref 5.8–7.6)
PROTHROMBIN TIME: 15.4 SECONDS (ref 12.5–14.5)
RBC # BLD AUTO: 3.68 X10(6)/MCL (ref 4.2–5.4)
SODIUM SERPL-SCNC: 137 MMOL/L (ref 136–145)
WBC # BLD AUTO: 8.14 X10(3)/MCL (ref 4.5–11.5)

## 2024-12-10 PROCEDURE — 3079F DIAST BP 80-89 MM HG: CPT | Mod: CPTII,GC,, | Performed by: SURGERY

## 2024-12-10 PROCEDURE — 93971 EXTREMITY STUDY: CPT | Mod: 26,RT,, | Performed by: SPECIALIST

## 2024-12-10 PROCEDURE — 1125F AMNT PAIN NOTED PAIN PRSNT: CPT | Mod: CPTII,GC,, | Performed by: SURGERY

## 2024-12-10 PROCEDURE — 80053 COMPREHEN METABOLIC PANEL: CPT | Performed by: PHYSICIAN ASSISTANT

## 2024-12-10 PROCEDURE — 1159F MED LIST DOCD IN RCRD: CPT | Mod: CPTII,GC,, | Performed by: SURGERY

## 2024-12-10 PROCEDURE — 85025 COMPLETE CBC W/AUTO DIFF WBC: CPT | Performed by: PHYSICIAN ASSISTANT

## 2024-12-10 PROCEDURE — 93971 EXTREMITY STUDY: CPT | Mod: RT

## 2024-12-10 PROCEDURE — 85610 PROTHROMBIN TIME: CPT | Performed by: PHYSICIAN ASSISTANT

## 2024-12-10 PROCEDURE — 1101F PT FALLS ASSESS-DOCD LE1/YR: CPT | Mod: CPTII,GC,, | Performed by: SURGERY

## 2024-12-10 PROCEDURE — 99283 EMERGENCY DEPT VISIT LOW MDM: CPT | Mod: 25

## 2024-12-10 PROCEDURE — 99024 POSTOP FOLLOW-UP VISIT: CPT | Mod: GC,,, | Performed by: SURGERY

## 2024-12-10 PROCEDURE — 85730 THROMBOPLASTIN TIME PARTIAL: CPT | Performed by: PHYSICIAN ASSISTANT

## 2024-12-10 PROCEDURE — 3074F SYST BP LT 130 MM HG: CPT | Mod: CPTII,GC,, | Performed by: SURGERY

## 2024-12-10 PROCEDURE — 3288F FALL RISK ASSESSMENT DOCD: CPT | Mod: CPTII,GC,, | Performed by: SURGERY

## 2024-12-10 NOTE — FIRST PROVIDER EVALUATION
"Medical screening examination initiated.  I have conducted a focused provider triage encounter, findings are as follows:    Brief history of present illness:  84-year-old female presents to ED for evaluation of DVT in her right leg.  Patient had a recent colon resection and went to her outpatient appointment for suture removal.  Patient states she had swelling in her leg and sent to have outpatient ultrasound done.  Positive for DVT in her right leg and sent to ED for further evaluation    Vitals:    12/10/24 1529   BP: 125/68   Pulse: 73   Resp: 18   Temp: 97.8 °F (36.6 °C)   TempSrc: Oral   SpO2: 97%   Weight: 73.5 kg (162 lb)   Height: 5' 6" (1.676 m)       Pertinent physical exam:  Patient awake and alert sitting in wheelchair    Brief workup plan:  Labs    Preliminary workup initiated; this workup will be continued and followed by the physician or advanced practice provider that is assigned to the patient when roomed.  "

## 2024-12-10 NOTE — PROGRESS NOTES
Acute Care Surgery Clinic Note  Date of surgery/procedure: November 26, 2024  Operation/Procedure: Small bowel resection  Surgeon: Dr. Diogenes Velazquez    Subjective:   Joy Donahue is a 84 y.o. female who presents to the clinic for follow-up after small bowel resection.  She states she is doing well.  Tolerating diet with minimal pain, nausea.  Intermittently has abdominal pain but states it does not last long.  Having normal bowel movements.  Denies any fevers, chills.  Has not noticed any drainage from her midline incision.  States yesterday she noticed that around the staples her skin started to become red.      Past medical history:  Past Medical History:   Diagnosis Date    Acute (reversible) ischemia of intestine, part and extent unspecified 08/24/2022    Acute cystitis without hematuria 08/14/2023    Cirrhosis of liver with ascites 02/14/2023    Continue to follow up with hepatologist    COVID     x 2-residual dysgeusia    Hepatitis C     treated/cured previously    Pacemaker     Paroxysmal atrial fibrillation     Pericardial cyst     Skin cancer     Thrombus      Past Medical History:   Diagnosis Date    Acute (reversible) ischemia of intestine, part and extent unspecified 08/24/2022    Acute cystitis without hematuria 08/14/2023    Cirrhosis of liver with ascites 02/14/2023    Continue to follow up with hepatologist    COVID     x 2-residual dysgeusia    Hepatitis C     treated/cured previously    Pacemaker     Paroxysmal atrial fibrillation     Pericardial cyst     Skin cancer     Thrombus      Past surgical history:  Past Surgical History:   Procedure Laterality Date    APPENDECTOMY      CARDIAC PACEMAKER PLACEMENT      cataract surgery      EXCISION, SMALL INTESTINE  11/26/2024    Procedure: EXCISION, SMALL INTESTINE;  Surgeon: Diogenes Velazquez MD;  Location: [facility];  Service: General;;  lysis of adhesions    FOOT SURGERY Left     HYSTERECTOMY      LAPAROSCOPIC CHOLECYSTECTOMY      LAPAROTOMY,  EXPLORATORY N/A 11/26/2024    Procedure: LAPAROTOMY, EXPLORATORY;  Surgeon: Diogenes Velazquez MD;  Location: [facility] OR;  Service: General;  Laterality: N/A;    parathyroid resection      REPEAT ABDOMINAL PARACENTESIS      SKIN CANCER EXCISION      spot removed Left     left ankle spot removed    THROMBECTOMY       Family history:  Family History   Problem Relation Name Age of Onset    Heart disease Mother      Heart disease Father      Hypertension Father      Pacemaker/defibrilator Father      Clotting disorder Sister [place]     Heart failure Brother Anrus     Pacemaker/defibrilator Brother Anrus     No Known Problems Maternal Grandmother      No Known Problems Maternal Grandfather      No Known Problems Paternal Grandmother      No Known Problems Paternal Grandfather      Drug abuse Daughter Rimma     Heart disease Daughter Johnnie         possibly related to covid vaccine    No Known Problems Son Piotr      Social history:  Social History     Socioeconomic History    Marital status:    Tobacco Use    Smoking status: Never    Smokeless tobacco: Never   Substance and Sexual Activity    Alcohol use: Not Currently    Drug use: Never    Sexual activity: Not Currently     Social Drivers of Health     Financial Resource Strain: Patient Declined (11/26/2024)    Overall Financial Resource Strain (CARDIA)     Difficulty of Paying Living Expenses: Patient declined   Food Insecurity: Patient Declined (11/26/2024)    Hunger Vital Sign     Worried About Running Out of Food in the Last Year: Patient declined     Ran Out of Food in the Last Year: Patient declined   Transportation Needs: Patient Declined (11/26/2024)    TRANSPORTATION NEEDS     Transportation : Patient declined   Physical Activity: Sufficiently Active (8/1/2024)    Exercise Vital Sign     Days of Exercise per Week: 7 days     Minutes of Exercise per Session: 60 min   Stress: Patient Declined (11/26/2024)    [employer] -  Occupational Stress Questionnaire     Feeling of Stress : Patient declined   Housing Stability: Patient Declined (11/26/2024)    Housing Stability Vital Sign     Unable to Pay for Housing in the Last Year: Patient declined     Homeless in the Last Year: Patient declined     Social History     Tobacco Use   Smoking Status Never   Smokeless Tobacco Never      Social History     Substance and Sexual Activity   Alcohol Use Not Currently      Allergies:   Review of patient's allergies indicates:   Allergen Reactions    Ciprofloxacin Swelling    Iodine Shortness Of Breath    Latex Itching    Fluoride      Mouth sores    Fluoride preparations Dermatitis     Other reaction(s): Ulcer of mouth     Home Meds:   Current Outpatient Medications   Medication Instructions    gabapentin (NEURONTIN) 100 MG capsule Take 3 capsules (300 mg total) by mouth 3 (three) times daily for 2 days, THEN 2 capsules (200 mg total) 3 (three) times daily for 2 days, THEN 1 capsule (100 mg total) 3 (three) times daily for 2 days, THEN 1 capsule (100 mg total) 2 (two) times daily for 2 days, THEN 1 capsule (100 mg total) Daily for 2 days.    lactulose (CHRONULAC) 20 g, Oral, Daily PRN    nadoloL (CORGARD) 40 mg, Daily    pantoprazole (PROTONIX) 40 mg, Oral, Daily    rifAXIMin (XIFAXAN) 550 mg, Oral, 2 times daily    spironolactone (ALDACTONE) 25 mg, Daily    sucralfate (CARAFATE) 1 gram tablet No dose, route, or frequency recorded.      Current Outpatient Medications on File Prior to Visit   Medication Sig Dispense Refill    gabapentin (NEURONTIN) 100 MG capsule Take 3 capsules (300 mg total) by mouth 3 (three) times daily for 2 days, THEN 2 capsules (200 mg total) 3 (three) times daily for 2 days, THEN 1 capsule (100 mg total) 3 (three) times daily for 2 days, THEN 1 capsule (100 mg total) 2 (two) times daily for 2 days, THEN 1 capsule (100 mg total) Daily for 2 days. 42 capsule 0    rifAXIMin (XIFAXAN) 550 mg Tab Take 1 tablet (550 mg total) by  mouth 2 (two) times daily. 180 tablet 3    spironolactone (ALDACTONE) 25 MG tablet Take 25 mg by mouth once daily.      lactulose (CHRONULAC) 10 gram/15 mL solution Take 30 mLs (20 g total) by mouth daily as needed. (Patient not taking: Reported on 12/10/2024) 1892 mL 11    nadoloL (CORGARD) 40 MG tablet Take 40 mg by mouth once daily. (Patient not taking: Reported on 12/10/2024)      pantoprazole (PROTONIX) 40 MG tablet Take 1 tablet (40 mg total) by mouth once daily. (Patient not taking: Reported on 12/10/2024) 30 tablet 0    sucralfate (CARAFATE) 1 gram tablet  (Patient not taking: Reported on 8/6/2024)       No current facility-administered medications on file prior to visit.      Current Outpatient Medications on File Prior to Visit   Medication Sig    gabapentin (NEURONTIN) 100 MG capsule Take 3 capsules (300 mg total) by mouth 3 (three) times daily for 2 days, THEN 2 capsules (200 mg total) 3 (three) times daily for 2 days, THEN 1 capsule (100 mg total) 3 (three) times daily for 2 days, THEN 1 capsule (100 mg total) 2 (two) times daily for 2 days, THEN 1 capsule (100 mg total) Daily for 2 days.    rifAXIMin (XIFAXAN) 550 mg Tab Take 1 tablet (550 mg total) by mouth 2 (two) times daily.    spironolactone (ALDACTONE) 25 MG tablet Take 25 mg by mouth once daily.    lactulose (CHRONULAC) 10 gram/15 mL solution Take 30 mLs (20 g total) by mouth daily as needed. (Patient not taking: Reported on 12/10/2024)    nadoloL (CORGARD) 40 MG tablet Take 40 mg by mouth once daily. (Patient not taking: Reported on 12/10/2024)    pantoprazole (PROTONIX) 40 MG tablet Take 1 tablet (40 mg total) by mouth once daily. (Patient not taking: Reported on 12/10/2024)    sucralfate (CARAFATE) 1 gram tablet  (Patient not taking: Reported on 8/6/2024)     No current facility-administered medications on file prior to visit.      Outpatient Medications as of 12/10/2024   Medication Sig Dispense Refill    gabapentin (NEURONTIN) 100 MG  "capsule Take 3 capsules (300 mg total) by mouth 3 (three) times daily for 2 days, THEN 2 capsules (200 mg total) 3 (three) times daily for 2 days, THEN 1 capsule (100 mg total) 3 (three) times daily for 2 days, THEN 1 capsule (100 mg total) 2 (two) times daily for 2 days, THEN 1 capsule (100 mg total) Daily for 2 days. 42 capsule 0    rifAXIMin (XIFAXAN) 550 mg Tab Take 1 tablet (550 mg total) by mouth 2 (two) times daily. 180 tablet 3    spironolactone (ALDACTONE) 25 MG tablet Take 25 mg by mouth once daily.      lactulose (CHRONULAC) 10 gram/15 mL solution Take 30 mLs (20 g total) by mouth daily as needed. (Patient not taking: Reported on 12/10/2024) 1892 mL 11    nadoloL (CORGARD) 40 MG tablet Take 40 mg by mouth once daily. (Patient not taking: Reported on 12/10/2024)      pantoprazole (PROTONIX) 40 MG tablet Take 1 tablet (40 mg total) by mouth once daily. (Patient not taking: Reported on 12/10/2024) 30 tablet 0    sucralfate (CARAFATE) 1 gram tablet  (Patient not taking: Reported on 8/6/2024)       No current facility-administered medications on file as of 12/10/2024.        ROS  A 12-point review of systems was performed with pertinent positives and negatives as per HPI     Objective:   Vitals:  Vitals:    12/10/24 1047   BP: 129/84   Pulse: 70      Vitals:    12/10/24 1047   BP: 129/84   Pulse: 70   Weight: 81.6 kg (179 lb 14.3 oz)   Height: 5' 5" (1.651 m)       Imaging:       Physical Exam  General: NAD  HEENT: Normocephalic  CV: RR  Pulm: NLB on RA   Abd: Soft, ND, NTTP, no rebound, no guarding.  Midline staples in place  Extremities: no edema in bilateral lower extremities   Skin:  Incision Clean/Dry/Intact. No evidence of infection.  Some erythema around staples but no fluctuance or induration      Pathology:  FINAL DIAGNOSIS      SMALL BOWEL, SEGMENTAL RESECTION:      A) SECTIONS OF FULL THICKNESS SMALL BOWEL MUCOSA AND SUBMUCOSA WITH MARKED   VASCULAR CONGESTION, DIFFUSE BOWEL WALL HEMORRHAGE AND " ISCHEMIA; NO EVIDENCE OF   MALIGNANCY.      B) PROXIMAL AND DISTAL SURGICAL MARGINS OF RESECTION WITH NO SPECIFIC PATHOLOGIC   CHANGES.   Discussed pathology findings with patient. Answered all questions.       VISIT DIAGNOSES:    ICD-10-CM ICD-9-CM   1. S/P small bowel resection  Z90.49 V45.89       Plan:    - doing well since surgery.  - tolerating diet and able to get around well  - ED precautions given  - Follow up PCP   - Return PRN, no scheduled appointment needed. Call for concerns.    Future Appointments   Date Time Provider Department Center   12/20/2024  1:00 PM [facility]   12/27/2024 10:40 AM PACEMAKER, [facility]          The above findings, diagnostics, and treatment plan were discussed with Dr. Sam Escalera who is in agreement with the plan of care except as stated in additional documentation.      Ty Mike MD  [facility] General Surgery PGY-1

## 2024-12-10 NOTE — OP NOTE
LAPAROTOMY, EXPLORATORY, EXCISION, SMALL INTESTINE  Procedure Note    Joy Donahue  11/26/2024    Pre-op Diagnosis: Volvulus [K56.2]       Post-op Diagnosis: same    Procedure(s):  LAPAROTOMY, EXPLORATORY  EXCISION, SMALL INTESTINE    Surgeon(s):  Diogenes Velazquez MD    Anesthesia: General    Staff:   Circulator: Karuna Garibay RN  Relief Scrub: Tiffany Garcia ST  Scrub Person: Ifeoma Iverson ST; Linda Liu ST    Estimated Blood Loss: minimal               Specimens: small bowel      Drains: None    Operative Technique:  Risks and benefits were discussed with patient and family at bedside, informed consent signed.  Patient was taken to the OR and placed supine, endotracheal intubation was successful.  The abdomen was then prepped and draped in sterile fashion.  A time out was completed to confirm correct patient, procedure, and all staff was in agreement.      Using a 10 blade a generous midline incision was made.  This was carried down through the subcutaneous tissue.  The abdomen was entered sharply with Metzenbaum scissors with no obvious injuries from entry.  A self retaining retractor was placed and the abdomen was then explored.  The transverse mesocolon was retracted cephalad and the ligament of Treitz was identified.  The small bowel was then run distally to the terminal ileum.  There was a segment of small bowel with concern for a volvulus.  This area was reduced.  The colon was then inspected from the ascending colon, across the transverse colon, down the descending colon to the sigmoid and upper rectum.  It was noted to be free of injury.  Attention returned to the area of concern in the small bowel.  It remained with ischemic changes and required resection.  This was completed with blue load fires of the MARY stapler.  The abdomen was then irrigated with a copious amount of warm sterile saline.  Attention returned to the staple lines which appeared well perfused.  A stapled side to side  functional end to end anastomosis was completed.  Hemostasis was assured.  Midline fascia was then closed with running PDS and skin closed with staples.  Patient tolerated procedure and was transferred to recovery in stable condition.      SURGEON:  Diogenes Velazquez MD    Date: 11/26/2024  Time: 03:07 PM

## 2024-12-11 NOTE — DISCHARGE INSTRUCTIONS
Thanks for letting use take care of you today! It is our goal to give you courteous care and to keep you comfortable and informed. If you have any questions before you leave I will be happy to try and answer them.     Advice after your visit:  Your visit in the emergency department is NOT definitive care - please follow-up with your primary care doctor and/or specialist within 1-2 days. If you do not have a primary care physician call [phone] to schedule an appointment. Please return if you have any worsening in your condition or if you have any other concerns.    Return to the emergency department if any worsening symptoms including fever, chest pain, difficulty breathing, weakness, numbness, tingling, nausea, vomiting, inability to eat, drink or take your medication, or any other new symptoms or concerns arise.      Please signup for MyChart as noted below in your paperwork to review all labwork, imaging results, and any other incidental findings from today's visit.     If you had radiology exams like an XRAY or CT in the emergency Department the interpreation on them may be preliminary - there may be less time sensitive findings on the reports please obtain these reports within 24 hours from the hospital or by using your out on your mobile phone to access records.  Bring these to your primary care doctor and/or specialist for further review of incidental findings.    Please review any LAB WORK from your visit today with your primary care physician.    If you were prescribed OPIATE PAIN MEDICATION - please understand of these medications can be addictive, you may fill less of the prescription was written for, you do not have to take the full prescription.  You may discard what you do not use.  Please seek help if you feel you are having problems with addiction.  Do not drive or operate heavy machinery if you are taking sedating medications.  Do not mix these medications with alcohol.      If you had a SPLINT  placed in the emergency department if you have severe pain numbness tingling or discoloration of year digits please remove the splint and return to the emergency department for further evaluation as this may represent a sign of compromise to the nerves or blood vessels due to swelling.    If you had SUTURES in the emergency department please have them removed in the prescribed time frame typically within 7-14 days.  You may shower but please do not bathe or swim.  Keep the wounds clean and dry and covered with a clean dressing.  Please return if he have any signs of infection like redness or drainage or pain at the suture site.    Please take the full course of  any ANTIBIOTICS you were prescribed - incomplete courses of antibiotics can cause resistance to antibiotics in the future which will make it difficult to treat any infections you may have.

## 2024-12-11 NOTE — ED PROVIDER NOTES
Encounter Date: 12/10/2024    SCRIBE #1 NOTE: I, Esperanza Sanchez, am scribing for, and in the presence of,  Binh Davis IV, MD. I have scribed the following portions of the note - Other sections scribed: HPI, ROS, PE.       History     Chief Complaint   Patient presents with    Leg Pain     Pt to ED via POV. Pt endorses R leg pain and swelling. States that she had an outpatient test done that showed a blood clot.      Patient is an 84-year-old female with a history of paroxysmal A-fib s/p pacemaker placement presenting to the ED with c/o right lower extremity pain. The pt reports having surgery on 11/26 for which she was discharged from the hospital a week ago. She notes that after discharge, she began developing edema and pain to the back of the right lower extremity. She reports seeing her primary care doctor Dr. Harris earlier today who recommended that she present to the ED due to concern for a DVT.    The history is provided by the patient. No  was used.     Review of patient's allergies indicates:   Allergen Reactions    Ciprofloxacin Swelling    Iodine Shortness Of Breath    Latex Itching    Fluoride      Mouth sores    Fluoride preparations Dermatitis     Other reaction(s): Ulcer of mouth     Past Medical History:   Diagnosis Date    Acute (reversible) ischemia of intestine, part and extent unspecified 08/24/2022    Acute cystitis without hematuria 08/14/2023    Cirrhosis of liver with ascites 02/14/2023    Continue to follow up with hepatologist    COVID     x 2-residual dysgeusia    Hepatitis C     treated/cured previously    Pacemaker     Paroxysmal atrial fibrillation     Pericardial cyst     Skin cancer     Thrombus      Past Surgical History:   Procedure Laterality Date    APPENDECTOMY      CARDIAC PACEMAKER PLACEMENT      cataract surgery      EXCISION, SMALL INTESTINE  11/26/2024    Procedure: EXCISION, SMALL INTESTINE;  Surgeon: Diogenes Velazquez MD;  Location: [facility];  Service:  General;;  lysis of adhesions    FOOT SURGERY Left     HYSTERECTOMY      LAPAROSCOPIC CHOLECYSTECTOMY      LAPAROTOMY, EXPLORATORY N/A 11/26/2024    Procedure: LAPAROTOMY, EXPLORATORY;  Surgeon: Diogenes Velazquez MD;  Location: [facility] OR;  Service: General;  Laterality: N/A;    parathyroid resection      REPEAT ABDOMINAL PARACENTESIS      SKIN CANCER EXCISION      spot removed Left     left ankle spot removed    THROMBECTOMY       Family History   Problem Relation Name Age of Onset    Heart disease Mother      Heart disease Father      Hypertension Father      Pacemaker/defibrilator Father      Clotting disorder Sister Elizabeth     Heart failure Brother Anrus     Pacemaker/defibrilator Brother Anrus     No Known Problems Maternal Grandmother      No Known Problems Maternal Grandfather      No Known Problems Paternal Grandmother      No Known Problems Paternal Grandfather      Drug abuse Daughter Rimma     Heart disease Daughter Johnnie         possibly related to covid vaccine    No Known Problems Son Piotr      Social History     Tobacco Use    Smoking status: Never    Smokeless tobacco: Never   Substance Use Topics    Alcohol use: Not Currently    Drug use: Never     Review of Systems   Constitutional:  Negative for chills and fever.   HENT:  Negative for congestion, rhinorrhea and sore throat.    Eyes:  Negative for visual disturbance.   Respiratory:  Negative for cough and shortness of breath.    Cardiovascular:  Negative for chest pain and leg swelling.   Gastrointestinal:  Negative for abdominal pain, nausea and vomiting.   Genitourinary:  Negative for dysuria, hematuria, vaginal bleeding and vaginal discharge.   Musculoskeletal:  Negative for joint swelling.        Pain and edema to the right lower extremity.   Skin:  Negative for rash.   Neurological:  Negative for weakness.   Psychiatric/Behavioral:  Negative for confusion.        Physical Exam     Initial Vitals [12/10/24 1529]   BP Pulse Resp Temp SpO2   125/68  73 18 97.8 °F (36.6 °C) 97 %      MAP       --         Physical Exam    Nursing note and vitals reviewed.  Constitutional: She is not diaphoretic. No distress.   HENT:   Head: Normocephalic and atraumatic.   Neck: Neck supple.   Normal range of motion.  Cardiovascular:  Normal rate and regular rhythm.           No murmur heard.  Pulses:       Dorsalis pedis pulses are 2+ on the right side.   Pulmonary/Chest: Breath sounds normal. No respiratory distress.   Abdominal: Abdomen is soft. She exhibits no distension. There is no abdominal tenderness.   Musculoskeletal:      Cervical back: Normal range of motion and neck supple.      Comments: Right lower extremity is subtly swollen. There is a brisk capillary refill. 2+ DP pulses, warm and well perfused      Neurological: She is alert and oriented to person, place, and time. She has normal strength. No cranial nerve deficit or sensory deficit.   Skin: Skin is warm. Capillary refill takes less than 2 seconds.   Psychiatric: She has a normal mood and affect.         ED Course   Procedures  Labs Reviewed   PROTIME-INR - Abnormal       Result Value    PT 15.4 (*)     INR 1.2     COMPREHENSIVE METABOLIC PANEL - Abnormal    Sodium 137      Potassium 3.9      Chloride 106      CO2 27      Glucose 88      Blood Urea Nitrogen 18.2      Creatinine 0.70      Calcium 8.3 (*)     Protein Total 6.2      Albumin 3.1 (*)     Globulin 3.1      Albumin/Globulin Ratio 1.0 (*)     Bilirubin Total 1.7 (*)           ALT 20      AST 28      eGFR >60      Anion Gap 4.0      BUN/Creatinine Ratio 26     CBC WITH DIFFERENTIAL - Abnormal    WBC 8.14      RBC 3.68 (*)     Hgb 11.1 (*)     Hct 34.9 (*)     MCV 94.8 (*)     MCH 30.2      MCHC 31.8 (*)     RDW 16.2      Platelet 57 (*)     MPV 10.5 (*)     Neut % 77.7      Lymph % 8.7      Mono % 10.9      Eos % 0.7      Basophil % 0.2      Lymph # 0.71      Neut # 6.31      Mono # 0.89      Eos # 0.06      Baso # 0.02      IG# 0.15 (*)     IG%  1.8      NRBC% 0.0      IPF 6.2     APTT - Normal    PTT 26.0     CBC W/ AUTO DIFFERENTIAL    Narrative:     The following orders were created for panel order CBC auto differential.  Procedure                               Abnormality         Status                     ---------                               -----------         ------                     CBC with Differential[[id number]]       Abnormal            Final result                 Please view results for these tests on the individual orders.          Imaging Results    None          Medications - No data to display  Medical Decision Making  85 yo presnting with uncomplicated provoked DVT of RLE  No signs of occlusive or other complications  Good candidate for outpatient treatment with elaquis, close PCP follow up   Will discharge, return precautions given     Differential diagnosis include but are not limited to:   Dvt, celluitis, strain, sprain      Problems Addressed:  Acute deep vein thrombosis (DVT) of proximal vein of right lower extremity: acute illness or injury that poses a threat to life or bodily functions  Right leg pain: acute illness or injury that poses a threat to life or bodily functions    Amount and/or Complexity of Data Reviewed  Labs: ordered.  Radiology: ordered and independent interpretation performed.    Risk  OTC drugs.  Prescription drug management.            Scribe Attestation:   Scribe #1: I performed the above scribed service and the documentation accurately describes the services I performed. I attest to the accuracy of the note.    Attending Attestation:           Physician Attestation for Scribe:  Physician Attestation Statement for Scribe #1: I, Binh Davis IV, MD, reviewed documentation, as scribed by Esperanza Sanchez in my presence, and it is both accurate and complete.             ED Course as of 12/11/24 0330   Tue Dec 10, 2024   1935 Ok for anticoagulation for DVT per surgery from their standpoint  [AC]   1937 Patient  with right leg pain had a surgery a month ago - has a right lower extremity DVT nonocclusive, has good perfusion minimal swelling on exam.  She is candidate for outpatient anticoagulation no contraindications surgery cleared for anticoagulation as well from their standpoint [AC]      ED Course User Index  [AC] Binh Davis IV, MD                           Clinical Impression:  Final diagnoses:  [M79.604] Right leg pain  [I82.4Y1] Acute deep vein thrombosis (DVT) of proximal vein of right lower extremity (Primary)          ED Disposition Condition    Discharge Stable          ED Prescriptions       Medication Sig Dispense Start Date End Date Auth. Provider    apixaban (ELIQUIS) 5 mg Tab Take 2 tablets (10 mg total) by mouth 2 (two) times daily for 7 days, THEN 1 tablet (5 mg total) 2 (two) times daily for 23 days. 74 tablet 12/10/2024 1/9/2025 Binh Davis IV, MD          Follow-up Information       Follow up With Specialties Details Why Contact Info    Monae Nazario MD Family Medicine Schedule an appointment as soon as possible for a visit   [address] [place] [id number]  [phone]      [facility] - Emergency Dept Emergency Medicine Go to  If symptoms worsen [address] [facility] [address] [phone]    Primary care physician  Schedule an appointment as soon as possible for a visit   Follow up with you primary care physician.   If you do not have a primary care physician call [phone] to schedule an appointment.             Binh Davis IV, MD  12/11/24 0330

## 2024-12-12 ENCOUNTER — TELEPHONE (OUTPATIENT)
Dept: PRIMARY CARE CLINIC | Facility: CLINIC | Age: 84
End: 2024-12-12
Payer: MEDICARE

## 2024-12-12 ENCOUNTER — HOSPITAL ENCOUNTER (INPATIENT)
Facility: HOSPITAL | Age: 84
LOS: 4 days | Discharge: HOME HEALTH | DRG: 948 | End: 2024-12-16
Attending: EMERGENCY MEDICINE | Admitting: INTERNAL MEDICINE
Payer: MEDICARE

## 2024-12-12 ENCOUNTER — TELEPHONE (OUTPATIENT)
Dept: SURGERY | Facility: CLINIC | Age: 84
End: 2024-12-12
Payer: MEDICARE

## 2024-12-12 ENCOUNTER — PATIENT MESSAGE (OUTPATIENT)
Dept: ELECTROPHYSIOLOGY | Facility: CLINIC | Age: 84
End: 2024-12-12
Payer: MEDICARE

## 2024-12-12 DIAGNOSIS — R10.31 RLQ ABDOMINAL PAIN: ICD-10-CM

## 2024-12-12 DIAGNOSIS — K74.60 CIRRHOSIS OF LIVER WITH ASCITES, UNSPECIFIED HEPATIC CIRRHOSIS TYPE: ICD-10-CM

## 2024-12-12 DIAGNOSIS — R94.30 ABNORMAL RESULT OF CARDIOVASCULAR FUNCTION STUDY: ICD-10-CM

## 2024-12-12 DIAGNOSIS — R18.8 CIRRHOSIS OF LIVER WITH ASCITES, UNSPECIFIED HEPATIC CIRRHOSIS TYPE: ICD-10-CM

## 2024-12-12 DIAGNOSIS — N39.0 URINARY TRACT INFECTION WITHOUT HEMATURIA, SITE UNSPECIFIED: ICD-10-CM

## 2024-12-12 DIAGNOSIS — I82.401 RIGHT LEG DVT: Primary | ICD-10-CM

## 2024-12-12 LAB
ALBUMIN SERPL-MCNC: 2.9 G/DL (ref 3.4–4.8)
ALBUMIN/GLOB SERPL: 1 RATIO (ref 1.1–2)
ALP SERPL-CCNC: 125 UNIT/L (ref 40–150)
ALT SERPL-CCNC: 19 UNIT/L (ref 0–55)
ANION GAP SERPL CALC-SCNC: 4 MEQ/L
AST SERPL-CCNC: 27 UNIT/L (ref 5–34)
BACTERIA #/AREA URNS AUTO: ABNORMAL /HPF
BASOPHILS # BLD AUTO: 0.02 X10(3)/MCL
BASOPHILS NFR BLD AUTO: 0.3 %
BILIRUB SERPL-MCNC: 1.6 MG/DL
BILIRUB UR QL STRIP.AUTO: NEGATIVE
BUN SERPL-MCNC: 13 MG/DL (ref 9.8–20.1)
CALCIUM SERPL-MCNC: 9.2 MG/DL (ref 8.4–10.2)
CHLORIDE SERPL-SCNC: 108 MMOL/L (ref 98–107)
CLARITY UR: ABNORMAL
CO2 SERPL-SCNC: 27 MMOL/L (ref 23–31)
COLOR UR AUTO: YELLOW
CREAT SERPL-MCNC: 0.75 MG/DL (ref 0.55–1.02)
CREAT/UREA NIT SERPL: 17
EOSINOPHIL # BLD AUTO: 0.06 X10(3)/MCL (ref 0–0.9)
EOSINOPHIL NFR BLD AUTO: 1 %
ERYTHROCYTE [DISTWIDTH] IN BLOOD BY AUTOMATED COUNT: 16.2 % (ref 11.5–17)
GFR SERPLBLD CREATININE-BSD FMLA CKD-EPI: >60 ML/MIN/1.73/M2
GLOBULIN SER-MCNC: 3 GM/DL (ref 2.4–3.5)
GLUCOSE SERPL-MCNC: 95 MG/DL (ref 82–115)
GLUCOSE UR QL STRIP: NORMAL
HCT VFR BLD AUTO: 35.9 % (ref 37–47)
HGB BLD-MCNC: 11.4 G/DL (ref 12–16)
HGB UR QL STRIP: NEGATIVE
IMM GRANULOCYTES # BLD AUTO: 0.1 X10(3)/MCL (ref 0–0.04)
IMM GRANULOCYTES NFR BLD AUTO: 1.7 %
KETONES UR QL STRIP: NEGATIVE
LACTATE SERPL-SCNC: 1.7 MMOL/L (ref 0.5–2.2)
LEUKOCYTE ESTERASE UR QL STRIP: 250
LIPASE SERPL-CCNC: 42 U/L
LYMPHOCYTES # BLD AUTO: 0.56 X10(3)/MCL (ref 0.6–4.6)
LYMPHOCYTES NFR BLD AUTO: 9.5 %
MCH RBC QN AUTO: 30.2 PG (ref 27–31)
MCHC RBC AUTO-ENTMCNC: 31.8 G/DL (ref 33–36)
MCV RBC AUTO: 95.2 FL (ref 80–94)
MONOCYTES # BLD AUTO: 0.85 X10(3)/MCL (ref 0.1–1.3)
MONOCYTES NFR BLD AUTO: 14.4 %
MUCOUS THREADS URNS QL MICRO: ABNORMAL /LPF
NEUTROPHILS # BLD AUTO: 4.31 X10(3)/MCL (ref 2.1–9.2)
NEUTROPHILS NFR BLD AUTO: 73.1 %
NITRITE UR QL STRIP: NEGATIVE
NRBC BLD AUTO-RTO: 0 %
PH UR STRIP: 5.5 [PH]
PLATELET # BLD AUTO: 55 X10(3)/MCL (ref 130–400)
PLATELETS.RETICULATED NFR BLD AUTO: 5.1 % (ref 0.9–11.2)
PMV BLD AUTO: 10.6 FL (ref 7.4–10.4)
POTASSIUM SERPL-SCNC: 4 MMOL/L (ref 3.5–5.1)
PROT SERPL-MCNC: 5.9 GM/DL (ref 5.8–7.6)
PROT UR QL STRIP: ABNORMAL
RBC # BLD AUTO: 3.77 X10(6)/MCL (ref 4.2–5.4)
RBC #/AREA URNS AUTO: ABNORMAL /HPF
SODIUM SERPL-SCNC: 139 MMOL/L (ref 136–145)
SP GR UR STRIP.AUTO: 1.02 (ref 1–1.03)
SQUAMOUS #/AREA URNS LPF: ABNORMAL /HPF
UROBILINOGEN UR STRIP-ACNC: 2
WBC # BLD AUTO: 5.9 X10(3)/MCL (ref 4.5–11.5)
WBC #/AREA URNS AUTO: ABNORMAL /HPF

## 2024-12-12 PROCEDURE — 25500020 PHARM REV CODE 255

## 2024-12-12 PROCEDURE — 83690 ASSAY OF LIPASE: CPT | Performed by: NURSE PRACTITIONER

## 2024-12-12 PROCEDURE — 63600175 PHARM REV CODE 636 W HCPCS: Performed by: INTERNAL MEDICINE

## 2024-12-12 PROCEDURE — 83605 ASSAY OF LACTIC ACID: CPT

## 2024-12-12 PROCEDURE — 96374 THER/PROPH/DIAG INJ IV PUSH: CPT

## 2024-12-12 PROCEDURE — 85025 COMPLETE CBC W/AUTO DIFF WBC: CPT | Performed by: NURSE PRACTITIONER

## 2024-12-12 PROCEDURE — 81001 URINALYSIS AUTO W/SCOPE: CPT | Performed by: NURSE PRACTITIONER

## 2024-12-12 PROCEDURE — 96375 TX/PRO/DX INJ NEW DRUG ADDON: CPT

## 2024-12-12 PROCEDURE — 11000001 HC ACUTE MED/SURG PRIVATE ROOM

## 2024-12-12 PROCEDURE — 96361 HYDRATE IV INFUSION ADD-ON: CPT

## 2024-12-12 PROCEDURE — 87086 URINE CULTURE/COLONY COUNT: CPT | Performed by: NURSE PRACTITIONER

## 2024-12-12 PROCEDURE — 63600175 PHARM REV CODE 636 W HCPCS

## 2024-12-12 PROCEDURE — 99285 EMERGENCY DEPT VISIT HI MDM: CPT | Mod: 25

## 2024-12-12 PROCEDURE — 80053 COMPREHEN METABOLIC PANEL: CPT | Performed by: NURSE PRACTITIONER

## 2024-12-12 RX ORDER — CEFTRIAXONE 1 G/1
1 INJECTION, POWDER, FOR SOLUTION INTRAMUSCULAR; INTRAVENOUS
Status: COMPLETED | OUTPATIENT
Start: 2024-12-12 | End: 2024-12-12

## 2024-12-12 RX ORDER — ENOXAPARIN SODIUM 100 MG/ML
1 INJECTION SUBCUTANEOUS
Status: DISCONTINUED | OUTPATIENT
Start: 2024-12-12 | End: 2024-12-14

## 2024-12-12 RX ORDER — HYDROCODONE BITARTRATE AND ACETAMINOPHEN 5; 325 MG/1; MG/1
1 TABLET ORAL EVERY 6 HOURS PRN
Status: DISCONTINUED | OUTPATIENT
Start: 2024-12-13 | End: 2024-12-15

## 2024-12-12 RX ORDER — DIPHENHYDRAMINE HYDROCHLORIDE 50 MG/ML
25 INJECTION INTRAMUSCULAR; INTRAVENOUS
Status: COMPLETED | OUTPATIENT
Start: 2024-12-12 | End: 2024-12-12

## 2024-12-12 RX ORDER — METHYLPREDNISOLONE SOD SUCC 125 MG
125 VIAL (EA) INJECTION
Status: COMPLETED | OUTPATIENT
Start: 2024-12-12 | End: 2024-12-12

## 2024-12-12 RX ADMIN — ENOXAPARIN SODIUM 70 MG: 80 INJECTION SUBCUTANEOUS at 11:12

## 2024-12-12 RX ADMIN — DIPHENHYDRAMINE HYDROCHLORIDE 25 MG: 50 INJECTION INTRAMUSCULAR; INTRAVENOUS at 05:12

## 2024-12-12 RX ADMIN — HYDROCODONE BITARTRATE AND ACETAMINOPHEN 1 TABLET: 5; 325 TABLET ORAL at 11:12

## 2024-12-12 RX ADMIN — SODIUM CHLORIDE, POTASSIUM CHLORIDE, SODIUM LACTATE AND CALCIUM CHLORIDE 1000 ML: 600; 310; 30; 20 INJECTION, SOLUTION INTRAVENOUS at 05:12

## 2024-12-12 RX ADMIN — CEFTRIAXONE SODIUM 1 G: 1 INJECTION, POWDER, FOR SOLUTION INTRAMUSCULAR; INTRAVENOUS at 07:12

## 2024-12-12 RX ADMIN — IOHEXOL 100 ML: 350 INJECTION, SOLUTION INTRAVENOUS at 06:12

## 2024-12-12 RX ADMIN — METHYLPREDNISOLONE SODIUM SUCCINATE 125 MG: 125 INJECTION, POWDER, FOR SOLUTION INTRAMUSCULAR; INTRAVENOUS at 05:12

## 2024-12-12 NOTE — TELEPHONE ENCOUNTER
----- Message from Miya sent at 12/11/2024  4:26 PM CST -----  Contact: [phone]  Patient called, would like to get a call back from CAYETANO Silverio about patient health. Patient want to let her know that she will be having a major surgery on 12/20/24 and want her to check on her records from the past days, and future appointments (Patient est care with NP on 08/02/24).   Goal Outcome Evaluation:              Outcome Evaluation: Pt did not report shortness of breath to this RN for this shift, pt is on 2L of O2. Pt got IV antibiotics as ordered, PRN pain medication and vitals were stable at this time. Pt is anticipated to return to [facility] today if labs are stable.

## 2024-12-12 NOTE — Clinical Note
Yaya, RN, patient's nurse, states that patient can not return to [facility] due to patient having current procedure. ANDREW Viveros spoke with supervision. Patient will be returning to [facility]. Nurse will receive bedside report.

## 2024-12-12 NOTE — ED PROVIDER NOTES
Encounter Date: 12/12/2024       History     Chief Complaint   Patient presents with    Abdominal Pain     Pt c/o RLQ abd pain since lastnight. Denies N/V or urinary changes. Reports chills. LBM today. Pt also reports told by PCP has  DVT to RLE.     The patient is a 84 y.o. female with a history of atrial fibrillation and hepatitis C who presents to the Emergency Department with a chief complaint of RLQ abdominal pain. Patient reports sudden onset of RLQ abdominal pain today.  Patient underwent small bowel resection with Dr. Velazquez on 11/26/24 due to ischemic bowel. Symptoms began today and have been constant since onset. Her pain is currently rated as a 7/10 in severity and described as aching with no radiation. Associated symptoms include nothing. Symptoms are aggravated with movement and there are no alleviating factors. The patient denies nausea or vomiting. Her last bowel movement was today and was normal. She reports taking nothing prior to arrival with no relief of symptoms. No other reported symptoms at this time.      The history is provided by the patient. No  was used.   Abdominal Pain  The current episode started today. The onset of the illness was abrupt. The problem has not changed since onset.The abdominal pain is located in the RLQ. The abdominal pain does not radiate. The severity of the abdominal pain is 7/10. The abdominal pain is relieved by nothing. The other symptoms of the illness do not include fever, shortness of breath, nausea, vomiting, diarrhea or dysuria.   The patient has not had a change in bowel habit. Symptoms associated with the illness do not include chills, hematuria or back pain.     Review of patient's allergies indicates:   Allergen Reactions    Ciprofloxacin Swelling    Iodine Shortness Of Breath    Latex Itching    Fluoride      Mouth sores    Fluoride preparations Dermatitis     Other reaction(s): Ulcer of mouth     Past Medical History:   Diagnosis Date     Acute (reversible) ischemia of intestine, part and extent unspecified 08/24/2022    Acute cystitis without hematuria 08/14/2023    Cirrhosis of liver with ascites 02/14/2023    Continue to follow up with hepatologist    COVID     x 2-residual dysgeusia    Hepatitis C     treated/cured previously    Pacemaker     Paroxysmal atrial fibrillation     Pericardial cyst     Skin cancer     Thrombus      Past Surgical History:   Procedure Laterality Date    APPENDECTOMY      CARDIAC PACEMAKER PLACEMENT      cataract surgery      EXCISION, SMALL INTESTINE  11/26/2024    Procedure: EXCISION, SMALL INTESTINE;  Surgeon: Diogenes Velazquez MD;  Location: [facility] OR;  Service: General;;  lysis of adhesions    FOOT SURGERY Left     HYSTERECTOMY      LAPAROSCOPIC CHOLECYSTECTOMY      LAPAROTOMY, EXPLORATORY N/A 11/26/2024    Procedure: LAPAROTOMY, EXPLORATORY;  Surgeon: Diogenes Velazquez MD;  Location: [facility] OR;  Service: General;  Laterality: N/A;    parathyroid resection      REPEAT ABDOMINAL PARACENTESIS      SKIN CANCER EXCISION      spot removed Left     left ankle spot removed    THROMBECTOMY       Family History   Problem Relation Name Age of Onset    Heart disease Mother      Heart disease Father      Hypertension Father      Pacemaker/defibrilator Father      Clotting disorder Sister [place]     Heart failure Brother Anrus     Pacemaker/defibrilator Brother Anrus     No Known Problems Maternal Grandmother      No Known Problems Maternal Grandfather      No Known Problems Paternal Grandmother      No Known Problems Paternal Grandfather      Drug abuse Daughter Rimma     Heart disease Daughter Johnnie         possibly related to covid vaccine    No Known Problems Son Piotr      Social History     Tobacco Use    Smoking status: Never    Smokeless tobacco: Never   Substance Use Topics    Alcohol use: Not Currently    Drug use: Never     Review of Systems   Constitutional:  Negative for chills and fever.   HENT:  Negative for congestion,  rhinorrhea and sore throat.    Respiratory:  Negative for shortness of breath.    Cardiovascular:  Negative for chest pain.   Gastrointestinal:  Positive for abdominal pain. Negative for diarrhea, nausea and vomiting.   Genitourinary:  Negative for dysuria and hematuria.   Musculoskeletal:  Negative for back pain.   Skin:  Negative for rash.   Neurological:  Negative for weakness.   Hematological:  Does not bruise/bleed easily.   All other systems reviewed and are negative.      Physical Exam     Initial Vitals [12/12/24 1553]   BP Pulse Resp Temp SpO2   136/74 70 18 97.6 °F (36.4 °C) 100 %      MAP       --         Physical Exam    Nursing note and vitals reviewed.  Constitutional: She appears well-developed and well-nourished.   HENT:   Head: Normocephalic.   Right Ear: Hearing and tympanic membrane normal.   Left Ear: Hearing and tympanic membrane normal. Mouth/Throat: Uvula is midline, oropharynx is clear and moist and mucous membranes are normal.   Eyes: Conjunctivae and EOM are normal. Pupils are equal, round, and reactive to light.   Cardiovascular:  Normal rate, regular rhythm, normal heart sounds and normal pulses.           Pulmonary/Chest: Effort normal and breath sounds normal.   Abdominal: Abdomen is soft. Bowel sounds are normal. A surgical scar is present. There is abdominal tenderness in the right lower quadrant.   Well appearing, healing midline surgical scar      Lymphadenopathy:     She has no cervical adenopathy.   Neurological: She is alert. GCS eye subscore is 4. GCS verbal subscore is 5. GCS motor subscore is 6.   Skin: Skin is warm, dry and intact. Capillary refill takes less than 2 seconds.         ED Course   Procedures  Labs Reviewed   COMPREHENSIVE METABOLIC PANEL - Abnormal       Result Value    Sodium 139      Potassium 4.0      Chloride 108 (*)     CO2 27      Glucose 95      Blood Urea Nitrogen 13.0      Creatinine 0.75      Calcium 9.2      Protein Total 5.9      Albumin 2.9 (*)      Globulin 3.0      Albumin/Globulin Ratio 1.0 (*)     Bilirubin Total 1.6 (*)           ALT 19      AST 27      eGFR >60      Anion Gap 4.0      BUN/Creatinine Ratio 17     URINALYSIS, REFLEX TO URINE CULTURE - Abnormal    Color, UA Yellow      Appearance, UA Turbid (*)     Specific Gravity, UA 1.022      pH, UA 5.5      Protein, UA Trace (*)     Glucose, UA Normal      Ketones, UA Negative      Blood, UA Negative      Bilirubin, UA Negative      Urobilinogen, UA 2.0 (*)     Nitrites, UA Negative      Leukocyte Esterase,  (*)     RBC, UA 0-5      WBC, UA 11-20 (*)     Bacteria, UA Few (*)     Squamous Epithelial Cells, UA Few (*)     Mucous, UA Moderate (*)    CBC WITH DIFFERENTIAL - Abnormal    WBC 5.90      RBC 3.77 (*)     Hgb 11.4 (*)     Hct 35.9 (*)     MCV 95.2 (*)     MCH 30.2      MCHC 31.8 (*)     RDW 16.2      Platelet 55 (*)     MPV 10.6 (*)     Neut % 73.1      Lymph % 9.5      Mono % 14.4      Eos % 1.0      Basophil % 0.3      Lymph # 0.56 (*)     Neut # 4.31      Mono # 0.85      Eos # 0.06      Baso # 0.02      IG# 0.10 (*)     IG% 1.7      NRBC% 0.0      IPF 5.1     LIPASE - Normal    Lipase Level 42     LACTIC ACID, PLASMA - Normal    Lactic Acid Level 1.7     CULTURE, URINE   CBC W/ AUTO DIFFERENTIAL    Narrative:     The following orders were created for panel order CBC Auto Differential.  Procedure                               Abnormality         Status                     ---------                               -----------         ------                     CBC with Differential[[id number]]       Abnormal            Final result                 Please view results for these tests on the individual orders.          Imaging Results              CT Abdomen Pelvis With IV Contrast NO Oral Contrast (Final result)  Result time 12/12/24 18:16:08      Final result by Tal Kirk MD (12/12/24 18:16:08)                   Impression:      1. Moderate ascites.  2. No bowel obstruction or  perforation evident.      Electronically signed by: Tal Kirk  Date:    12/12/2024  Time:    18:16               Narrative:    EXAMINATION:  CT ABDOMEN PELVIS WITH IV CONTRAST    CLINICAL HISTORY:  Abdominal abscess/infection suspected;RLQ abdominal pain (Age >= 14y);Severe RLQ abdominal pain, recent small bowel resection;    TECHNIQUE:  Helical acquisition through the abdomen and pelvis with IV contrast.  Three plane reconstructions were provided for review.  mGycm. Automatic exposure control, adjustment of mA/kV or iterative reconstruction technique was used to reduce radiation.    COMPARISON:  25 November 2024    FINDINGS:  Mild atelectasis or scarring at the lung bases.  Small hiatal hernia.    Stable appearance of the solid abdominal organs.  The gallbladder is surgically absent.    New suture line left upper quadrant small bowel loops.  No free air is seen.  There is moderate abdominopelvic free fluid.  There is colonic diverticulosis.    Urinary bladder unremarkable.  Abdominal aorta normal in caliber.  Moderate atherosclerotic disease.    There are no acute osseous findings.                                       Medications   diphenhydrAMINE injection 25 mg (25 mg Intravenous Given 12/12/24 1748)   methylPREDNISolone sodium succinate injection 125 mg (125 mg Intravenous Given 12/12/24 1748)   lactated ringers bolus 1,000 mL (0 mLs Intravenous Stopped 12/12/24 1848)   iohexoL (OMNIPAQUE 350) injection 100 mL (100 mLs Intravenous Given 12/12/24 1809)   cefTRIAXone injection 1 g (1 g Intravenous Given 12/12/24 1958)     Medical Decision Making  The patient is a 84 y.o. female with a history of atrial fibrillation and hepatitis C who presents to the Emergency Department with a chief complaint of RLQ abdominal pain. Patient reports sudden onset of RLQ abdominal pain today.  Patient underwent small bowel resection with Dr. Velazquez on 11/26/24 due to ischemic bowel. Symptoms began today and have been constant  since onset. Her pain is currently rated as a 7/10 in severity and described as aching with no radiation. Associated symptoms include nothing. Symptoms are aggravated with movement and there are no alleviating factors. The patient denies nausea or vomiting. She reports taking nothing prior to arrival with no relief of symptoms. No other reported symptoms at this time.     Judging by the patient's chief complaint and pertinent history, the patient has the following possible differential diagnoses, including but not limited to the following.  Some of these are deemed to be lower likelihood and some more likely based on my physical exam and history combined with possible lab work and/or imaging studies.   Please see the pertinent studies, and refer to the HPI.  Some of these diagnoses will take further evaluation to fully rule out, perhaps as an outpatient and the patient was encouraged to follow up when discharged for more comprehensive evaluation.    appendicitis, biliary disease, diverticulitis,  AAA, ACS, mesenteric ischemia, intraabdominal abcess, retroperitoneal abcess, gastritis, gastroenteritis, hepatitis, hernia, pancreatitis, inflammatory bowel disease, PUD, SBP, nephrolithiasis, DKA, sickle cell crisis, consitpation, GERD, IBS     Problems Addressed:  RLQ abdominal pain: acute illness or injury  Urinary tract infection without hematuria, site unspecified: acute illness or injury    Amount and/or Complexity of Data Reviewed  Radiology: ordered. Decision-making details documented in ED Course.  Discussion of management or test interpretation with external provider(s): Discussed with ED attending, Dr. Donaldson, she had face to face encounter with patient.     Discussed with Dr. Skinner with surgery. Recommends admission to medicine for UTI and surgery will follow in consult    Discussed with CAYETANO Whitmore with hospital medicine who accepts admission for Dr. Herron     Risk  Prescription drug management.  Decision  regarding hospitalization.               ED Course as of 12/12/24 2122   Thu Dec 12, 2024   1750 Chart review reveals patient underwent exploratory laparotomy on 11/26/24. Patient was found to have ischemic small bowel and underwent small bowel resection with Dr. Velazquez [LM]   1824 CT Abdomen Pelvis With IV Contrast NO Oral Contrast    Impression:     1. Moderate ascites.  2. No bowel obstruction or perforation evident.      [LM]   1824 General surgery paged.  [LM]   1831 Discussed with general surgery resident who will come down and evaluate patient.  [LM]   1857 Dr. Skinner with general surgery. Recommend admission to medicine for IV abx. Surgery will follow  [LM]   1952 Discussed with CAYETANO Whitmore with hospital medicine. Hospital medicine will consult on patient but request surgery to admit.  [LM]   2030 Discussed with CAYETANO whitmore. Admit to Dr. Herron [LM]      ED Course User Index  [LM] Luh Dalton NP                           Clinical Impression:  Final diagnoses:  [N39.0] Urinary tract infection without hematuria, site unspecified (Primary)  [R10.31] RLQ abdominal pain          ED Disposition Condition    Admit Stable                Luh Dalton NP  12/12/24 2122

## 2024-12-12 NOTE — TELEPHONE ENCOUNTER
Name....: Diamond  ......w/: [facility]  Phone #.: [phone]  Patient.: Nery Donahue  Pt .: 1940  Details.: pt had abdominal surgery last month; she is currently having abdominal pain on right side      Received this message via [employer]. I called at this time. Patient S/P  SBR with Dr. Velazquez. Patient complaining of right upper and lower abdominal pain 10/10, since last night. Patient denies fever, dysuria, and states she is having regular bowel movements. Patient was also seen in the ED on 12/10 and Dx with a DVT to the right lower extremity. Patient states she is unable to walk due to the pain. I instruct patient and MsAmalia Fields to go to the ED for further evaluation. Patient and Ms. Fields verbalize understanding.

## 2024-12-12 NOTE — FIRST PROVIDER EVALUATION
Medical screening examination initiated.  I have conducted a focused provider triage encounter, findings are as follows:    Brief history of present illness:  Patient states right sided abdominal pain. Patient had a small bowel resection last month.     There were no vitals filed for this visit.    Pertinent physical exam:  awake, alert    Brief workup plan:  Labs    Preliminary workup initiated; this workup will be continued and followed by the physician or advanced practice provider that is assigned to the patient when roomed.

## 2024-12-12 NOTE — TELEPHONE ENCOUNTER
Name....: Diamond  ......w/: [facility]  Phone #.: [phone]  Patient.: Nery Donahue  Pt .: 1940  Details.: pt had abdominal surgery last month; she is currently having abdominal pain on right side    Called and spoke w ms Fields she states pt Is having lower/upper right side abdominal pain x 1 day worsen with walking denies fever/n/v.  Advised her I will speak with our RN or provider and will give her a call back

## 2024-12-13 PROBLEM — I82.401 RIGHT LEG DVT: Status: ACTIVE | Noted: 2024-12-13

## 2024-12-13 LAB
ALBUMIN FLD-MCNC: 0.8 GM/DL
ALBUMIN SERPL-MCNC: 2.5 G/DL (ref 3.4–4.8)
ALBUMIN/GLOB SERPL: 0.9 RATIO (ref 1.1–2)
ALP SERPL-CCNC: 107 UNIT/L (ref 40–150)
ALT SERPL-CCNC: 19 UNIT/L (ref 0–55)
ANION GAP SERPL CALC-SCNC: 6 MEQ/L
AST SERPL-CCNC: 25 UNIT/L (ref 5–34)
BASOPHILS # BLD AUTO: 0 X10(3)/MCL
BASOPHILS NFR BLD AUTO: 0 %
BILIRUB SERPL-MCNC: 1.2 MG/DL
BUN SERPL-MCNC: 14.2 MG/DL (ref 9.8–20.1)
CALCIUM SERPL-MCNC: 8.8 MG/DL (ref 8.4–10.2)
CHLORIDE SERPL-SCNC: 108 MMOL/L (ref 98–107)
CLARITY BODY FLUID (OLG): NORMAL
CO2 SERPL-SCNC: 24 MMOL/L (ref 23–31)
COLOR BODY FLUID (OLG): NORMAL
CREAT SERPL-MCNC: 0.78 MG/DL (ref 0.55–1.02)
CREAT/UREA NIT SERPL: 18
EOSINOPHIL # BLD AUTO: 0 X10(3)/MCL (ref 0–0.9)
EOSINOPHIL NFR BLD AUTO: 0 %
ERYTHROCYTE [DISTWIDTH] IN BLOOD BY AUTOMATED COUNT: 16.1 % (ref 11.5–17)
GFR SERPLBLD CREATININE-BSD FMLA CKD-EPI: >60 ML/MIN/1.73/M2
GLOBULIN SER-MCNC: 2.8 GM/DL (ref 2.4–3.5)
GLUCOSE SERPL-MCNC: 160 MG/DL (ref 82–115)
GRAM STN SPEC: NORMAL
GRAM STN SPEC: NORMAL
HCT VFR BLD AUTO: 32.1 % (ref 37–47)
HGB BLD-MCNC: 10.2 G/DL (ref 12–16)
IMM GRANULOCYTES # BLD AUTO: 0.03 X10(3)/MCL (ref 0–0.04)
IMM GRANULOCYTES NFR BLD AUTO: 1.1 %
LYMPHOCYTES # BLD AUTO: 0.3 X10(3)/MCL (ref 0.6–4.6)
LYMPHOCYTES NFR BLD AUTO: 11.1 %
LYMPHOCYTES NFR FLD MANUAL: 25 %
MACROPHAGES MAN COUNT BF (OLG): 8
MCH RBC QN AUTO: 30 PG (ref 27–31)
MCHC RBC AUTO-ENTMCNC: 31.8 G/DL (ref 33–36)
MCV RBC AUTO: 94.4 FL (ref 80–94)
MONOCYTE MAN % BF (OLG): 59 %
MONOCYTES # BLD AUTO: 0.07 X10(3)/MCL (ref 0.1–1.3)
MONOCYTES NFR BLD AUTO: 2.6 %
NEUTROPHILS # BLD AUTO: 2.3 X10(3)/MCL (ref 2.1–9.2)
NEUTROPHILS MAN % BF (OLG): 16 %
NEUTROPHILS NFR BLD AUTO: 85.2 %
NRBC BLD AUTO-RTO: 0 %
PLATELET # BLD AUTO: 45 X10(3)/MCL (ref 130–400)
PMV BLD AUTO: 11.3 FL (ref 7.4–10.4)
POTASSIUM SERPL-SCNC: 4.2 MMOL/L (ref 3.5–5.1)
PROT SERPL-MCNC: 5.3 GM/DL (ref 5.8–7.6)
RBC # BLD AUTO: 3.4 X10(6)/MCL (ref 4.2–5.4)
SODIUM SERPL-SCNC: 138 MMOL/L (ref 136–145)
WBC # BLD AUTO: 2.7 X10(3)/MCL (ref 4.5–11.5)
WBC # FLD AUTO: 237 /UL

## 2024-12-13 PROCEDURE — 82042 OTHER SOURCE ALBUMIN QUAN EA: CPT | Performed by: INTERNAL MEDICINE

## 2024-12-13 PROCEDURE — 25000003 PHARM REV CODE 250: Performed by: INTERNAL MEDICINE

## 2024-12-13 PROCEDURE — 80053 COMPREHEN METABOLIC PANEL: CPT | Performed by: INTERNAL MEDICINE

## 2024-12-13 PROCEDURE — 99223 1ST HOSP IP/OBS HIGH 75: CPT | Mod: FS,,, | Performed by: SURGERY

## 2024-12-13 PROCEDURE — 25000003 PHARM REV CODE 250: Performed by: SURGERY

## 2024-12-13 PROCEDURE — 87205 SMEAR GRAM STAIN: CPT | Performed by: INTERNAL MEDICINE

## 2024-12-13 PROCEDURE — 83615 LACTATE (LD) (LDH) ENZYME: CPT | Performed by: INTERNAL MEDICINE

## 2024-12-13 PROCEDURE — 89051 BODY FLUID CELL COUNT: CPT | Performed by: INTERNAL MEDICINE

## 2024-12-13 PROCEDURE — 85025 COMPLETE CBC W/AUTO DIFF WBC: CPT | Performed by: INTERNAL MEDICINE

## 2024-12-13 PROCEDURE — 11000001 HC ACUTE MED/SURG PRIVATE ROOM

## 2024-12-13 PROCEDURE — 0W9G3ZX DRAINAGE OF PERITONEAL CAVITY, PERCUTANEOUS APPROACH, DIAGNOSTIC: ICD-10-PCS | Performed by: RADIOLOGY

## 2024-12-13 PROCEDURE — 87070 CULTURE OTHR SPECIMN AEROBIC: CPT | Performed by: INTERNAL MEDICINE

## 2024-12-13 PROCEDURE — 63600175 PHARM REV CODE 636 W HCPCS: Performed by: SURGERY

## 2024-12-13 PROCEDURE — 84157 ASSAY OF PROTEIN OTHER: CPT | Performed by: INTERNAL MEDICINE

## 2024-12-13 PROCEDURE — 63600175 PHARM REV CODE 636 W HCPCS: Performed by: INTERNAL MEDICINE

## 2024-12-13 PROCEDURE — 36415 COLL VENOUS BLD VENIPUNCTURE: CPT | Performed by: INTERNAL MEDICINE

## 2024-12-13 RX ORDER — SPIRONOLACTONE 25 MG/1
25 TABLET ORAL DAILY
Status: DISCONTINUED | OUTPATIENT
Start: 2024-12-13 | End: 2024-12-16 | Stop reason: HOSPADM

## 2024-12-13 RX ORDER — METHYLPREDNISOLONE SOD SUCC 125 MG
125 VIAL (EA) INJECTION ONCE
Status: COMPLETED | OUTPATIENT
Start: 2024-12-13 | End: 2024-12-13

## 2024-12-13 RX ORDER — DIPHENHYDRAMINE HCL 25 MG
25 CAPSULE ORAL ONCE
Status: COMPLETED | OUTPATIENT
Start: 2024-12-13 | End: 2024-12-13

## 2024-12-13 RX ORDER — PROPRANOLOL HYDROCHLORIDE 20 MG/1
40 TABLET ORAL 2 TIMES DAILY
Status: DISCONTINUED | OUTPATIENT
Start: 2024-12-13 | End: 2024-12-16 | Stop reason: HOSPADM

## 2024-12-13 RX ADMIN — METHYLPREDNISOLONE SODIUM SUCCINATE 125 MG: 125 INJECTION, POWDER, FOR SOLUTION INTRAMUSCULAR; INTRAVENOUS at 08:12

## 2024-12-13 RX ADMIN — ENOXAPARIN SODIUM 70 MG: 80 INJECTION SUBCUTANEOUS at 07:12

## 2024-12-13 RX ADMIN — DIPHENHYDRAMINE HYDROCHLORIDE 25 MG: 25 CAPSULE ORAL at 08:12

## 2024-12-13 RX ADMIN — PIPERACILLIN SODIUM AND TAZOBACTAM SODIUM 4.5 G: 4; .5 INJECTION, POWDER, LYOPHILIZED, FOR SOLUTION INTRAVENOUS at 08:12

## 2024-12-13 RX ADMIN — PIPERACILLIN SODIUM AND TAZOBACTAM SODIUM 4.5 G: 4; .5 INJECTION, POWDER, LYOPHILIZED, FOR SOLUTION INTRAVENOUS at 11:12

## 2024-12-13 RX ADMIN — PIPERACILLIN SODIUM AND TAZOBACTAM SODIUM 4.5 G: 4; .5 INJECTION, POWDER, LYOPHILIZED, FOR SOLUTION INTRAVENOUS at 05:12

## 2024-12-13 RX ADMIN — RIFAXIMIN 550 MG: 550 TABLET ORAL at 08:12

## 2024-12-13 NOTE — DISCHARGE SUMMARY
Radiology Post-Procedure Note    Pre Op Diagnosis: Ascites    Post Op Diagnosis: Same    Secondary Diagnoses:   Problem List Items Addressed This Visit    None  Visit Diagnoses       Urinary tract infection without hematuria, site unspecified    -  Primary    RLQ abdominal pain                 Procedure: US Guided Paracentesis    Procedure performed by: Ricardo Brooks MD    Assistant: None    Written Informed Consent Obtained: Yes    Specimen Removed: None    Estimated Blood Loss: <10 cc    Condition: Stable    Outcome: The patient tolerated the procedure well and was without complications.    For further details please see the imaging report associated.    Disposition: Home or Self Care    Follow Up: With primary care provider    Discharge Instructions:  No discharge procedures on file.       Time Spent On Discharge: 2 minutes

## 2024-12-13 NOTE — INTERVAL H&P NOTE
The patient has been examined and the H&P has been reviewed:    I concur with the findings and no changes have occurred since H&P was written. 85 yo F with Cirrhosis and Ascites presents for paracentesis.        There are no hospital problems to display for this patient.

## 2024-12-13 NOTE — PROGRESS NOTES
"   Acute Care Surgery   Progress Note  Admit Date: 12/12/2024  HD#1  POD#* No surgery found *    Subjective:   Interval history:  AF, HDS  WBC 2.7 this AM  Reports that pain is somewhat improved but still present in RLQ  Last BM yesterday    Home Meds:  Current Outpatient Medications   Medication Instructions    apixaban (ELIQUIS) 5 mg Tab Take 2 tablets (10 mg total) by mouth 2 (two) times daily for 7 days, THEN 1 tablet (5 mg total) 2 (two) times daily for 23 days.    lactulose (CHRONULAC) 20 g, Oral, Daily PRN    nadoloL (CORGARD) 40 mg, Daily    rifAXIMin (XIFAXAN) 550 mg, Oral, 2 times daily    spironolactone (ALDACTONE) 25 mg, Daily      Scheduled Meds:   enoxaparin  1 mg/kg Subcutaneous Q12H    piperacillin-tazobactam (Zosyn) IV (PEDS and ADULTS) (extended infusion is not appropriate)  4.5 g Intravenous Q8H    propranoloL  40 mg Oral BID    rifAXIMin  550 mg Oral BID    spironolactone  25 mg Oral Daily     Continuous Infusions:  PRN Meds:  Current Facility-Administered Medications:     HYDROcodone-acetaminophen, 1 tablet, Oral, Q6H PRN     Objective:     VITAL SIGNS: 24 HR MIN & MAX LAST   Temp  Min: 97.5 °F (36.4 °C)  Max: 98.2 °F (36.8 °C)  97.8 °F (36.6 °C)   BP  Min: 106/57  Max: 163/70  116/74    Pulse  Min: 54  Max: 77  65    Resp  Min: 18  Max: 19  18    SpO2  Min: 95 %  Max: 100 %  96 %      HT: 5' 6" (167.6 cm)  WT: 73.5 kg (162 lb 0.6 oz)  BMI: 26.2     Intake/output:  Intake/Output - Last 3 Shifts         12/11 0700 12/12 0659 12/12 0700 12/13 0659 12/13 0700 12/14 0659    Urine (mL/kg/hr)  400     Total Output  400     Net  -400                    Intake/Output Summary (Last 24 hours) at 12/13/2024 1155  Last data filed at 12/13/2024 0604  Gross per 24 hour   Intake --   Output 400 ml   Net -400 ml         Lines/drains/airway:       Peripheral IV - Single Lumen 12/12/24 1742 20 G Anterior;Right Forearm (Active)   Site Assessment Clean;Dry;Intact 12/13/24 0400   Extremity Assessment Distal to IV " "No abnormal discoloration;No redness;No swelling 12/12/24 2257   Line Status Saline locked;Capped 12/13/24 0000   Dressing Status Clean;Dry;Intact 12/13/24 0400   Dressing Intervention Integrity maintained 12/13/24 0400   Number of days: 0       Physical examination:  Gen: NAD, AAOx3, answering questions appropriately  HEENT: NC  CV: RR  Resp: NWOB  Abd: S/TTP RLQ/ND. Well healing midline incision  Ext: moving all extremities spontaneously and purposefully  Neuro: CN II-XII grossly intact  Skin/wounds: warm, dry    Labs:  Renal:  Recent Labs     12/10/24  1559 12/12/24  1635 12/13/24  0552   BUN 18.2 13.0 14.2   CREATININE 0.70 0.75 0.78     No results for input(s): "LACTIC" in the last 72 hours.  FENGI:  Recent Labs     12/10/24  1559 12/12/24  1635 12/13/24  0552    139 138   K 3.9 4.0 4.2    108* 108*   CO2 27 27 24   CALCIUM 8.3* 9.2 8.8   ALBUMIN 3.1* 2.9* 2.5*   BILITOT 1.7* 1.6* 1.2   AST 28 27 25   ALKPHOS 133 125 107   ALT 20 19 19     Heme:  Recent Labs     12/10/24  1559 12/12/24  1635 12/13/24  0552   HGB 11.1* 11.4* 10.2*   HCT 34.9* 35.9* 32.1*   PLT 57* 55* 45*   INR 1.2  --   --      ID:  Recent Labs     12/10/24  1559 12/12/24  1635 12/13/24  0552   WBC 8.14 5.90 2.70*     CBG:  Recent Labs     12/10/24  1559 12/12/24  1635 12/13/24  0552   GLUCOSE 88 95 160*      Cardiovascular:  No results for input(s): "TROPONINI", "CKTOTAL", "CKMB", "BNP" in the last 168 hours.  ABG:  No results for input(s): "PH", "PO2", "PCO2", "HCO3", "BE" in the last 168 hours.   I have reviewed all pertinent lab results within the past 24 hours.    Imaging:  CT Abdomen Pelvis With IV Contrast NO Oral Contrast   Final Result      1. Moderate ascites.   2. No bowel obstruction or perforation evident.         Electronically signed by: Tal Kirk   Date:    12/12/2024   Time:    18:16      IR Paracentesis with Imaging    (Results Pending)      I have reviewed all pertinent imaging results/findings within the past " 24 hours.    Micro/Path/Other:  Microbiology Results (last 7 days)       Procedure Component Value Units Date/Time    Body Fluid Culture [[id number]] Collected: 12/13/24 1108    Order Status: Sent Specimen: Body Fluid from Ascitic Fluid Updated: 12/13/24 1129    Gram Stain [[id number]] Collected: 12/13/24 1108    Order Status: Sent Specimen: Body Fluid from Ascitic Fluid Updated: 12/13/24 1128    Urine culture [[id number]]  (Abnormal) Collected: 12/12/24 6069    Order Status: Completed Specimen: Urine Updated: 12/13/24 0856     Urine Culture 50,000-75,000 colonies/ml Gram-negative Rods           Specimens (From admission, onward)      None           Pathology Results  (Last 365 days)                 11/26/24 1340  Specimen to Pathology Final result             Assessment & Plan:   84-year-old female with past medical history of AFib on anticoagulation, hepatitis-C, thrombocytopenia, prediabetes, pacemaker, along with right lower extremity DVT and status post small bowel resection on 11/26/24 for ischemic bowel who now presents with right flank pain and lower quadrant pain.  CT abdomen pelvis demonstrating small amount of free fluid throughout the abdomen and pelvis with new suture line in left upper quadrant.  No evidence of bowel obstruction or perforation.  Patient is afebrile, hemodynamically stable, and without leukocytosis.  Notably turbid urine with bacteria and tender to palpation at right flank.     - To IR today for paracentesis  - Will continue to monitor abdominal exam  - Okay for diet if tolerating  - Monitor for obstructive symptoms  - Agree with abx for UTI  - Current clinical picture does not appear to be related to recent surgery  - No surgical intervention indicated at this time, please reach out to surgery with any questions      Disposition/Outpatient Follow Up/ Referrals/ Discharge Instructions:   - Disposition: Continue inpatient stay  - Follow up: [facility] - Address [address] [place] [address] [address]. Phone - [phone]  as needed    Ty Mike MD  [facility] General Surgery PGY-1

## 2024-12-13 NOTE — H&P (VIEW-ONLY)
Vascular Surgery - Consult Note  Suleman Reyes PA-C    SUBJECTIVE:     Reason for Consult: RLE DVT - thrombectomy vs IVC filter placement     History of Present Illness: Joy Donahue is an 84 y.o. female with Afib and cirrhosis s/t chronic hep C who presented to [facility] ED yesterday for right lower quadrant abdominal pain. Patiently recently underwent small bowel resection on 11/26 with Dr Velazquez s/t volvulus and ischemic bowel. She was later diagnosed with a RLE DVT on 12/10 after developing some pain posterior to the right knee and was started on Eliquis. Denies chest pain or SOB. States she travels back and forth between [place] and [place] frequently. Denies tobacco use. Denies any leg pain or swelling at this time.     Review of patient's allergies indicates:   Allergen Reactions    Ciprofloxacin Swelling    Iodine Shortness Of Breath    Latex Itching    Fluoride      Mouth sores    Fluoride preparations Dermatitis     Other reaction(s): Ulcer of mouth     Past Medical History:   Diagnosis Date    Acute (reversible) ischemia of intestine, part and extent unspecified 08/24/2022    Acute cystitis without hematuria 08/14/2023    Cirrhosis of liver with ascites 02/14/2023    Continue to follow up with hepatologist    COVID     x 2-residual dysgeusia    Hepatitis C     treated/cured previously    Pacemaker     Paroxysmal atrial fibrillation     Pericardial cyst     Skin cancer     Thrombus      Past Surgical History:   Procedure Laterality Date    APPENDECTOMY      CARDIAC PACEMAKER PLACEMENT      cataract surgery      EXCISION, SMALL INTESTINE  11/26/2024    Procedure: EXCISION, SMALL INTESTINE;  Surgeon: Diogenes Velazquez MD;  Location: [facility] OR;  Service: General;;  lysis of adhesions    FOOT SURGERY Left     HYSTERECTOMY      LAPAROSCOPIC CHOLECYSTECTOMY      LAPAROTOMY, EXPLORATORY N/A 11/26/2024    Procedure: LAPAROTOMY, EXPLORATORY;  Surgeon: Diogenes Velazquez MD;  Location: [facility] OR;  Service: General;   Laterality: N/A;    parathyroid resection      REPEAT ABDOMINAL PARACENTESIS      SKIN CANCER EXCISION      spot removed Left     left ankle spot removed    THROMBECTOMY       Family History   Problem Relation Name Age of Onset    Heart disease Mother      Heart disease Father      Hypertension Father      Pacemaker/defibrilator Father      Clotting disorder Sister Elizabeth     Heart failure Brother Anrus     Pacemaker/defibrilator Brother Anrus     No Known Problems Maternal Grandmother      No Known Problems Maternal Grandfather      No Known Problems Paternal Grandmother      No Known Problems Paternal Grandfather      Drug abuse Daughter Rimma     Heart disease Daughter Johnnie         possibly related to covid vaccine    No Known Problems Son Piotr      Social History     Tobacco Use    Smoking status: Never    Smokeless tobacco: Never   Substance Use Topics    Alcohol use: Not Currently    Drug use: Never        Prior to Admission medications    Medication Sig Start Date End Date Taking? Authorizing Provider   apixaban (ELIQUIS) 5 mg Tab Take 2 tablets (10 mg total) by mouth 2 (two) times daily for 7 days, THEN 1 tablet (5 mg total) 2 (two) times daily for 23 days. 12/10/24 1/9/25 Yes Binh Davis IV, MD   nadoloL (CORGARD) 40 MG tablet Take 40 mg by mouth once daily. 11/12/24  Yes Provider, Historical   rifAXIMin (XIFAXAN) 550 mg Tab Take 1 tablet (550 mg total) by mouth 2 (two) times daily. 12/5/23  Yes Monae Nazario MD   spironolactone (ALDACTONE) 25 MG tablet Take 25 mg by mouth once daily.   Yes Provider, Historical   gabapentin (NEURONTIN) 100 MG capsule Take 3 capsules (300 mg total) by mouth 3 (three) times daily for 2 days, THEN 2 capsules (200 mg total) 3 (three) times daily for 2 days, THEN 1 capsule (100 mg total) 3 (three) times daily for 2 days, THEN 1 capsule (100 mg total) 2 (two) times daily for 2 days, THEN 1 capsule (100 mg total) Daily for 2 days. 12/3/24 12/13/24 Yes Tamera,  MD Jamie   lactulose (CHRONULAC) 10 gram/15 mL solution Take 30 mLs (20 g total) by mouth daily as needed.  Patient not taking: Reported on 12/10/2024 8/1/24   Monae Nazario MD   pantoprazole (PROTONIX) 40 MG tablet Take 1 tablet (40 mg total) by mouth once daily.  Patient not taking: Reported on 12/10/2024 12/3/24 12/13/24  Jamie Philip MD   sucralfate (CARAFATE) 1 gram tablet  6/8/22 12/13/24  Provider, Historical       Review of Systems:  Review of Systems   Constitutional:  Negative for chills, fever and weight loss.   Eyes:  Negative for blurred vision and double vision.   Respiratory:  Negative for cough, shortness of breath and wheezing.    Cardiovascular:  Negative for chest pain, palpitations, claudication and leg swelling.   Gastrointestinal:  Positive for abdominal pain (RLQ). Negative for nausea and vomiting.   Musculoskeletal:  Negative for myalgias.   Neurological:  Negative for dizziness, speech change, weakness and headaches.   All other systems reviewed and are negative.      OBJECTIVE:     Vital Signs (Most Recent):  Temp: 97.8 °F (36.6 °C) (12/13/24 0721)  Pulse: 65 (12/13/24 0721)  Resp: 18 (12/12/24 2328)  BP: 116/74 (12/13/24 0721)  SpO2: 96 % (12/13/24 0721)    Admission: Weight: 73.5 kg (162 lb 0.6 oz) (12/12/24 2201)   Most Recent: Weight: 73.5 kg (162 lb 0.6 oz) (12/12/24 2201)    Physical Exam:  Vascular Physical Exam  Vitals and nursing note reviewed.   Constitutional:       General: She is not in acute distress.  HENT:      Head: Normocephalic.      Nose: Nose normal.   Cardiovascular:      Rate and Rhythm: Normal rate and regular rhythm.      Pulses:           Dorsalis pedis pulses are 2+ on the right side and 2+ on the left side.     Abdominal:      General: There is no distension.      Tenderness: There is no abdominal tenderness.      Comments: Well-approximated surgical incision    Musculoskeletal:      Right lower leg: No edema.      Left lower leg: No edema.    Lymphadenopathy:      Cervical: No cervical adenopathy.   Skin:     General: Skin is warm and dry.   Neurological:      General: No focal deficit present.      Mental Status: She is alert.      Cranial Nerves: No cranial nerve deficit.   Psychiatric:         Mood and Affect: Mood normal.        Diagnostic Results:  CV Ultrasound doppler venous right leg  - There is a deep vein thrombosis in the right mid femoral vein, distal femoral vein, popliteal vein, posterior tibial veins, peroneal veins.  - All other vessels of the right lower extremity venous system are patent with no evidence of thrombosis.     ASSESSMENT/PLAN:     Joy Donahue is an 85 y/o female with cirrhosis s/t chronic hep C now with RLE DVT that was started on Eliquis. Concerns regarding anticoagulation due to thrombocytopenia. Patient not a candidate for RLE thrombectomy.   - Will proceed with IVC filter placement tomorrow morning in the cath lab; risks and benefits of the procedure have been explained to patient and friend at bedside.  - NPO after midnight  - Hold morning Lovenox dose         The above findings, diagnostics, and treatment plan were discussed with Dr. Priest who is in agreement with the plan of care except as stated in additional documentation.      Thank you for your consult. Please feel free to reach me with any questions.    Suleman Reyes PA-C  [facility]  [phone]

## 2024-12-13 NOTE — CONSULTS
Vascular Surgery - Consult Note  Suleman Reyes PA-C    SUBJECTIVE:     Reason for Consult: RLE DVT - thrombectomy vs IVC filter placement     History of Present Illness: Joy Donahue is an 84 y.o. female with Afib and cirrhosis s/t chronic hep C who presented to [facility] ED yesterday for right lower quadrant abdominal pain. Patiently recently underwent small bowel resection on 11/26 with Dr Velazquez s/t volvulus and ischemic bowel. She was later diagnosed with a RLE DVT on 12/10 after developing some pain posterior to the right knee and was started on Eliquis. Denies chest pain or SOB. States she travels back and forth between [place] and [place] frequently. Denies tobacco use. Denies any leg pain or swelling at this time.     Review of patient's allergies indicates:   Allergen Reactions    Ciprofloxacin Swelling    Iodine Shortness Of Breath    Latex Itching    Fluoride      Mouth sores    Fluoride preparations Dermatitis     Other reaction(s): Ulcer of mouth     Past Medical History:   Diagnosis Date    Acute (reversible) ischemia of intestine, part and extent unspecified 08/24/2022    Acute cystitis without hematuria 08/14/2023    Cirrhosis of liver with ascites 02/14/2023    Continue to follow up with hepatologist    COVID     x 2-residual dysgeusia    Hepatitis C     treated/cured previously    Pacemaker     Paroxysmal atrial fibrillation     Pericardial cyst     Skin cancer     Thrombus      Past Surgical History:   Procedure Laterality Date    APPENDECTOMY      CARDIAC PACEMAKER PLACEMENT      cataract surgery      EXCISION, SMALL INTESTINE  11/26/2024    Procedure: EXCISION, SMALL INTESTINE;  Surgeon: Diogenes Velazquez MD;  Location: [facility] OR;  Service: General;;  lysis of adhesions    FOOT SURGERY Left     HYSTERECTOMY      LAPAROSCOPIC CHOLECYSTECTOMY      LAPAROTOMY, EXPLORATORY N/A 11/26/2024    Procedure: LAPAROTOMY, EXPLORATORY;  Surgeon: Diogenes Velazquez MD;  Location: [facility] OR;  Service: General;   Laterality: N/A;    parathyroid resection      REPEAT ABDOMINAL PARACENTESIS      SKIN CANCER EXCISION      spot removed Left     left ankle spot removed    THROMBECTOMY       Family History   Problem Relation Name Age of Onset    Heart disease Mother      Heart disease Father      Hypertension Father      Pacemaker/defibrilator Father      Clotting disorder Sister Elizabeth     Heart failure Brother Anrus     Pacemaker/defibrilator Brother Anrus     No Known Problems Maternal Grandmother      No Known Problems Maternal Grandfather      No Known Problems Paternal Grandmother      No Known Problems Paternal Grandfather      Drug abuse Daughter Rimma     Heart disease Daughter Johnnie         possibly related to covid vaccine    No Known Problems Son Piotr      Social History     Tobacco Use    Smoking status: Never    Smokeless tobacco: Never   Substance Use Topics    Alcohol use: Not Currently    Drug use: Never        Prior to Admission medications    Medication Sig Start Date End Date Taking? Authorizing Provider   apixaban (ELIQUIS) 5 mg Tab Take 2 tablets (10 mg total) by mouth 2 (two) times daily for 7 days, THEN 1 tablet (5 mg total) 2 (two) times daily for 23 days. 12/10/24 1/9/25 Yes Binh Davis IV, MD   nadoloL (CORGARD) 40 MG tablet Take 40 mg by mouth once daily. 11/12/24  Yes Provider, Historical   rifAXIMin (XIFAXAN) 550 mg Tab Take 1 tablet (550 mg total) by mouth 2 (two) times daily. 12/5/23  Yes Monae Nazario MD   spironolactone (ALDACTONE) 25 MG tablet Take 25 mg by mouth once daily.   Yes Provider, Historical   gabapentin (NEURONTIN) 100 MG capsule Take 3 capsules (300 mg total) by mouth 3 (three) times daily for 2 days, THEN 2 capsules (200 mg total) 3 (three) times daily for 2 days, THEN 1 capsule (100 mg total) 3 (three) times daily for 2 days, THEN 1 capsule (100 mg total) 2 (two) times daily for 2 days, THEN 1 capsule (100 mg total) Daily for 2 days. 12/3/24 12/13/24 Yes Tamera,  MD Jamie   lactulose (CHRONULAC) 10 gram/15 mL solution Take 30 mLs (20 g total) by mouth daily as needed.  Patient not taking: Reported on 12/10/2024 8/1/24   Monae Nazario MD   pantoprazole (PROTONIX) 40 MG tablet Take 1 tablet (40 mg total) by mouth once daily.  Patient not taking: Reported on 12/10/2024 12/3/24 12/13/24  Jamie Philip MD   sucralfate (CARAFATE) 1 gram tablet  6/8/22 12/13/24  Provider, Historical       Review of Systems:  Review of Systems   Constitutional:  Negative for chills, fever and weight loss.   Eyes:  Negative for blurred vision and double vision.   Respiratory:  Negative for cough, shortness of breath and wheezing.    Cardiovascular:  Negative for chest pain, palpitations, claudication and leg swelling.   Gastrointestinal:  Positive for abdominal pain (RLQ). Negative for nausea and vomiting.   Musculoskeletal:  Negative for myalgias.   Neurological:  Negative for dizziness, speech change, weakness and headaches.   All other systems reviewed and are negative.      OBJECTIVE:     Vital Signs (Most Recent):  Temp: 97.8 °F (36.6 °C) (12/13/24 0721)  Pulse: 65 (12/13/24 0721)  Resp: 18 (12/12/24 2328)  BP: 116/74 (12/13/24 0721)  SpO2: 96 % (12/13/24 0721)    Admission: Weight: 73.5 kg (162 lb 0.6 oz) (12/12/24 2201)   Most Recent: Weight: 73.5 kg (162 lb 0.6 oz) (12/12/24 2201)    Physical Exam:  Vascular Physical Exam  Vitals and nursing note reviewed.   Constitutional:       General: She is not in acute distress.  HENT:      Head: Normocephalic.      Nose: Nose normal.   Cardiovascular:      Rate and Rhythm: Normal rate and regular rhythm.      Pulses:           Dorsalis pedis pulses are 2+ on the right side and 2+ on the left side.     Abdominal:      General: There is no distension.      Tenderness: There is no abdominal tenderness.      Comments: Well-approximated surgical incision    Musculoskeletal:      Right lower leg: No edema.      Left lower leg: No edema.    Lymphadenopathy:      Cervical: No cervical adenopathy.   Skin:     General: Skin is warm and dry.   Neurological:      General: No focal deficit present.      Mental Status: She is alert.      Cranial Nerves: No cranial nerve deficit.   Psychiatric:         Mood and Affect: Mood normal.        Diagnostic Results:  CV Ultrasound doppler venous right leg  - There is a deep vein thrombosis in the right mid femoral vein, distal femoral vein, popliteal vein, posterior tibial veins, peroneal veins.  - All other vessels of the right lower extremity venous system are patent with no evidence of thrombosis.     ASSESSMENT/PLAN:     Joy Donahue is an 85 y/o female with cirrhosis s/t chronic hep C now with RLE DVT that was started on Eliquis. Concerns regarding anticoagulation due to thrombocytopenia. Patient not a candidate for RLE thrombectomy.   - Will proceed with IVC filter placement tomorrow morning in the cath lab; risks and benefits of the procedure have been explained to patient and friend at bedside.  - NPO after midnight  - Hold morning Lovenox dose         The above findings, diagnostics, and treatment plan were discussed with Dr. Priest who is in agreement with the plan of care except as stated in additional documentation.      Thank you for your consult. Please feel free to reach me with any questions.    Suleman Reyes PA-C  [facility]  [phone]

## 2024-12-13 NOTE — CONSULTS
Acute Care Surgery   Consult    Patient Name: Joy Donahue  YOB: 1940  Date: 12/12/2024 7:04 PM  Date of Admission: 12/12/2024  HD#0  POD#* No surgery found *    PRESENTING HISTORY   Chief Complaint/Reason for Admission: RLQ pain    History of Present Illness:  84-year-old female with past medical history of AFib on anticoagulation, hepatitis-C, thrombocytopenia, prediabetes, pacemaker, along with right lower extremity DVT and status post small bowel resection on 11/26/24 for ischemic bowel who now presents with right lower quadrant pain.  Patient states she is in 7/10 pain that was earlier today 10/10 pain in her right side, that is worse with walking.  She states the pain started yesterday and has been persistent over the last 24 hours.  She denies any nausea or vomiting and states that she had a bowel movement this morning.  She also says she has taken her anticoagulation as directed; eliquis 2 in the morning and 2 at night.  She denies any pain with urination, and she states that this pain is different than the pain before her surgery in November.  She also endorses pain to palpation of the right flank.  Denies any shortness of breath or chest pain.  Denies any pain from her midline incision, staples were removed this week in clinic.    Review of Systems:  12 point ROS negative except as stated in HPI    PAST HISTORY:   Past medical history:  Past Medical History:   Diagnosis Date    Acute (reversible) ischemia of intestine, part and extent unspecified 08/24/2022    Acute cystitis without hematuria 08/14/2023    Cirrhosis of liver with ascites 02/14/2023    Continue to follow up with hepatologist    COVID     x 2-residual dysgeusia    Hepatitis C     treated/cured previously    Pacemaker     Paroxysmal atrial fibrillation     Pericardial cyst     Skin cancer     Thrombus        Past surgical history:  Past Surgical History:   Procedure Laterality Date    APPENDECTOMY      CARDIAC PACEMAKER  PLACEMENT      cataract surgery      EXCISION, SMALL INTESTINE  11/26/2024    Procedure: EXCISION, SMALL INTESTINE;  Surgeon: Diogenes Velazquez MD;  Location: [facility] OR;  Service: General;;  lysis of adhesions    FOOT SURGERY Left     HYSTERECTOMY      LAPAROSCOPIC CHOLECYSTECTOMY      LAPAROTOMY, EXPLORATORY N/A 11/26/2024    Procedure: LAPAROTOMY, EXPLORATORY;  Surgeon: Diogenes Velazquez MD;  [facility];  Service: General;  Laterality: N/A;    parathyroid resection      REPEAT ABDOMINAL PARACENTESIS      SKIN CANCER EXCISION      spot removed Left     left ankle spot removed    THROMBECTOMY         Family history:  Family History   Problem Relation Name Age of Onset    Heart disease Mother      Heart disease Father      Hypertension Father      Pacemaker/defibrilator Father      Clotting disorder Sister [place]     Heart failure Brother Anrus     Pacemaker/defibrilator Brother Anrus     No Known Problems Maternal Grandmother      No Known Problems Maternal Grandfather      No Known Problems Paternal Grandmother      No Known Problems Paternal Grandfather      Drug abuse Daughter Rimma     Heart disease Daughter Johnnie         possibly related to covid vaccine    No Known Problems Son Piotr        Social history:  Social History     Socioeconomic History    Marital status:    Tobacco Use    Smoking status: Never    Smokeless tobacco: Never   Substance and Sexual Activity    Alcohol use: Not Currently    Drug use: Never    Sexual activity: Not Currently     Social Drivers of Health     Financial Resource Strain: Patient Declined (11/26/2024)    Overall Financial Resource Strain (CARDIA)     Difficulty of Paying Living Expenses: Patient declined   Food Insecurity: Patient Declined (11/26/2024)    Hunger Vital Sign     Worried About Running Out of Food in the Last Year: Patient declined     Ran Out of Food in the Last Year: Patient declined   Transportation Needs: Patient Declined (11/26/2024)    TRANSPORTATION  NEEDS     Transportation : Patient declined   Physical Activity: Sufficiently Active (8/1/2024)    Exercise Vital Sign     Days of Exercise per Week: 7 days     Minutes of Exercise per Session: 60 min   Stress: Patient Declined (11/26/2024)    [employer] [place] [employer] - Occupational Stress Questionnaire     Feeling of Stress : Patient declined   Housing Stability: Patient Declined (11/26/2024)    Housing Stability Vital Sign     Unable to Pay for Housing in the Last Year: Patient declined     Homeless in the Last Year: Patient declined     Social History     Tobacco Use   Smoking Status Never   Smokeless Tobacco Never      Social History     Substance and Sexual Activity   Alcohol Use Not Currently        MEDICATIONS & ALLERGIES:     No current facility-administered medications on file prior to encounter.     Current Outpatient Medications on File Prior to Encounter   Medication Sig    apixaban (ELIQUIS) 5 mg Tab Take 2 tablets (10 mg total) by mouth 2 (two) times daily for 7 days, THEN 1 tablet (5 mg total) 2 (two) times daily for 23 days.    gabapentin (NEURONTIN) 100 MG capsule Take 3 capsules (300 mg total) by mouth 3 (three) times daily for 2 days, THEN 2 capsules (200 mg total) 3 (three) times daily for 2 days, THEN 1 capsule (100 mg total) 3 (three) times daily for 2 days, THEN 1 capsule (100 mg total) 2 (two) times daily for 2 days, THEN 1 capsule (100 mg total) Daily for 2 days.    lactulose (CHRONULAC) 10 gram/15 mL solution Take 30 mLs (20 g total) by mouth daily as needed. (Patient not taking: Reported on 12/10/2024)    nadoloL (CORGARD) 40 MG tablet Take 40 mg by mouth once daily. (Patient not taking: Reported on 12/10/2024)    pantoprazole (PROTONIX) 40 MG tablet Take 1 tablet (40 mg total) by mouth once daily. (Patient not taking: Reported on 12/10/2024)    rifAXIMin (XIFAXAN) 550 mg Tab Take 1 tablet (550 mg total) by mouth 2 (two) times daily.    spironolactone (ALDACTONE) 25 MG  "tablet Take 25 mg by mouth once daily.    sucralfate (CARAFATE) 1 gram tablet  (Patient not taking: Reported on 8/6/2024)       Allergies:   Review of patient's allergies indicates:   Allergen Reactions    Ciprofloxacin Swelling    Iodine Shortness Of Breath    Latex Itching    Fluoride      Mouth sores    Fluoride preparations Dermatitis     Other reaction(s): Ulcer of mouth       Scheduled Meds:    Continuous Infusions:    PRN Meds:    OBJECTIVE:   Vital Signs:  VITAL SIGNS: 24 HR MIN & MAX LAST   Temp  Min: 97.6 °F (36.4 °C)  Max: 97.6 °F (36.4 °C)  97.6 °F (36.4 °C)   BP  Min: 136/74  Max: 136/74  136/74    Pulse  Min: 70  Max: 77  77    Resp  Min: 18  Max: 18  18    SpO2  Min: 100 %  Max: 100 %  100 %      HT:    WT:    BMI:       Intake/output:  Intake/Output - Last 3 Shifts       None          No intake or output data in the 24 hours ending 12/12/24 1904      Physical Exam:  General:  Elderly female  HEENT:  Protruding eyes, normocephalic, atraumatic  CV: RR  Resp: NWOB  GI:  Abdomen soft, right lower quadrant tenderness to palpation, right flank tenderness to palpation, midline incision healing appropriately, staples removed in clinic earlier this week  :  Deferred  MSK:  Moving all extremities spontaneously  Skin/wounds:  No rashes, ulcers, erythema  Neuro:  CNII-XII grossly intact, alert and oriented to person, place, and time    Labs:  Troponin:  No results for input(s): "TROPONINI" in the last 72 hours.  CBC:  Recent Labs     12/10/24  1559 12/12/24  1635   WBC 8.14 5.90   RBC 3.68* 3.77*   HGB 11.1* 11.4*   HCT 34.9* 35.9*   PLT 57* 55*   MCV 94.8* 95.2*   MCH 30.2 30.2   MCHC 31.8* 31.8*     CMP:  Recent Labs     12/10/24  1559 12/12/24  1635   CALCIUM 8.3* 9.2   ALBUMIN 3.1* 2.9*    139   K 3.9 4.0   CO2 27 27    108*   BUN 18.2 13.0   CREATININE 0.70 0.75   ALKPHOS 133 125   ALT 20 19   AST 28 27   BILITOT 1.7* 1.6*     Lactic Acid:  No results for input(s): "LACTATE" in the last 72 " "hours.  ETOH:  No results for input(s): "ETHANOL" in the last 72 hours.   Urine Drug Screen:  No results for input(s): "COCAINE", "OPIATE", "BARBITURATE", "AMPHETAMINE", "FENTANYL", "CANNABINOIDS", "MDMA" in the last 72 hours.    Invalid input(s): "BENZODIAZEPINE", "PHENCYCLIDINE"   ABG:  No results for input(s): "PH", "PO2", "PCO2", "HCO3", "BE" in the last 168 hours.     Diagnostic Results:  CT Abdomen Pelvis With IV Contrast NO Oral Contrast   Final Result      1. Moderate ascites.   2. No bowel obstruction or perforation evident.         Electronically signed by: Tal Kirk   Date:    12/12/2024   Time:    18:16          ASSESSMENT & PLAN:    84-year-old female with past medical history of AFib on anticoagulation, hepatitis-C, thrombocytopenia, prediabetes, pacemaker, along with right lower extremity DVT and status post small bowel resection on 11/26/24 for ischemic bowel who now presents with right flank pain and lower quadrant pain.  CT abdomen pelvis demonstrating small amount of free fluid throughout the abdomen and pelvis with new suture line in left upper quadrant.  No evidence of bowel obstruction or perforation.  Patient is afebrile, hemodynamically stable, and without leukocytosis.  Notably turbid urine with bacteria and tender to palpation at right flank.    -recommend consulting medicine for UTI workup  -multimodal pain control  -okay for diet from surgical standpoint    Anayeli Skinner MD  [facility]      "

## 2024-12-13 NOTE — H&P
Hospital Medicine  History & Physical Examination       Patient: Joy Donahue  MRN: [id number]  STATUS: IP- Inpatient   DOS: 12/13/2024   PCP: Miya Silverio NP      CC: RLQ pain       HISTORY OF PRESENT ILLNESS     84 y.o. female with a history that includes chronic hepatitis C, cirrhosis of the liver, and recent SB resection s/t volvulus and ischemic bowel, presented to ED with RLQ abdominal pain x 1 day.  She denies nausea or vomiting, reports her stools have returned to normal since surgery and has been regular.  She also denied any fever or chills.    Patient recently hospitalized under surgical services, where she required small bowel resection with Dr. Velazquez on 11/26 due to volvulus and associated ischemic bowel; she was discharged home on 12/3.   She did have some leg swelling and RLE venous ultrasound on 12/10 noted DVT involving peroneal veins extending proximally all the way into the right distal femoral and mid femoral veins; for which patient was started on Eliquis.  Otherwise was doing well since surgery.    Evaluation in the ED notes patient afebrile and hemodynamically stable; oxygenating well on room air.  Labs note normal white count with a unremarkable serum chemistries.  UA notes negative nitrites, positive LE, small amount of WBCs and a few bacteria (also notes few squamous epithelial cells).  CT of the abdomen and pelvis notes a moderate amount of ascites, though no free air, and otherwise unremarkable.      REVIEW OF SYSTEMS     Except as documented, all other systems reviewed and negative       PAST MEDICAL HISTORY     Recent Volvulus/ischemic bowel  Recent RLE DVT  Cirrhosis of the liver with ascites  Paroxysmal atrial fibrillation   Skin cancer  Chronic hepatitis-C, s/p treatment and clearance      PAST SURGICAL HISTORY     Recent Exploratory Laparotomy with excision of small bowel  Appendectomy   Permanent Pacemaker placement   Left foot surgery   Laparoscopic cholecystectomy  Multiple  abdominal paracentesis   Excision of skin cancer   Thrombectomy  Cataract extraction      FAMILY HISTORY     Reviewed, negative in relation to patient's current condition.      SOCIAL HISTORY     Denies alcohol, tobacco or drug abuse.  Screening for Social Drivers of health:  []Housing/Food Insecurity []Transportation Needs []Utility Difficulties []Interpersonal safety [x]Noncontributory      ALLERGIES     IODINE  LATEX   FLUORIDE PREPARATIONS   FLUOROQUINOLONES      HOME MEDICATIONS     apixaban (ELIQUIS) 5 mg Tab 2 tablets PO BID x 7 days, then 1 tablet BID   gabapentin (NEURONTIN) 100 MG capsule 3 capsules PO TID x 2 days, then 2 capsules TID x2 days, then 1 capsule TID x 2 days, then 1 capsule BID x 2 days, then 1 capsule daily x 2 days.   lactulose (CHRONULAC) 20 g, Oral, Daily PRN   nadoloL (CORGARD) 40 mg, Daily   pantoprazole (PROTONIX) 40 mg, Oral, Daily   rifAXIMin (XIFAXAN) 550 mg, Oral, 2 times daily   spironolactone (ALDACTONE) 25 mg, Daily   sucralfate (CARAFATE) 1 gram tablet No dose, route, or frequency recorded.       PHYSICAL EXAM   VITALS: T 98.2 °F (36.8 °C)   BP (!) 120/45   P 60   RR 18   O2 96 %    GENERAL: Awake and in NAD  HEENT: NC/AT, EOMI, PERRL.  NECK: Supple,  No JVD  LUNGS: CTA B/L  CVS: RRR, S1S2 positive  GI/: Soft, bowel sounds hyperactive.  TTP RLQ, no definitive rebound. Midline scar is healing, well approximated, clean and dry.  EXTREMITIES: Peripheral pulses 2+, no peripheral edema  DERM: Warm, dry.  No rashes or lesions noted.  NEURO: AAOx3, no focal neurologic deficit  PSYCH: Cooperative, appropriate mood and affect       DIAGNOSTICS     Recent Labs     12/10/24  1559 12/12/24  1635   WBC 8.14 5.90   RBC 3.68* 3.77*   HGB 11.1* 11.4*   HCT 34.9* 35.9*   MCV 94.8* 95.2*   MCH 30.2 30.2   MCHC 31.8* 31.8*   RDW 16.2 16.2   PLT 57* 55*     Recent Labs     12/10/24  1559   INR 1.2   APTT 26.0      Recent Labs     12/10/24  1559 12/12/24  1635    139   K 3.9 4.0   CO2 27 27  "  BUN 18.2 13.0   CREATININE 0.70 0.75   GLUCOSE 88 95   CALCIUM 8.3* 9.2   ALBUMIN 3.1* 2.9*   GLOBULIN 3.1 3.0   ALKPHOS 133 125   ALT 20 19   AST 28 27   BILITOT 1.7* 1.6*   LIPASE  --  42          CT Abdomen Pelvis With IV Contrast NO Oral Contrast  Narrative:   Mild atelectasis or scarring at the lung bases.  Small hiatal hernia.  Stable appearance of the solid abdominal organs.  The gallbladder is surgically absent.  New suture line left upper quadrant small bowel loops.  No free air is seen.  There is moderate abdominopelvic free fluid.  There is colonic diverticulosis.  Urinary bladder unremarkable.  Abdominal aorta normal in caliber.  Moderate atherosclerotic disease.  There are no acute osseous findings.  Impression:   1. Moderate ascites.  2. No bowel obstruction or perforation evident.  Electronically signed by: Tal Kirk  Date:    12/12/2024  Time:    18:16        ASSESSMENT   RLQ abd pain r/o peritonitis  Cirrhosis with moderate ascites  Questionable UTI  Recent SB resection s/t volvulus/ischemic bowel  Recently diagnosed RLE DVT    PLAN   Start antibiotics with IV Zosyn  Will obtain diagnostic paracentesis and follow fluid studies  Will follow cultures both ascitic and urine  Continue AC, though will utilize FD Lovenox for now  Home medications otherwise reviewed and resumed as deemed appropriate  Will monitor overnight        "Prophylaxis":  As above  Code Status:  DNR      Jone Herron MD  Hospital Medicine            If the patient has been admitted under observation status, it is at my discretion and under our care with hospital medicine services. [TWA]       "

## 2024-12-13 NOTE — PLAN OF CARE
12/13/24 1215   Discharge Assessment   Assessment Type Discharge Planning Assessment   Confirmed/corrected address, phone number and insurance Yes   Confirmed Demographics Correct on Facesheet   Source of Information patient;family   Reason For Admission RLQ pain   People in Home alone  (Family in area)   Do you expect to return to your current living situation? Yes   Do you have help at home or someone to help you manage your care at home? Yes   Who are your caregiver(s) and their phone number(s)? Johnnie (daughter) [phone]   Prior to hospitilization cognitive status: Alert/Oriented   Current cognitive status: Alert/Oriented   Walking or Climbing Stairs Difficulty no   Dressing/Bathing Difficulty no   Home Accessibility wheelchair accessible   Home Layout Able to live on 1st floor   Equipment Currently Used at Home shower chair;cane, straight;walker, standard   Readmission within 30 days? No   Patient currently being followed by outpatient case management? No   Do you currently have service(s) that help you manage your care at home? Yes   Name and Contact number of agency [facility]   Is the pt/caregiver preference to resume services with current agency Yes   Do you take prescription medications? Yes   Do you have prescription coverage? Yes   Coverage [employer]   Do you have any problems affording any of your prescribed medications? No   Is the patient taking medications as prescribed? yes   How do you get to doctors appointments? car, drives self;family or friend will provide   Are you on dialysis? No   Do you take coumadin? No   Discharge Plan A Home Health   Discharge Plan B Home with family   DME Needed Upon Discharge  none   Discharge Plan discussed with: Patient   Transition of Care Barriers None

## 2024-12-14 LAB
ANION GAP SERPL CALC-SCNC: 7 MEQ/L
BASOPHILS # BLD AUTO: 0 X10(3)/MCL
BASOPHILS NFR BLD AUTO: 0 %
BODY FLD TYPE: NORMAL
BODY FLD TYPE: NORMAL
BUN SERPL-MCNC: 16.9 MG/DL (ref 9.8–20.1)
CALCIUM SERPL-MCNC: 9 MG/DL (ref 8.4–10.2)
CHLORIDE SERPL-SCNC: 109 MMOL/L (ref 98–107)
CO2 SERPL-SCNC: 24 MMOL/L (ref 23–31)
CREAT SERPL-MCNC: 0.83 MG/DL (ref 0.55–1.02)
CREAT/UREA NIT SERPL: 20
EOSINOPHIL # BLD AUTO: 0 X10(3)/MCL (ref 0–0.9)
EOSINOPHIL NFR BLD AUTO: 0 %
ERYTHROCYTE [DISTWIDTH] IN BLOOD BY AUTOMATED COUNT: 16 % (ref 11.5–17)
GFR SERPLBLD CREATININE-BSD FMLA CKD-EPI: >60 ML/MIN/1.73/M2
GLUCOSE SERPL-MCNC: 167 MG/DL (ref 82–115)
GLUCOSE SERPL-MCNC: 184 MG/DL (ref 70–110)
HCT VFR BLD AUTO: 35.3 % (ref 37–47)
HGB BLD-MCNC: 11.1 G/DL (ref 12–16)
IMM GRANULOCYTES # BLD AUTO: 0.05 X10(3)/MCL (ref 0–0.04)
IMM GRANULOCYTES NFR BLD AUTO: 0.9 %
LDH FLD L TO P-CCNC: 71 U/L
LYMPHOCYTES # BLD AUTO: 0.37 X10(3)/MCL (ref 0.6–4.6)
LYMPHOCYTES NFR BLD AUTO: 7 %
MCH RBC QN AUTO: 30.5 PG (ref 27–31)
MCHC RBC AUTO-ENTMCNC: 31.4 G/DL (ref 33–36)
MCV RBC AUTO: 97 FL (ref 80–94)
MONOCYTES # BLD AUTO: 0.1 X10(3)/MCL (ref 0.1–1.3)
MONOCYTES NFR BLD AUTO: 1.9 %
NEUTROPHILS # BLD AUTO: 4.75 X10(3)/MCL (ref 2.1–9.2)
NEUTROPHILS NFR BLD AUTO: 90.2 %
NRBC BLD AUTO-RTO: 0 %
PLATELET # BLD AUTO: 49 X10(3)/MCL (ref 130–400)
PMV BLD AUTO: 11.3 FL (ref 7.4–10.4)
POCT GLUCOSE: 176 MG/DL (ref 70–110)
POCT GLUCOSE: 184 MG/DL (ref 70–110)
POCT GLUCOSE: 203 MG/DL (ref 70–110)
POTASSIUM SERPL-SCNC: 3.8 MMOL/L (ref 3.5–5.1)
PROT FLD-MCNC: 1.3 G/DL
RBC # BLD AUTO: 3.64 X10(6)/MCL (ref 4.2–5.4)
SODIUM SERPL-SCNC: 140 MMOL/L (ref 136–145)
WBC # BLD AUTO: 5.27 X10(3)/MCL (ref 4.5–11.5)

## 2024-12-14 PROCEDURE — 63600175 PHARM REV CODE 636 W HCPCS: Performed by: INTERNAL MEDICINE

## 2024-12-14 PROCEDURE — 25500020 PHARM REV CODE 255: Performed by: SURGERY

## 2024-12-14 PROCEDURE — 25000003 PHARM REV CODE 250: Performed by: INTERNAL MEDICINE

## 2024-12-14 PROCEDURE — 85025 COMPLETE CBC W/AUTO DIFF WBC: CPT | Performed by: INTERNAL MEDICINE

## 2024-12-14 PROCEDURE — 37191 INS ENDOVAS VENA CAVA FILTR: CPT | Performed by: SURGERY

## 2024-12-14 PROCEDURE — 36415 COLL VENOUS BLD VENIPUNCTURE: CPT | Performed by: INTERNAL MEDICINE

## 2024-12-14 PROCEDURE — C1880 VENA CAVA FILTER: HCPCS | Performed by: SURGERY

## 2024-12-14 PROCEDURE — 80048 BASIC METABOLIC PNL TOTAL CA: CPT | Performed by: INTERNAL MEDICINE

## 2024-12-14 PROCEDURE — 21400001 HC TELEMETRY ROOM

## 2024-12-14 PROCEDURE — 11000001 HC ACUTE MED/SURG PRIVATE ROOM

## 2024-12-14 PROCEDURE — 63600175 PHARM REV CODE 636 W HCPCS: Performed by: SURGERY

## 2024-12-14 PROCEDURE — 37191 INS ENDOVAS VENA CAVA FILTR: CPT | Mod: ,,, | Performed by: SURGERY

## 2024-12-14 PROCEDURE — 06H03DZ INSERTION OF INTRALUMINAL DEVICE INTO INFERIOR VENA CAVA, PERCUTANEOUS APPROACH: ICD-10-PCS | Performed by: SURGERY

## 2024-12-14 DEVICE — DENALI® VENA CAVA FILTER FEMORAL
Type: IMPLANTABLE DEVICE | Site: VEIN | Status: FUNCTIONAL
Brand: DENALI® VENA CAVA FILTER

## 2024-12-14 RX ORDER — ONDANSETRON 4 MG/1
8 TABLET, ORALLY DISINTEGRATING ORAL EVERY 8 HOURS PRN
Status: DISCONTINUED | OUTPATIENT
Start: 2024-12-14 | End: 2024-12-16 | Stop reason: HOSPADM

## 2024-12-14 RX ORDER — FENTANYL CITRATE 50 UG/ML
INJECTION, SOLUTION INTRAMUSCULAR; INTRAVENOUS
Status: DISCONTINUED | OUTPATIENT
Start: 2024-12-14 | End: 2024-12-14 | Stop reason: HOSPADM

## 2024-12-14 RX ORDER — MIDAZOLAM HYDROCHLORIDE 1 MG/ML
INJECTION INTRAMUSCULAR; INTRAVENOUS
Status: DISCONTINUED | OUTPATIENT
Start: 2024-12-14 | End: 2024-12-14 | Stop reason: HOSPADM

## 2024-12-14 RX ORDER — IOPAMIDOL 612 MG/ML
INJECTION, SOLUTION INTRAVASCULAR
Status: DISCONTINUED | OUTPATIENT
Start: 2024-12-14 | End: 2024-12-14 | Stop reason: HOSPADM

## 2024-12-14 RX ORDER — ACETAMINOPHEN 325 MG/1
650 TABLET ORAL EVERY 4 HOURS PRN
Status: DISCONTINUED | OUTPATIENT
Start: 2024-12-14 | End: 2024-12-16 | Stop reason: HOSPADM

## 2024-12-14 RX ORDER — LIDOCAINE HYDROCHLORIDE 10 MG/ML
INJECTION, SOLUTION INFILTRATION; PERINEURAL
Status: DISCONTINUED | OUTPATIENT
Start: 2024-12-14 | End: 2024-12-14 | Stop reason: HOSPADM

## 2024-12-14 RX ORDER — DIPHENHYDRAMINE HYDROCHLORIDE 50 MG/ML
INJECTION INTRAMUSCULAR; INTRAVENOUS
Status: DISCONTINUED | OUTPATIENT
Start: 2024-12-14 | End: 2024-12-14 | Stop reason: HOSPADM

## 2024-12-14 RX ORDER — METHYLPREDNISOLONE SOD SUCC 125 MG
VIAL (EA) INJECTION
Status: DISCONTINUED | OUTPATIENT
Start: 2024-12-14 | End: 2024-12-14 | Stop reason: HOSPADM

## 2024-12-14 RX ADMIN — PIPERACILLIN SODIUM AND TAZOBACTAM SODIUM 4.5 G: 4; .5 INJECTION, POWDER, LYOPHILIZED, FOR SOLUTION INTRAVENOUS at 02:12

## 2024-12-14 RX ADMIN — PIPERACILLIN SODIUM AND TAZOBACTAM SODIUM 4.5 G: 4; .5 INJECTION, POWDER, LYOPHILIZED, FOR SOLUTION INTRAVENOUS at 05:12

## 2024-12-14 RX ADMIN — RIFAXIMIN 550 MG: 550 TABLET ORAL at 08:12

## 2024-12-14 RX ADMIN — RIFAXIMIN 550 MG: 550 TABLET ORAL at 10:12

## 2024-12-14 RX ADMIN — PIPERACILLIN SODIUM AND TAZOBACTAM SODIUM 4.5 G: 4; .5 INJECTION, POWDER, LYOPHILIZED, FOR SOLUTION INTRAVENOUS at 08:12

## 2024-12-14 RX ADMIN — HYDROCODONE BITARTRATE AND ACETAMINOPHEN 1 TABLET: 5; 325 TABLET ORAL at 09:12

## 2024-12-14 RX ADMIN — SPIRONOLACTONE 25 MG: 25 TABLET ORAL at 10:12

## 2024-12-14 RX ADMIN — HYDROCODONE BITARTRATE AND ACETAMINOPHEN 1 TABLET: 5; 325 TABLET ORAL at 10:12

## 2024-12-14 NOTE — OP NOTE
[facility] [place] [facility] Procedure Note    Patient: Joy Donahue    Date of Procedure: 12/14/2024    Preop Diagnosis:  Right lower extremity DVT    Postop Diagnosis:  Same    Procedure:  1.  Moderate sedation directly supervised by me for 8 minutes   2.  Placement of IVC filter using Bard Jamilah filter   3.  Ultrasound-guided access of the right common femoral vein    Surgeon: Sam Priest    Indication for Procedure: Joy Donahue is a 84 y.o. female   who presented with right leg pain.  She has a history of a previous DVT in 2016.  She also has a history of cirrhosis and chronic thrombocytopenia.  She was deemed not a candidate for continued anticoagulation.  She was taken for IVC filter placement.    Anesthesia:  Moderate sedation directly supervised by me and 1% lidocaine.  An independent observer was used to monitor cardiorespiratory function throughout the procedure.  Please see the procedure logs for timing and dosing of sedation medications as well as the name of the there was providing sedation.    Blood Loss:  5 mL    Findings:  Ultrasound evaluation of the right common femoral vein noted it to be patent.  The right iliac vein was patent.  The IVC was patent.  Filter was placed below the renal veins orthogonal to the walls of the IVC    Implants:  Bard Leake IVC filter    Closure: Manual Pressure     Procedure in Detail:  After informed consent was obtained, and risks and benefits discussed with the patient she was brought to the cath lab and placed supine.  After adequate sedation was obtained, she was prepped and draped in a standard sterile fashion.  Local anesthetic was infused in the skin overlying the right common femoral vein there was an accessed with a micropuncture needle under direct ultrasound visualization.  I exchanged out for the catheter and angiography of the iliac system and the IVC was performed.  I then placed a J-wire and advanced the filter delivery sheath up into the iliac  bifurcation.  Venography was then performed and the renal veins were marked.  The wire and dilator were then advanced back up the sheath and the sheath was advanced up to the renal veins.  The filter was then advanced up the delivery sheath until it was in an infra renal position.  The sheath was withdrawn and the filter deployed.  Completion venography showed the filter to be in good place.  The sheath was removed and pressure was held.  The patient was transferred back to her room in stable condition    Complications: None    Contrast Volume: 15 mL    Specimens: None    Flouro Time: 0.8 min

## 2024-12-14 NOTE — PLAN OF CARE
Problem: Adult Inpatient Plan of Care  Goal: Plan of Care Review  Outcome: Progressing  Goal: Patient-Specific Goal (Individualized)  Outcome: Progressing  Goal: Absence of Hospital-Acquired Illness or Injury  Outcome: Progressing  Goal: Optimal Comfort and Wellbeing  Outcome: Progressing  Goal: Readiness for Transition of Care  Outcome: Progressing     Problem: Wound  Goal: Optimal Coping  Outcome: Progressing  Goal: Optimal Functional Ability  Outcome: Progressing  Goal: Absence of Infection Signs and Symptoms  Outcome: Progressing  Goal: Improved Oral Intake  Outcome: Progressing  Goal: Optimal Pain Control and Function  Outcome: Progressing  Goal: Skin Health and Integrity  Outcome: Progressing  Goal: Optimal Wound Healing  Outcome: Progressing     Problem: Pain Acute  Goal: Optimal Pain Control and Function  Outcome: Progressing     Problem: Fall Injury Risk  Goal: Absence of Fall and Fall-Related Injury  Outcome: Progressing     Problem: Fatigue  Goal: Improved Activity Tolerance  Outcome: Progressing     Problem: Infection  Goal: Absence of Infection Signs and Symptoms  Outcome: Progressing

## 2024-12-14 NOTE — PROGRESS NOTES
[facility]        Chief Complaint: Inpatient Follow-up     HPI:   84 y.o. female with a history that includes chronic hepatitis C, cirrhosis of the liver, and recent SB resection s/t volvulus and ischemic bowel, presented to ED with RLQ abdominal pain x 1 day.  She denies nausea or vomiting, reports her stools have returned to normal since surgery and has been regular.  She also denied any fever or chills.    Patient recently hospitalized under surgical services, where she required small bowel resection with Dr. Velazquez on 11/26 due to volvulus and associated ischemic bowel; she was discharged home on 12/3.   She did have some leg swelling and RLE venous ultrasound on 12/10 noted DVT involving peroneal veins extending proximally all the way into the right distal femoral and mid femoral veins; for which patient was started on Eliquis.  Otherwise was doing well since surgery.    Evaluation in the ED notes patient afebrile and hemodynamically stable; oxygenating well on room air.  Labs note normal white count with a unremarkable serum chemistries.  UA notes negative nitrites, positive LE, small amount of WBCs and a few bacteria (also notes few squamous epithelial cells).  CT of the abdomen and pelvis notes a moderate amount of ascites, though no free air, and otherwise unremarkable.  Patient admitted to HM service.    General surgery team evaluated the patient, suspected clinical picture not appearing to be related to recent surgery, surgical team signed off.  Recommended UTI workup, urine cultures no significant growth, [id number] colonies Gram-negative rods    Patient had paracentesis 12/13/2024 with IR, 1.3 L serous fluid was drained,  negative for SBP, saag >1.1 likely portal hypertension.    Patient was recently initiated on Eliquis for acute DVT right lower extremity by surgery team but patient with chronic thrombocytopenia in the setting of cirrhosis and  platelets have dropped below 50 K , she was deemed not a candidate for continued long-term anticoagulation for DVT due to thrombocytopenia, vascular surgery was consulted for IVC filter placement.       Interval Hx:   Patient underwent IVC filter placement this morning, seen postprocedure, daughter at bedside p.  Patient lying on bed voiced no new complaints, discussed at length the ongoing care, discussed possible etiologies for ascites,  recommended patient to have  follow ups    Daughter reported patient with a long standing history of cirrhosis, was on clinical trial drug, was following up with the GI physician, patient no longer following up with him, reported did not require any paracentesis in the recent years.     Hemodynamically stable intermittent bradycardia, of note patient is due in the coming weeks for pacemaker battery change, had pacemaker interrogation in the recent admission        Objective/physical exam:  General: In no acute distress, afebrile  Chest: Clear to auscultation bilaterally anteriorly  Heart:  Bradycardia  Abdomen: Soft, mild TTP lower quadrants, healing surgical incision   MSK: Warm, no lower extremity edema  Neurologic: Alert and appropriately responding  VITAL SIGNS: 24 HRS MIN & MAX LAST   Temp  Min: 97.5 °F (36.4 °C)  Max: 97.9 °F (36.6 °C) 97.6 °F (36.4 °C)   BP  Min: 100/47  Max: 112/74 (!) 100/47   Pulse  Min: 53  Max: 88  65   Resp  Min: 18  Max: 18 18   SpO2  Min: 94 %  Max: 99 % 97 %     I have reviewed the following labs:  Recent Labs   Lab 12/10/24  1559 12/12/24  1635 12/13/24  0552   WBC 8.14 5.90 2.70*   RBC 3.68* 3.77* 3.40*   HGB 11.1* 11.4* 10.2*   HCT 34.9* 35.9* 32.1*   MCV 94.8* 95.2* 94.4*   MCH 30.2 30.2 30.0   MCHC 31.8* 31.8* 31.8*   RDW 16.2 16.2 16.1   PLT 57* 55* 45*   MPV 10.5* 10.6* 11.3*     Recent Labs   Lab 12/10/24  1559 12/12/24  1635 12/13/24  0552    139 138   K 3.9 4.0 4.2    108* 108*   CO2 27 27 24   BUN 18.2 13.0 14.2   CREATININE  0.70 0.75 0.78   CALCIUM 8.3* 9.2 8.8   ALBUMIN 3.1* 2.9* 2.5*   ALKPHOS 133 125 107   ALT 20 19 19   AST 28 27 25   BILITOT 1.7* 1.6* 1.2     Microbiology Results (last 7 days)       Procedure Component Value Units Date/Time    Urine culture [[id number]]  (Abnormal) Collected: 12/12/24 1739    Order Status: Completed Specimen: Urine Updated: 12/14/24 0832     Urine Culture No other significant growth      50,000-75,000 colonies/ml Gram-negative Rods    Body Fluid Culture [[id number]] Collected: 12/13/24 1108    Order Status: Completed Specimen: Body Fluid from Ascitic Fluid Updated: 12/14/24 0653     Body Fluid Culture No Growth At 24 Hours    Gram Stain [[id number]] Collected: 12/13/24 1108    Order Status: Completed Specimen: Body Fluid from Ascitic Fluid Updated: 12/13/24 2116     GRAM STAIN Rare WBC observed      No bacteria seen             See below for Radiology    Assessment/Plan:  Acute DVT RLE 12/10/2024 status post IVC filter 12/14/2024  Cirrhosis with ascites status post paracentesis 12/13/2024  RLQ abd pain, unclear etiology-neg UTI, neg SBP, not related to surgery per surgical team  Jejunal volvulus with ischemic bowel-status post jejunal resection 11/26   Chronic thrombocytopenia   SSS, h/o PPM, nearing end of life battery      Monitor on tele  Follow vascular surgery recommendations  Labs today noted platelets 49 K, hold anticoagulation, discussed  holding anticoagulation with low platelet count   Micro noted, urine cultures 50 to 75 K colonies, continue empiric Zosyn given her recent surgery, presentation of RLQ pain, presence of ascites we will monitoring her fluid cultures  Continue rifaximin  General surgery team signed off, monitor abdominal exam  Mobilize/PT as tolerated   Case was discussed with patient's nurse     VTE prophylaxis:  SCDs    Patient condition:  Fair    Anticipated discharge and Disposition:   TBD        Portions of this note dictated using EMR integrated voice recognition  software, and may be subject to voice recognition errors not corrected at proofreading. Please contact writer for clarification if needed.   _____________________________________________________________________    Malnutrition Status:  Nutrition consulted. Most recent weight and BMI monitored-     Measurements:  Wt Readings from Last 1 Encounters:   12/12/24 73.5 kg (162 lb 0.6 oz)   Body mass index is 26.15 kg/m².    Patient has been screened and assessed by RD.    Malnutrition Type:  Context:    Level:      Malnutrition Characteristic Summary:       Interventions/Recommendations (treatment strategy):        Scheduled Med:   enoxaparin  1 mg/kg Subcutaneous Q12H    piperacillin-tazobactam (Zosyn) IV (PEDS and ADULTS) (extended infusion is not appropriate)  4.5 g Intravenous Q8H    propranoloL  40 mg Oral BID    rifAXIMin  550 mg Oral BID    spironolactone  25 mg Oral Daily      Continuous Infusions:     PRN Meds:    Current Facility-Administered Medications:     acetaminophen, 650 mg, Oral, Q4H PRN    HYDROcodone-acetaminophen, 1 tablet, Oral, Q6H PRN    ondansetron, 8 mg, Oral, Q8H PRN     Radiology:  I have personally reviewed the following imaging and agree with the radiologist.     Cardiac catheterization  Procedure performed in the Invasive Lab    - See Procedure Log link below for nursing documentation    - See OpNote on Surgeries Tab for physician findings    - See Imaging Tab for radiologist dictation      Jamie Philip MD  Department of Hospital Medicine   [facility]   12/14/2024

## 2024-12-14 NOTE — PLAN OF CARE
Problem: Adult Inpatient Plan of Care  Goal: Plan of Care Review  Outcome: Progressing  Flowsheets (Taken 12/14/2024 0800)  Plan of Care Reviewed With: patient  Goal: Patient-Specific Goal (Individualized)  Outcome: Progressing  Goal: Absence of Hospital-Acquired Illness or Injury  Outcome: Progressing  Intervention: Identify and Manage Fall Risk  Flowsheets (Taken 12/14/2024 0800)  Safety Promotion/Fall Prevention:   assistive device/personal item within reach   medications reviewed   bed alarm set   family expresses understanding of fall risk and prevention   family to remain at bedside   instructed to call staff for mobility   lighting adjusted   nonskid shoes/socks when out of bed  Intervention: Prevent Skin Injury  Flowsheets (Taken 12/14/2024 0800)  Body Position: position changed independently  Skin Protection: incontinence pads utilized  Device Skin Pressure Protection: absorbent pad utilized/changed  Intervention: Prevent and Manage VTE (Venous Thromboembolism) Risk  Flowsheets (Taken 12/14/2024 0800)  VTE Prevention/Management: ROM (passive) performed  Intervention: Prevent Infection  Flowsheets (Taken 12/14/2024 0800)  Infection Prevention: rest/sleep promoted  Goal: Optimal Comfort and Wellbeing  Outcome: Progressing  Intervention: Monitor Pain and Promote Comfort  Flowsheets (Taken 12/14/2024 0800)  Pain Management Interventions:   care clustered   pillow support provided  Intervention: Provide Person-Centered Care  Flowsheets (Taken 12/14/2024 0800)  Trust Relationship/Rapport:   care explained   choices provided   reassurance provided   thoughts/feelings acknowledged   emotional support provided   empathic listening provided   questions answered   questions encouraged  Goal: Readiness for Transition of Care  Outcome: Progressing     Problem: Wound  Goal: Optimal Coping  Outcome: Progressing  Intervention: Support Patient and Family Response  Flowsheets (Taken 12/14/2024 0800)  Family/Support System  Care: caregiver stress acknowledged  Goal: Optimal Functional Ability  Outcome: Progressing  Intervention: Optimize Functional Ability  Flowsheets (Taken 12/14/2024 0800)  Activity Management: Patient unable to perform activities  Goal: Absence of Infection Signs and Symptoms  Outcome: Progressing  Goal: Improved Oral Intake  Outcome: Progressing  Goal: Optimal Pain Control and Function  Outcome: Progressing  Intervention: Prevent or Manage Pain  Flowsheets (Taken 12/14/2024 0800)  Sleep/Rest Enhancement:   awakenings minimized   noise level reduced  Pain Management Interventions:   care clustered   pillow support provided  Goal: Skin Health and Integrity  Outcome: Progressing  Intervention: Optimize Skin Protection  Flowsheets (Taken 12/14/2024 0800)  Pressure Reduction Techniques: frequent weight shift encouraged  Skin Protection: incontinence pads utilized  Activity Management: Patient unable to perform activities  Head of Bed (HOB) Positioning: HOB flat  Goal: Optimal Wound Healing  Outcome: Progressing  Intervention: Promote Wound Healing  Flowsheets (Taken 12/14/2024 0800)  Sleep/Rest Enhancement:   awakenings minimized   noise level reduced     Problem: Pain Acute  Goal: Optimal Pain Control and Function  Outcome: Progressing  Intervention: Develop Pain Management Plan  Flowsheets (Taken 12/14/2024 0800)  Pain Management Interventions:   care clustered   pillow support provided  Intervention: Prevent or Manage Pain  Flowsheets (Taken 12/14/2024 0800)  Sleep/Rest Enhancement:   awakenings minimized   noise level reduced  Medication Review/Management: medications reviewed     Problem: Fall Injury Risk  Goal: Absence of Fall and Fall-Related Injury  Outcome: Progressing  Intervention: Identify and Manage Contributors  Flowsheets (Taken 12/14/2024 0800)  Medication Review/Management: medications reviewed  Intervention: Promote Injury-Free Environment  Flowsheets (Taken 12/14/2024 0800)  Safety Promotion/Fall  Prevention:   assistive device/personal item within reach   medications reviewed   bed alarm set   family expresses understanding of fall risk and prevention   family to remain at bedside   instructed to call staff for mobility   lighting adjusted   nonskid shoes/socks when out of bed     Problem: Fatigue  Goal: Improved Activity Tolerance  Outcome: Progressing  Intervention: Promote Improved Energy  Flowsheets (Taken 12/14/2024 0800)  Sleep/Rest Enhancement:   awakenings minimized   noise level reduced  Activity Management: Patient unable to perform activities  Environmental Support: calm environment promoted     Problem: Infection  Goal: Absence of Infection Signs and Symptoms  Outcome: Progressing

## 2024-12-14 NOTE — INTERVAL H&P NOTE
The patient has been examined and the H&P has been reviewed:    I concur with the findings and no changes have occurred since H&P was written.    Anesthesia/Surgery risks, benefits and alternative options discussed and understood by patient/family.          Active Hospital Problems    Diagnosis  POA    Right leg DVT [I82.401]  Yes      Resolved Hospital Problems   No resolved problems to display.

## 2024-12-15 LAB
ANION GAP SERPL CALC-SCNC: 7 MEQ/L
BACTERIA UR CULT: ABNORMAL
BACTERIA UR CULT: ABNORMAL
BASOPHILS # BLD AUTO: 0 X10(3)/MCL
BASOPHILS NFR BLD AUTO: 0 %
BUN SERPL-MCNC: 21.1 MG/DL (ref 9.8–20.1)
CALCIUM SERPL-MCNC: 8.7 MG/DL (ref 8.4–10.2)
CHLORIDE SERPL-SCNC: 107 MMOL/L (ref 98–107)
CO2 SERPL-SCNC: 25 MMOL/L (ref 23–31)
CREAT SERPL-MCNC: 0.79 MG/DL (ref 0.55–1.02)
CREAT/UREA NIT SERPL: 27
EOSINOPHIL # BLD AUTO: 0 X10(3)/MCL (ref 0–0.9)
EOSINOPHIL NFR BLD AUTO: 0 %
ERYTHROCYTE [DISTWIDTH] IN BLOOD BY AUTOMATED COUNT: 16 % (ref 11.5–17)
GFR SERPLBLD CREATININE-BSD FMLA CKD-EPI: >60 ML/MIN/1.73/M2
GLUCOSE SERPL-MCNC: 145 MG/DL (ref 82–115)
GLUCOSE SERPL-MCNC: 154 MG/DL (ref 70–110)
HCT VFR BLD AUTO: 32.5 % (ref 37–47)
HGB BLD-MCNC: 10.1 G/DL (ref 12–16)
IMM GRANULOCYTES # BLD AUTO: 0.02 X10(3)/MCL (ref 0–0.04)
IMM GRANULOCYTES NFR BLD AUTO: 0.6 %
LYMPHOCYTES # BLD AUTO: 0.24 X10(3)/MCL (ref 0.6–4.6)
LYMPHOCYTES NFR BLD AUTO: 7.1 %
MCH RBC QN AUTO: 29.9 PG (ref 27–31)
MCHC RBC AUTO-ENTMCNC: 31.1 G/DL (ref 33–36)
MCV RBC AUTO: 96.2 FL (ref 80–94)
MONOCYTES # BLD AUTO: 0.12 X10(3)/MCL (ref 0.1–1.3)
MONOCYTES NFR BLD AUTO: 3.5 %
NEUTROPHILS # BLD AUTO: 3.02 X10(3)/MCL (ref 2.1–9.2)
NEUTROPHILS NFR BLD AUTO: 88.8 %
NRBC BLD AUTO-RTO: 0 %
PLATELET # BLD AUTO: 54 X10(3)/MCL (ref 130–400)
PLATELETS.RETICULATED NFR BLD AUTO: 6 % (ref 0.9–11.2)
PMV BLD AUTO: 11.1 FL (ref 7.4–10.4)
POCT GLUCOSE: 154 MG/DL (ref 70–110)
POCT GLUCOSE: 180 MG/DL (ref 70–110)
POCT GLUCOSE: 205 MG/DL (ref 70–110)
POTASSIUM SERPL-SCNC: 4.1 MMOL/L (ref 3.5–5.1)
RBC # BLD AUTO: 3.38 X10(6)/MCL (ref 4.2–5.4)
SODIUM SERPL-SCNC: 139 MMOL/L (ref 136–145)
WBC # BLD AUTO: 3.4 X10(3)/MCL (ref 4.5–11.5)

## 2024-12-15 PROCEDURE — 21400001 HC TELEMETRY ROOM

## 2024-12-15 PROCEDURE — 25000003 PHARM REV CODE 250: Performed by: INTERNAL MEDICINE

## 2024-12-15 PROCEDURE — 36415 COLL VENOUS BLD VENIPUNCTURE: CPT | Performed by: INTERNAL MEDICINE

## 2024-12-15 PROCEDURE — 80048 BASIC METABOLIC PNL TOTAL CA: CPT | Performed by: INTERNAL MEDICINE

## 2024-12-15 PROCEDURE — 63600175 PHARM REV CODE 636 W HCPCS: Performed by: INTERNAL MEDICINE

## 2024-12-15 PROCEDURE — 85025 COMPLETE CBC W/AUTO DIFF WBC: CPT | Performed by: INTERNAL MEDICINE

## 2024-12-15 RX ADMIN — PIPERACILLIN SODIUM AND TAZOBACTAM SODIUM 4.5 G: 4; .5 INJECTION, POWDER, LYOPHILIZED, FOR SOLUTION INTRAVENOUS at 04:12

## 2024-12-15 RX ADMIN — RIFAXIMIN 550 MG: 550 TABLET ORAL at 07:12

## 2024-12-15 RX ADMIN — PROPRANOLOL HYDROCHLORIDE 40 MG: 20 TABLET ORAL at 09:12

## 2024-12-15 RX ADMIN — SPIRONOLACTONE 25 MG: 25 TABLET ORAL at 09:12

## 2024-12-15 RX ADMIN — PIPERACILLIN SODIUM AND TAZOBACTAM SODIUM 4.5 G: 4; .5 INJECTION, POWDER, LYOPHILIZED, FOR SOLUTION INTRAVENOUS at 07:12

## 2024-12-15 RX ADMIN — RIFAXIMIN 550 MG: 550 TABLET ORAL at 09:12

## 2024-12-15 RX ADMIN — APIXABAN 5 MG: 5 TABLET, FILM COATED ORAL at 11:12

## 2024-12-15 RX ADMIN — APIXABAN 5 MG: 5 TABLET, FILM COATED ORAL at 07:12

## 2024-12-15 RX ADMIN — PIPERACILLIN SODIUM AND TAZOBACTAM SODIUM 4.5 G: 4; .5 INJECTION, POWDER, LYOPHILIZED, FOR SOLUTION INTRAVENOUS at 02:12

## 2024-12-15 NOTE — PLAN OF CARE
Problem: Adult Inpatient Plan of Care  Goal: Plan of Care Review  Outcome: Progressing  Goal: Patient-Specific Goal (Individualized)  Outcome: Progressing  Goal: Absence of Hospital-Acquired Illness or Injury  Outcome: Progressing  Goal: Optimal Comfort and Wellbeing  Outcome: Progressing  Goal: Readiness for Transition of Care  Outcome: Progressing     Problem: Wound  Goal: Optimal Coping  Outcome: Progressing  Goal: Optimal Functional Ability  Outcome: Progressing  Goal: Absence of Infection Signs and Symptoms  Outcome: Progressing  Goal: Improved Oral Intake  Outcome: Progressing  Goal: Optimal Pain Control and Function  Outcome: Progressing  Goal: Skin Health and Integrity  Outcome: Progressing  Goal: Optimal Wound Healing  Outcome: Progressing     Problem: Pain Acute  Goal: Optimal Pain Control and Function  Outcome: Progressing     Problem: Fall Injury Risk  Goal: Absence of Fall and Fall-Related Injury  Outcome: Progressing     Problem: Fatigue  Goal: Improved Activity Tolerance  Outcome: Progressing     Problem: Infection  Goal: Absence of Infection Signs and Symptoms  Outcome: Progressing        SUBJECTIVE:                                                    Colette Marcos is a 17 year old female who presents to clinic today for the following health issues:      Chief Complaint   Patient presents with     Consult     discuss metabolism       (R63.5) Weight gain  (primary encounter diagnosis)  Comment: has had slow and steady weight gain - usually able to control this with exercise  Weight gain since last fall:  Has noted cycles with her weight  Swims in fall  Now swims year round  Diet is good  Wt Readings from Last 4 Encounters:   06/05/17 163 lb (73.9 kg) (91 %)*   05/15/17 166 lb 0.1 oz (75.3 kg) (92 %)*   04/17/17 165 lb 5.5 oz (75 kg) (92 %)*   03/20/17 164 lb 0.4 oz (74.4 kg) (91 %)*     * Growth percentiles are based on CDC 2-20 Years data.     Plan: TSH with free T4 reflex, Basic metabolic panel            (Z02.79) Encounter for issuance of medical certificate  Comment: has forms to be completed for school  Plan:          Problem list and histories reviewed & adjusted, as indicated.  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 (H)     Secondary iridocyclitis, noninfectious     Cystoid macular degeneration of retina     Chondromalacia of patella     Cataract     Adjustment disorder with depressed mood     Uveitis     Past Surgical History:   Procedure Laterality Date     CATARACT IOL, RT/LT       EXAM UNDER ANESTHESIA EYE(S) Bilateral 12/9/2014    Procedure: EXAM UNDER ANESTHESIA EYE(S);  Surgeon: Ramon Manuel MD;  Location: [facility] OR     INJECT STEROID (LOCATION)       LENSECTOMY CHILD Right 12/9/2014    Procedure: LENSECTOMY CHILD;  Surgeon: Ramon Manuel MD;  Location: [facility] OR       Social History   Substance Use Topics     Smoking status: Never Smoker     Smokeless tobacco: Never Used     Alcohol use No     Family History   Problem Relation Age of Onset     Macular Degeneration Paternal  "Grandfather      Amblyopia No family hx of      Strabismus No family hx of      Glaucoma No family hx of          Current Outpatient Prescriptions   Medication Sig Dispense Refill     methotrexate 2.5 MG tablet CHEMO Take 6 tablets (15 mg) by mouth once a week 24 tablet 6     prednisoLONE acetate (PRED FORTE) 1 % ophthalmic susp Place 1 drop into the right eye 2 times daily 5 mL 8     folic acid (FOLVITE) 1 MG tablet Take 1 tablet (1 mg) by mouth daily 30 tablet 11     InFLIXimab (REMICADE IV) Inject into the vein every 30 days       Labs reviewed in EPIC    Reviewed and updated as needed this visit by clinical staff  Tobacco  Allergies  Meds  Med Hx  Surg Hx  Fam Hx  Soc Hx      Reviewed and updated as needed this visit by Provider         ROS:  Constitutional, HEENT, cardiovascular, pulmonary, gi and gu systems are negative, except as otherwise noted.    OBJECTIVE:                                                    /72  Pulse 74  Temp 97.2  F (36.2  C) (Oral)  Resp 14  Ht 5' 3.75\" (1.619 m)  Wt 163 lb (73.9 kg)  SpO2 100%  Breastfeeding? No  BMI 28.2 kg/m2  Body mass index is 28.2 kg/(m^2).  GENERAL APPEARANCE: healthy, alert and no distress  HENT: nose and mouth without ulcers or lesions  NECK: no adenopathy, no asymmetry, masses, or scars and thyroid normal to palpation    Labs are pending     ASSESSMENT/PLAN:                                                    1. Weight gain  Reviewed diet exercise portions and hormones  - TSH with free T4 reflex  - Basic metabolic panel    2. Encounter for issuance of medical certificate  Forms filled out for school      Follow up with Provider - as needed     Yaima Jaime MD  [facility]    "

## 2024-12-15 NOTE — PROGRESS NOTES
[facility] Medicine Progress Note        Chief Complaint: Inpatient Follow-up     HPI:   84 y.o. female with a history that includes chronic hepatitis C, cirrhosis of the liver, and recent SB resection s/t volvulus and ischemic bowel, presented to ED with RLQ abdominal pain x 1 day.  She denies nausea or vomiting, reports her stools have returned to normal since surgery and has been regular.  She also denied any fever or chills.    Patient recently hospitalized under surgical services, where she required small bowel resection with Dr. Velazquez on 11/26 due to volvulus and associated ischemic bowel; she was discharged home on 12/3.   She did have some leg swelling and RLE venous ultrasound on 12/10 noted DVT involving peroneal veins extending proximally all the way into the right distal femoral and mid femoral veins; for which patient was started on Eliquis.  Otherwise was doing well since surgery.    Evaluation in the ED notes patient afebrile and hemodynamically stable; oxygenating well on room air.  Labs note normal white count with a unremarkable serum chemistries.  UA notes negative nitrites, positive LE, small amount of WBCs and a few bacteria (also notes few squamous epithelial cells).  CT of the abdomen and pelvis notes a moderate amount of ascites, though no free air, and otherwise unremarkable.  Patient admitted to HM service.    General surgery team evaluated the patient, suspected clinical picture not appearing to be related to recent surgery, surgical team signed off.  Recommended UTI workup, urine cultures no significant growth, [id number] colonies Gram-negative rods    Patient had paracentesis 12/13/2024 with IR, 1.3 L serous fluid was drained,  negative for SBP, saag >1.1 likely portal hypertension.    Patient was recently initiated on Eliquis for acute DVT right lower extremity by surgery team but patient with chronic thrombocytopenia in the setting of cirrhosis and  platelets have dropped below 50 K , she was deemed not a candidate for continued anticoagulation long-term for DVT due to thrombocytopenia age greater than 75, recent surgery cirrhosis, vascular surgery was consulted for IVC filter placement.     Patient underwent IVC filter placement 12/14/2024, with no acute issues.      Interval Hx:   Patient was seen at bedside this morning, patient's daughter present, daughter reported patient being more weak when she walked in this morning compared to yesterday, chart reviewed, patient received Norco last night for pain possibly contributing     Patient reported some right lumbar /paraspinal area muscular pain worsens with movement appears muscular otherwise improved compared to presentation no abdominal pain, ambulating, tolerating p.o.    I had extensive discussion with the patient and daughter at bedside regarding IVC filter, Eliquis use, low platelets.  Discussed plan of care.  They verbalized understanding that IVC filter helps to prevent  clot migration but can not prevent the new clot to form, greatest benefit of anticoagulation occurs within 1st few days or weeks, ok with Eliquis to continue at 5 bid if plt count above 50k given the proximal nature of DVT.  Recommended close follow up with PCP , cardiology to see patient's tolerance to Eliquis and platelet count and decide on treatment duration with Eliquis versus  discontinuation.  Of note, generator change anticipated at the end of 2024 or early Jan '25.  She is worried that she was told because  the patient on Eliquis for acute dvt ,that the procedure had to be postponed.  Recommended to discuss with patient's cardiologist now that patient has IVC filter ,  possibly moving to earlier dates  if indicated .       Hemodynamically stable, intermittent asymptomatic bradycardia        Objective/physical exam:  General: In no acute distress, afebrile  Chest:  Unlabored breathing on room  Heart:  Normal rate  Abdomen: Soft,  nontender  MSK: some ttp right para spinal/lumbar  area  Neurologic: Alert and appropriately responding  VITAL SIGNS: 24 HRS MIN & MAX LAST   Temp  Min: 97.3 °F (36.3 °C)  Max: 97.8 °F (36.6 °C) 97.4 °F (36.3 °C)   BP  Min: 105/61  Max: 130/73 114/67   Pulse  Min: 51  Max: 84  61   Resp  Min: 17  Max: 18 18   SpO2  Min: 96 %  Max: 99 % 98 %     I have reviewed the following labs:  Recent Labs   Lab 12/13/24  0552 12/14/24  0903 12/15/24  0457   WBC 2.70* 5.27 3.40*   RBC 3.40* 3.64* 3.38*   HGB 10.2* 11.1* 10.1*   HCT 32.1* 35.3* 32.5*   MCV 94.4* 97.0* 96.2*   MCH 30.0 30.5 29.9   MCHC 31.8* 31.4* 31.1*   RDW 16.1 16.0 16.0   PLT 45* 49* 54*   MPV 11.3* 11.3* 11.1*     Recent Labs   Lab 12/10/24  1559 12/12/24  1635 12/13/24  0552 12/14/24  0903 12/15/24  0457    139 138 140 139   K 3.9 4.0 4.2 3.8 4.1    108* 108* 109* 107   CO2 27 27 24 24 25   BUN 18.2 13.0 14.2 16.9 21.1*   CREATININE 0.70 0.75 0.78 0.83 0.79   CALCIUM 8.3* 9.2 8.8 9.0 8.7   ALBUMIN 3.1* 2.9* 2.5*  --   --    ALKPHOS 133 125 107  --   --    ALT 20 19 19  --   --    AST 28 27 25  --   --    BILITOT 1.7* 1.6* 1.2  --   --      Microbiology Results (last 7 days)       Procedure Component Value Units Date/Time    Body Fluid Culture [[id number]] Collected: 12/13/24 1108    Order Status: Completed Specimen: Body Fluid from Ascitic Fluid Updated: 12/15/24 0907     Body Fluid Culture No Growth At 48 Hours    Urine culture [[id number]]  (Abnormal)  (Susceptibility) Collected: 12/12/24 1739    Order Status: Completed Specimen: Urine Updated: 12/15/24 0807     Urine Culture No other significant growth      50,000-75,000 colonies/ml Escherichia coli    Gram Stain [[id number]] Collected: 12/13/24 1108    Order Status: Completed Specimen: Body Fluid from Ascitic Fluid Updated: 12/13/24 2116     GRAM STAIN Rare WBC observed      No bacteria seen             See below for Radiology    Assessment/Plan:  Acute DVT RLE 12/10/2024 post op possibly  provoked status post IVC filter 12/14/2024   Cirrhosis with ascites status post paracentesis 12/13/2024  RLQ abd pain, unclear etiology-neg UTI, neg SBP, not related to surgery per surgical team  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 hold anticoagulation if platelets drop below 50 K  Micro noted, urine cultures 50 to 75 K colonies, growing E coli, continue empiric Zosyn given her recent surgery, presentation of abd pain, presence of ascites , ascitic fluid cultures negative so far  Continue rifaximin  General surgery team signed off, continue to monitor abdominal exam  Mobilize/PT as tolerated   Case was discussed with patient's nurse   Labs in a.m.    VTE prophylaxis:  SCDs    Patient condition:  Fair    Anticipated discharge and Disposition:   TBD/likely home tomorrow if platelets stable        Portions of this note dictated using EMR integrated voice recognition software, and may be subject to voice recognition errors not corrected at proofreading. Please contact writer for clarification if needed.   _____________________________________________________________________    Malnutrition Status:  Nutrition consulted. Most recent weight and BMI monitored-     Measurements:  Wt Readings from Last 1 Encounters:   12/12/24 73.5 kg (162 lb 0.6 oz)   Body mass index is 26.15 kg/m².    Patient has been screened and assessed by RD.    Malnutrition Type:  Context:    Level:      Malnutrition Characteristic Summary:       Interventions/Recommendations (treatment strategy):        Scheduled Med:   apixaban  5 mg Oral BID    piperacillin-tazobactam (Zosyn) IV (PEDS and ADULTS) (extended infusion is not appropriate)  4.5 g Intravenous Q8H    propranoloL  40 mg Oral BID     rifAXIMin  550 mg Oral BID    spironolactone  25 mg Oral Daily      Continuous Infusions:     PRN Meds:    Current Facility-Administered Medications:     acetaminophen, 650 mg, Oral, Q4H PRN    HYDROcodone-acetaminophen, 1 tablet, Oral, Q6H PRN    ondansetron, 8 mg, Oral, Q8H PRN     Radiology:  I have personally reviewed the following imaging and agree with the radiologist.     Cardiac catheterization  Procedure performed in the Invasive Lab    - See Procedure Log link below for nursing documentation    - See OpNote on Surgeries Tab for physician findings    - See Imaging Tab for radiologist dictation      Jamie Philip MD  Department of Hospital Medicine   [facility]   12/15/2024

## 2024-12-16 ENCOUNTER — OFFICE VISIT (OUTPATIENT)
Dept: PRIMARY CARE CLINIC | Facility: CLINIC | Age: 84
End: 2024-12-16
Payer: MEDICARE

## 2024-12-16 VITALS
DIASTOLIC BLOOD PRESSURE: 62 MMHG | BODY MASS INDEX: 26.05 KG/M2 | RESPIRATION RATE: 18 BRPM | WEIGHT: 162.06 LBS | SYSTOLIC BLOOD PRESSURE: 111 MMHG | HEIGHT: 66 IN | OXYGEN SATURATION: 98 % | HEART RATE: 66 BPM | TEMPERATURE: 99 F

## 2024-12-16 DIAGNOSIS — I11.0 HYPERTENSIVE HEART DISEASE WITH HEART FAILURE: ICD-10-CM

## 2024-12-16 DIAGNOSIS — R53.1 GENERALIZED WEAKNESS: Primary | ICD-10-CM

## 2024-12-16 LAB
BASOPHILS # BLD AUTO: 0 X10(3)/MCL
BASOPHILS NFR BLD AUTO: 0 %
EOSINOPHIL # BLD AUTO: 0 X10(3)/MCL (ref 0–0.9)
EOSINOPHIL NFR BLD AUTO: 0 %
ERYTHROCYTE [DISTWIDTH] IN BLOOD BY AUTOMATED COUNT: 15.9 % (ref 11.5–17)
EST. AVERAGE GLUCOSE BLD GHB EST-MCNC: 116.9 MG/DL
HBA1C MFR BLD: 5.7 %
HCT VFR BLD AUTO: 31.9 % (ref 37–47)
HGB BLD-MCNC: 10.3 G/DL (ref 12–16)
IMM GRANULOCYTES # BLD AUTO: 0.02 X10(3)/MCL (ref 0–0.04)
IMM GRANULOCYTES NFR BLD AUTO: 0.6 %
LYMPHOCYTES # BLD AUTO: 0.28 X10(3)/MCL (ref 0.6–4.6)
LYMPHOCYTES NFR BLD AUTO: 8.4 %
MCH RBC QN AUTO: 30.2 PG (ref 27–31)
MCHC RBC AUTO-ENTMCNC: 32.3 G/DL (ref 33–36)
MCV RBC AUTO: 93.5 FL (ref 80–94)
MONOCYTES # BLD AUTO: 0.32 X10(3)/MCL (ref 0.1–1.3)
MONOCYTES NFR BLD AUTO: 9.6 %
NEUTROPHILS # BLD AUTO: 2.72 X10(3)/MCL (ref 2.1–9.2)
NEUTROPHILS NFR BLD AUTO: 81.4 %
NRBC BLD AUTO-RTO: 0 %
PLATELET # BLD AUTO: 53 X10(3)/MCL (ref 130–400)
PMV BLD AUTO: 11.2 FL (ref 7.4–10.4)
POCT GLUCOSE: 149 MG/DL (ref 70–110)
POCT GLUCOSE: 189 MG/DL (ref 70–110)
RBC # BLD AUTO: 3.41 X10(6)/MCL (ref 4.2–5.4)
WBC # BLD AUTO: 3.34 X10(3)/MCL (ref 4.5–11.5)

## 2024-12-16 PROCEDURE — 99214 OFFICE O/P EST MOD 30 MIN: CPT | Mod: 95,,, | Performed by: NURSE PRACTITIONER

## 2024-12-16 PROCEDURE — 25000003 PHARM REV CODE 250: Performed by: INTERNAL MEDICINE

## 2024-12-16 PROCEDURE — 36415 COLL VENOUS BLD VENIPUNCTURE: CPT | Performed by: INTERNAL MEDICINE

## 2024-12-16 PROCEDURE — 97162 PT EVAL MOD COMPLEX 30 MIN: CPT

## 2024-12-16 PROCEDURE — 83036 HEMOGLOBIN GLYCOSYLATED A1C: CPT | Performed by: INTERNAL MEDICINE

## 2024-12-16 PROCEDURE — 85025 COMPLETE CBC W/AUTO DIFF WBC: CPT | Performed by: INTERNAL MEDICINE

## 2024-12-16 PROCEDURE — 1111F DSCHRG MED/CURRENT MED MERGE: CPT | Mod: CPTII,95,, | Performed by: NURSE PRACTITIONER

## 2024-12-16 PROCEDURE — 97530 THERAPEUTIC ACTIVITIES: CPT

## 2024-12-16 PROCEDURE — 63600175 PHARM REV CODE 636 W HCPCS: Performed by: INTERNAL MEDICINE

## 2024-12-16 PROCEDURE — 1159F MED LIST DOCD IN RCRD: CPT | Mod: CPTII,95,, | Performed by: NURSE PRACTITIONER

## 2024-12-16 PROCEDURE — 1160F RVW MEDS BY RX/DR IN RCRD: CPT | Mod: CPTII,95,, | Performed by: NURSE PRACTITIONER

## 2024-12-16 RX ORDER — AMOXICILLIN AND CLAVULANATE POTASSIUM 500; 125 MG/1; MG/1
1 TABLET, FILM COATED ORAL 3 TIMES DAILY
Qty: 12 TABLET | Refills: 0 | Status: SHIPPED | OUTPATIENT
Start: 2024-12-16 | End: 2024-12-20

## 2024-12-16 RX ORDER — METHOCARBAMOL 500 MG/1
500 TABLET, FILM COATED ORAL EVERY 6 HOURS PRN
Status: DISCONTINUED | OUTPATIENT
Start: 2024-12-16 | End: 2024-12-16 | Stop reason: HOSPADM

## 2024-12-16 RX ORDER — METHOCARBAMOL 500 MG/1
500 TABLET, FILM COATED ORAL EVERY 6 HOURS PRN
Qty: 30 TABLET | Refills: 0 | Status: SHIPPED | OUTPATIENT
Start: 2024-12-16 | End: 2024-12-26

## 2024-12-16 RX ADMIN — SPIRONOLACTONE 25 MG: 25 TABLET ORAL at 08:12

## 2024-12-16 RX ADMIN — PROPRANOLOL HYDROCHLORIDE 40 MG: 20 TABLET ORAL at 08:12

## 2024-12-16 RX ADMIN — APIXABAN 5 MG: 5 TABLET, FILM COATED ORAL at 08:12

## 2024-12-16 RX ADMIN — PIPERACILLIN SODIUM AND TAZOBACTAM SODIUM 4.5 G: 4; .5 INJECTION, POWDER, LYOPHILIZED, FOR SOLUTION INTRAVENOUS at 04:12

## 2024-12-16 RX ADMIN — RIFAXIMIN 550 MG: 550 TABLET ORAL at 08:12

## 2024-12-16 NOTE — PLAN OF CARE
GI referral sent through [facility] to Johny Sal. Spoke to Sandra with [facility]-will transfer services to [facility].

## 2024-12-16 NOTE — NURSING
Discharge instructions given to pt and family. Reviewed and answered all questions. Educated pt about monitoring incision site for signs of infection. Pt verbalized understanding. IV removed, NAD noted. Transport called.

## 2024-12-16 NOTE — PLAN OF CARE
12/16/24 1531   Final Note   Assessment Type Final Discharge Note   Anticipated Discharge Disposition Home-Health   Post-Acute Status   Post-Acute Authorization Home Health   Home Health Status Set-up Complete/Auth obtained  ([facility])   Discharge Delays None known at this time     Provided list of sitter services to daughter.

## 2024-12-16 NOTE — PT/OT/SLP EVAL
"Physical Therapy Evaluation    Patient Name:  Joy Donahue   MRN:  [id number]    Recommendations:     Discharge therapy intensity: Low Intensity Therapy (with 24 hr assist)   Discharge Equipment Recommendations: raised toilet   Barriers to discharge: Impaired mobility and Ongoing medical needs    Assessment:     Joy Donahue is a 84 y.o. female admitted with a medical diagnosis of acute DVT RLE 12/10 s/p IVC filter placement 12/14. Pt also with recent history of small bowel resection 11/26.  She presents with the following impairments/functional limitations: weakness, impaired functional mobility. Overall pt is SBA for ambulation but requires min/mod A for sit<>stand and toilet transfers. Pt family member at bedside states she is not able to physically assist pt at home, but has a paid CG/family member that will be available to assist while in [place] and other family members to assist when she travels to [place] tomorrow. PT spent extended time reviewing sequencing and transfer techniques with family member at bedside. Reviewed discharge options with family member and states pt  not interested in placement. Pt okay for low intensity services upon discharge but will require 24 hr caregiver along with RW and elevated toilet seat.    Rehab Prognosis: Good; patient would benefit from acute skilled PT services to address these deficits and reach maximum level of function.    Recent Surgery: Procedure(s) (LRB):  Insertion, Filter, Inferior Vena Cava (N/A) 2 Days Post-Op    Plan:     During this hospitalization, patient would benefit from acute PT services 5 x/week to address the identified rehab impairments via gait training, therapeutic activities, therapeutic exercises, neuromuscular re-education and progress toward the following goals:    Plan of Care Expires:  01/16/25    Subjective     Chief Complaint: "can't stand up"  Patient/Family Comments/goals: be able to stand and transfer on her " own  Pain/Comfort:  Pain Rating 1:  (Pt reports L flank pain but does not objectively rate)    Patients cultural, spiritual, Lutheran conflicts given the current situation: no    Living Environment:  Pt has homes in both [place] and [place]. States she is discharging to [facility], then traveling to [place] tomorrow. Pt has steps to enter both homes but ramp available. [place] home is handicap accesible.  Prior to admission, patients level of function was Ind prior to sx in November, assistance requires since surgery.  Equipment used at home: wheelchair, walker, rolling.  DME owned (not currently used): wheelchair.  Upon discharge, patient will have assistance from paid CG and family.    Objective:     Communicated with nurse prior to session.  Patient found up in chair with peripheral IV, pulse ox (continuous), telemetry  upon PT entry to room.    General Precautions: Standard, fall  Orthopedic Precautions:N/A   Braces: N/A  Respiratory Status: Room air      Exams:  Cognitive Exam:  Patient is oriented to Person, Place, Time, and Situation  Gross Motor Coordination:  WFL  RLE ROM: WFL  RLE Strength: grossly 4-/5  LLE ROM: WFL  LLE Strength: grossly 4-/5  Skin integrity: Visible skin intact      Functional Mobility:  Transfers:     Sit to Stand:  minimum assistance with rolling walker  Toilet Transfer: moderate assistance with  rolling walker  using  Step Transfer  Gait: 200ft with RW, SBA with steady armani, upright posture, and step through gait pattern  Balance: SBA for static sitting balance      AM-PAC 6 CLICK MOBILITY  Total Score:16       Treatment & Education:  Education, demonstration, and reinforcement of transfer techniques and CG level of assist. Demonstrated head/hips relationship with proper weight shift and LE engagement to improve independence in task and CG demonstrating understanding of proper assistance. Also demonstrated home exercise program for [place] with reinforcement  of intensity and frequency.    Patient and family were provided with verbal education and demonstrations education regarding PT role/goals/POC, fall prevention, safety awareness, discharge/DME recommendations, and home exercise program.  Understanding was verbalized, however additional teaching warranted.     Patient left up in chair with all lines intact, call button in reach, nurse notified, and family present.    GOALS:   Multidisciplinary Problems       Physical Therapy Goals          Problem: Physical Therapy    Goal Priority Disciplines Outcome Interventions   Physical Therapy Goal     PT, PT/OT Progressing    Description: Goals to be met by: 2025     Patient will increase functional independence with mobility by performin. Supine to sit with Modified [place]  2. Sit to stand transfer with Modified [place]  3. Gait  x 300 feet with Modified [place] using Rolling Walker.   4. Pt able to stand from commode mod Ind with RW and grab bars                         History:     Past Medical History:   Diagnosis Date    Acute (reversible) ischemia of intestine, part and extent unspecified 2022    Acute cystitis without hematuria 2023    Cirrhosis of liver with ascites 2023    Continue to follow up with hepatologist    COVID     x 2-residual dysgeusia    Hepatitis C     treated/cured previously    Pacemaker     Paroxysmal atrial fibrillation     Pericardial cyst     Skin cancer     Thrombus        Past Surgical History:   Procedure Laterality Date    APPENDECTOMY      CARDIAC PACEMAKER PLACEMENT      cataract surgery      EXCISION, SMALL INTESTINE  2024    Procedure: EXCISION, SMALL INTESTINE;  Surgeon: Diogenes Velazquez MD;  Location: [facility];  Service: General;;  lysis of adhesions    FOOT SURGERY Left     HYSTERECTOMY      INSERTION OF INFERIOR VENA CAVAL FILTER N/A 2024    Procedure: Insertion, Filter, Inferior Vena Cava;  Surgeon: Sam Priest MD;   Location: [facility] C[facility] LAB;  Service: Peripheral Vascular;  Laterality: N/A;    LAPAROSCOPIC CHOLECYSTECTOMY      LAPAROTOMY, EXPLORATORY N/A 11/26/2024    Procedure: LAPAROTOMY, EXPLORATORY;  Surgeon: Diogenes Velazuqez MD;  Location: [facility];  Service: General;  Laterality: N/A;    parathyroid resection      REPEAT ABDOMINAL PARACENTESIS      SKIN CANCER EXCISION      spot removed Left     left ankle spot removed    THROMBECTOMY         Time Tracking:     PT Received On: 12/16/24  PT Start Time: 1450     PT Stop Time: 1519  PT Total Time (min): 29 min     Billable Minutes: Evaluation mod and Therapeutic Activity 10      12/16/2024

## 2024-12-16 NOTE — DISCHARGE SUMMARY
Today, discharge [facility] Medicine Discharge Summary    Admit Date: 12/12/2024  Discharge Date and Time: 12/16/20241:07 PM  Admitting Physician:  Team  Discharging Physician: Jamie Philip MD.  Primary Care Physician: Miya Silverio NP  Consults: General Surgery ,vascular surgery    Discharge Diagnoses:  Acute DVT RLE 12/10/2024 post op possibly provoked status post IVC filter 12/14/2024   Cirrhosis with ascites status post paracentesis 12/13/2024  RLQ abd pain, unclear etiology- -neg UTI, neg SBP, not related to surgery per surgical team  Right flank/ Lumbar pain?  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 female with a history that includes chronic hepatitis C, cirrhosis of the liver, and recent SB resection s/t volvulus and ischemic bowel, presented to ED with RLQ abdominal pain x 1 day.  She denies nausea or vomiting, reports her stools have returned to normal since surgery and has been regular.  She also denied any fever or chills.    Patient recently hospitalized under surgical services, where she required small bowel resection with Dr. Velazquez on 11/26 due to volvulus and associated ischemic bowel; she was discharged home on 12/3.   She did have some leg swelling and RLE venous ultrasound on 12/10 noted DVT involving peroneal veins extending proximally all the way into the right distal femoral and mid femoral veins; for which patient was started on Eliquis.  Otherwise was doing well since surgery.    Evaluation in the ED notes patient afebrile and hemodynamically stable; oxygenating well on room air.  Labs note normal white count with a unremarkable serum chemistries.  UA notes negative nitrites, positive LE, small amount of WBCs and a few bacteria (also notes few squamous epithelial cells).  CT of the abdomen and pelvis  notes a moderate amount of ascites, though no free air, and otherwise unremarkable.  Patient admitted to  service.     General surgery team evaluated the patient, suspected clinical picture not appearing to be related to recent surgery, surgical team signed off.  Recommended UTI workup, urine cultures no significant growth, [id number] colonies Gram-negative rods     Patient had paracentesis 12/13/2024 with IR, 1.3 L serous fluid was drained,  negative for SBP, saag >1.1 likely portal hypertension.     Patient was recently initiated on Eliquis for acute DVT right lower extremity by surgery team but patient with chronic thrombocytopenia in the setting of cirrhosis and platelets have dropped below 50 K , she was deemed not a ideal candidate for  anticoagulation long-term for DVT due to thrombocytopenia , age 84, recent surgery ,cirrhosis, vascular surgery was consulted for IVC filter placement.  Vascular surgery team was consulted, surgery team evaluated the patient, discussed the indications, procedure with the patient upon agreement, Patient underwent IVC filter placement 12/14/2024, with no acute issues.      Postprocedure platelets were monitored,had extensive discussion with the patient and daughter at bedside regarding IVC filter, Eliquis use, low platelets.   They verbalized understanding that IVC filter helps to prevent  clot migration but can not prevent the new clot to form, greatest benefit of anticoagulation occurs within 1st few days or weeks, agreed with Eliquis to continue at 5 bid if plt count above 50k given the proximal nature of DVT, given her decreased mobility from recent surgery.  Recommended close follow up with PCP , cardiology to see patient's tolerance to Eliquis and platelet count and decide on treatment duration with Eliquis versus  discontinuation.     Pt was seen and examined on the day of discharge, stable, expressed her wish to go home today, patient was discharged to home with home  [facility].  Prescription sent.  Patient was discharged on antibiotics empirically.  Vitals:  VITAL SIGNS: 24 HRS MIN & MAX LAST   Temp  Min: 97.5 °F (36.4 °C)  Max: 99.1 °F (37.3 °C) 99.1 °F (37.3 °C)   BP  Min: 106/56  Max: 129/58 111/62   Pulse  Min: 49  Max: 78  66   Resp  Min: 18  Max: 18 18   SpO2  Min: 88 %  Max: 100 % 98 %       Physical Exam:  General: In no acute distress, afebrile  Chest:  Unlabored breathing on room  Heart:  Normal rate  Abdomen: Soft, nontender  MSK: some ttp right para spinal/lumbar  area  Neurologic: Alert and appropriately responding    Procedures Performed:  IV C filter placement    Significant Diagnostic Studies: See Full reports for all details    Recent Labs   Lab 12/14/24  0903 12/15/24  0457 12/16/24  0517   WBC 5.27 3.40* 3.34*   RBC 3.64* 3.38* 3.41*   HGB 11.1* 10.1* 10.3*   HCT 35.3* 32.5* 31.9*   MCV 97.0* 96.2* 93.5   MCH 30.5 29.9 30.2   MCHC 31.4* 31.1* 32.3*   RDW 16.0 16.0 15.9   PLT 49* 54* 53*   MPV 11.3* 11.1* 11.2*       Recent Labs   Lab 12/10/24  1559 12/12/24  1635 12/13/24  0552 12/14/24  0903 12/15/24  0457    139 138 140 139   K 3.9 4.0 4.2 3.8 4.1    108* 108* 109* 107   CO2 27 27 24 24 25   BUN 18.2 13.0 14.2 16.9 21.1*   CREATININE 0.70 0.75 0.78 0.83 0.79   CALCIUM 8.3* 9.2 8.8 9.0 8.7   ALBUMIN 3.1* 2.9* 2.5*  --   --    ALKPHOS 133 125 107  --   --    ALT 20 19 19  --   --    AST 28 27 25  --   --    BILITOT 1.7* 1.6* 1.2  --   --         Microbiology Results (last 7 days)       Procedure Component Value Units Date/Time    Body Fluid Culture [[id number]] Collected: 12/13/24 1108    Order Status: Completed Specimen: Body Fluid from Ascitic Fluid Updated: 12/16/24 0932     Body Fluid Culture No Growth At 72 Hours    Urine culture [[id number]]  (Abnormal)  (Susceptibility) Collected: 12/12/24 1739    Order Status: Completed Specimen: Urine Updated: 12/15/24 0807     Urine Culture No other significant growth      50,000-75,000 colonies/ml  Escherichia coli    Gram Stain [[id number]] Collected: 12/13/24 1108    Order Status: Completed Specimen: Body Fluid from Ascitic Fluid Updated: 12/13/24 2116     GRAM STAIN Rare WBC observed      No bacteria seen             Cardiac catheterization  Procedure performed in the Invasive Lab    - See Procedure Log link below for nursing documentation    - See OpNote on Surgeries Tab for physician findings    - See Imaging Tab for radiologist dictation         Medication List        START taking these medications      amoxicillin-clavulanate 500-125mg 500-125 mg Tab  Commonly known as: AUGMENTIN  Take 1 tablet (500 mg total) by mouth 3 (three) times daily. for 4 days     methocarbamoL 500 MG Tab  Commonly known as: ROBAXIN  Take 1 tablet (500 mg total) by mouth every 6 (six) hours as needed (Muscle spasm).            CHANGE how you take these medications      apixaban 5 mg Tab  Commonly known as: ELIQUIS  Take 1 tablet (5 mg total) by mouth 2 (two) times daily.  What changed: See the new instructions.            CONTINUE taking these medications      nadoloL 40 MG tablet  Commonly known as: CORGARD     rifAXIMin 550 mg Tab  Commonly known as: XIFAXAN  Take 1 tablet (550 mg total) by mouth 2 (two) times daily.     spironolactone 25 MG tablet  Commonly known as: ALDACTONE            ASK your doctor about these medications      lactulose 10 gram/15 mL solution  Commonly known as: CHRONULAC  Take 30 mLs (20 g total) by mouth daily as needed.               Where to Get Your Medications        These medications were sent to [facility]/pharmacy #[id number] - [place] - [address] [facility]  [address] [address]      Hours: 24-hours Phone: [phone]   amoxicillin-clavulanate 500-125mg 500-125 mg Tab  methocarbamoL 500 MG Tab       Information about where to get these medications is not yet available    Ask your nurse or doctor about these medications  apixaban 5 mg Tab           Explained in detail to the patient about the discharge plan, medications, and follow-up visits. Pt understands and agrees with the treatment plan  Discharge Disposition:  Home with home health   Discharged Condition: stable  Diet-   Dietary Orders (From admission, onward)       Start     Ordered    12/14/24 0929  Diet Heart Healthy  Diet effective now         12/14/24 0929                   Medications Per DC med rec  Activities as tolerated   Follow-up Information       Miya Silverio, NP Follow up.    Specialty: Internal Medicine  Contact information:  [facility] [facility] [address]  [phone]               . Follow up.    Why: Please follow up with your cardiologist             . Follow up.    Why: Please follow up with GI, referral sent.                         For further questions contact hospitalist office    Discharge time 33 minutes    For worsening symptoms, chest pain, shortness of breath, increased abdominal pain, high grade fever, stroke or stroke like symptoms, immediately go to the nearest Emergency Room or call 911 as soon as possible.      Jamie Hannah M.D, on 12/16/2024. at 1:07 PM.

## 2024-12-16 NOTE — PROGRESS NOTES
[facility] Note    Chief Complaint      Chief Complaint   Patient presents with    Post-op Problem       History of Present Illness      Joy Donahue is a 84 y.o. female who presents today via virtual visit for   Chief Complaint   Patient presents with    Post-op Problem         Patient is known to me.  She presents to clinic via virtual visit to discuss need for home health and physical therapy.  Patient recently underwent a small bowel obstruction removal and deep vein thrombosis.  She has been hospitalized for 10 days and finds that she has generalized weakness.  She would like to be evaluated for physical therapy and occupational therapy when she is discharged to home.  She believes she may be discharged tomorrow, 12/17/2024.  She reports that she is in good spirits and feels positive about the future.         Review of Systems   Neurological:  Positive for weakness.   All 12 systems otherwise negative.       Family History:  family history includes Clotting disorder in her sister; Drug abuse in her daughter; Heart disease in her daughter, father, and mother; Heart failure in her brother; Hypertension in her father; No Known Problems in her maternal grandfather, maternal grandmother, paternal grandfather, paternal grandmother, and son; Pacemaker/defibrilator in her brother and father.   Family history was reviewed with patient.     Medications:  Facility-Administered Encounter Medications as of 12/16/2024   Medication Dose Route Frequency Provider Last Rate Last Admin    acetaminophen tablet 650 mg  650 mg Oral Q4H PRN Suleman Reyes PA-C        apixaban tablet 5 mg  5 mg Oral BID Jamie Philip MD   5 mg at 12/16/24 0843    methocarbamoL tablet 500 mg  500 mg Oral Q6H PRN Jamie Philip MD        ondansetron disintegrating tablet 8 mg  8 mg Oral Q8H PRN Suleman Reyes PA-C        piperacillin-tazobactam (ZOSYN) 4.5 g in D5W 100 mL IVPB (MB+)  4.5 g Intravenous Q8H Gopal  Jone EDWARDS MD   Stopped at 12/16/24 0805    propranoloL tablet 40 mg  40 mg Oral BID Jone Herron MD   40 mg at 12/16/24 0843    rifAXIMin tablet 550 mg  550 mg Oral BID Jone Herron MD   550 mg at 12/16/24 0843    spironolactone tablet 25 mg  25 mg Oral Daily Jone Herron MD   25 mg at 12/16/24 0843     Outpatient Encounter Medications as of 12/16/2024   Medication Sig Dispense Refill    amoxicillin-clavulanate 500-125mg (AUGMENTIN) 500-125 mg Tab Take 1 tablet (500 mg total) by mouth 3 (three) times daily. for 4 days 12 tablet 0    apixaban (ELIQUIS) 5 mg Tab Take 1 tablet (5 mg total) by mouth 2 (two) times daily.      lactulose (CHRONULAC) 10 gram/15 mL solution Take 30 mLs (20 g total) by mouth daily as needed. (Patient not taking: Reported on 12/10/2024) 1892 mL 11    methocarbamoL (ROBAXIN) 500 MG Tab Take 1 tablet (500 mg total) by mouth every 6 (six) hours as needed (Muscle spasm). 30 tablet 0    nadoloL (CORGARD) 40 MG tablet Take 40 mg by mouth once daily.      rifAXIMin (XIFAXAN) 550 mg Tab Take 1 tablet (550 mg total) by mouth 2 (two) times daily. 180 tablet 3    spironolactone (ALDACTONE) 25 MG tablet Take 25 mg by mouth once daily.      [DISCONTINUED] apixaban (ELIQUIS) 5 mg Tab Take 2 tablets (10 mg total) by mouth 2 (two) times daily for 7 days, THEN 1 tablet (5 mg total) 2 (two) times daily for 23 days. 74 tablet 0       Allergies:  Review of patient's allergies indicates:   Allergen Reactions    Ciprofloxacin Swelling    Iodine Shortness Of Breath    Latex Itching    Fluoride      Mouth sores    Fluoride preparations Dermatitis     Other reaction(s): Ulcer of mouth       Health Maintenance:  Health Maintenance   Topic Date Due    RSV Vaccine (Age 60+ and Pregnant patients) (1 - 1-dose 75+ series) Never done    COVID-19 Vaccine (3 - Pfizer risk series) 04/24/2021    Influenza Vaccine (1) 09/01/2024    Shingles Vaccine (2 of 2) 09/27/2024    Pneumococcal Vaccines (Age 65+) (2 of 2 - PCV)  08/02/2025 (Originally 10/6/2022)    DEXA Scan  05/11/2026 (Originally 7/5/2019)    TETANUS VACCINE  12/03/2026    Lipid Panel  07/30/2029     Health Maintenance Topics with due status: Not Due       Topic Last Completion Date    TETANUS VACCINE 12/03/2016    Lipid Panel 07/30/2024       Physical Exam       ]        Assessment/Plan     Joy Donahue is a 84 y.o.female with:    Generalized weakness  -     Ambulatory referral/consult to Home Health; Future; Expected date: 12/17/2024    Hypertensive heart disease with heart failure  -     Ambulatory referral/consult to Home Health; Future; Expected date: 12/17/2024      The patient location is: hospital  The chief complaint leading to consultation is: request for HH    Visit type: audiovisual    Face to Face time with patient:   Five minutes of total time spent on the encounter, which includes face to face time and non-face to face time preparing to see the patient (eg, review of tests), Obtaining and/or reviewing separately obtained history, Documenting clinical information in the electronic or other health record, Independently interpreting results (not separately reported) and communicating results to the patient/family/caregiver, or Care coordination (not separately reported).         Each patient to whom he or she provides medical services by telemedicine is:  (1) informed of the relationship between the physician and patient and the respective role of any other health care provider with respect to management of the patient; and (2) notified that he or she may decline to receive medical services by telemedicine and may withdraw from such care at any time.   As above, continue current medications and maintain follow up with specialists.  Return to clinic as needed.    Greater than 50% of this time was spent face to face via virtual visit with patient.  All questions were answered to patient's satisfaction.    The patient location is: [facility]  The chief  complaint leading to consultation is: request for HH    Visit type: audiovisual    Face to Face time with patient:   Five minutes of total time spent on the encounter, which includes face to face time and non-face to face time preparing to see the patient (eg, review of tests), Obtaining and/or reviewing separately obtained history, Documenting clinical information in the electronic or other health record, Independently interpreting results (not separately reported) and communicating results to the patient/family/caregiver, or Care coordination (not separately reported).         Each patient to whom he or she provides medical services by telemedicine is:  (1) informed of the relationship between the physician and patient and the respective role of any other health care provider with respect to management of the patient; and (2) notified that he or she may decline to receive medical services by telemedicine and may withdraw from such care at any time.           Miya Silverio, NP-C  [facility]    Answers submitted by the patient for this visit:  Review of Systems Questionnaire (Submitted on 12/16/2024)  activity change: Yes  unexpected weight change: No  neck pain: No  hearing loss: No  rhinorrhea: No  trouble swallowing: No  eye discharge: No  visual disturbance: No  chest tightness: No  wheezing: No  chest pain: No  palpitations: No  blood in stool: No  constipation: No  vomiting: No  diarrhea: No  polydipsia: No  polyuria: No  difficulty urinating: No  hematuria: No  menstrual problem: No  dysuria: No  joint swelling: No  arthralgias: No  headaches: No  weakness: Yes  confusion: Yes  dysphoric mood: No

## 2024-12-16 NOTE — PLAN OF CARE
Problem: Physical Therapy  Goal: Physical Therapy Goal  Description: Goals to be met by: 2025     Patient will increase functional independence with mobility by performin. Supine to sit with Modified Rowan  2. Sit to stand transfer with Modified Rowan  3. Gait  x 300 feet with Modified Rowan using Rolling Walker.   4. Pt able to stand from commode mod Ind with RW and grab bars    Outcome: Progressing

## 2024-12-18 LAB — BACTERIA FLD CULT: NORMAL

## 2024-12-27 ENCOUNTER — CLINICAL SUPPORT (OUTPATIENT)
Dept: CARDIOLOGY | Facility: HOSPITAL | Age: 84
End: 2024-12-27
Attending: INTERNAL MEDICINE
Payer: MEDICARE

## 2024-12-27 ENCOUNTER — TELEPHONE (OUTPATIENT)
Dept: ELECTROPHYSIOLOGY | Facility: CLINIC | Age: 84
End: 2024-12-27
Payer: MEDICARE

## 2024-12-27 ENCOUNTER — OFFICE VISIT (OUTPATIENT)
Dept: CARDIOLOGY | Facility: CLINIC | Age: 84
End: 2024-12-27
Payer: MEDICARE

## 2024-12-27 VITALS
BODY MASS INDEX: 26.26 KG/M2 | DIASTOLIC BLOOD PRESSURE: 74 MMHG | HEART RATE: 98 BPM | WEIGHT: 162.69 LBS | SYSTOLIC BLOOD PRESSURE: 119 MMHG

## 2024-12-27 DIAGNOSIS — I50.33 ACUTE ON CHRONIC DIASTOLIC CONGESTIVE HEART FAILURE: Primary | ICD-10-CM

## 2024-12-27 DIAGNOSIS — K74.60 CIRRHOSIS OF LIVER WITH ASCITES, UNSPECIFIED HEPATIC CIRRHOSIS TYPE: ICD-10-CM

## 2024-12-27 DIAGNOSIS — R18.8 CIRRHOSIS OF LIVER WITH ASCITES, UNSPECIFIED HEPATIC CIRRHOSIS TYPE: ICD-10-CM

## 2024-12-27 DIAGNOSIS — I48.91 ATRIAL FIBRILLATION, UNSPECIFIED TYPE: ICD-10-CM

## 2024-12-27 DIAGNOSIS — D69.6 THROMBOCYTOPENIA, UNSPECIFIED: ICD-10-CM

## 2024-12-27 DIAGNOSIS — Z95.0 PACEMAKER: ICD-10-CM

## 2024-12-27 DIAGNOSIS — Z45.010 PACEMAKER BATTERY DEPLETION: ICD-10-CM

## 2024-12-27 DIAGNOSIS — I82.411 DEEP VEIN THROMBOSIS (DVT) OF FEMORAL VEIN OF RIGHT LOWER EXTREMITY, UNSPECIFIED CHRONICITY: ICD-10-CM

## 2024-12-27 PROCEDURE — 99999 PR PBB SHADOW E&M-EST. PATIENT-LVL III: CPT | Mod: PBBFAC,,, | Performed by: INTERNAL MEDICINE

## 2024-12-27 PROCEDURE — 93280 PM DEVICE PROGR EVAL DUAL: CPT

## 2024-12-27 RX ORDER — SPIRONOLACTONE 25 MG/1
25 TABLET ORAL DAILY
Qty: 30 TABLET | Refills: 11 | Status: SHIPPED | OUTPATIENT
Start: 2024-12-27

## 2024-12-27 RX ORDER — FUROSEMIDE 40 MG/1
40 TABLET ORAL DAILY PRN
Qty: 30 TABLET | Refills: 11 | Status: SHIPPED | OUTPATIENT
Start: 2024-12-27 | End: 2025-12-27

## 2024-12-27 RX ORDER — NADOLOL 40 MG/1
20 TABLET ORAL DAILY
Qty: 30 TABLET | Refills: 11 | Status: SHIPPED | OUTPATIENT
Start: 2024-12-27

## 2024-12-27 NOTE — PROGRESS NOTES
Subjective:   12/27/2024     Patient ID:  Joy Donahue is a 84 y.o. female who presents for evaulation of Establish Care      Comes in today for follow-up.  She has had a quite complicated recent medical history.  Epigastric pain in mid November lead to partial small-bowel resection for obstruction.  She subsequently developed a right leg DVT, because of thrombocytopenia, Eliquis was begun at 5 mg twice a day, but not 10 mg twice a day.  She eventually had a IVC filter placed.  She developed ascites, underwent paracentesis of 1 L of fluid.    She continues to have mild lower extremity edema.  She is not having chest pains or tightness.      She is known to have cirrhosis, history of hepatitis-C.    Prior cardiac evaluation:  DISCHARGE DIAGNOSES:  1. Diastolic heart failure.  2. Chronic hepatitis C.  3. Hepatic cirrhosis with portal hypertension.  4. Hypersplenism.    PROCEDURES PERFORMED: Right heart catheterization, left heart  catheterization, coronary angiogram, left ventricular angiogram, hepatic  wedge pressure management.    HOSPITAL COURSE: This is a 77-year-old white female, who has chronic  liver troubles. She also had some heart block, which seemed to be related  to some antiviral therapy. She had a pacer placed a few years back. She  presented again with dyspnea and chest pain, which failed diagnosis with  noninvasive workup. Because of her multiple medical problems, we opted to  proceed with angiography.    She was taken to the cath lab on February 19th, where she was found have a  mean right atrial pressure of 14. Hepatic wedge was 17. Right  ventricular pressure was 47/15 with a pulmonary artery pressure of 47/23.  Wedge pressure had an A wave of 25, V wave 29, mean 23. Left ventricular  end-diastolic pressure was 26. There was no aortic valve gradient. EF  was normal, estimated at 65% to 70%. She had mild mitral regurgitation.    Her coronaries had minimal luminal irregularity without  significant  obstruction. She had a right dominant pattern.    Because of these findings, we feel she is having a little element of  diastolic heart failure, but her chest pain is not cardiac in etiology.  She is thus going to be sent back to her primary docs and GI docs for  further workup as they see fit.          Past Medical History:   Diagnosis Date    Acute (reversible) ischemia of intestine, part and extent unspecified 08/24/2022    Acute cystitis without hematuria 08/14/2023    Cirrhosis of liver with ascites 02/14/2023    Continue to follow up with hepatologist    COVID     x 2-residual dysgeusia    Hepatitis C     treated/cured previously    Pacemaker     Paroxysmal atrial fibrillation     Pericardial cyst     Skin cancer     Thrombus        Review of patient's allergies indicates:   Allergen Reactions    Ciprofloxacin Swelling    Iodine Shortness Of Breath    Latex Itching    Fluoride      Mouth sores    Fluoride preparations Dermatitis     Other reaction(s): Ulcer of mouth         Current Outpatient Medications:     apixaban (ELIQUIS) 5 mg Tab, Take 1 tablet (5 mg total) by mouth 2 (two) times daily., Disp: , Rfl:     rifAXIMin (XIFAXAN) 550 mg Tab, Take 1 tablet (550 mg total) by mouth 2 (two) times daily., Disp: 180 tablet, Rfl: 3    furosemide (LASIX) 40 MG tablet, Take 1 tablet (40 mg total) by mouth daily as needed (fluid)., Disp: 30 tablet, Rfl: 11    lactulose (CHRONULAC) 10 gram/15 mL solution, Take 30 mLs (20 g total) by mouth daily as needed. (Patient not taking: Reported on 12/10/2024), Disp: 1892 mL, Rfl: 11    nadoloL (CORGARD) 40 MG tablet, Take 0.5 tablets (20 mg total) by mouth once daily., Disp: 30 tablet, Rfl: 11    spironolactone (ALDACTONE) 25 MG tablet, Take 1 tablet (25 mg total) by mouth once daily., Disp: 30 tablet, Rfl: 11     Objective:   Review of Systems   Cardiovascular:  Positive for dyspnea on exertion and leg swelling. Negative for chest pain, claudication, cyanosis,  irregular heartbeat, near-syncope, orthopnea, palpitations, paroxysmal nocturnal dyspnea and syncope.         Vitals:    12/27/24 1424   BP: 119/74   Pulse: 98     Wt Readings from Last 3 Encounters:   12/27/24 73.8 kg (162 lb 11.2 oz)   12/12/24 73.5 kg (162 lb 0.6 oz)   12/10/24 73.5 kg (162 lb)     Temp Readings from Last 3 Encounters:   12/16/24 99.1 °F (37.3 °C) (Oral)   12/10/24 97.8 °F (36.6 °C) (Oral)   12/03/24 98.1 °F (36.7 °C) (Oral)     BP Readings from Last 3 Encounters:   12/27/24 119/74   12/16/24 111/62   12/10/24 125/60     Pulse Readings from Last 3 Encounters:   12/27/24 98   12/16/24 66   12/10/24 71             Physical Exam  Vitals reviewed.   Constitutional:       General: She is not in acute distress.     Appearance: She is well-developed.   HENT:      Head: Normocephalic and atraumatic.      Nose: Nose normal.   Eyes:      Conjunctiva/sclera: Conjunctivae normal.      Pupils: Pupils are equal, round, and reactive to light.   Neck:      Vascular: No carotid bruit or JVD.   Cardiovascular:      Rate and Rhythm: Normal rate and regular rhythm.      Pulses: Intact distal pulses.      Heart sounds: Normal heart sounds. No murmur heard.     No friction rub. No gallop.   Pulmonary:      Effort: Pulmonary effort is normal. No respiratory distress.      Breath sounds: Normal breath sounds. No wheezing or rales.   Chest:      Chest wall: No tenderness.   Abdominal:      General: Bowel sounds are normal. There is no distension.      Palpations: Abdomen is soft.      Tenderness: There is no abdominal tenderness.   Musculoskeletal:         General: No tenderness or deformity. Normal range of motion.      Cervical back: Normal range of motion and neck supple.      Right lower leg: Edema present.      Left lower leg: Edema present.   Skin:     General: Skin is warm and dry.      Findings: No erythema or rash.   Neurological:      Mental Status: She is alert and oriented to person, place, and time.       Cranial Nerves: No cranial nerve deficit.      Motor: No abnormal muscle tone.      Coordination: Coordination normal.   Psychiatric:         Behavior: Behavior normal.         Thought Content: Thought content normal.         Judgment: Judgment normal.           Lab Results   Component Value Date    CHOL 227 (H) 07/30/2024    CHOL 208 (A) 10/18/2022    CHOL 205 04/07/2022     Lab Results   Component Value Date    HDL 76 (H) 07/30/2024    HDL 71 (A) 10/18/2022    HDL 66 04/07/2022     Lab Results   Component Value Date    LDLCALC 122 10/18/2022     Lab Results   Component Value Date    ALT 19 12/13/2024    AST 25 12/13/2024    AST 27 12/12/2024    AST 28 12/10/2024     Lab Results   Component Value Date    CREATININE 0.79 12/15/2024    BUN 21.1 (H) 12/15/2024     12/15/2024    K 4.1 12/15/2024    CO2 25 12/15/2024    CO2 24 12/14/2024    CO2 24 12/13/2024     Lab Results   Component Value Date    HGB 10.3 (L) 12/16/2024    HCT 31.9 (L) 12/16/2024    HCT 32.5 (L) 12/15/2024    HCT 35.3 (L) 12/14/2024             EKG shows sinus rhythm with frequent atrial extrasystoles and paced ventricular beats          Assessment and Plan:     Acute on chronic diastolic congestive heart failure  Comments:  Will treat with furosemide PRN and spironolactone daily  Orders:  -     IN OFFICE EKG 12-LEAD (to [facility])  -     spironolactone (ALDACTONE) 25 MG tablet; Take 1 tablet (25 mg total) by mouth once daily.  Dispense: 30 tablet; Refill: 11  -     furosemide (LASIX) 40 MG tablet; Take 1 tablet (40 mg total) by mouth daily as needed (fluid).  Dispense: 30 tablet; Refill: 11  -     Comprehensive Metabolic Panel; Future; Expected date: 01/06/2025    Cirrhosis of liver with ascites, unspecified hepatic cirrhosis type  -     nadoloL (CORGARD) 40 MG tablet; Take 0.5 tablets (20 mg total) by mouth once daily.  Dispense: 30 tablet; Refill: 11  -     CBC Auto Differential; Future; Expected date: 01/06/2025    Deep vein thrombosis (DVT) of  femoral vein of right lower extremity, unspecified chronicity  Comments:  Eliquis should be twice a day, now only taking it once daily    Thrombocytopenia, unspecified  Comments:  Check lab in 3 days    Pacemaker  Comments:  At [facility], generator change arranged    Atrial fibrillation, unspecified type  Comments:  Currently not in fibrillation, none noted on pacemaker         Follow up in about 2 weeks (around 1/10/2025).          Future Appointments   Date Time Provider Department Center   12/30/2024  9:45 AM LAB, SPECIMEN [facility] MERRY PERKINS [facility]   1/9/2025 10:00 AM LAB, APPOINTMENT [facility] LAB IM [facility]   1/16/2025 11:00 AM 3PREP, [facility] BERNSTEIN [facility]

## 2024-12-27 NOTE — TELEPHONE ENCOUNTER
Patient presented to [facility]  this afternoon requesting device check due to HR reading in the 40's at home. Patient has Medtronic Dual chamber PPM- @ [facility] since 11/11/2024 with Gen change scheduled 1/16/24 (rescheduled due to small bowel resection surgery complicated by right leg DVT- now on Eliquis and s/p IVC filter 12/14/2024).    Patient accompanied by daughter- reporting increased fluid retention and being discharged from [facility] without Spironolactone refill and is out of this medication. Informed patient and family that this medication would need to be refilled through a primary care physician or general cardiologist as this is not a medication managed by Electrophysiologist. Patient's daughter reports patient is transferring care from [place] to [place] and requests appointment to see general cardiologist today while they are already at [facility]. Alyssia Device RN supervisor attempted to schedule same day provider appt with General Cardiologist but no appointment was available today. Appt scheduled w/ Dr Mullins 1/10/2025. Patient's daughter also sent portal message to general NP to schedule appt.     Device function WNL, programmed AAIR-DDDR   Presenting ECG Ap/Vs with frequent bigeminal PVC's  Single PVC burden >1000/hr (increased from 8/2024 @ 780 single PVC's/hr)    Discussed findings with Dr Damian (consults for Dr Singh) who was in agreement with the following programming changes:   Atrial preferential pacing turned ON to minimize frequent compensatory pauses due to bigeminal PVC's

## 2024-12-30 ENCOUNTER — TELEPHONE (OUTPATIENT)
Dept: PRIMARY CARE CLINIC | Facility: CLINIC | Age: 84
End: 2024-12-30
Payer: MEDICARE

## 2024-12-30 ENCOUNTER — LAB VISIT (OUTPATIENT)
Dept: LAB | Facility: HOSPITAL | Age: 84
End: 2024-12-30
Attending: INTERNAL MEDICINE
Payer: MEDICARE

## 2024-12-30 DIAGNOSIS — K74.60 CIRRHOSIS OF LIVER WITH ASCITES, UNSPECIFIED HEPATIC CIRRHOSIS TYPE: ICD-10-CM

## 2024-12-30 DIAGNOSIS — I50.33 ACUTE ON CHRONIC DIASTOLIC CONGESTIVE HEART FAILURE: ICD-10-CM

## 2024-12-30 DIAGNOSIS — R18.8 CIRRHOSIS OF LIVER WITH ASCITES, UNSPECIFIED HEPATIC CIRRHOSIS TYPE: ICD-10-CM

## 2024-12-30 LAB
ALBUMIN SERPL BCP-MCNC: 2.8 G/DL (ref 3.5–5.2)
ALP SERPL-CCNC: 106 U/L (ref 40–150)
ALT SERPL W/O P-5'-P-CCNC: 32 U/L (ref 10–44)
ANION GAP SERPL CALC-SCNC: 7 MMOL/L (ref 8–16)
AST SERPL-CCNC: 43 U/L (ref 10–40)
BASOPHILS # BLD AUTO: 0.02 K/UL (ref 0–0.2)
BASOPHILS NFR BLD: 0.5 % (ref 0–1.9)
BILIRUB SERPL-MCNC: 1 MG/DL (ref 0.1–1)
BUN SERPL-MCNC: 12 MG/DL (ref 8–23)
CALCIUM SERPL-MCNC: 8.9 MG/DL (ref 8.7–10.5)
CHLORIDE SERPL-SCNC: 107 MMOL/L (ref 95–110)
CO2 SERPL-SCNC: 26 MMOL/L (ref 23–29)
CREAT SERPL-MCNC: 0.7 MG/DL (ref 0.5–1.4)
DIFFERENTIAL METHOD BLD: ABNORMAL
EOSINOPHIL # BLD AUTO: 0 K/UL (ref 0–0.5)
EOSINOPHIL NFR BLD: 1.1 % (ref 0–8)
ERYTHROCYTE [DISTWIDTH] IN BLOOD BY AUTOMATED COUNT: 15.9 % (ref 11.5–14.5)
EST. GFR  (NO RACE VARIABLE): >60 ML/MIN/1.73 M^2
GLUCOSE SERPL-MCNC: 127 MG/DL (ref 70–110)
HCT VFR BLD AUTO: 34.5 % (ref 37–48.5)
HGB BLD-MCNC: 10.5 G/DL (ref 12–16)
IMM GRANULOCYTES # BLD AUTO: 0.13 K/UL (ref 0–0.04)
IMM GRANULOCYTES NFR BLD AUTO: 3.5 % (ref 0–0.5)
LYMPHOCYTES # BLD AUTO: 0.7 K/UL (ref 1–4.8)
LYMPHOCYTES NFR BLD: 18.4 % (ref 18–48)
MCH RBC QN AUTO: 29.2 PG (ref 27–31)
MCHC RBC AUTO-ENTMCNC: 30.4 G/DL (ref 32–36)
MCV RBC AUTO: 96 FL (ref 82–98)
MONOCYTES # BLD AUTO: 0.5 K/UL (ref 0.3–1)
MONOCYTES NFR BLD: 13.5 % (ref 4–15)
NEUTROPHILS # BLD AUTO: 2.3 K/UL (ref 1.8–7.7)
NEUTROPHILS NFR BLD: 63 % (ref 38–73)
NRBC BLD-RTO: 0 /100 WBC
PLATELET # BLD AUTO: 60 K/UL (ref 150–450)
PMV BLD AUTO: 12.7 FL (ref 9.2–12.9)
POTASSIUM SERPL-SCNC: 3.6 MMOL/L (ref 3.5–5.1)
PROT SERPL-MCNC: 5.6 G/DL (ref 6–8.4)
RBC # BLD AUTO: 3.59 M/UL (ref 4–5.4)
SODIUM SERPL-SCNC: 140 MMOL/L (ref 136–145)
WBC # BLD AUTO: 3.7 K/UL (ref 3.9–12.7)

## 2024-12-30 PROCEDURE — 85025 COMPLETE CBC W/AUTO DIFF WBC: CPT | Performed by: INTERNAL MEDICINE

## 2024-12-30 PROCEDURE — 36415 COLL VENOUS BLD VENIPUNCTURE: CPT | Performed by: INTERNAL MEDICINE

## 2024-12-30 PROCEDURE — 80053 COMPREHEN METABOLIC PANEL: CPT | Performed by: INTERNAL MEDICINE

## 2024-12-30 NOTE — TELEPHONE ENCOUNTER
LVM for patient to call us or message to schedule with Dr Subramanian for her post op on her legs. On 12/31 at 3:20 pm

## 2024-12-31 ENCOUNTER — OFFICE VISIT (OUTPATIENT)
Dept: PRIMARY CARE CLINIC | Facility: CLINIC | Age: 84
End: 2024-12-31
Payer: MEDICARE

## 2024-12-31 VITALS
DIASTOLIC BLOOD PRESSURE: 76 MMHG | BODY MASS INDEX: 26.58 KG/M2 | HEART RATE: 78 BPM | WEIGHT: 165.38 LBS | SYSTOLIC BLOOD PRESSURE: 120 MMHG | OXYGEN SATURATION: 98 % | HEIGHT: 66 IN

## 2024-12-31 DIAGNOSIS — I50.33 ACUTE ON CHRONIC DIASTOLIC CONGESTIVE HEART FAILURE: ICD-10-CM

## 2024-12-31 DIAGNOSIS — D64.9 ANEMIA, UNSPECIFIED TYPE: ICD-10-CM

## 2024-12-31 DIAGNOSIS — R18.8 CIRRHOSIS OF LIVER WITH ASCITES, UNSPECIFIED HEPATIC CIRRHOSIS TYPE: ICD-10-CM

## 2024-12-31 DIAGNOSIS — R60.0 LOWER EXTREMITY EDEMA: Primary | ICD-10-CM

## 2024-12-31 DIAGNOSIS — D69.6 THROMBOCYTOPENIA, UNSPECIFIED: ICD-10-CM

## 2024-12-31 DIAGNOSIS — Z90.49 S/P SMALL BOWEL RESECTION: ICD-10-CM

## 2024-12-31 DIAGNOSIS — K74.60 CIRRHOSIS OF LIVER WITH ASCITES, UNSPECIFIED HEPATIC CIRRHOSIS TYPE: ICD-10-CM

## 2024-12-31 DIAGNOSIS — B17.10 ACUTE HEPATITIS C VIRUS INFECTION WITHOUT HEPATIC COMA: ICD-10-CM

## 2024-12-31 DIAGNOSIS — Z95.0 PACEMAKER: ICD-10-CM

## 2024-12-31 DIAGNOSIS — I48.91 ATRIAL FIBRILLATION, UNSPECIFIED TYPE: ICD-10-CM

## 2024-12-31 DIAGNOSIS — I82.411 DEEP VEIN THROMBOSIS (DVT) OF FEMORAL VEIN OF RIGHT LOWER EXTREMITY, UNSPECIFIED CHRONICITY: ICD-10-CM

## 2024-12-31 PROCEDURE — 99999 PR PBB SHADOW E&M-EST. PATIENT-LVL IV: CPT | Mod: PBBFAC,,, | Performed by: STUDENT IN AN ORGANIZED HEALTH CARE EDUCATION/TRAINING PROGRAM

## 2024-12-31 PROCEDURE — 1111F DSCHRG MED/CURRENT MED MERGE: CPT | Mod: CPTII,S$GLB,, | Performed by: STUDENT IN AN ORGANIZED HEALTH CARE EDUCATION/TRAINING PROGRAM

## 2024-12-31 PROCEDURE — 1125F AMNT PAIN NOTED PAIN PRSNT: CPT | Mod: CPTII,S$GLB,, | Performed by: STUDENT IN AN ORGANIZED HEALTH CARE EDUCATION/TRAINING PROGRAM

## 2024-12-31 PROCEDURE — 99215 OFFICE O/P EST HI 40 MIN: CPT | Mod: S$GLB,,, | Performed by: STUDENT IN AN ORGANIZED HEALTH CARE EDUCATION/TRAINING PROGRAM

## 2024-12-31 PROCEDURE — 1101F PT FALLS ASSESS-DOCD LE1/YR: CPT | Mod: CPTII,S$GLB,, | Performed by: STUDENT IN AN ORGANIZED HEALTH CARE EDUCATION/TRAINING PROGRAM

## 2024-12-31 PROCEDURE — 1159F MED LIST DOCD IN RCRD: CPT | Mod: CPTII,S$GLB,, | Performed by: STUDENT IN AN ORGANIZED HEALTH CARE EDUCATION/TRAINING PROGRAM

## 2024-12-31 PROCEDURE — 3288F FALL RISK ASSESSMENT DOCD: CPT | Mod: CPTII,S$GLB,, | Performed by: STUDENT IN AN ORGANIZED HEALTH CARE EDUCATION/TRAINING PROGRAM

## 2024-12-31 PROCEDURE — 3074F SYST BP LT 130 MM HG: CPT | Mod: CPTII,S$GLB,, | Performed by: STUDENT IN AN ORGANIZED HEALTH CARE EDUCATION/TRAINING PROGRAM

## 2024-12-31 PROCEDURE — 3078F DIAST BP <80 MM HG: CPT | Mod: CPTII,S$GLB,, | Performed by: STUDENT IN AN ORGANIZED HEALTH CARE EDUCATION/TRAINING PROGRAM

## 2024-12-31 RX ORDER — DOXYCYCLINE 100 MG/1
100 CAPSULE ORAL EVERY 12 HOURS
Qty: 14 CAPSULE | Refills: 0 | Status: SHIPPED | OUTPATIENT
Start: 2024-12-31 | End: 2025-01-07

## 2024-12-31 NOTE — PROGRESS NOTES
"Primary Care  Office Visit - In Person  12/31/2024      HPI    Patient is a 84 y.o.   Joy Donahue  has a past medical history of Acute (reversible) ischemia of intestine, part and extent unspecified (08/24/2022), Acute cystitis without hematuria (08/14/2023), Cirrhosis of liver with ascites (02/14/2023), COVID, Hepatitis C, Pacemaker, Paroxysmal atrial fibrillation, Pericardial cyst, Skin cancer, and Thrombus.    History of Present Illness    CHIEF COMPLAINT:  Patient presents today for follow-up after recent surgery.    POST-OPERATIVE COURSE:  She underwent emergent small bowel resection due to volvulus and ischemic bowel in on 11/26. Post-operatively, she developed DVT in her right leg. Given hx of thrombocytopenia 2/2 cirrhosis the decision was made to place IVC filter on 12/15. Eliquis was started given proximal nature of DVT and decreased mobility.     She has since developed a rash and redness on her abdomen that seems to be worsening. She has not has had f/u with surgery.     She has also been experiencing increased lower extremity edema. She has been taking spironolactone daily but reports use of of lasix only once.     She notes significant bruising on her right upper extremity.       Active Medications:  Current Outpatient Medications   Medication Instructions    apixaban (ELIQUIS) 5 mg, Oral, 2 times daily    doxycycline (VIBRAMYCIN) 100 mg, Oral, Every 12 hours    furosemide (LASIX) 40 mg, Oral, Daily PRN    lactulose (CHRONULAC) 20 g, Oral, Daily PRN    nadoloL (CORGARD) 20 mg, Oral, Daily    rifAXIMin (XIFAXAN) 550 mg, Oral, 2 times daily    spironolactone (ALDACTONE) 25 mg, Oral, Daily       Vitals:    12/31/24 1502   BP: 120/76   BP Location: Left arm   Patient Position: Sitting   Pulse: 78   SpO2: 98%   Weight: 75 kg (165 lb 5.5 oz)   Height: 5' 6" (1.676 m)       Physical Exam  Vitals reviewed.   Constitutional:       General: She is not in acute distress.  Cardiovascular:      Rate and " Rhythm: Normal rate and regular rhythm.      Pulses: Normal pulses.      Heart sounds: Normal heart sounds.   Pulmonary:      Effort: Pulmonary effort is normal.      Breath sounds: Normal breath sounds.   Abdominal:      General: Abdomen is flat. Bowel sounds are normal.      Palpations: Abdomen is soft.      Tenderness: There is no abdominal tenderness.   Musculoskeletal:      Right lower leg: Edema present.      Left lower leg: Edema present.   Skin:     Findings: Ecchymosis (RUE) and rash (erythema localized to abdomen) present.   Neurological:      General: No focal deficit present.      Mental Status: She is oriented to person, place, and time.            Assessment & Plan      Assessed bruising on arm, likely due to thrombocytopenia and Eliquis use  Evaluated abdominal redness  Reviewed recent hemoglobin levels, noting stability  Continued Eliquis for now, deferring final decision to Dr. Mullins (cardiology)  Considered swelling in feet, recommending continued use of Lasix and Spironolactone    LOWER EXTREMITY EDEMA:   Likely 2/2 hypoalbuminemia and fluid resuscitation during hospitalizations   Continued spironolactone 25 mg daily   Start lasix 40 mg daily   Can consider increasing spironolactone to 100 mg daily if tolerated   Patient has been lost to f/u with hepatology. Would appreciate input.     DVT:   S/p IVC filter   Hb stable and platelet count remains > 50k   F/u with cardiology   Pacemaker generator replacement is apparently playing a role is decision to continue Eliquis     HFpEF  Diuresis as above     ATRIAL FIBRILLATION S/P PM:   As above     CIRRHOSIS   THROMBOCYTOPENIA:   As above     S/P SMALL BOWEL RESECTION:   Since this was an emergent procedure, patient did not have routine OP f/u with surgery?  She is developing redness on abdomen around incision site which is concerning for infection   No leukocytosis noted on labs from yesterday   Started doxycyline.   Referral placed to surgery for  appropriate f/u.     ANEMIA:   Likely combined iron def and ACD   No significant drop in Hb after starting Eliquis     FOLLOW-UP:  Follow up in 2 weeks.           Orders Placed This Encounter    Ambulatory referral/consult to General Surgery    doxycycline (VIBRAMYCIN) 100 MG Cap         Previous labs, records, and notes reviewed and considered for their impact on clinical decision making today.                Upcoming Scheduled Appointments and Follow Up:    Future Appointments   Date Time Provider Department Center   1/9/2025 10:00 AM LAB, APPOINTMENT [facility] IM [facility]   1/10/2025 11:00 AM Andi Mullins Jr., MD [facility] CARDIO [place]   1/13/2025  2:40 PM Melissa Subramanian MD [facility]   1/16/2025 11:00 AM 3[facility]PC[facility]RA [facility]   1/23/2025 11:00 AM COORDINATED DEVICE CHECK [facility]       Follow Up [facility] (with who? when?): Follow up in about 2 weeks (around 1/14/2025) for 40 minutes .      Extended Emergency Contact Information  Primary Emergency Contact: KirbylyssaJohnnie  Mobile Phone: [phone]  Relation: Daughter  Preferred language: English   needed? No  Secondary Emergency Contact: Andrea Hayes  Mobile Phone: [phone]  Relation: Other      Melissa Subramanian MD   Internal Medicine  12/31/2024 - 3:54 PM    I spent a total of 50 minutes on the day of the visit.This includes face to face time and non-face to face time preparing to see the patient (eg, review of tests), obtaining and/or reviewing separately obtained history, documenting clinical information in the electronic or other health record, independently interpreting results and communicating results to the patient/family/caregiver, or care coordinator.    This note was generated with the assistance of ambient listening technology. Verbal consent was obtained by the patient and accompanying visitor(s) for the recording of patient appointment to facilitate this note.  I attest to having reviewed and edited the generated note for accuracy, though some syntax or spelling errors may persist. Please contact the author of this note for any clarification.      While patients have the right to access their medical record, it is essential to recognize that progress notes primarily serve as a means of communication among healthcare professionals.

## 2025-01-07 ENCOUNTER — OFFICE VISIT (OUTPATIENT)
Dept: SURGERY | Facility: CLINIC | Age: 85
End: 2025-01-07
Payer: MEDICARE

## 2025-01-07 VITALS
DIASTOLIC BLOOD PRESSURE: 72 MMHG | BODY MASS INDEX: 26.66 KG/M2 | SYSTOLIC BLOOD PRESSURE: 136 MMHG | HEART RATE: 80 BPM | WEIGHT: 165.88 LBS | OXYGEN SATURATION: 98 % | HEIGHT: 66 IN

## 2025-01-07 DIAGNOSIS — Z90.49 S/P SMALL BOWEL RESECTION: ICD-10-CM

## 2025-01-07 PROCEDURE — 99213 OFFICE O/P EST LOW 20 MIN: CPT | Mod: PBBFAC | Performed by: SURGERY

## 2025-01-07 NOTE — PROGRESS NOTES
[facility]  General Surgery  History & Physical  Date: 01/07/2025  Referring Provider: Melissa Subramanian    Subjective:     Joy Donahue is a 84 y.o. female with PMH cirrhosis, Hep C, HTN, CHF, Afib, and DVT who presents to clinic for evaluation s/p ex-lap with 53 cm small bowel resection for volvulus on 11/26/24 with outside surgeon. Postoperative course complicated by DVT for which she has received an IVC filter. Never had follow up with a general surgeon. Seen by her PCP who was concerned given redness and tenderness about surgical incision. She was given a course of doxycycline. Has not had any breakdown or drainage from wound.       PMH:   Past Medical History:   Diagnosis Date    Acute (reversible) ischemia of intestine, part and extent unspecified 08/24/2022    Acute cystitis without hematuria 08/14/2023    Cirrhosis of liver with ascites 02/14/2023    Continue to follow up with hepatologist    COVID     x 2-residual dysgeusia    Hepatitis C     treated/cured previously    Pacemaker     Paroxysmal atrial fibrillation     Pericardial cyst     Skin cancer     Thrombus        Past Surgical History:   Past Surgical History:   Procedure Laterality Date    APPENDECTOMY      CARDIAC PACEMAKER PLACEMENT      cataract surgery      EXCISION, SMALL INTESTINE  11/26/2024    Procedure: EXCISION, SMALL INTESTINE;  Surgeon: Diogenes Velazquez MD;  Location: [facility] OR;  Service: General;;  lysis of adhesions    FOOT SURGERY Left     HYSTERECTOMY      INSERTION OF INFERIOR VENA CAVAL FILTER N/A 12/14/2024    Procedure: Insertion, Filter, Inferior Vena Cava;  Surgeon: Sam Priest MD;  Location: [facility] CATH LAB;  Service: Peripheral Vascular;  Laterality: N/A;    LAPAROSCOPIC CHOLECYSTECTOMY      LAPAROTOMY, EXPLORATORY N/A 11/26/2024    Procedure: LAPAROTOMY, EXPLORATORY;  Surgeon: Diogenes Velazquez MD;  Location: [facility] OR;  Service: General;  Laterality: N/A;    parathyroid resection      REPEAT ABDOMINAL  PARACENTESIS      SKIN CANCER EXCISION      spot removed Left     left ankle spot removed    THROMBECTOMY         Social History:  Social History     Socioeconomic History    Marital status:    Tobacco Use    Smoking status: Never    Smokeless tobacco: Never   Substance and Sexual Activity    Alcohol use: Not Currently    Drug use: Never    Sexual activity: Not Currently     Social Drivers of Health     Financial Resource Strain: Low Risk  (12/16/2024)    Overall Financial Resource Strain (CARDIA)     Difficulty of Paying Living Expenses: Not very hard   Food Insecurity: No Food Insecurity (12/16/2024)    Hunger Vital Sign     Worried About Running Out of Food in the Last Year: Never true     Ran Out of Food in the Last Year: Never true   Transportation Needs: No Transportation Needs (12/12/2024)    TRANSPORTATION NEEDS     Transportation : No   Physical Activity: Inactive (12/16/2024)    Exercise Vital Sign     Days of Exercise per Week: 0 days     Minutes of Exercise per Session: 0 min   Stress: No Stress Concern Present (12/16/2024)    [employer] - Occupational Stress Questionnaire     Feeling of Stress : Only a little   Housing Stability: Low Risk  (12/16/2024)    Housing Stability Vital Sign     Unable to Pay for Housing in the Last Year: No     Homeless in the Last Year: No       Allergies:   Review of patient's allergies indicates:   Allergen Reactions    Ciprofloxacin Swelling    Iodine Shortness Of Breath    Latex Itching    Fluoride      Mouth sores    Fluoride preparations Dermatitis     Other reaction(s): Ulcer of mouth       Medications:  Current Outpatient Medications on File Prior to Visit   Medication Sig Dispense Refill    apixaban (ELIQUIS) 5 mg Tab Take 1 tablet (5 mg total) by mouth 2 (two) times daily.      doxycycline (VIBRAMYCIN) 100 MG Cap Take 1 capsule (100 mg total) by mouth every 12 (twelve) hours. for 7 days 14 capsule 0    furosemide (LASIX) 40 MG  tablet Take 1 tablet (40 mg total) by mouth daily as needed (fluid). 30 tablet 11    lactulose (CHRONULAC) 10 gram/15 mL solution Take 30 mLs (20 g total) by mouth daily as needed. 1892 mL 11    nadoloL (CORGARD) 40 MG tablet Take 0.5 tablets (20 mg total) by mouth once daily. 30 tablet 11    rifAXIMin (XIFAXAN) 550 mg Tab Take 1 tablet (550 mg total) by mouth 2 (two) times daily. 180 tablet 3    spironolactone (ALDACTONE) 25 MG tablet Take 1 tablet (25 mg total) by mouth once daily. 30 tablet 11     No current facility-administered medications on file prior to visit.         Objective:     Vital Signs (Most Recent)       Review of Systems   Constitutional:  Negative for chills and fever.   Respiratory:  Negative for cough and shortness of breath.    Cardiovascular:  Negative for chest pain and palpitations.   Gastrointestinal:  Positive for abdominal pain (appropriate). Negative for nausea and vomiting.   Genitourinary:  Negative for dysuria and hematuria.   Musculoskeletal:  Negative for falls and neck pain.   Skin:  Negative for itching and rash.   Neurological:  Negative for weakness and headaches.   Endo/Heme/Allergies:  Negative for environmental allergies. Bruises/bleeds easily.    A 10+ review of systems was performed with pertinent positives and negatives noted above in the history of present illness.  Other systems were negative unless otherwise specified.    Physical Exam:  Gen: awake, alert, in no acute distress  HEENT: normocephalic, atraumatic, EOMI, no scleral icterus  CV: regular rate and rhythm  Pulm: equal chest rise bilaterally, normal work of breathing  Abd:  soft, non-tender, no guarding, midline incision appears well healed. No breakdown or drainage noted. Mild surrounding erythema. No induration or appreciable fluid collections.   Ext: WWP, skin warm and dry    Imaging  The following imaging was reviewed: CT Abd/Pelvis      Assessment:     Joy HUSSEIN Rowan is a 84 y.o. female with PMH  cirrhosis, Hep C, HTN, CHF, Afib, and DVT who presents to clinic for evaluation s/p ex-lap with 53 cm small bowel resection for volvulus on 11/26/24 with outside surgeon.    Plan:     - May continue current antibiotic course  - No indication for surgery exploration or drainage at this time.   - RTC PRN    Get Tang MD  Acute Care Surgery  [facility] [place]

## 2025-01-09 ENCOUNTER — LAB VISIT (OUTPATIENT)
Dept: LAB | Facility: HOSPITAL | Age: 85
End: 2025-01-09
Attending: INTERNAL MEDICINE
Payer: MEDICARE

## 2025-01-09 DIAGNOSIS — I48.91 ATRIAL FIBRILLATION, UNSPECIFIED TYPE: ICD-10-CM

## 2025-01-09 DIAGNOSIS — Z45.010 PACEMAKER BATTERY DEPLETION: ICD-10-CM

## 2025-01-09 LAB
ANION GAP SERPL CALC-SCNC: 7 MMOL/L (ref 8–16)
APTT PPP: 24.4 SEC (ref 21–32)
BUN SERPL-MCNC: 19 MG/DL (ref 8–23)
CALCIUM SERPL-MCNC: 10.3 MG/DL (ref 8.7–10.5)
CHLORIDE SERPL-SCNC: 108 MMOL/L (ref 95–110)
CO2 SERPL-SCNC: 26 MMOL/L (ref 23–29)
CREAT SERPL-MCNC: 0.8 MG/DL (ref 0.5–1.4)
ERYTHROCYTE [DISTWIDTH] IN BLOOD BY AUTOMATED COUNT: 17.3 % (ref 11.5–14.5)
EST. GFR  (NO RACE VARIABLE): >60 ML/MIN/1.73 M^2
GLUCOSE SERPL-MCNC: 109 MG/DL (ref 70–110)
HCT VFR BLD AUTO: 33.7 % (ref 37–48.5)
HGB BLD-MCNC: 10.1 G/DL (ref 12–16)
INR PPP: 1.3 (ref 0.8–1.2)
MCH RBC QN AUTO: 28.7 PG (ref 27–31)
MCHC RBC AUTO-ENTMCNC: 30 G/DL (ref 32–36)
MCV RBC AUTO: 96 FL (ref 82–98)
PLATELET # BLD AUTO: 82 K/UL (ref 150–450)
PMV BLD AUTO: 12 FL (ref 9.2–12.9)
POTASSIUM SERPL-SCNC: 3.7 MMOL/L (ref 3.5–5.1)
PROTHROMBIN TIME: 14 SEC (ref 9–12.5)
RBC # BLD AUTO: 3.52 M/UL (ref 4–5.4)
SODIUM SERPL-SCNC: 141 MMOL/L (ref 136–145)
WBC # BLD AUTO: 5.6 K/UL (ref 3.9–12.7)

## 2025-01-09 PROCEDURE — 85730 THROMBOPLASTIN TIME PARTIAL: CPT | Performed by: INTERNAL MEDICINE

## 2025-01-09 PROCEDURE — 80048 BASIC METABOLIC PNL TOTAL CA: CPT | Performed by: INTERNAL MEDICINE

## 2025-01-09 PROCEDURE — 85610 PROTHROMBIN TIME: CPT | Performed by: INTERNAL MEDICINE

## 2025-01-09 PROCEDURE — 85027 COMPLETE CBC AUTOMATED: CPT | Performed by: INTERNAL MEDICINE

## 2025-01-09 PROCEDURE — 36415 COLL VENOUS BLD VENIPUNCTURE: CPT | Performed by: INTERNAL MEDICINE

## 2025-01-10 ENCOUNTER — OFFICE VISIT (OUTPATIENT)
Dept: CARDIOLOGY | Facility: CLINIC | Age: 85
End: 2025-01-10
Payer: MEDICARE

## 2025-01-10 VITALS
WEIGHT: 160.06 LBS | HEART RATE: 98 BPM | HEIGHT: 66 IN | DIASTOLIC BLOOD PRESSURE: 76 MMHG | SYSTOLIC BLOOD PRESSURE: 124 MMHG | BODY MASS INDEX: 25.72 KG/M2

## 2025-01-10 DIAGNOSIS — I50.33 ACUTE ON CHRONIC DIASTOLIC CONGESTIVE HEART FAILURE: Primary | ICD-10-CM

## 2025-01-10 DIAGNOSIS — I82.411 DEEP VEIN THROMBOSIS (DVT) OF FEMORAL VEIN OF RIGHT LOWER EXTREMITY, UNSPECIFIED CHRONICITY: ICD-10-CM

## 2025-01-10 DIAGNOSIS — I48.91 ATRIAL FIBRILLATION, UNSPECIFIED TYPE: ICD-10-CM

## 2025-01-10 DIAGNOSIS — Z95.0 PACEMAKER: ICD-10-CM

## 2025-01-10 DIAGNOSIS — I11.0 HYPERTENSIVE HEART DISEASE WITH HEART FAILURE: ICD-10-CM

## 2025-01-10 PROCEDURE — 99999 PR PBB SHADOW E&M-EST. PATIENT-LVL III: CPT | Mod: PBBFAC,,, | Performed by: INTERNAL MEDICINE

## 2025-01-10 NOTE — PROGRESS NOTES
Subjective:   01/10/2025     Patient ID:  Joy Donahue is a 84 y.o. female who presents for evaulation of Congestive Heart Failure      Comes in today for follow-up.  She has had a quite complicated recent medical history.  Epigastric pain in mid November lead to partial small-bowel resection for obstruction.  She subsequently developed a right leg DVT, because of thrombocytopenia, Eliquis was begun at 5 mg twice a day, but not 10 mg twice a day.  She eventually had a IVC filter placed.  She developed ascites, underwent paracentesis of 1 L of fluid.  I saw 2 weeks ago, felt she had congestive heart failure, spironolactone added.  Furosemide continued.  Since then, she notes that her weight has decreased by about 5 lb.  She is not having worsening shortness of breath, PND or orthopnea.    She is scheduled for pulse generator replacement next week.    She continues to have mild lower extremity edema.  She is not having chest pains or tightness.  She does have back pain.    She is known to have cirrhosis, history of hepatitis-C.  Currently treated with nadolol.        Prior impression reviewed:   Assessment and Plan:    Acute on chronic diastolic congestive heart failure  Comments:  Will treat with furosemide PRN and spironolactone daily  Orders:  -     IN OFFICE EKG 12-LEAD (to Muse)  -     spironolactone (ALDACTONE) 25 MG tablet; Take 1 tablet (25 mg total) by mouth once daily.  Dispense: 30 tablet; Refill: 11  -     furosemide (LASIX) 40 MG tablet; Take 1 tablet (40 mg total) by mouth daily as needed (fluid).  Dispense: 30 tablet; Refill: 11  -     Comprehensive Metabolic Panel; Future; Expected date: 01/06/2025    Cirrhosis of liver with ascites, unspecified hepatic cirrhosis type  -     nadoloL (CORGARD) 40 MG tablet; Take 0.5 tablets (20 mg total) by mouth once daily.  Dispense: 30 tablet; Refill: 11  -     CBC Auto Differential; Future; Expected date: 01/06/2025    Deep vein thrombosis (DVT) of femoral vein  of right lower extremity, unspecified chronicity  Comments:  Eliquis should be twice a day, now only taking it once daily    Thrombocytopenia, unspecified  Comments:  Check lab in 3 days    Pacemaker  Comments:  At [facility], generator change arranged    Atrial fibrillation, unspecified type  Comments:  Currently not in fibrillation, none noted on pacemaker         Follow up in about 2 weeks (around 1/10/2025).    Prior cardiac evaluation:  DISCHARGE DIAGNOSES:  1. Diastolic heart failure.  2. Chronic hepatitis C.  3. Hepatic cirrhosis with portal hypertension.  4. Hypersplenism.    PROCEDURES PERFORMED: Right heart catheterization, left heart  catheterization, coronary angiogram, left ventricular angiogram, hepatic  wedge pressure management.    HOSPITAL COURSE: This is a 77-year-old white female, who has chronic  liver troubles. She also had some heart block, which seemed to be related  to some antiviral therapy. She had a pacer placed a few years back. She  presented again with dyspnea and chest pain, which failed diagnosis with  noninvasive workup. Because of her multiple medical problems, we opted to  proceed with angiography.    She was taken to the cath lab on February 19th, where she was found have a  mean right atrial pressure of 14. Hepatic wedge was 17. Right  ventricular pressure was 47/15 with a pulmonary artery pressure of 47/23.  Wedge pressure had an A wave of 25, V wave 29, mean 23. Left ventricular  end-diastolic pressure was 26. There was no aortic valve gradient. EF  was normal, estimated at 65% to 70%. She had mild mitral regurgitation.    Her coronaries had minimal luminal irregularity without significant  obstruction. She had a right dominant pattern.    Because of these findings, we feel she is having a little element of  diastolic heart failure, but her chest pain is not cardiac in etiology.  She is thus going to be sent back to her primary docs and GI docs for  further workup as they see  fit.          Past Medical History:   Diagnosis Date    Acute (reversible) ischemia of intestine, part and extent unspecified 08/24/2022    Acute cystitis without hematuria 08/14/2023    Cirrhosis of liver with ascites 02/14/2023    Continue to follow up with hepatologist    COVID     x 2-residual dysgeusia    Hepatitis C     treated/cured previously    Pacemaker     Paroxysmal atrial fibrillation     Pericardial cyst     Skin cancer     Thrombus        Review of patient's allergies indicates:   Allergen Reactions    Ciprofloxacin Swelling    Iodine Shortness Of Breath    Latex Itching    Fluoride      Mouth sores    Fluoride preparations Dermatitis     Other reaction(s): Ulcer of mouth         Current Outpatient Medications:     apixaban (ELIQUIS) 5 mg Tab, Take 1 tablet (5 mg total) by mouth 2 (two) times daily., Disp: , Rfl:     furosemide (LASIX) 40 MG tablet, Take 1 tablet (40 mg total) by mouth daily as needed (fluid)., Disp: 30 tablet, Rfl: 11    lactulose (CHRONULAC) 10 gram/15 mL solution, Take 30 mLs (20 g total) by mouth daily as needed., Disp: 1892 mL, Rfl: 11    nadoloL (CORGARD) 40 MG tablet, Take 0.5 tablets (20 mg total) by mouth once daily., Disp: 30 tablet, Rfl: 11    rifAXIMin (XIFAXAN) 550 mg Tab, Take 1 tablet (550 mg total) by mouth 2 (two) times daily., Disp: 180 tablet, Rfl: 3    spironolactone (ALDACTONE) 25 MG tablet, Take 1 tablet (25 mg total) by mouth once daily., Disp: 30 tablet, Rfl: 11     Objective:   Review of Systems   Cardiovascular:  Positive for dyspnea on exertion and leg swelling. Negative for chest pain, claudication, cyanosis, irregular heartbeat, near-syncope, orthopnea, palpitations, paroxysmal nocturnal dyspnea and syncope.         Vitals:    01/10/25 1039   BP: 124/76   Pulse: 98     Wt Readings from Last 3 Encounters:   01/10/25 72.6 kg (160 lb 0.9 oz)   01/07/25 75.3 kg (165 lb 14.3 oz)   12/31/24 75 kg (165 lb 5.5 oz)     Temp Readings from Last 3 Encounters:    12/16/24 99.1 °F (37.3 °C) (Oral)   12/10/24 97.8 °F (36.6 °C) (Oral)   12/03/24 98.1 °F (36.7 °C) (Oral)     BP Readings from Last 3 Encounters:   01/10/25 124/76   01/07/25 136/72   12/31/24 120/76     Pulse Readings from Last 3 Encounters:   01/10/25 98   01/07/25 80   12/31/24 78             Physical Exam  Vitals reviewed.   Constitutional:       General: She is not in acute distress.     Appearance: She is well-developed.   HENT:      Head: Normocephalic and atraumatic.      Nose: Nose normal.   Eyes:      Conjunctiva/sclera: Conjunctivae normal.      Pupils: Pupils are equal, round, and reactive to light.   Neck:      Vascular: No carotid bruit or JVD.   Cardiovascular:      Rate and Rhythm: Normal rate and regular rhythm.      Pulses: Intact distal pulses.      Heart sounds: Normal heart sounds. No murmur heard.     No friction rub. No gallop.   Pulmonary:      Effort: Pulmonary effort is normal. No respiratory distress.      Breath sounds: Normal breath sounds. No wheezing or rales.   Chest:      Chest wall: No tenderness.   Abdominal:      General: Bowel sounds are normal. There is no distension.      Palpations: Abdomen is soft.      Tenderness: There is no abdominal tenderness.   Musculoskeletal:         General: No tenderness or deformity. Normal range of motion.      Cervical back: Normal range of motion and neck supple.      Right lower leg: Edema present.      Left lower leg: Edema present.   Skin:     General: Skin is warm and dry.      Findings: No erythema or rash.   Neurological:      Mental Status: She is alert and oriented to person, place, and time.      Cranial Nerves: No cranial nerve deficit.      Motor: No abnormal muscle tone.      Coordination: Coordination normal.   Psychiatric:         Behavior: Behavior normal.         Thought Content: Thought content normal.         Judgment: Judgment normal.           Lab Results   Component Value Date    CHOL 227 (H) 07/30/2024    CHOL 208 (A)  10/18/2022    CHOL 205 04/07/2022     Lab Results   Component Value Date    HDL 76 (H) 07/30/2024    HDL 71 (A) 10/18/2022    HDL 66 04/07/2022     Lab Results   Component Value Date    LDLCALC 122 10/18/2022     Lab Results   Component Value Date    ALT 32 12/30/2024    AST 43 (H) 12/30/2024    AST 25 12/13/2024    AST 27 12/12/2024     Lab Results   Component Value Date    CREATININE 0.8 01/09/2025    BUN 19 01/09/2025     01/09/2025    K 3.7 01/09/2025    CO2 26 01/09/2025    CO2 26 12/30/2024    CO2 25 12/15/2024     Lab Results   Component Value Date    HGB 10.1 (L) 01/09/2025    HCT 33.7 (L) 01/09/2025    HCT 34.5 (L) 12/30/2024    HCT 31.9 (L) 12/16/2024             EKG shows sinus rhythm with frequent atrial extrasystoles and paced ventricular beats          Assessment and Plan:     Acute on chronic diastolic congestive heart failure  Comments:  Appears stabilized with increased diuretics, lab okay.  Continue spironolactone take furosemide.    Hypertensive heart disease with heart failure  Comments:  Blood pressure well controlled  Orders:  -     Echo; Future; Expected date: 02/10/2025    Atrial fibrillation, unspecified type  Comments:  Follow-up with EP    Pacemaker  Comments:  Generator change next week    Deep vein thrombosis (DVT) of femoral vein of right lower extremity, unspecified chronicity  Comments:  Continue Eliquis           Follow up in about 4 weeks (around 2/7/2025).          Future Appointments   Date Time Provider Department Center   1/13/2025  2:40 PM Melissa Subramanian MD [facility] [place]   1/16/2025  1:00 PM 3PREP, [facility]   1/23/2025 11:00 AM COORDINATED DEVICE CHECK [facility]

## 2025-01-13 ENCOUNTER — TELEPHONE (OUTPATIENT)
Dept: ELECTROPHYSIOLOGY | Facility: CLINIC | Age: 85
End: 2025-01-13
Payer: MEDICARE

## 2025-01-13 NOTE — TELEPHONE ENCOUNTER
Spoke to patient's daughter (Johnnie)     CONFIRMED procedure arrival time of 1:00 PM on 1/16/2025    Reiterated instructions including:  -Directions to check in desk  -NPO after midnight night prior to procedure  -High importance of HOLDING Eliquis 1 day prior to procedure. Instructed patient's daughter last dose of Eliquis will be taken on 1/14/2025. Patient's daughter (Johnnie) verbalized understanding.   -Confirmed compliance of Eliquis  -Pre-procedure LABS reviewed.   -Confirmed  presence of implanted device/stimulator dc PPM MDT   -Confirmed no fever, cough, or shortness of breath in the past 30 days  -Confirmed no redness, rash, irritation, or yeast infection to chest area.   -Do not wear mascara day of procedure  -Bathe night prior and morning prior to procedure with Hibiclens solution or an antibacterial soap  -Reviewed current visitor policy    Patient verbalized understanding of above and appreciated the call.     ----- Message from ANDREW Walter sent at 1/13/2025 12:52 PM CST -----  Regarding: FW: Procedure    ----- Message -----  From: Ailin Mcintyre  Sent: 1/13/2025  12:22 PM CST  To: [facility] Staff  Subject: Procedure                                        Patient daughter Johnnie calling to get information regarding her procedure. Please call back @ [phone]. Thank you Ailin

## 2025-01-15 ENCOUNTER — TELEPHONE (OUTPATIENT)
Dept: ELECTROPHYSIOLOGY | Facility: CLINIC | Age: 85
End: 2025-01-15
Payer: MEDICARE

## 2025-01-15 NOTE — TELEPHONE ENCOUNTER
Spoke with patient daughter (Johnnie). Patient had a question about appointment scheduled on 1/23/2025. Informed patient's daughter (Johnnie) this is a follow up device/ wound check appointment. Informed that her procedure is still scheduled for 1/16/2025 with a 1:00 PM arrival time. Patient's daughter (Johnnie) verbalized understanding and appreciated the call.       ----- Message from ANDREW Walter sent at 1/15/2025  1:08 PM CST -----    ----- Message -----  From: Ailin Mcintyre  Sent: 1/15/2025  12:55 PM CST  To: Francisco [facility] Staff    Patient daughter Johnnie calling to speak with staff regarding procedure. Please call back @ [phone]. Thank you Aliin

## 2025-01-16 ENCOUNTER — HOSPITAL ENCOUNTER (OUTPATIENT)
Facility: HOSPITAL | Age: 85
Discharge: HOME | End: 2025-01-16
Attending: INTERNAL MEDICINE | Admitting: INTERNAL MEDICINE
Payer: MEDICARE

## 2025-01-16 ENCOUNTER — ANESTHESIA EVENT (OUTPATIENT)
Dept: MEDSURG UNIT | Facility: HOSPITAL | Age: 85
End: 2025-01-16
Payer: MEDICARE

## 2025-01-16 ENCOUNTER — ANESTHESIA (OUTPATIENT)
Dept: MEDSURG UNIT | Facility: HOSPITAL | Age: 85
End: 2025-01-16
Payer: MEDICARE

## 2025-01-16 VITALS
OXYGEN SATURATION: 99 % | RESPIRATION RATE: 18 BRPM | HEART RATE: 81 BPM | HEIGHT: 66 IN | DIASTOLIC BLOOD PRESSURE: 59 MMHG | BODY MASS INDEX: 25.71 KG/M2 | TEMPERATURE: 97 F | SYSTOLIC BLOOD PRESSURE: 129 MMHG | WEIGHT: 160 LBS

## 2025-01-16 DIAGNOSIS — Z45.010 PACEMAKER BATTERY DEPLETION: ICD-10-CM

## 2025-01-16 DIAGNOSIS — Z95.9 CARDIAC DEVICE IN SITU: ICD-10-CM

## 2025-01-16 DIAGNOSIS — Z45.010 PACEMAKER GENERATOR END OF LIFE: Primary | ICD-10-CM

## 2025-01-16 DIAGNOSIS — I48.91 ATRIAL FIBRILLATION, UNSPECIFIED TYPE: ICD-10-CM

## 2025-01-16 DIAGNOSIS — T14.8XXA WOUND, OPEN: ICD-10-CM

## 2025-01-16 DIAGNOSIS — I49.9 ARRHYTHMIA: ICD-10-CM

## 2025-01-16 LAB
OHS QRS DURATION: 64 MS
OHS QTC CALCULATION: 438 MS

## 2025-01-16 PROCEDURE — 63600175 PHARM REV CODE 636 W HCPCS: Performed by: INTERNAL MEDICINE

## 2025-01-16 PROCEDURE — 33228 REMV&REPLC PM GEN DUAL LEAD: CPT | Mod: SC,,, | Performed by: INTERNAL MEDICINE

## 2025-01-16 PROCEDURE — 25000003 PHARM REV CODE 250: Performed by: INTERNAL MEDICINE

## 2025-01-16 PROCEDURE — 27201423 OPTIME MED/SURG SUP & DEVICES STERILE SUPPLY: Performed by: INTERNAL MEDICINE

## 2025-01-16 PROCEDURE — D9220A PRA ANESTHESIA: Mod: CRNA,,, | Performed by: NURSE ANESTHETIST, CERTIFIED REGISTERED

## 2025-01-16 PROCEDURE — 63600175 PHARM REV CODE 636 W HCPCS: Performed by: NURSE ANESTHETIST, CERTIFIED REGISTERED

## 2025-01-16 PROCEDURE — 93005 ELECTROCARDIOGRAM TRACING: CPT

## 2025-01-16 PROCEDURE — 93010 ELECTROCARDIOGRAM REPORT: CPT | Mod: ,,, | Performed by: INTERNAL MEDICINE

## 2025-01-16 PROCEDURE — 37000008 HC ANESTHESIA 1ST 15 MINUTES: Performed by: INTERNAL MEDICINE

## 2025-01-16 PROCEDURE — C1785 PMKR, DUAL, RATE-RESP: HCPCS | Performed by: INTERNAL MEDICINE

## 2025-01-16 PROCEDURE — 37000009 HC ANESTHESIA EA ADD 15 MINS: Performed by: INTERNAL MEDICINE

## 2025-01-16 PROCEDURE — 63600175 PHARM REV CODE 636 W HCPCS: Performed by: ANESTHESIOLOGY

## 2025-01-16 PROCEDURE — 33228 REMV&REPLC PM GEN DUAL LEAD: CPT | Mod: SC | Performed by: INTERNAL MEDICINE

## 2025-01-16 PROCEDURE — D9220A PRA ANESTHESIA: Mod: ANES,,, | Performed by: ANESTHESIOLOGY

## 2025-01-16 DEVICE — IPG W1DR01 AZURE XT DR MRI USA
Type: IMPLANTABLE DEVICE | Site: CHEST  WALL | Status: FUNCTIONAL
Brand: AZURE™ XT DR MRI SURESCAN™

## 2025-01-16 RX ORDER — HALOPERIDOL 5 MG/ML
0.5 INJECTION INTRAMUSCULAR EVERY 10 MIN PRN
Status: DISCONTINUED | OUTPATIENT
Start: 2025-01-16 | End: 2025-01-16 | Stop reason: HOSPADM

## 2025-01-16 RX ORDER — MUPIROCIN 20 MG/G
OINTMENT TOPICAL DAILY
Qty: 22 G | Refills: 0 | Status: SHIPPED | OUTPATIENT
Start: 2025-01-16 | End: 2025-01-30

## 2025-01-16 RX ORDER — CEFAZOLIN 2 G/1
2 INJECTION, POWDER, FOR SOLUTION INTRAMUSCULAR; INTRAVENOUS
Status: DISCONTINUED | OUTPATIENT
Start: 2025-01-16 | End: 2025-01-16 | Stop reason: HOSPADM

## 2025-01-16 RX ORDER — PROPOFOL 10 MG/ML
VIAL (ML) INTRAVENOUS
Status: DISCONTINUED | OUTPATIENT
Start: 2025-01-16 | End: 2025-01-16

## 2025-01-16 RX ORDER — PHENYLEPHRINE HYDROCHLORIDE 10 MG/ML
INJECTION INTRAVENOUS
Status: DISCONTINUED | OUTPATIENT
Start: 2025-01-16 | End: 2025-01-16

## 2025-01-16 RX ORDER — BUPIVACAINE HYDROCHLORIDE 2.5 MG/ML
INJECTION, SOLUTION EPIDURAL; INFILTRATION; INTRACAUDAL
Status: DISCONTINUED | OUTPATIENT
Start: 2025-01-16 | End: 2025-01-16 | Stop reason: HOSPADM

## 2025-01-16 RX ORDER — CEFAZOLIN SODIUM 1 G/3ML
INJECTION, POWDER, FOR SOLUTION INTRAMUSCULAR; INTRAVENOUS
Status: DISCONTINUED | OUTPATIENT
Start: 2025-01-16 | End: 2025-01-16

## 2025-01-16 RX ORDER — LIDOCAINE HYDROCHLORIDE 20 MG/ML
INJECTION INTRAVENOUS
Status: DISCONTINUED | OUTPATIENT
Start: 2025-01-16 | End: 2025-01-16

## 2025-01-16 RX ORDER — LIDOCAINE HYDROCHLORIDE 20 MG/ML
INJECTION, SOLUTION INFILTRATION; PERINEURAL
Status: DISCONTINUED | OUTPATIENT
Start: 2025-01-16 | End: 2025-01-16 | Stop reason: HOSPADM

## 2025-01-16 RX ORDER — VANCOMYCIN HYDROCHLORIDE 1 G/20ML
INJECTION, POWDER, LYOPHILIZED, FOR SOLUTION INTRAVENOUS
Status: DISCONTINUED | OUTPATIENT
Start: 2025-01-16 | End: 2025-01-16 | Stop reason: HOSPADM

## 2025-01-16 RX ORDER — SODIUM CHLORIDE 0.9 % (FLUSH) 0.9 %
10 SYRINGE (ML) INJECTION
Status: DISCONTINUED | OUTPATIENT
Start: 2025-01-16 | End: 2025-01-16 | Stop reason: HOSPADM

## 2025-01-16 RX ORDER — SODIUM CHLORIDE 0.9 G/100ML
IRRIGANT IRRIGATION
Status: DISCONTINUED | OUTPATIENT
Start: 2025-01-16 | End: 2025-01-16 | Stop reason: HOSPADM

## 2025-01-16 RX ORDER — FENTANYL CITRATE 50 UG/ML
25 INJECTION, SOLUTION INTRAMUSCULAR; INTRAVENOUS EVERY 5 MIN PRN
Status: DISCONTINUED | OUTPATIENT
Start: 2025-01-16 | End: 2025-01-16 | Stop reason: HOSPADM

## 2025-01-16 RX ORDER — GLUCAGON 1 MG
1 KIT INJECTION
Status: DISCONTINUED | OUTPATIENT
Start: 2025-01-16 | End: 2025-01-16 | Stop reason: HOSPADM

## 2025-01-16 RX ORDER — DOXYCYCLINE 100 MG/1
100 CAPSULE ORAL EVERY 12 HOURS
Qty: 20 CAPSULE | Refills: 0 | Status: SHIPPED | OUTPATIENT
Start: 2025-01-16 | End: 2025-01-26

## 2025-01-16 RX ADMIN — FENTANYL CITRATE 25 MCG: 50 INJECTION, SOLUTION INTRAMUSCULAR; INTRAVENOUS at 05:01

## 2025-01-16 RX ADMIN — PHENYLEPHRINE HYDROCHLORIDE 100 MCG: 10 INJECTION INTRAVENOUS at 04:01

## 2025-01-16 RX ADMIN — LIDOCAINE HYDROCHLORIDE 60 MG: 20 INJECTION INTRAVENOUS at 03:01

## 2025-01-16 RX ADMIN — PROPOFOL 75 MCG/KG/MIN: 10 INJECTION, EMULSION INTRAVENOUS at 03:01

## 2025-01-16 RX ADMIN — FENTANYL CITRATE 25 MCG: 50 INJECTION, SOLUTION INTRAMUSCULAR; INTRAVENOUS at 06:01

## 2025-01-16 RX ADMIN — PROPOFOL 30 MG: 10 INJECTION, EMULSION INTRAVENOUS at 03:01

## 2025-01-16 RX ADMIN — PROPOFOL 20 MG: 10 INJECTION, EMULSION INTRAVENOUS at 03:01

## 2025-01-16 RX ADMIN — CEFAZOLIN 2 G: 330 INJECTION, POWDER, FOR SOLUTION INTRAMUSCULAR; INTRAVENOUS at 04:01

## 2025-01-16 NOTE — HPI
oJy Donahue is a 84 y.o. female with cirrhosis, PAF, SSS s/p PPM, DVT s/p IVC filter 12/14/24, chronic thrombocytopenia, anemia.    History obtained through patient report and clinic notes:  Background:  8/2024: 83 yoF here for device management. She had a MDT DC PM 2/12/15 for SSS. She is transferring her [place] to [facility] [place]. She is nearing [facility]. She has chronic thrombocytopenia with platelets in the 50-80s. AP 81%,  <10%, no AMS.     11/15/24: recent dx of DVT and being placed on a blood thinner, we will need to postpone her gen change, rescheduled for 1/16 and instructed for her to hold it one day prior.     11/26/25: small bowel resection procedure with gen surgery    12/14/25 IVC filter placement      Joy Donahue presents today to [facility] for scheduled generator change for [facility] with Dr. Singh. She denies any chest pain, palpitations, SOB, HOLMAN, dizziness, light headedness, weakness, syncope, or near syncopal episodes. She denies any bleeding, infections, fevers, rashes, or surgeries in the past 30 days. She is currently taking Eliquis 5mg BID with last dose 1/14/25 pm.  Pre procedure labs reviewed from 1/9/25, chronic thrombocytopenia and anemia known.    ECG today shows Apaced rhthm with vpaced complexes rate 94bpm with , QRS 64, QT/QTc 342/438      Plan:  -Generator Change  -Anesthesia for sedation  -Plan for 5 days doxycyline 100mg BID    Prior to procedure, I discussed with the patient risks, indications, benefits, and alternatives of device generator replacement. Our discussion of risks included (but was not limited to) the possibility of pain, infection, bleeding, embolism, stroke, MI, and death. All questions were answered. Patient verbalized understanding and wish to proceed. No further questions or concerns voiced at this time.     Attending: ector singh MD

## 2025-01-16 NOTE — Clinical Note
The left chest was prepped. The site was prepped with ChloraPrep and steri drape. The site was clipped. The patient was draped.

## 2025-01-16 NOTE — Clinical Note
The lead thresholds were tested.      The chronic RA and RV lead was disconnected from chronic generator and connected to a replacement chronic generator.

## 2025-01-16 NOTE — H&P
[facility] Short Stay Cardiac [facility]  Cardiac Electrophysiology  History and Physical     Admission Date: 1/16/2025  Code Status: Prior   Attending Provider: Gulshan Singh MD   Principal Problem:Pacemaker generator end of life    Subjective:     Chief Complaint:  Pacemaker generator end of life     HPI:  Joy Donahue is a 84 y.o. female with cirrhosis, PAF, SSS s/p PPM, DVT s/p IVC filter 12/14/24, chronic thrombocytopenia, anemia.    History obtained through patient report and clinic notes:  Background:  8/2024: 83 yoF here for device management. She had a MDT DC PM 2/12/15 for SSS. She is transferring her care to [facility] [place]. She is nearing [facility]. She has chronic thrombocytopenia with platelets in the 50-80s. AP 81%,  <10%, no AMS.     11/15/24: recent dx of DVT and being placed on a blood thinner, we will need to postpone her gen change, rescheduled for 1/16 and instructed for her to hold it one day prior.     11/26/25: small bowel resection procedure with gen surgery    12/14/25 IVC filter placement      Joy Donahue presents today to [facility] for scheduled generator change for [facility] with Dr. Singh. She denies any chest pain, palpitations, SOB, HOLMAN, dizziness, light headedness, weakness, syncope, or near syncopal episodes. She denies any bleeding, infections, fevers, rashes, or surgeries in the past 30 days. She is currently taking Eliquis 5mg BID with last dose 1/14/25 pm.  Pre procedure labs reviewed from 1/9/25, chronic thrombocytopenia and anemia known.    ECG today shows Apaced rhthm with Vpaced complexes at rate 94bpm with , QRS 64, QT/QTc 342/438       Past Medical History:   Diagnosis Date    Acute (reversible) ischemia of intestine, part and extent unspecified 08/24/2022    Acute cystitis without hematuria 08/14/2023    Cirrhosis of liver with ascites 02/14/2023    Continue to follow up with hepatologist    COVID     x 2-residual dysgeusia    Hepatitis C     treated/cured previously     Pacemaker     Paroxysmal atrial fibrillation     Pericardial cyst     Skin cancer     Thrombus        Past Surgical History:   Procedure Laterality Date    APPENDECTOMY      CARDIAC PACEMAKER PLACEMENT      cataract surgery      EXCISION, SMALL INTESTINE  11/26/2024    Procedure: EXCISION, SMALL INTESTINE;  Surgeon: Diogenes Velazquez MD;  Location: [facility] OR;  Service: General;;  lysis of adhesions    FOOT SURGERY Left     HYSTERECTOMY      INSERTION OF INFERIOR VENA CAVAL FILTER N/A 12/14/2024    Procedure: Insertion, Filter, Inferior Vena Cava;  Surgeon: Sam Priest MD;  Location: [facility] CATH LAB;  Service: Peripheral Vascular;  Laterality: N/A;    LAPAROSCOPIC CHOLECYSTECTOMY      LAPAROTOMY, EXPLORATORY N/A 11/26/2024    Procedure: LAPAROTOMY, EXPLORATORY;  Surgeon: Diogenes Velazquez MD;  Location: [facility] OR;  Service: General;  Laterality: N/A;    parathyroid resection      REPEAT ABDOMINAL PARACENTESIS      SKIN CANCER EXCISION      spot removed Left     left ankle spot removed    THROMBECTOMY         Review of patient's allergies indicates:   Allergen Reactions    Ciprofloxacin Swelling    Iodine Shortness Of Breath    Latex Itching    Fluoride      Mouth sores    Fluoride preparations Dermatitis     Other reaction(s): Ulcer of mouth       No current facility-administered medications on file prior to encounter.     Current Outpatient Medications on File Prior to Encounter   Medication Sig    rifAXIMin (XIFAXAN) 550 mg Tab Take 1 tablet (550 mg total) by mouth 2 (two) times daily.    lactulose (CHRONULAC) 10 gram/15 mL solution Take 30 mLs (20 g total) by mouth daily as needed.     Family History       Problem Relation (Age of Onset)    Clotting disorder Sister    Drug abuse Daughter    Heart disease Mother, Father, Daughter    Heart failure Brother    Hypertension Father    No Known Problems Maternal Grandmother, Maternal Grandfather, Paternal Grandmother, Paternal Grandfather, Son    Pacemaker/defibrilator  Father, Brother          Tobacco Use    Smoking status: Never    Smokeless tobacco: Never   Substance and Sexual Activity    Alcohol use: Not Currently    Drug use: Never    Sexual activity: Not Currently     Review of Systems   Constitutional: Negative for chills, diaphoresis, fever and malaise/fatigue.   HENT:  Negative for congestion, nosebleeds and sore throat.    Eyes: Negative.    Cardiovascular:  Positive for leg swelling. Negative for chest pain, dyspnea on exertion, irregular heartbeat, palpitations and syncope.   Respiratory:  Negative for cough, shortness of breath and wheezing.    Endocrine: Negative.    Hematologic/Lymphatic: Negative.    Musculoskeletal: Negative.    Gastrointestinal:  Positive for abdominal pain (sorenes from recent small bowel resection in november). Negative for melena, nausea and vomiting.   Genitourinary:  Negative for hematuria.   Neurological:  Negative for dizziness, focal weakness and headaches.   Psychiatric/Behavioral:  Negative for altered mental status.      Objective:     Vital Signs (Most Recent):  Temp: 97.2 °F (36.2 °C) (01/16/25 1334)  Pulse: 91 (01/16/25 1334)  Resp: 20 (01/16/25 1334)  BP: 131/61 (01/16/25 1338)  SpO2: 99 % (01/16/25 1334) Vital Signs (24h Range):  Temp:  [97.2 °F (36.2 °C)] 97.2 °F (36.2 °C)  Pulse:  [91] 91  Resp:  [20] 20  SpO2:  [99 %] 99 %  BP: (131-133)/(58-61) 131/61       Weight: 72.6 kg (160 lb)  Body mass index is 25.82 kg/m².    SpO2: 99 %        Physical Exam  Vitals reviewed.   Constitutional:       General: She is not in acute distress.     Appearance: She is not ill-appearing.   HENT:      Head: Atraumatic.      Nose: No congestion.   Eyes:      Extraocular Movements: Extraocular movements intact.   Cardiovascular:      Rate and Rhythm: Normal rate and regular rhythm.      Pulses: Normal pulses.   Pulmonary:      Effort: Pulmonary effort is normal. No respiratory distress.   Abdominal:      General: Abdomen is flat.      Palpations:  Abdomen is soft.   Musculoskeletal:         General: Normal range of motion.      Cervical back: Normal range of motion.      Right lower leg: trace/+1 edema     Left lower leg: trace edema  Skin:     General: Skin is warm and dry.      Findings: No rash.      Comments: LUCW site in good condition   Neurological:      General: No focal deficit present.      Mental Status: She is alert and oriented to person, place, and time. Mental status is at baseline.   Psychiatric:         Mood and Affect: Mood normal.         Behavior: Behavior normal. Behavior is cooperative.     Significant Imaging: EKG    Assessment and Plan:     Plan:  -Generator Change  -Anesthesia for sedation  -Plan for 5 days doxycyline 100mg BID    Prior to procedure, I discussed with the patient risks, indications, benefits, and alternatives of device generator replacement. Our discussion of risks included (but was not limited to) the possibility of pain, infection, bleeding, embolism, stroke, MI, and death. All questions were answered. Patient verbalized understanding and wish to proceed. No further questions or concerns voiced at this time.     Attending: MD Pina Cook, GEOVANNY  Cardiac Electrophysiology  [facility] - Short Stay Cardiac Unit

## 2025-01-16 NOTE — DISCHARGE INSTRUCTIONS
Patient Discharge Instructions   Devices (Pacemakers & Defibrillators)    Medications:  -Continue all of your home medications  -Start your antibiotic Doxycycline 100 mg two times per day for 10 days.  -Can start mupirocin ointment after further instruction by wound care tomorrow  -Resume your Eliquis in 5 day days    Follow up:  -Follow up with our device clinic 1/16/25  -Follow up with Dr. Singh in clinic in 3 months.    Restrictions   No driving until your clinic wound check appointment (this will be 7-10 days post-procedure).   No submerging device incision site in a tub, pool, or body of water for 6 weeks.    Activity   Avoid rough contact at the device site for 6 weeks.   You may participate in sexual activity unless otherwise instructed.   You may return to work within 3-5 days, unless told otherwise, provided you adhere to the above activity restrictions.   If you only had a battery/generator change performed, then there are no postoperative activity restrictions.     Wound Care  If you are discharged with a white tape pressure dressing, remove this dressing after 24 hours.   If you are discharged with a water resistant Aquacel dressing, keep this dressing on until your follow-up appointment in 1 week. We will remove it at that time. IF you find that this dressing is no longer occlusive or sticking to your skin, it is okay to remove it prior to your 1 week follow-up appointment. The incision may then remain open to air.   There is dermabond skin glue over your incision. DO NOT scrub it off. Dermabond acts as another protective barrier and will eventually dissolve/flake off.   You will be discharged with 5 days of oral antibiotics. Please take the full prescription until it is gone.   If you are taking a blood thinner (Eliquis, Pradaxa, Xarelto), continue to hold this medication for 5 days. RESUME your blood thinner following completion of your antibiotics. If you are taking warfarin, continue to take this  medication without interruption.   Do not get your incision wet for 48 hours following the procedure. You may sponge bath during this period. After 48 hours, you may shower, but avoid direct water contact to the incision and try to keep this area dry as possible. Allow the shower water to hit the back, not directly on the chest. Gently pat the incision/dressing dry if it does get wet.   Avoid using deodorants, galarza, creams, lotions on your incision for 6 weeks.   If you notice increased swelling, redness, drainage, device site pain, chest pain, shortness of breath, or have a fever greater than 100 degrees, call our device clinic immediately: [phone] or [phone] during daytime business hours, OR call [phone] during after-hours or on weekends and ask for the electrophysiology fellow on call.     Long-Term Instructions  Keep your pacemaker or defibrillator identification card with you at all times.   If you have a defibrillator and you get shocked by your device: If you receive 1 shock and you feel okay, call [phone] or [phone] during normal office hours. For after hours, call [phone] and ask to speak with the on-call electrophysiology fellow. If you receive 1 shock and do NOT feel well, call EMS or go to the ER.   If you have a defibrillator and you get more than 1 shock from the device, or multiple shocks in a short period of time: Call EMS and or go to the nearest ER.   Appliances: You may operate any electrical device in your home, including microwaves.   Security Systems: Electromagnetic security systems can be located in the workplace, courthouse, airports, or other high security areas. Exposure to this type of security system has been shown to cause interference in some cases. Interference may be related to the duration of exposure and or the distance between the device and the security device. You should be aware of the location of security systems, moving through them  at a normal pace, and avoid leaning or standing too close.   Metal Detectors at Airports: Metal detectors at airports can potentially interfere with pacemakers or defibrillators, although it is unlikely. Metal detectors will likely be triggered by your device and therefore at places such as airports/courthouses it will be important for you to carry your identification card for the device. Airport personnel will likely prefer to do a manual search.   Cellular Phones: It is unlikely that using a cell phone will interfere with your device. It should be used with the hand opposite to the side where your device was implanted. The phone should not be carried in the shirt pocket on the same side as the device.   Specific Work Conditions: If you work near high voltage lines, transmitting towers, large motors, welding equipment, or powerful magnets, you should discuss your specific work conditions with your physician. In general, remain at least 2 feet from external electrical equipment, verify that the equipment is properly grounded, and wear insulated gloves when using electrical devices. Leave the immediate location if lightheadedness or other symptoms develop.   MRI: Some pacemakers and defibrillators are safe in MRI scanners, while others are not. Please consult your physician to see if you have an MRI-compatible device.   Surgery: Should you require surgery, alf electrosurgical devices can interfere with your device function. You should discuss this with your surgeon prior to any operation.   Radiation Therapy: If you require radiation therapy, care must be taken to avoid irradiating the device.     Long-Term Follow-Up  Your device has the ability to transmit device information from home to the doctors office using a home monitoring system. This remote system takes the place of a doctors visit. Your device will be checked from home every 3-6 months. Every 6-12 months, you will be asked to come in the office for an  in-office check.   Your device should last in the range of 6-12 years. This depends on many factors including how often it paces the heart.   When the battery is low, a generator change will be performed. This is a same-day procedure with no postoperative activity restrictions.     Any need to reschedule or cancel procedures or appointments, or any questions regarding your procedures should be addressed with the Arrhythmia Department Nurses at: [phone].

## 2025-01-16 NOTE — SUBJECTIVE & OBJECTIVE
Past Medical History:   Diagnosis Date    Acute (reversible) ischemia of intestine, part and extent unspecified 08/24/2022    Acute cystitis without hematuria 08/14/2023    Cirrhosis of liver with ascites 02/14/2023    Continue to follow up with hepatologist    COVID     x 2-residual dysgeusia    Hepatitis C     treated/cured previously    Pacemaker     Paroxysmal atrial fibrillation     Pericardial cyst     Skin cancer     Thrombus        Past Surgical History:   Procedure Laterality Date    APPENDECTOMY      CARDIAC PACEMAKER PLACEMENT      cataract surgery      EXCISION, SMALL INTESTINE  11/26/2024    Procedure: EXCISION, SMALL INTESTINE;  Surgeon: Diogenes Velazquez MD;  Location: [facility] OR;  Service: General;;  lysis of adhesions    FOOT SURGERY Left     HYSTERECTOMY      INSERTION OF INFERIOR VENA CAVAL FILTER N/A 12/14/2024    Procedure: Insertion, Filter, Inferior Vena Cava;  Surgeon: Sam Priest MD;  Location: [facility] CATH LAB;  Service: Peripheral Vascular;  Laterality: N/A;    LAPAROSCOPIC CHOLECYSTECTOMY      LAPAROTOMY, EXPLORATORY N/A 11/26/2024    Procedure: LAPAROTOMY, EXPLORATORY;  Surgeon: Diogenes Velazquez MD;  Location: [facility] OR;  Service: General;  Laterality: N/A;    parathyroid resection      REPEAT ABDOMINAL PARACENTESIS      SKIN CANCER EXCISION      spot removed Left     left ankle spot removed    THROMBECTOMY         Review of patient's allergies indicates:   Allergen Reactions    Ciprofloxacin Swelling    Iodine Shortness Of Breath    Latex Itching    Fluoride      Mouth sores    Fluoride preparations Dermatitis     Other reaction(s): Ulcer of mouth       No current facility-administered medications on file prior to encounter.     Current Outpatient Medications on File Prior to Encounter   Medication Sig    rifAXIMin (XIFAXAN) 550 mg Tab Take 1 tablet (550 mg total) by mouth 2 (two) times daily.    lactulose (CHRONULAC) 10 gram/15 mL solution Take 30 mLs (20 g total) by mouth daily as needed.      Family History       Problem Relation (Age of Onset)    Clotting disorder Sister    Drug abuse Daughter    Heart disease Mother, Father, Daughter    Heart failure Brother    Hypertension Father    No Known Problems Maternal Grandmother, Maternal Grandfather, Paternal Grandmother, Paternal Grandfather, Son    Pacemaker/defibrilator Father, Brother          Tobacco Use    Smoking status: Never    Smokeless tobacco: Never   Substance and Sexual Activity    Alcohol use: Not Currently    Drug use: Never    Sexual activity: Not Currently     Review of Systems   Constitutional: Negative for chills, diaphoresis, fever and malaise/fatigue.   HENT:  Negative for congestion, nosebleeds and sore throat.    Eyes: Negative.    Cardiovascular:  Positive for leg swelling. Negative for chest pain, dyspnea on exertion, irregular heartbeat, palpitations and syncope.   Respiratory:  Negative for cough, shortness of breath and wheezing.    Endocrine: Negative.    Hematologic/Lymphatic: Negative.    Musculoskeletal: Negative.    Gastrointestinal:  Positive for abdominal pain (sorenes from recent small bowel resection in november). Negative for melena, nausea and vomiting.   Genitourinary:  Negative for hematuria.   Neurological:  Negative for dizziness, focal weakness and headaches.   Psychiatric/Behavioral:  Negative for altered mental status.      Objective:     Vital Signs (Most Recent):  Temp: 97.2 °F (36.2 °C) (01/16/25 1334)  Pulse: 91 (01/16/25 1334)  Resp: 20 (01/16/25 1334)  BP: 131/61 (01/16/25 1338)  SpO2: 99 % (01/16/25 1334) Vital Signs (24h Range):  Temp:  [97.2 °F (36.2 °C)] 97.2 °F (36.2 °C)  Pulse:  [91] 91  Resp:  [20] 20  SpO2:  [99 %] 99 %  BP: (131-133)/(58-61) 131/61       Weight: 72.6 kg (160 lb)  Body mass index is 25.82 kg/m².    SpO2: 99 %        Physical Exam  Vitals reviewed.   Constitutional:       General: She is not in acute distress.     Appearance: She is not ill-appearing.   HENT:      Head:  Atraumatic.      Nose: No congestion.   Eyes:      Extraocular Movements: Extraocular movements intact.   Cardiovascular:      Rate and Rhythm: Normal rate and regular rhythm.      Pulses: Normal pulses.   Pulmonary:      Effort: Pulmonary effort is normal. No respiratory distress.   Abdominal:      General: Abdomen is flat.      Palpations: Abdomen is soft.   Musculoskeletal:         General: Normal range of motion.      Cervical back: Normal range of motion.      Right lower leg: No edema.      Left lower leg: No edema.   Skin:     General: Skin is warm and dry.      Findings: No rash.      Comments: LUCW site in good condition   Neurological:      General: No focal deficit present.      Mental Status: She is alert and oriented to person, place, and time. Mental status is at baseline.   Psychiatric:         Mood and Affect: Mood normal.         Behavior: Behavior normal. Behavior is cooperative.          Significant Imaging: EKG

## 2025-01-16 NOTE — ANESTHESIA PREPROCEDURE EVALUATION
01/16/2025  Joy Donahue is a 84 y.o., female.    Pre-operative evaluation for Procedure(s) (LRB):  REPLACEMENT, PACEMAKER GENERATOR (Left)    Joy Donahue is a 84 y.o. female     Patient Active Problem List   Diagnosis    Thrombocytopenia, unspecified    Cognitive decline    Secondary esophageal varices without bleeding    Hypertensive heart disease with heart failure    Angina pectoris, unspecified    Atrial fibrillation, unspecified type    Cirrhosis of liver with ascites    Urinary hesitancy    Chronic pain of left ankle    Primary malignant neoplasm of skin of ankle    Fatigue    Leukopenia    Pacemaker    Acute hepatitis C virus infection without hepatic coma    Moderate malnutrition    Volvulus    Right leg DVT    Acute on chronic diastolic congestive heart failure    Pacemaker generator end of life       Review of patient's allergies indicates:   Allergen Reactions    Ciprofloxacin Swelling    Iodine Shortness Of Breath    Latex Itching    Fluoride      Mouth sores    Fluoride preparations Dermatitis     Other reaction(s): Ulcer of mouth       No current facility-administered medications on file prior to encounter.     Current Outpatient Medications on File Prior to Encounter   Medication Sig Dispense Refill    rifAXIMin (XIFAXAN) 550 mg Tab Take 1 tablet (550 mg total) by mouth 2 (two) times daily. 180 tablet 3    lactulose (CHRONULAC) 10 gram/15 mL solution Take 30 mLs (20 g total) by mouth daily as needed. 1892 mL 11       Past Surgical History:   Procedure Laterality Date    APPENDECTOMY      CARDIAC PACEMAKER PLACEMENT      cataract surgery      EXCISION, SMALL INTESTINE  11/26/2024    Procedure: EXCISION, SMALL INTESTINE;  Surgeon: Diogenes Velazquez MD;  Location: [facility];  Service: General;;  lysis of adhesions    FOOT SURGERY Left     HYSTERECTOMY      INSERTION OF INFERIOR VENA CAVAL  "FILTER N/A 12/14/2024    Procedure: Insertion, Filter, Inferior Vena Cava;  Surgeon: Sam Priest MD;  Location: [facility] CATH LAB;  Service: Peripheral Vascular;  Laterality: N/A;    LAPAROSCOPIC CHOLECYSTECTOMY      LAPAROTOMY, EXPLORATORY N/A 11/26/2024    Procedure: LAPAROTOMY, EXPLORATORY;  Surgeon: Diogenes Velazquez MD;  Location: [facility] OR;  Service: General;  Laterality: N/A;    parathyroid resection      REPEAT ABDOMINAL PARACENTESIS      SKIN CANCER EXCISION      spot removed Left     left ankle spot removed    THROMBECTOMY         Social History     Socioeconomic History    Marital status:    Tobacco Use    Smoking status: Never    Smokeless tobacco: Never   Substance and Sexual Activity    Alcohol use: Not Currently    Drug use: Never    Sexual activity: Not Currently     Social Drivers of Health     Financial Resource Strain: Low Risk  (12/16/2024)    Overall Financial Resource Strain (CARDIA)     Difficulty of Paying Living Expenses: Not very hard   Food Insecurity: No Food Insecurity (12/16/2024)    Hunger Vital Sign     Worried About Running Out of Food in the Last Year: Never true     Ran Out of Food in the Last Year: Never true   Transportation Needs: No Transportation Needs (12/12/2024)    TRANSPORTATION NEEDS     Transportation : No   Physical Activity: Inactive (12/16/2024)    Exercise Vital Sign     Days of Exercise per Week: 0 days     Minutes of Exercise per Session: 0 min   Stress: No Stress Concern Present (12/16/2024)    [employer] - Occupational Stress Questionnaire     Feeling of Stress : Only a little   Housing Stability: Low Risk  (12/16/2024)    Housing Stability Vital Sign     Unable to Pay for Housing in the Last Year: No     Homeless in the Last Year: No         CBC: No results for input(s): "WBC", "RBC", "HGB", "HCT", "PLT", "MCV", "MCH", "MCHC" in the last 72 hours.    CMP: No results for input(s): "NA", "K", "CL", "CO2", "BUN", "CREATININE", " ""GLU", "MG", "PHOS", "CALCIUM", "ALBUMIN", "PROT", "ALKPHOS", "ALT", "AST", "BILITOT" in the last 72 hours.    INR  No results for input(s): "PT", "INR", "PROTIME", "APTT" in the last 72 hours.        Diagnostic Studies:      EKD Echo:  No results found for this or any previous visit.        Pre-op Assessment    I have reviewed the Patient Summary Reports.    I have reviewed the NPO Status.   I have reviewed the Medications.     Review of Systems  Anesthesia Hx:  No problems with previous Anesthesia               Denies Personal Hx of Anesthesia complications.                    Cardiovascular:  Exercise tolerance: poor  Pacemaker        CHF                                   Hepatic/GI:      Liver Disease, Hepatitis, C              Neurological:    Denies CVA.    Denies Seizures.                                    Physical Exam  General: Cooperative, Alert and Oriented    Airway:  Mallampati: III / II  Mouth Opening: Normal  TM Distance: Normal  Tongue: Normal  Neck ROM: Extension Decreased    Dental:  Dentures        Anesthesia Plan  Type of Anesthesia, risks & benefits discussed:    Anesthesia Type: Gen Natural Airway  Intra-op Monitoring Plan: Standard ASA Monitors  Post Op Pain Control Plan: multimodal analgesia and IV/PO Opioids PRN  Induction:  IV  Informed Consent: Informed consent signed with the Patient and all parties understand the risks and agree with anesthesia plan.  All questions answered.   ASA Score: 3  Day of Surgery Review of History & Physical: H&P Update referred to the surgeon/provider.    Ready For Surgery From Anesthesia Perspective.     .      "

## 2025-01-16 NOTE — Clinical Note
There was an existing generator. The generator was removed. The leads were disconnected. The generator was returned to . The generator was returned due to upgrade and SILVIANO/EOL

## 2025-01-16 NOTE — Clinical Note
Pt has skin tear from steri drape adhesive.  Covered with  petroleum jelly impregnated gauze with sterile 4x4 gauze and paper tape

## 2025-01-16 NOTE — HOSPITAL COURSE
Joy Donahue underwent Device Generator Change, tolerated procedure well.*** end procedure skin tear dressed with Vaseline gauze applied. Left pectoral site C/D/I, no redness, drainage/discharge, bleeding, or hematoma noted. Pressure dressing and aquacel *** dressing applied per RN. Post procedure ECG shows *** rhythm at *** bpm AL *** ms QRS *** ms QT/QTc *** ms. Monitored on telemetry for 1 hours post procedure with no events. Patient ambulating, tolerating PO intake, and voiding with no issues. *** denies any chest pain or SOB. Pressure dressing removed per RN, aquacel dressing remained in place. Discharge plans discussed with Dr. Singh. Continue all other home medications. Start doxycycline BID x 5 days. Wound check in 1 week-keep Aquacel dressing in place until this appointment. Follow up with Dr. Singh in 3 months. Discharge plans/instructions discussed with patient and family who verbalized understanding and agreement of plans of care. No further questions or concerns voiced at this time. VSS.        Plan:  -Device check appt 1/23/25  -Clinic f/u appt in 3 months with Dr. Singh  -Start Doxycycline 100 mg BID for 5 days  -Resume Eliquis ***    Attending: Gulshan Singh MD

## 2025-01-16 NOTE — Clinical Note
A dressing was applied to the incision. To be applied per PACU RN 30 min following Dermabond application

## 2025-01-16 NOTE — TRANSFER OF CARE
"Anesthesia Transfer of Care Note    Patient: Joy Donahue    Procedure(s) Performed: Procedure(s) (LRB):  REPLACEMENT, PACEMAKER GENERATOR (Left)    Patient location: PACU    Anesthesia Type: MAC    Transport from OR: Transported from OR on room air with adequate spontaneous ventilation    Post pain: adequate analgesia    Post assessment: no apparent anesthetic complications and tolerated procedure well    Post vital signs: stable    Level of consciousness: awake and responds to stimulation    Nausea/Vomiting: no nausea/vomiting    Complications: none    Transfer of care protocol was followed    Last vitals: Visit Vitals  /61 (BP Location: Right arm)   Pulse 91   Temp 36.2 °C (97.2 °F) (Temporal)   Resp 20   Ht 5' 6" (1.676 m)   Wt 72.6 kg (160 lb)   SpO2 99%   Breastfeeding No   BMI 25.82 kg/m²     "

## 2025-01-17 ENCOUNTER — TELEPHONE (OUTPATIENT)
Dept: WOUND CARE | Facility: CLINIC | Age: 85
End: 2025-01-17
Payer: MEDICARE

## 2025-01-17 ENCOUNTER — DOCUMENTATION ONLY (OUTPATIENT)
Dept: CARDIOLOGY | Facility: HOSPITAL | Age: 85
End: 2025-01-17
Payer: MEDICARE

## 2025-01-17 ENCOUNTER — CLINICAL SUPPORT (OUTPATIENT)
Dept: CARDIOLOGY | Facility: HOSPITAL | Age: 85
End: 2025-01-17
Attending: INTERNAL MEDICINE
Payer: MEDICARE

## 2025-01-17 DIAGNOSIS — I48.91 ATRIAL FIBRILLATION, UNSPECIFIED TYPE: ICD-10-CM

## 2025-01-17 DIAGNOSIS — Z45.010 PACEMAKER BATTERY DEPLETION: ICD-10-CM

## 2025-01-17 LAB
OHS QRS DURATION: 76 MS
OHS QTC CALCULATION: 462 MS

## 2025-01-17 NOTE — NURSING
Pt DC'd per orders.  DC paperwork reviewed w pt and children.  Pt verbalized DC instructions.    IV removed. Pt was able to drink, eat, and walk with no difficulties.    Pt being wheeled off unit per transport in wheelchair.

## 2025-01-17 NOTE — Clinical Note
Please see documentation note with image of device site. Pt has appt with wound care on Tuesday, 1/21/25 (first available).

## 2025-01-17 NOTE — DISCHARGE SUMMARY
[facility] - Cardiology  Cardiac Electrophysiology  Discharge Summary      Patient Name: Joy Donahue  MRN: [id number]  Admission Date: 1/16/2025  Hospital Length of Stay: 0 days  Discharge Date and Time:  01/16/2025   Attending Physician: Gulshan Singh MD    Discharging Provider: Pina High DNP  Primary Care Physician: Miya Silverio NP    HPI:   Joy Donahue is a 84 y.o. female with cirrhosis, PAF, SSS s/p PPM, DVT s/p IVC filter 12/14/24, chronic thrombocytopenia, anemia.    History obtained through patient report and clinic notes:  Background:  8/2024: 83 yoF here for device management. She had a MDT DC PM 2/12/15 for SSS. She is transferring her care to [facility]. She is nearing SILVIANO. She has chronic thrombocytopenia with platelets in the 50-80s. AP 81%,  <10%, no AMS.     11/15/24: recent dx of DVT and being placed on a blood thinner, we will need to postpone her gen change, rescheduled for 1/16 and instructed for her to hold it one day prior.     11/26/25: small bowel resection procedure with gen surgery    12/14/25 IVC filter placement      Joy Donahue presents today to [facility] for scheduled generator change for SILVIANO with Dr. Singh. She denies any chest pain, palpitations, SOB, HOLMAN, dizziness, light headedness, weakness, syncope, or near syncopal episodes. She denies any bleeding, infections, fevers, rashes, or surgeries in the past 30 days. She is currently taking Eliquis 5mg BID with last dose 1/14/25 pm.  Pre procedure labs reviewed from 1/9/25, chronic thrombocytopenia and anemia known.    ECG today shows Apaced rhthm with vpaced complexes rate 94bpm with , QRS 64, QT/QTc 342/438      Procedure(s) (LRB):  REPLACEMENT, PACEMAKER GENERATOR (Left)     Hospital Course:  Joy Donahue underwent Device Generator Change, tolerated procedure well. End procedure skin tear 2x4 cm, dressed with Vaseline gauze applied. Left pectoral dressing site C/D/I, no drainage/discharge,  bleeding, or hematoma noted.  Post procedure ECG shows A-paced with intermittent V-paced complexes rhythm at 87 bpm  ms QRS 76 ms QT/QTc 384/462 ms. Monitored on telemetry for 1 hours post procedure with no events. Patient ambulating, tolerating PO intake, and voiding with no issues. She denies any chest pain or SOB. Ice pack applied and sent home with patient for prn pain, unable to take tylenol with hx of cirrhosis and to avoid nsaid with recent procedure. Discharge plans discussed with Dr. Singh. Continue all other home medications. Start doxycycline BID x 10 days. Wound check tomorrow, dressing in place until this appointment. Follow up with Dr. Singh in 3 months. Discharge plans/instructions discussed with patient and family who verbalized understanding and agreement of plans of care. No further questions or concerns voiced at this time. VSS.    Patient deemed stable for discharge to home with family.      Goals of Care Treatment Preferences:  Code Status: Full Code      Final Active Diagnoses:    Diagnosis Date Noted POA    PRINCIPAL PROBLEM:  Pacemaker generator end of life [Z45.010] 01/16/2025 Not Applicable      Problems Resolved During this Admission:     No new Assessment & Plan notes have been filed under this hospital service since the last note was generated.  Service: Arrhythmia      Discharged Condition: stable    Disposition:     Follow Up:   Follow-up Information       DEVICE CHECK CLINIC Follow up on 1/17/2025.    Why: For wound re-check             Gulshan Singh MD Follow up in 3 month(s).    Specialties: Electrophysiology, Cardiovascular Disease, Cardiology  Why: routine follow up  Contact information:  [address] [place] [address] [id number] [phone]                           Patient Instructions:      Ambulatory referral/consult to Wound Clinic   Standing Status: Future   Referral Priority: Emergency Referral Type: Consultation   Referral Reason: Specialty Services  Required   Requested Specialty: Wound Care   Number of Visits Requested: 1     Medications:  Reconciled Home Medications:      Medication List        START taking these medications      doxycycline 100 MG Cap  Commonly known as: VIBRAMYCIN  Take 1 capsule (100 mg total) by mouth every 12 (twelve) hours. for 10 days     mupirocin 2 % ointment  Commonly known as: BACTROBAN  Apply topically Daily. Apply as directed at wound check appt for 14 days            ASK your doctor about these medications      apixaban 5 mg Tab  Commonly known as: ELIQUIS  Take 1 tablet (5 mg total) by mouth 2 (two) times daily.     furosemide 40 MG tablet  Commonly known as: LASIX  Take 1 tablet (40 mg total) by mouth daily as needed (fluid).     lactulose 10 gram/15 mL solution  Commonly known as: CHRONULAC  Take 30 mLs (20 g total) by mouth daily as needed.     nadoloL 40 MG tablet  Commonly known as: CORGARD  Take 0.5 tablets (20 mg total) by mouth once daily.     rifAXIMin 550 mg Tab  Commonly known as: XIFAXAN  Take 1 tablet (550 mg total) by mouth 2 (two) times daily.     spironolactone 25 MG tablet  Commonly known as: ALDACTONE  Take 1 tablet (25 mg total) by mouth once daily.              Plan:  -Device check appt tomorrow 1/17/25 at 12:40  -Start Doxycycline 100 mg BID for 10 days  -Resume Eliquis in 5 days  -Clinic f/u appt in 3 months with Dr. Singh    Time spent on the discharge of patient: 50 minutes    Pina High DNP  Cardiac Electrophysiology  [facility] - Cardiology    Attending: Gulshan Singh MD

## 2025-01-17 NOTE — PROGRESS NOTES
Patient seen in device clinic today for wound re-check.   Previous dressing was removed without incident.     New Vaseline dressing was applied per instruction from wound care nurse. A non-adherent dressing and paper tape were used to cover the Vaseline dressing to hold in place. Pt was instructed to leave the dressing in place for 24 hrs and then replace. Pt and family member were instructed on re-dressing the wound and supplied with extra wound care supplies. Pt is to follow up with wound care appt at [facility] on Tuesday, 1/21/25.    Pt was instructed to monitor the site for s/s of infection and report for evaluation should these develop. Pt and family member verbalized understanding.     Dr. Singh made aware of the above.

## 2025-01-17 NOTE — NURSING
Pt walked around the unit per orders.   Pt's dressing to L chest wall remained CDI.   No s/s of bleeding noted. VSS.  NAD noted.     Pt however concerned about pain management and going home w no pain meds. NP aware regarding pt's concern.  Per NP pt to use ice for discomfort or pain tonight to site, and to follow up tomorrow at appointment.  Will DC pt per orders.

## 2025-01-17 NOTE — TELEPHONE ENCOUNTER
Spoke with Mitzy from the Arrhythmia clinic whom is with the patient at this time assisting her with scheduling and appointment.  [facility] care phone number was given to see if the patient could get and earlier appointment.Vaseline impregnated dressing was given to Mitzy at her request to assist in 24 hour dressing change until the patient is seen by the wound care provider.  Informed Mitzy to instructed patient that if her wound become inflamed  before she see the wound care provider to go to Urgent Care. Appointment is schedule for 01/22/2025 @1.

## 2025-01-17 NOTE — TELEPHONE ENCOUNTER
Reached out to patient no answer, a detail message was let in reference for my call along with a phone number to return my call.

## 2025-01-17 NOTE — TELEPHONE ENCOUNTER
----- Message from Summer sent at 1/17/2025  9:05 AM CST -----  .Type:  Patient Call Back    Who Called: [facility]       Does the patient know what this is regarding?: PLEASE REACH OUT TO PT TO SCHEDULE VISIT. REFERRAL IN EPIC IT'S LABELED AT STAT     Would the patient rather a call back YES     Best Call Back Number: [phone]    Additional Information: Thank You

## 2025-01-17 NOTE — TELEPHONE ENCOUNTER
----- Message from Pina High DNP sent at 1/17/2025 12:16 PM CST -----  Regarding: RE: STAT wound recs  Okay yes I forwarded this message to our device nurse who is seeing the patient today. Will relay the message. She may reach out to you her name is julienne centeno. Should a vasoline dressing be redressed every so often until Monday?  Thanks!  ----- Message -----  From: Mariusz Katz LPN  Sent: 1/17/2025  10:56 AM EST  To: Pina High DNP  Subject: RE: STAT wound recs                              Good morning     The provider is out of the office on Fridays. She'll return on Monday, that was a good choice to apply the vasoline gauze dressing to the wound. If the patient is available on Monday I can put her on the schedule to be seen by the wound care provider.      Thanks   Nurse Vee  ----- Message -----  From: Pina High DNP  Sent: 1/16/2025   5:56 PM CST  To: [facility] Wound Clinical Support; #  Subject: STAT wound recs                                  Hey this patient had a PPM generator change procedure today 1/16/25 with a skin tear complication upon removing surgical drape. Size 2x4 cm. Placed a vasoline gauze dressing. We tried to get wound nurse inpatient to come by, but they had left for the day. Pt has a wound check appt tomorrow in our device clinic at 12:40pm, I placed a STAT wound care referral/order. If a wound care nurse could please see patient tomorrow for wound recommendations. Thanks!    Pina High DNP  Arrhythmia

## 2025-01-17 NOTE — NURSING
Received report from Susanna MORALES. Patient s/p gen change, AAOx4.   VSS, no c/o pain or discomfort at this time, resp even and unlabored. Paper tape dressing to L chest wall is CDI. No active bleeding. No hematoma noted.   Post procedure protocol reviewed with patient and patient's family. Understanding verbalized. Family members at bedside. Nurse call bell within reach.

## 2025-01-17 NOTE — NURSING TRANSFER
Nursing Transfer Note      1/16/2025   6:23 PM    Nurse giving handoff:Susanna PACU RN  Nurse receiving handoff:Arsenio CHANU RN    Transfer To: [facility]     Transfer via stretcher    Transfer with N/A    Transported by RN    Order for Tele Monitor? No    Medicines sent: none    Any special needs or follow-up needed: no    Patient belongings transferred with patient: No    Chart send with patient: Yes    Notified: daughter    Patient reassessed at: 1813

## 2025-01-23 ENCOUNTER — DOCUMENT SCAN (OUTPATIENT)
Dept: HOME HEALTH SERVICES | Facility: HOSPITAL | Age: 85
End: 2025-01-23
Payer: MEDICARE

## 2025-01-23 ENCOUNTER — TELEPHONE (OUTPATIENT)
Dept: ELECTROPHYSIOLOGY | Facility: CLINIC | Age: 85
End: 2025-01-23
Payer: MEDICARE

## 2025-01-23 ENCOUNTER — EXTERNAL HOME HEALTH (OUTPATIENT)
Dept: HOME HEALTH SERVICES | Facility: HOSPITAL | Age: 85
End: 2025-01-23
Payer: MEDICARE

## 2025-01-23 NOTE — TELEPHONE ENCOUNTER
Returned patients call. I let her know that she has been rescheduled to 1/24/24. Patient agreed to the appointment.

## 2025-01-24 ENCOUNTER — TELEPHONE (OUTPATIENT)
Dept: WOUND CARE | Facility: CLINIC | Age: 85
End: 2025-01-24
Payer: MEDICARE

## 2025-01-24 ENCOUNTER — DOCUMENTATION ONLY (OUTPATIENT)
Dept: ELECTROPHYSIOLOGY | Facility: CLINIC | Age: 85
End: 2025-01-24
Payer: MEDICARE

## 2025-01-24 ENCOUNTER — CLINICAL SUPPORT (OUTPATIENT)
Dept: CARDIOLOGY | Facility: HOSPITAL | Age: 85
End: 2025-01-24
Attending: INTERNAL MEDICINE
Payer: MEDICARE

## 2025-01-24 DIAGNOSIS — I48.91 ATRIAL FIBRILLATION, UNSPECIFIED TYPE: ICD-10-CM

## 2025-01-24 DIAGNOSIS — Z45.010 PACEMAKER BATTERY DEPLETION: ICD-10-CM

## 2025-01-24 NOTE — TELEPHONE ENCOUNTER
Returned call and spoke with patient, advised no clinic today in wound care.  Also, advised patient that message was sent to [facility] for someone from their office to call to schedule a new patient consult appointment.
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
## 2025-01-24 NOTE — PROGRESS NOTES
Patient seen today in device clinic for post-op PPM device and wound check s/p generator change complicated by skin tear on 1/16/2025. Dressing removed by wound care RNs using adhesive remover. Wound was cleansed with Vashe wound solution on gauze (letting it sit for 30 seconds). Skin prep pad applied to surrounding tissue. Aquacel Ag dressing applied. Instructed patient and daughter this may stay in place up to 1 week- changed sooner if water infiltrated dressing. Patient provided with dressing supplies- if needed to be replaced prior to outpatient wound check appointment (pending schedule). Patient and daughter in agreement to follow-up plan.         Refill request is for a maintenance medication and has met the criteria specified in the Ambulatory Medication Refill Standing Order for eligibility, visits, laboratory, alerts and was sent to the requested pharmacy.

## 2025-01-27 NOTE — ANESTHESIA POSTPROCEDURE EVALUATION
Anesthesia Post Evaluation    Patient: Joy Donahue    Procedure(s) Performed: Procedure(s) (LRB):  REPLACEMENT, PACEMAKER GENERATOR (Left)    Final Anesthesia Type: MAC      Patient location during evaluation: PACU  Patient participation: Yes- Able to Participate  Level of consciousness: awake and alert and oriented  Post-procedure vital signs: reviewed and stable  Pain management: adequate  Airway patency: patent    PONV status at discharge: No PONV  Anesthetic complications: no      Cardiovascular status: hemodynamically stable  Respiratory status: unassisted, spontaneous ventilation and room air  Hydration status: euvolemic  Follow-up not needed.              Vitals Value Taken Time   /59 01/16/25 1905   Temp 36.3 °C (97.3 °F) 01/16/25 1905   Pulse 81 01/16/25 1905   Resp 18 01/16/25 1905   SpO2 99 % 01/16/25 1905         No case tracking events are documented in the log.      Pain/Nadine Score: No data recorded

## 2025-01-30 ENCOUNTER — HOSPITAL ENCOUNTER (OUTPATIENT)
Dept: WOUND CARE | Facility: HOSPITAL | Age: 85
Discharge: HOME | End: 2025-01-30
Attending: PREVENTIVE MEDICINE
Payer: MEDICARE

## 2025-01-30 VITALS — SYSTOLIC BLOOD PRESSURE: 126 MMHG | HEART RATE: 88 BPM | DIASTOLIC BLOOD PRESSURE: 58 MMHG | RESPIRATION RATE: 16 BRPM

## 2025-01-30 DIAGNOSIS — T14.8XXA SKIN AVULSION: Primary | ICD-10-CM

## 2025-01-30 PROCEDURE — 99203 OFFICE O/P NEW LOW 30 MIN: CPT

## 2025-01-30 NOTE — PROGRESS NOTES
Wound Care & Hyperbaric Medicine    Subjective:       Patient ID: Joy Donahue is a 84 y.o. female.    Chief Complaint: Wound Consult    New referral to wound care clinic for left chest skin tear.  Patient s/p generator change complicated by skin tear on 1/16/2025. Daughter has been assisting with dressing changes - using xeroform. Skin tear has now almost fully resolved, remains with small area of dried fibrin and bruising. No signs of infection. Patient to continue xeroform until completely healed. Return to clinic as needed.           Objective:      Physical Exam       Wound 01/30/25 1357 Skin Tear Left Chest (Active)   01/30/25 1357 Chest   Present on Original Admission: Y   Primary Wound Type: Skin tear   Side: Left   Orientation:    Wound Approximate Age at First Assessment (Weeks):    Wound Number:    Is this injury device related?:    Incision Type:    Closure Method:    Wound Description (Comments):    Type:    Additional Comments:    Ankle-Brachial Index:    Pulses:    Removal Indication and Assessment:    Wound Outcome:    Wound Image   01/30/25 1357   Dressing Appearance Open to air 01/30/25 1357   Drainage Amount None 01/30/25 1357   Appearance Dry;Fibrin 01/30/25 1357   Periwound Area Ecchymotic 01/30/25 1357   Wound Edges Rolled/closed 01/30/25 1357   Wound Length (cm) 0 cm 01/30/25 1357   Wound Width (cm) 0 cm 01/30/25 1357   Wound Depth (cm) 0 cm 01/30/25 1357   Wound Volume (cm^3) 0 cm^3 01/30/25 1357   Wound Surface Area (cm^2) 0 cm^2 01/30/25 1357   Care Cleansed with:;Sterile normal saline 01/30/25 1357   Dressing Applied;Non-adherent;Island/border 01/30/25 1357   Dressing Change Due 01/31/25 01/30/25 1357         Assessment/Plan:         ICD-10-CM ICD-9-CM   1. Skin avulsion  T14.8XXA 879.8         Tissue pathology and/or culture taken:   [] Yes    [x] No   Sharp debridement performed:    [] Yes    [x] No   Labs ordered this visit:    [] Yes    [x] No   Imaging  ordered this visit:    [] Yes    [x] No     Is the wound improving?    [x] Yes    [] No   Any signs of infection?    [] Yes    [x] No             Orders Placed This Encounter   Procedures    Ambulatory referral/consult to Wound Clinic     Standing Status:   Standing     Number of Occurrences:   1     Referral Priority:   Emergency     Referral Type:   Consultation     Referral Reason:   Specialty Services Required     Requested Specialty:   Wound Care     Number of Visits Requested:   1    Change dressing     Left chest:   Cleanse wound with:saline  Lidocaine:prn  Primary dressing:xeroform  Secondary dressing:border  Frequency:daily until fully healed  Follow-up:PRN        Follow up if symptoms worsen or fail to improve.            This includes face to face time and non-face to face time preparing to see the patient (eg, review of tests), obtaining and/or reviewing separately obtained history, documenting clinical information in the electronic or other health record, independently interpreting results and communicating results to the patient/family/caregiver, or care coordinator.  Total time spent  > 55 minutes

## 2025-02-03 ENCOUNTER — HOSPITAL ENCOUNTER (OUTPATIENT)
Dept: CARDIOLOGY | Facility: HOSPITAL | Age: 85
Discharge: HOME | End: 2025-02-03
Attending: INTERNAL MEDICINE
Payer: MEDICARE

## 2025-02-03 VITALS
HEART RATE: 100 BPM | WEIGHT: 160 LBS | HEIGHT: 66 IN | BODY MASS INDEX: 25.71 KG/M2 | DIASTOLIC BLOOD PRESSURE: 78 MMHG | SYSTOLIC BLOOD PRESSURE: 150 MMHG

## 2025-02-03 DIAGNOSIS — I11.0 HYPERTENSIVE HEART DISEASE WITH HEART FAILURE: ICD-10-CM

## 2025-02-03 LAB
ASCENDING AORTA: 3.21 CM
AV AREA BY CONTINUOUS VTI: 1.7 CM2
AV INDEX (PROSTH): 0.55
AV LVOT MEAN GRADIENT: 1 MMHG
AV LVOT PEAK GRADIENT: 1 MMHG
AV MEAN GRADIENT: 5 MMHG
AV PEAK GRADIENT: 8 MMHG
AV VALVE AREA BY VELOCITY RATIO: 1.3 CM²
AV VALVE AREA: 1.7 CM2
AV VELOCITY RATIO: 0.43
BSA FOR ECHO PROCEDURE: 1.84 M2
CV ECHO LV RWT: 0.41 CM
DOP CALC AO PEAK VEL: 1.4 M/S
DOP CALC AO VTI: 28.7 CM
DOP CALC LVOT AREA: 3.1 CM2
DOP CALC LVOT DIAMETER: 2 CM
DOP CALC LVOT PEAK VEL: 0.6 M/S
DOP CALC LVOT STROKE VOLUME: 49.3 CM3
DOP CALCLVOT PEAK VEL VTI: 15.7 CM
E WAVE DECELERATION TIME: 275 MS
E/A RATIO: 1.25
E/E' RATIO: 16 M/S
ECHO EF ESTIMATED: 41 %
ECHO LV POSTERIOR WALL: 0.8 CM (ref 0.6–1.1)
EJECTION FRACTION: 55 %
FRACTIONAL SHORTENING: 20.5 % (ref 28–44)
INTERVENTRICULAR SEPTUM: 0.7 CM (ref 0.6–1.1)
IVC DIAMETER: 1.49 CM
LA MAJOR: 6 CM
LA MINOR: 5.3 CM
LA WIDTH: 4.3 CM
LEFT ATRIUM SIZE: 4.1 CM
LEFT ATRIUM VOLUME INDEX MOD: 43 ML/M2
LEFT ATRIUM VOLUME INDEX: 46 ML/M2
LEFT ATRIUM VOLUME MOD: 78 ML
LEFT ATRIUM VOLUME: 84 CM3
LEFT INTERNAL DIMENSION IN SYSTOLE: 3.1 CM (ref 2.1–4)
LEFT VENTRICLE DIASTOLIC VOLUME INDEX: 35.54 ML/M2
LEFT VENTRICLE DIASTOLIC VOLUME: 64.68 ML
LEFT VENTRICLE MASS INDEX: 45.2 G/M2
LEFT VENTRICLE SYSTOLIC VOLUME INDEX: 20.9 ML/M2
LEFT VENTRICLE SYSTOLIC VOLUME: 38.05 ML
LEFT VENTRICULAR INTERNAL DIMENSION IN DIASTOLE: 3.9 CM (ref 3.5–6)
LEFT VENTRICULAR MASS: 82.3 G
LV LATERAL E/E' RATIO: 15.8
LV SEPTAL E/E' RATIO: 15.8
MV PEAK A VEL: 0.76 M/S
MV PEAK E VEL: 0.95 M/S
OHS CV RV/LV RATIO: 1 CM
PISA TR MAX VEL: 2.6 M/S
RA MAJOR: 4.39 CM
RA PRESSURE ESTIMATED: 3 MMHG
RA WIDTH: 3.83 CM
RIGHT ATRIAL AREA: 14.6 CM2
RIGHT VENTRICLE DIASTOLIC BASEL DIMENSION: 3.9 CM
RV TB RVSP: 6 MMHG
RV TISSUE DOPPLER FREE WALL SYSTOLIC VELOCITY 1 (APICAL 4 CHAMBER VIEW): 10.42 CM/S
SINUS: 2.61 CM
STJ: 2.03 CM
TDI LATERAL: 0.06 M/S
TDI SEPTAL: 0.06 M/S
TDI: 0.06 M/S
TR MAX PG: 27 MMHG
TRICUSPID ANNULAR PLANE SYSTOLIC EXCURSION: 1.88 CM
TV PEAK GRADIENT: 26 MMHG
TV REST PULMONARY ARTERY PRESSURE: 30 MMHG
Z-SCORE OF LEFT VENTRICULAR DIMENSION IN END DIASTOLE: -2.56
Z-SCORE OF LEFT VENTRICULAR DIMENSION IN END SYSTOLE: -0.03

## 2025-02-03 PROCEDURE — 93306 TTE W/DOPPLER COMPLETE: CPT | Mod: 26,,, | Performed by: INTERNAL MEDICINE

## 2025-02-03 PROCEDURE — 93306 TTE W/DOPPLER COMPLETE: CPT

## 2025-02-07 ENCOUNTER — OFFICE VISIT (OUTPATIENT)
Dept: CARDIOLOGY | Facility: CLINIC | Age: 85
End: 2025-02-07
Payer: MEDICARE

## 2025-02-07 ENCOUNTER — TELEPHONE (OUTPATIENT)
Dept: CARDIOLOGY | Facility: CLINIC | Age: 85
End: 2025-02-07
Payer: MEDICARE

## 2025-02-07 VITALS
DIASTOLIC BLOOD PRESSURE: 85 MMHG | BODY MASS INDEX: 25.16 KG/M2 | HEART RATE: 47 BPM | WEIGHT: 155.88 LBS | SYSTOLIC BLOOD PRESSURE: 126 MMHG

## 2025-02-07 DIAGNOSIS — R18.8 CIRRHOSIS OF LIVER WITH ASCITES, UNSPECIFIED HEPATIC CIRRHOSIS TYPE: ICD-10-CM

## 2025-02-07 DIAGNOSIS — I85.10 SECONDARY ESOPHAGEAL VARICES WITHOUT BLEEDING: ICD-10-CM

## 2025-02-07 DIAGNOSIS — I50.9 CONGESTIVE HEART FAILURE, UNSPECIFIED HF CHRONICITY, UNSPECIFIED HEART FAILURE TYPE: Primary | ICD-10-CM

## 2025-02-07 DIAGNOSIS — K74.60 CIRRHOSIS OF LIVER WITH ASCITES, UNSPECIFIED HEPATIC CIRRHOSIS TYPE: ICD-10-CM

## 2025-02-07 DIAGNOSIS — Z95.0 STATUS POST CARDIAC PACEMAKER PROCEDURE: ICD-10-CM

## 2025-02-07 DIAGNOSIS — I50.33 ACUTE ON CHRONIC DIASTOLIC CONGESTIVE HEART FAILURE: Primary | ICD-10-CM

## 2025-02-07 PROCEDURE — 99999 PR PBB SHADOW E&M-EST. PATIENT-LVL III: CPT | Mod: PBBFAC,,, | Performed by: INTERNAL MEDICINE

## 2025-02-07 PROCEDURE — 99213 OFFICE O/P EST LOW 20 MIN: CPT | Mod: PBBFAC | Performed by: INTERNAL MEDICINE

## 2025-02-07 PROCEDURE — 99214 OFFICE O/P EST MOD 30 MIN: CPT | Mod: S$PBB,,, | Performed by: INTERNAL MEDICINE

## 2025-02-07 RX ORDER — NADOLOL 40 MG/1
40 TABLET ORAL DAILY
Qty: 30 TABLET | Refills: 11 | Status: SHIPPED | OUTPATIENT
Start: 2025-02-07

## 2025-02-07 NOTE — PROGRESS NOTES
Subjective:   02/07/2025     Patient ID:  Joy Donahue is a 84 y.o. female who presents for evaulation of Congestive Heart Failure, Follow-up, and Results      Comes in today for follow-up.  She has had a quite complicated recent medical history.  Epigastric pain in mid November lead to partial small-bowel resection for obstruction.  She subsequently developed a right leg DVT, because of thrombocytopenia, Eliquis was begun at 5 mg twice a day, but not 10 mg twice a day.  She eventually had a IVC filter placed.  She developed ascites, underwent paracentesis of 1 L of fluid.  I felt she did have congestive heart failure, spironolactone added.  Furosemide continued.  On a subsequent visit, she notes that her weight was decreased by about 5 lb.  She has continued to have dyspnea on exertion.      Echocardiography showed normal left ventricular function, no pulmonary hypertension.    She underwent a pulse generator change.  Generator checked today, appears to be functioning normally.  Will arrange follow-up.  Heart rate does appear to be fast, will increase nadolol.    She is not having chest pains or tightness.  She does have back pain.    She is known to have cirrhosis, history of hepatitis-C.  Currently treated with nadolol.        Prior impression reviewed:     Acute on chronic diastolic congestive heart failure  Comments:  Will treat with furosemide PRN and spironolactone daily  Orders:  -     IN OFFICE EKG 12-LEAD (to [facility])  -     spironolactone (ALDACTONE) 25 MG tablet; Take 1 tablet (25 mg total) by mouth once daily.  Dispense: 30 tablet; Refill: 11  -     furosemide (LASIX) 40 MG tablet; Take 1 tablet (40 mg total) by mouth daily as needed (fluid).  Dispense: 30 tablet; Refill: 11  -     Comprehensive Metabolic Panel; Future; Expected date: 01/06/2025    Cirrhosis of liver with ascites, unspecified hepatic cirrhosis type  -     nadoloL (CORGARD) 40 MG tablet; Take 0.5 tablets (20 mg total) by mouth once  daily.  Dispense: 30 tablet; Refill: 11  -     CBC Auto Differential; Future; Expected date: 01/06/2025    Deep vein thrombosis (DVT) of femoral vein of right lower extremity, unspecified chronicity  Comments:  Eliquis should be twice a day, now only taking it once daily    Thrombocytopenia, unspecified  Comments:  Check lab in 3 days    Pacemaker  Comments:  At [facility], generator change arranged    Atrial fibrillation, unspecified type  Comments:  Currently not in fibrillation, none noted on pacemaker         Follow up in about 2 weeks (around 1/10/2025).    Prior cardiac evaluation:  DISCHARGE DIAGNOSES:  1. Diastolic heart failure.  2. Chronic hepatitis C.  3. Hepatic cirrhosis with portal hypertension.  4. Hypersplenism.    PROCEDURES PERFORMED: Right heart catheterization, left heart  catheterization, coronary angiogram, left ventricular angiogram, hepatic  wedge pressure management.    HOSPITAL COURSE: This is a 77-year-old white female, who has chronic  liver troubles. She also had some heart block, which seemed to be related  to some antiviral therapy. She had a pacer placed a few years back. She  presented again with dyspnea and chest pain, which failed diagnosis with  noninvasive workup. Because of her multiple medical problems, we opted to  proceed with angiography.    She was taken to the cath lab on February 19th, where she was found have a  mean right atrial pressure of 14. Hepatic wedge was 17. Right  ventricular pressure was 47/15 with a pulmonary artery pressure of 47/23.  Wedge pressure had an A wave of 25, V wave 29, mean 23. Left ventricular  end-diastolic pressure was 26. There was no aortic valve gradient. EF  was normal, estimated at 65% to 70%. She had mild mitral regurgitation.    Her coronaries had minimal luminal irregularity without significant  obstruction. She had a right dominant pattern.    Because of these findings, we feel she is having a little element of  diastolic heart failure, but  her chest pain is not cardiac in etiology.  She is thus going to be sent back to her primary docs and GI docs for  further workup as they see fit.          Past Medical History:   Diagnosis Date    Acute (reversible) ischemia of intestine, part and extent unspecified 08/24/2022    Acute cystitis without hematuria 08/14/2023    Cirrhosis of liver with ascites 02/14/2023    Continue to follow up with hepatologist    COVID     x 2-residual dysgeusia    Hepatitis C     treated/cured previously    Pacemaker     Paroxysmal atrial fibrillation     Pericardial cyst     Skin cancer     Thrombus        Review of patient's allergies indicates:   Allergen Reactions    Ciprofloxacin Swelling    Iodine Shortness Of Breath    Latex Itching    Fluoride      Mouth sores    Fluoride preparations Dermatitis     Other reaction(s): Ulcer of mouth         Current Outpatient Medications:     apixaban (ELIQUIS) 5 mg Tab, Take 1 tablet (5 mg total) by mouth 2 (two) times daily., Disp: , Rfl:     furosemide (LASIX) 40 MG tablet, Take 1 tablet (40 mg total) by mouth daily as needed (fluid)., Disp: 30 tablet, Rfl: 11    lactulose (CHRONULAC) 10 gram/15 mL solution, Take 30 mLs (20 g total) by mouth daily as needed., Disp: 1892 mL, Rfl: 11    rifAXIMin (XIFAXAN) 550 mg Tab, Take 1 tablet (550 mg total) by mouth 2 (two) times daily., Disp: 180 tablet, Rfl: 3    spironolactone (ALDACTONE) 25 MG tablet, Take 1 tablet (25 mg total) by mouth once daily., Disp: 30 tablet, Rfl: 11    empagliflozin (JARDIANCE) 10 mg tablet, 1/2 tab daily, Disp: 30 tablet, Rfl: 11    nadoloL (CORGARD) 40 MG tablet, Take 1 tablet (40 mg total) by mouth once daily., Disp: 30 tablet, Rfl: 11     Objective:   Review of Systems   Cardiovascular:  Positive for dyspnea on exertion and leg swelling. Negative for chest pain, claudication, cyanosis, irregular heartbeat, near-syncope, orthopnea, palpitations, paroxysmal nocturnal dyspnea and syncope.         Vitals:    02/07/25  1115   BP: 126/85   Pulse: (!) 47     Wt Readings from Last 3 Encounters:   02/07/25 70.7 kg (155 lb 13.8 oz)   02/03/25 72.6 kg (160 lb)   01/16/25 72.6 kg (160 lb)     Temp Readings from Last 3 Encounters:   01/16/25 97.3 °F (36.3 °C) (Temporal)   12/16/24 99.1 °F (37.3 °C) (Oral)   12/10/24 97.8 °F (36.6 °C) (Oral)     BP Readings from Last 3 Encounters:   02/07/25 126/85   02/03/25 (!) 150/78   01/30/25 (!) 126/58     Pulse Readings from Last 3 Encounters:   02/07/25 (!) 47   02/03/25 100   01/30/25 88             Physical Exam  Vitals reviewed.   Constitutional:       General: She is not in acute distress.     Appearance: She is well-developed.   HENT:      Head: Normocephalic and atraumatic.      Nose: Nose normal.   Eyes:      Conjunctiva/sclera: Conjunctivae normal.      Pupils: Pupils are equal, round, and reactive to light.   Neck:      Vascular: No carotid bruit or JVD.   Cardiovascular:      Rate and Rhythm: Normal rate and regular rhythm.      Pulses: Intact distal pulses.      Heart sounds: Normal heart sounds. No murmur heard.     No friction rub. No gallop.   Pulmonary:      Effort: Pulmonary effort is normal. No respiratory distress.      Breath sounds: Normal breath sounds. No wheezing or rales.   Chest:      Chest wall: No tenderness.   Abdominal:      General: Bowel sounds are normal. There is no distension.      Palpations: Abdomen is soft.      Tenderness: There is no abdominal tenderness.   Musculoskeletal:         General: No tenderness or deformity. Normal range of motion.      Cervical back: Normal range of motion and neck supple.      Right lower leg: Edema present.      Left lower leg: Edema present.   Skin:     General: Skin is warm and dry.      Findings: No erythema or rash.   Neurological:      Mental Status: She is alert and oriented to person, place, and time.      Cranial Nerves: No cranial nerve deficit.      Motor: No abnormal muscle tone.      Coordination: Coordination normal.    Psychiatric:         Behavior: Behavior normal.         Thought Content: Thought content normal.         Judgment: Judgment normal.           Lab Results   Component Value Date    CHOL 227 (H) 07/30/2024    CHOL 208 (A) 10/18/2022    CHOL 205 04/07/2022     Lab Results   Component Value Date    HDL 76 (H) 07/30/2024    HDL 71 (A) 10/18/2022    HDL 66 04/07/2022     Lab Results   Component Value Date    LDLCALC 122 10/18/2022     Lab Results   Component Value Date    ALT 32 12/30/2024    AST 43 (H) 12/30/2024    AST 25 12/13/2024    AST 27 12/12/2024     Lab Results   Component Value Date    CREATININE 0.8 01/09/2025    BUN 19 01/09/2025     01/09/2025    K 3.7 01/09/2025    CO2 26 01/09/2025    CO2 26 12/30/2024    CO2 25 12/15/2024     Lab Results   Component Value Date    HGB 10.1 (L) 01/09/2025    HCT 33.7 (L) 01/09/2025    HCT 34.5 (L) 12/30/2024    HCT 31.9 (L) 12/16/2024             EKG shows sinus rhythm with frequent atrial extrasystoles and paced ventricular beats          Assessment and Plan:     Acute on chronic diastolic congestive heart failure  Comments:  Continues to have dyspnea.  Will add SG LT 2 inhibitor therapy  Orders:  -     empagliflozin (JARDIANCE) 10 mg tablet; 1/2 tab daily  Dispense: 30 tablet; Refill: 11  -     Basic Metabolic Panel; Future; Expected date: 02/21/2025  -     NT-Pro Natriuretic Peptide; Future; Expected date: 02/21/2025    Cirrhosis of liver with ascites, unspecified hepatic cirrhosis type  -     nadoloL (CORGARD) 40 MG tablet; Take 1 tablet (40 mg total) by mouth once daily.  Dispense: 30 tablet; Refill: 11    Status post cardiac pacemaker procedure  Comments:  Status post generator change    Secondary esophageal varices without bleeding      Pacemaker checked, appears to be functionally normally.  She does have frequent extrasystoles, preference pacing present.  Rate increased, will increase nadolol for heart rate control.      She has not had atrial fibrillation  on her pacemaker.      She does continue to have dyspnea and now has lower extremity swelling, will institute therapy with low-dose SG LT 2 inhibitor therapy and check lab in, see her again in about 2 weeks.    Pacemaker follow-up not arrange, will do so.       Follow up in about 2 weeks (around 2/21/2025).          Future Appointments   Date Time Provider Department Center   2/19/2025  2:30 PM [facility]ORDIN[facility] [place]   2/19/2025  3:20 PM Andi Mullins Jr., MD [facility] [place]

## 2025-02-12 DIAGNOSIS — I48.0 PAROXYSMAL ATRIAL FIBRILLATION: Primary | ICD-10-CM

## 2025-02-17 ENCOUNTER — CLINICAL SUPPORT (OUTPATIENT)
Dept: CARDIOLOGY | Facility: HOSPITAL | Age: 85
End: 2025-02-17
Payer: MEDICARE

## 2025-02-17 ENCOUNTER — LAB VISIT (OUTPATIENT)
Dept: LAB | Facility: HOSPITAL | Age: 85
End: 2025-02-17
Attending: INTERNAL MEDICINE
Payer: MEDICARE

## 2025-02-17 ENCOUNTER — CLINICAL SUPPORT (OUTPATIENT)
Dept: CARDIOLOGY | Facility: HOSPITAL | Age: 85
End: 2025-02-17
Attending: INTERNAL MEDICINE
Payer: MEDICARE

## 2025-02-17 DIAGNOSIS — Z95.0 PRESENCE OF CARDIAC PACEMAKER: ICD-10-CM

## 2025-02-17 DIAGNOSIS — I49.5 SICK SINUS SYNDROME: ICD-10-CM

## 2025-02-17 DIAGNOSIS — I50.33 ACUTE ON CHRONIC DIASTOLIC CONGESTIVE HEART FAILURE: ICD-10-CM

## 2025-02-17 LAB
ANION GAP SERPL CALC-SCNC: 6 MMOL/L (ref 8–16)
BUN SERPL-MCNC: 14 MG/DL (ref 8–23)
CALCIUM SERPL-MCNC: 9.7 MG/DL (ref 8.7–10.5)
CHLORIDE SERPL-SCNC: 109 MMOL/L (ref 95–110)
CO2 SERPL-SCNC: 24 MMOL/L (ref 23–29)
CREAT SERPL-MCNC: 0.7 MG/DL (ref 0.5–1.4)
EST. GFR  (NO RACE VARIABLE): >60 ML/MIN/1.73 M^2
GLUCOSE SERPL-MCNC: 98 MG/DL (ref 70–110)
POTASSIUM SERPL-SCNC: 4.1 MMOL/L (ref 3.5–5.1)
SODIUM SERPL-SCNC: 139 MMOL/L (ref 136–145)

## 2025-02-17 PROCEDURE — 93294 REM INTERROG EVL PM/LDLS PM: CPT | Mod: ,,, | Performed by: INTERNAL MEDICINE

## 2025-02-17 PROCEDURE — 80048 BASIC METABOLIC PNL TOTAL CA: CPT | Performed by: INTERNAL MEDICINE

## 2025-02-19 ENCOUNTER — OFFICE VISIT (OUTPATIENT)
Dept: CARDIOLOGY | Facility: CLINIC | Age: 85
End: 2025-02-19
Payer: MEDICARE

## 2025-02-19 ENCOUNTER — CLINICAL SUPPORT (OUTPATIENT)
Dept: CARDIOLOGY | Facility: HOSPITAL | Age: 85
End: 2025-02-19
Attending: INTERNAL MEDICINE
Payer: MEDICARE

## 2025-02-19 VITALS
WEIGHT: 157.19 LBS | DIASTOLIC BLOOD PRESSURE: 83 MMHG | HEART RATE: 90 BPM | BODY MASS INDEX: 25.37 KG/M2 | SYSTOLIC BLOOD PRESSURE: 137 MMHG

## 2025-02-19 DIAGNOSIS — Z45.010 PACEMAKER GENERATOR END OF LIFE: ICD-10-CM

## 2025-02-19 DIAGNOSIS — I11.0 HYPERTENSIVE HEART DISEASE WITH HEART FAILURE: ICD-10-CM

## 2025-02-19 DIAGNOSIS — I50.32 CHRONIC DIASTOLIC CONGESTIVE HEART FAILURE: Primary | ICD-10-CM

## 2025-02-19 DIAGNOSIS — I48.91 ATRIAL FIBRILLATION, UNSPECIFIED TYPE: ICD-10-CM

## 2025-02-19 DIAGNOSIS — I50.9 CONGESTIVE HEART FAILURE, UNSPECIFIED HF CHRONICITY, UNSPECIFIED HEART FAILURE TYPE: ICD-10-CM

## 2025-02-19 DIAGNOSIS — Z95.828 S/P IVC FILTER: ICD-10-CM

## 2025-02-19 LAB — NT-PROBNP SERPL IA-MCNC: 199 PG/ML

## 2025-02-19 PROCEDURE — 93280 PM DEVICE PROGR EVAL DUAL: CPT

## 2025-02-19 PROCEDURE — 99213 OFFICE O/P EST LOW 20 MIN: CPT | Mod: PBBFAC,25 | Performed by: INTERNAL MEDICINE

## 2025-02-19 NOTE — PROGRESS NOTES
Subjective:   02/19/2025     Patient ID:  Joy Donahue is a 84 y.o. female who presents for evaulation of Results      Comes in today for cardiac follow-up.  She notes that her breathing has not grown worse, maybe better.  She has not had increased swelling.  We did try an SG LT 2 inhibitor, Jardiance, she developed itchiness on an and could not tolerate.  She is taking spironolactone.  Tolerates that well.      During her prolonged hospitalization November, she did have a right leg DVT and an IVC filter placed.  Will refer for removal.      She does have cirrhosis, does need follow-up with GI, sees Dr. Jamison.    Patient tolerating Eliquis.  Would continue.  Could consider discontinuation of Eliquis in 3 months.    Status post pulse generator change, tolerating well.          Prior note reviewed:  Comes in today for follow-up.  She has had a quite complicated recent medical history.  Epigastric pain in mid November lead to partial small-bowel resection for obstruction.  She subsequently developed a right leg DVT, because of thrombocytopenia, Eliquis was begun at 5 mg twice a day, but not 10 mg twice a day.  She eventually had a IVC filter placed.  She developed ascites, underwent paracentesis of 1 L of fluid.  I felt she did have congestive heart failure, spironolactone added.  Furosemide continued.  On a subsequent visit, she notes that her weight was decreased by about 5 lb.  She has continued to have dyspnea on exertion.      Echocardiography showed normal left ventricular function, no pulmonary hypertension.    She underwent a pulse generator change.  Generator checked today, appears to be functioning normally.  Will arrange follow-up.  Heart rate does appear to be fast, will increase nadolol.    She is not having chest pains or tightness.  She does have back pain.    She is known to have cirrhosis, history of hepatitis-C.  Currently treated with nadolol.    Echocardiogram from 02/25:     Left Ventricle:  The left ventricle is normal in size. Normal wall thickness. There is normal systolic function. Ejection fraction is approximately 55%. There is normal diastolic function.  ·  Right Ventricle: Normal right ventricular cavity size. Wall thickness is normal. Systolic function is normal. Pacemaker lead present in the ventricle.  ·  Aortic Valve: There is annular calcification and sclerosis w/o significant stenosis.  ·  Tricuspid Valve: There is mild to moderate regurgitation.  ·  Pulmonary Artery: The estimated pulmonary artery systolic pressure is 30 mmHg.  ·  IVC/SVC: Normal venous pressure at 3 mmHg.            Prior impression reviewed:   Acute on chronic diastolic congestive heart failure  Comments:  Continues to have dyspnea.  Will add SG LT 2 inhibitor therapy  Orders:  -     empagliflozin (JARDIANCE) 10 mg tablet; 1/2 tab daily  Dispense: 30 tablet; Refill: 11  -     Basic Metabolic Panel; Future; Expected date: 02/21/2025  -     NT-Pro Natriuretic Peptide; Future; Expected date: 02/21/2025    Cirrhosis of liver with ascites, unspecified hepatic cirrhosis type  -     nadoloL (CORGARD) 40 MG tablet; Take 1 tablet (40 mg total) by mouth once daily.  Dispense: 30 tablet; Refill: 11    Status post cardiac pacemaker procedure  Comments:  Status post generator change    Secondary esophageal varices without bleeding      Pacemaker checked, appears to be functionally normally.  She does have frequent extrasystoles, preference pacing present.  Rate increased, will increase nadolol for heart rate control.      She has not had atrial fibrillation on her pacemaker.      She does continue to have dyspnea and now has lower extremity swelling, will institute therapy with low-dose SG LT 2 inhibitor therapy and check lab in, see her again in about 2 weeks.    Pacemaker follow-up not arrange, will do so.       Follow up in about 2 weeks (around 2/21/2025).    Prior cardiac evaluation:  DISCHARGE DIAGNOSES:  1. Diastolic heart  failure.  2. Chronic hepatitis C.  3. Hepatic cirrhosis with portal hypertension.  4. Hypersplenism.    PROCEDURES PERFORMED: Right heart catheterization, left heart  catheterization, coronary angiogram, left ventricular angiogram, hepatic  wedge pressure management.    HOSPITAL COURSE: This is a 77-year-old white female, who has chronic  liver troubles. She also had some heart block, which seemed to be related  to some antiviral therapy. She had a pacer placed a few years back. She  presented again with dyspnea and chest pain, which failed diagnosis with  noninvasive workup. Because of her multiple medical problems, we opted to  proceed with angiography.    She was taken to the cath lab on February 19th, where she was found have a  mean right atrial pressure of 14. Hepatic wedge was 17. Right  ventricular pressure was 47/15 with a pulmonary artery pressure of 47/23.  Wedge pressure had an A wave of 25, V wave 29, mean 23. Left ventricular  end-diastolic pressure was 26. There was no aortic valve gradient. EF  was normal, estimated at 65% to 70%. She had mild mitral regurgitation.    Her coronaries had minimal luminal irregularity without significant  obstruction. She had a right dominant pattern.    Because of these findings, we feel she is having a little element of  diastolic heart failure, but her chest pain is not cardiac in etiology.  She is thus going to be sent back to her primary docs and GI docs for  further workup as they see fit.          Past Medical History:   Diagnosis Date    Acute (reversible) ischemia of intestine, part and extent unspecified 08/24/2022    Acute cystitis without hematuria 08/14/2023    Cirrhosis of liver with ascites 02/14/2023    Continue to follow up with hepatologist    COVID     x 2-residual dysgeusia    Hepatitis C     treated/cured previously    Pacemaker     Paroxysmal atrial fibrillation     Pericardial cyst     Skin cancer     Thrombus        Review of patient's allergies  indicates:   Allergen Reactions    Ciprofloxacin Swelling    Iodine Shortness Of Breath    Latex Itching    Fluoride      Mouth sores    Fluoride preparations Dermatitis     Other reaction(s): Ulcer of mouth         Current Outpatient Medications:     apixaban (ELIQUIS) 5 mg Tab, Take 1 tablet (5 mg total) by mouth 2 (two) times daily., Disp: , Rfl:     furosemide (LASIX) 40 MG tablet, Take 1 tablet (40 mg total) by mouth daily as needed (fluid)., Disp: 30 tablet, Rfl: 11    lactulose (CHRONULAC) 10 gram/15 mL solution, Take 30 mLs (20 g total) by mouth daily as needed., Disp: 1892 mL, Rfl: 11    nadoloL (CORGARD) 40 MG tablet, Take 1 tablet (40 mg total) by mouth once daily., Disp: 30 tablet, Rfl: 11    rifAXIMin (XIFAXAN) 550 mg Tab, Take 1 tablet (550 mg total) by mouth 2 (two) times daily., Disp: 180 tablet, Rfl: 3    spironolactone (ALDACTONE) 25 MG tablet, Take 1 tablet (25 mg total) by mouth once daily., Disp: 30 tablet, Rfl: 11     Objective:   Review of Systems   Cardiovascular:  Positive for dyspnea on exertion and leg swelling. Negative for chest pain, claudication, cyanosis, irregular heartbeat, near-syncope, orthopnea, palpitations, paroxysmal nocturnal dyspnea and syncope.         Vitals:    02/19/25 1452   BP: 137/83   Pulse: 90     Wt Readings from Last 3 Encounters:   02/19/25 71.3 kg (157 lb 3 oz)   02/07/25 70.7 kg (155 lb 13.8 oz)   02/03/25 72.6 kg (160 lb)     Temp Readings from Last 3 Encounters:   01/16/25 97.3 °F (36.3 °C) (Temporal)   12/16/24 99.1 °F (37.3 °C) (Oral)   12/10/24 97.8 °F (36.6 °C) (Oral)     BP Readings from Last 3 Encounters:   02/19/25 137/83   02/07/25 126/85   02/03/25 (!) 150/78     Pulse Readings from Last 3 Encounters:   02/19/25 90   02/07/25 (!) 47   02/03/25 100             Physical Exam  Vitals reviewed.   Constitutional:       General: She is not in acute distress.     Appearance: She is well-developed.   HENT:      Head: Normocephalic and atraumatic.      Nose:  Nose normal.   Eyes:      Conjunctiva/sclera: Conjunctivae normal.      Pupils: Pupils are equal, round, and reactive to light.   Neck:      Vascular: No carotid bruit or JVD.   Cardiovascular:      Rate and Rhythm: Normal rate and regular rhythm.      Pulses: Intact distal pulses.      Heart sounds: Normal heart sounds. No murmur heard.     No friction rub. No gallop.   Pulmonary:      Effort: Pulmonary effort is normal. No respiratory distress.      Breath sounds: Normal breath sounds. No wheezing or rales.   Chest:      Chest wall: No tenderness.   Abdominal:      General: Bowel sounds are normal. There is no distension.      Palpations: Abdomen is soft.      Tenderness: There is no abdominal tenderness.   Musculoskeletal:         General: No tenderness or deformity. Normal range of motion.      Cervical back: Normal range of motion and neck supple.      Right lower leg: Edema present.      Left lower leg: Edema present.   Skin:     General: Skin is warm and dry.      Findings: No erythema or rash.   Neurological:      Mental Status: She is alert and oriented to person, place, and time.      Cranial Nerves: No cranial nerve deficit.      Motor: No abnormal muscle tone.      Coordination: Coordination normal.   Psychiatric:         Behavior: Behavior normal.         Thought Content: Thought content normal.         Judgment: Judgment normal.           Lab Results   Component Value Date    CHOL 227 (H) 07/30/2024    CHOL 208 (A) 10/18/2022    CHOL 205 04/07/2022     Lab Results   Component Value Date    HDL 76 (H) 07/30/2024    HDL 71 (A) 10/18/2022    HDL 66 04/07/2022     Lab Results   Component Value Date    LDLCALC 122 10/18/2022     Lab Results   Component Value Date    ALT 32 12/30/2024    AST 43 (H) 12/30/2024    AST 25 12/13/2024    AST 27 12/12/2024     Lab Results   Component Value Date    CREATININE 0.7 02/17/2025    BUN 14 02/17/2025     02/17/2025    K 4.1 02/17/2025    CO2 24 02/17/2025    CO2 26  01/09/2025    CO2 26 12/30/2024     Lab Results   Component Value Date    HGB 10.1 (L) 01/09/2025    HCT 33.7 (L) 01/09/2025    HCT 34.5 (L) 12/30/2024    HCT 31.9 (L) 12/16/2024             EKG shows sinus rhythm with frequent atrial extrasystoles and paced ventricular beats          Assessment and Plan:     Chronic diastolic congestive heart failure  Comments:  Appears stable  Did not tolerate SG LT 2 inhibitor    Atrial fibrillation, unspecified type  Comments:  No atrial fib noted on pacemaker check    Hypertensive heart disease with heart failure  Comments:  Blood pressure well controlled    Pacemaker generator end of life  Comments:  Status post pacer change    S/P IVC filter  Comments:  Referral for removal  Orders:  -     Ambulatory referral/consult to Interventional Cardiology; Future; Expected date: 02/26/2025            Follow up in about 3 months (around 5/19/2025).          Future Appointments   Date Time Provider Department Center   5/7/2025  2:40 PM Andi Mullins Jr., MD [facility] [place]   6/4/2025  2:30 PM [facility]

## 2025-03-05 ENCOUNTER — EXTERNAL HOME HEALTH (OUTPATIENT)
Dept: HOME HEALTH SERVICES | Facility: HOSPITAL | Age: 85
End: 2025-03-05
Payer: MEDICARE

## 2025-03-14 LAB
OHS CV AF BURDEN PERCENT: < 1
OHS CV DC REMOTE DEVICE TYPE: NORMAL
OHS CV ICD SHOCK: NO
OHS CV RV PACING PERCENT: 0.31 %

## (undated) DEVICE — ELECTRODE PATIENT RETURN DISP

## (undated) DEVICE — DRESSING GAUZE XEROFORM 5X9

## (undated) DEVICE — ELECTRODE REM PLYHSV RETURN 9

## (undated) DEVICE — RELOAD PROXIMATE CUT BLUE 75MM

## (undated) DEVICE — SOL NACL IRR 1000ML BTL

## (undated) DEVICE — GLOVE PROTEXIS LTX MICRO  7.5

## (undated) DEVICE — PAD DEFIB CADENCE ADULT R2

## (undated) DEVICE — RETAINER VISCERAL 3204 MEDIUM

## (undated) DEVICE — TOWEL OR WHT XRAY 16X26 2/PK

## (undated) DEVICE — BINDER ABD 12IN SM/MED 30-45IN

## (undated) DEVICE — KIT MINI STK MAX COAX 5FR 10CM

## (undated) DEVICE — PAD DEFIB RADIOTRANSPARENT

## (undated) DEVICE — SUT SILK SH 2-0 30IN MP BLK

## (undated) DEVICE — KIT SURGICAL TURNOVER

## (undated) DEVICE — SUT SILK 3-0 SH DETACH 30IN

## (undated) DEVICE — PENCIL SMK EVAC CONNECTOR 10FT

## (undated) DEVICE — GLOVE PROTEXIS BLUE LATEX 7.5

## (undated) DEVICE — PACK PACER PERMANENT OMC

## (undated) DEVICE — SUT PDS PLUS 1 TP1 96IN

## (undated) DEVICE — CUTTER PROXIMATE BLUE 75MM

## (undated) DEVICE — DRAPE STERI INCISE 17X23IN

## (undated) DEVICE — Device

## (undated) DEVICE — SUT VICRYL PLUS 3-0 SH 27IN

## (undated) DEVICE — ELECTRODE BLD EXT 6.50 ST DISP

## (undated) DEVICE — CANNULA ADULT NASAL 7FT

## (undated) DEVICE — PENCIL ELECSURG ROCKER 15FT

## (undated) DEVICE — SET ANGIO ACIST CVI ANGIOTOUCH

## (undated) DEVICE — DRESSING TELFA N ADH 3X8

## (undated) DEVICE — DRESSING AQUACEL FOAM 5 X 5

## (undated) DEVICE — KIT WRENCH

## (undated) DEVICE — SEALER LIGASURE IMPACT 18CM

## (undated) DEVICE — DRAPE FLUID WARMER ORS 44X44IN

## (undated) DEVICE — SUT SILK 2-0 STRANDS 30IN

## (undated) DEVICE — ADHESIVE DERMABOND ADVANCED

## (undated) DEVICE — DRESSING WND GZ 10X4X8X2IN

## (undated) DEVICE — SUT SILK 2-0 SH 18IN BLACK

## (undated) DEVICE — BOWL STERILE LARGE 32OZ

## (undated) DEVICE — STAPLER SKIN PROXIMATE WIDE